# Patient Record
Sex: FEMALE | Race: WHITE | Employment: PART TIME | URBAN - METROPOLITAN AREA
[De-identification: names, ages, dates, MRNs, and addresses within clinical notes are randomized per-mention and may not be internally consistent; named-entity substitution may affect disease eponyms.]

---

## 2017-08-21 DIAGNOSIS — O36.80X0 ENCOUNTER TO DETERMINE FETAL VIABILITY OF PREGNANCY, NOT APPLICABLE OR UNSPECIFIED FETUS: Primary | ICD-10-CM

## 2017-08-25 ENCOUNTER — PRENATAL OFFICE VISIT (OUTPATIENT)
Dept: OBGYN | Facility: CLINIC | Age: 27
End: 2017-08-25
Attending: OBSTETRICS & GYNECOLOGY
Payer: COMMERCIAL

## 2017-08-25 ENCOUNTER — RADIANT APPOINTMENT (OUTPATIENT)
Dept: ULTRASOUND IMAGING | Facility: CLINIC | Age: 27
End: 2017-08-25
Attending: OBSTETRICS & GYNECOLOGY
Payer: COMMERCIAL

## 2017-08-25 DIAGNOSIS — O36.80X0 PREGNANCY WITH UNCERTAIN FETAL VIABILITY, NOT APPLICABLE OR UNSPECIFIED FETUS: Primary | ICD-10-CM

## 2017-08-25 DIAGNOSIS — O36.80X0 ENCOUNTER TO DETERMINE FETAL VIABILITY OF PREGNANCY, NOT APPLICABLE OR UNSPECIFIED FETUS: ICD-10-CM

## 2017-08-25 LAB
ABO + RH BLD: NORMAL
ABO + RH BLD: NORMAL
BLD GP AB SCN SERPL QL: NORMAL
BLOOD BANK CMNT PATIENT-IMP: NORMAL
SPECIMEN EXP DATE BLD: NORMAL

## 2017-08-25 PROCEDURE — 99202 OFFICE O/P NEW SF 15 MIN: CPT | Performed by: OBSTETRICS & GYNECOLOGY

## 2017-08-25 PROCEDURE — 86901 BLOOD TYPING SEROLOGIC RH(D): CPT | Performed by: OBSTETRICS & GYNECOLOGY

## 2017-08-25 PROCEDURE — 84702 CHORIONIC GONADOTROPIN TEST: CPT | Performed by: OBSTETRICS & GYNECOLOGY

## 2017-08-25 PROCEDURE — 36415 COLL VENOUS BLD VENIPUNCTURE: CPT | Performed by: OBSTETRICS & GYNECOLOGY

## 2017-08-25 PROCEDURE — 86900 BLOOD TYPING SEROLOGIC ABO: CPT | Performed by: OBSTETRICS & GYNECOLOGY

## 2017-08-25 PROCEDURE — 76817 TRANSVAGINAL US OBSTETRIC: CPT | Performed by: OBSTETRICS & GYNECOLOGY

## 2017-08-25 PROCEDURE — 86850 RBC ANTIBODY SCREEN: CPT | Performed by: OBSTETRICS & GYNECOLOGY

## 2017-08-25 ASSESSMENT — ANXIETY QUESTIONNAIRES
6. BECOMING EASILY ANNOYED OR IRRITABLE: NEARLY EVERY DAY
GAD7 TOTAL SCORE: 12
3. WORRYING TOO MUCH ABOUT DIFFERENT THINGS: SEVERAL DAYS
5. BEING SO RESTLESS THAT IT IS HARD TO SIT STILL: MORE THAN HALF THE DAYS
2. NOT BEING ABLE TO STOP OR CONTROL WORRYING: SEVERAL DAYS
7. FEELING AFRAID AS IF SOMETHING AWFUL MIGHT HAPPEN: MORE THAN HALF THE DAYS
IF YOU CHECKED OFF ANY PROBLEMS ON THIS QUESTIONNAIRE, HOW DIFFICULT HAVE THESE PROBLEMS MADE IT FOR YOU TO DO YOUR WORK, TAKE CARE OF THINGS AT HOME, OR GET ALONG WITH OTHER PEOPLE: SOMEWHAT DIFFICULT
1. FEELING NERVOUS, ANXIOUS, OR ON EDGE: MORE THAN HALF THE DAYS

## 2017-08-25 ASSESSMENT — PATIENT HEALTH QUESTIONNAIRE - PHQ9
5. POOR APPETITE OR OVEREATING: SEVERAL DAYS
SUM OF ALL RESPONSES TO PHQ QUESTIONS 1-9: 15

## 2017-08-25 NOTE — PATIENT INSTRUCTIONS
Ultrasound images reviewed and concerns with dating discrepancy discussed at length.  Will check serial quantitative pregnancy hormone level (today and Monday) as well as blood type today.    Will return in one week for repeat viability ultrasound if numbers rise appropriately.  Miscarriage precautions reviewed in detail

## 2017-08-25 NOTE — PROGRESS NOTES
SUBJECTIVE:                                                   Dayana Moss is a 27 year old female who presents to clinic today for the following health issue(s):  Patient presents with:  Prenatal Care      HPI:  New patient --here today for new OB visit but found to have possible early missed AB.  Were not TTC but were not preventing.  Sure LMP and had been having regular, monthly menses q28d.  No vb/spotting/cramping.  Overall feeling pretty well.  First pregnancy.    Hx ADHD --stopped her adderall once she found out she was pregnant  Hx smoking --stopped once she found out she was pregnant  Hx ovarian cyst --right side 2yrs ago; some concern for torsion but never had surgery    US today shows possible IUP with enlarged yolk sac and ?CRL of 5+6wks, no FHT; should be 8+6wks by LMP  Discussed options of repeat US vs serial quantitative HCG --will check quant today and again Monday; if rising, will repeat US on Friday with me    Patient's last menstrual period was 2017..   Patient is sexually active, .  Using nothing for contraception.    reports that she has been smoking Cigarettes.  She started smoking about 14 years ago. She has been smoking about 0.50 packs per day. She does not have any smokeless tobacco history on file.    Today's PHQ-9 Score:   PHQ-9 SCORE 2017   Total Score 15     Today's JACINTO-7 Score:   JACINTO-7 SCORE 2017   Total Score 12       Problem list and histories reviewed & adjusted, as indicated.  Additional history: as documented.    Patient Active Problem List   Diagnosis     Ovarian mass     No past surgical history on file.   Social History   Substance Use Topics     Smoking status: Current Every Day Smoker     Packs/day: 0.50     Types: Cigarettes     Start date: 2003     Smokeless tobacco: Not on file     Alcohol use 8.4 oz/week     14 Cans of beer per week           Current Outpatient Prescriptions   Medication Sig     oxyCODONE-acetaminophen (PERCOCET)  5-325 MG per tablet Take 1-2 tablets by mouth every 4 hours as needed for moderate to severe pain     ibuprofen (ADVIL,MOTRIN) 600 MG tablet Take 1 tablet (600 mg) by mouth every 6 hours as needed for moderate pain     senna-docusate (SENOKOT-S;PERICOLACE) 8.6-50 MG per tablet Take 1 tablet by mouth 2 times daily     senna-docusate (SENOKOT-S;PERICOLACE) 8.6-50 MG per tablet Take 1 tablet by mouth 2 times daily as needed for constipation     ondansetron (ZOFRAN) 4 MG tablet Take 1 tablet (4 mg) by mouth every 8 hours as needed for nausea     Amphetamine-Dextroamphetamine (ADDERALL PO) Take 25 mg by mouth     No current facility-administered medications for this visit.      No Known Allergies    ROS:  12 point review of systems negative other than symptoms noted below.  Constitutional: Fatigue and Weight Gain  Breast: Tenderness  Gastrointestinal: Bloating, Constipation and Diarrhea  Genitourinary: Cramps, No Periods and Vaginal Discharge  Skin: Acne  Neurologic: Headaches  Psychiatric: Anxiety, Depression, Difficulty Sleeping and Quinn    OBJECTIVE:     LMP 06/24/2017      Exam:  Constitutional:  Appearance: Well nourished, well developed alert, in no acute distress  Neurologic/Psychiatric:  Mental Status:  Oriented X3      In-Clinic Test Results:  Results for orders placed or performed in visit on 08/25/17 (from the past 24 hour(s))   ABO/Rh type and screen   Result Value Ref Range    ABO A     RH(D) Pos     Antibody Screen Neg     Test Valid Only At [facility]        Specimen Expires 08/28/2017        ASSESSMENT/PLAN:                                                        ICD-10-CM    1. Pregnancy with uncertain fetal viability, not applicable or unspecified fetus O36.80X0 ABO/Rh type and screen     HCG quantitative pregnancy     HCG quantitative pregnancy       Patient Instructions   Ultrasound images reviewed and concerns with dating discrepancy discussed at length.  Will check serial  quantitative pregnancy hormone level (today and Monday) as well as blood type today.    Will return in one week for repeat viability ultrasound if numbers rise appropriately.  Miscarriage precautions reviewed in detail      Nia Casanova MD  [facility] [place]

## 2017-08-25 NOTE — MR AVS SNAPSHOT
After Visit Summary   8/25/2017    Dayana Moss    MRN: [id number]           Patient Information     Date Of Birth          1990        Visit Information        Provider Department      8/25/2017 2:00 PM Nia Casanova MD; WE TRIAGE [facility]        Today's Diagnoses     Pregnancy with uncertain fetal viability, not applicable or unspecified fetus    -  1      Care Instructions    Ultrasound images reviewed and concerns with dating discrepancy discussed at length.  Will check serial quantitative pregnancy hormone level (today and Monday) as well as blood type today.    Will return in one week for repeat viability ultrasound if numbers rise appropriately.  Miscarriage precautions reviewed in detail          Follow-ups after your visit        Follow-up notes from your care team     Return in about 1 week (around 9/1/2017) for viability ultrsaound.      Your next 10 appointments already scheduled     Aug 28, 2017  8:00 AM CDT   LAB with WE LAB   [facility] [place] ([facility] [place])    [address] [id number] [phone]           Patient must bring picture ID. Patient should be prepared to give a urine specimen  Please do not eat 10-12 hours before your appointment if you are coming in fasting for labs on lipids, cholesterol, or glucose (sugar). Pregnant women should follow their Care Team instructions. Water with medications is okay. Do not drink coffee or other fluids. If you have concerns about taking  your medications, please ask at office or if scheduling via [employer], send a message by clicking on Secure Messaging, Message Your Care Team.            Sep 01, 2017  3:30 PM CDT   US PELVIC COMPLETE W TRANSVAGINAL with WEUS1   [facility] [place] ([facility] [place])    [address]  [place] [address] [phone]           Please bring a list of your  medicines (including vitamins, minerals and over-the-counter drugs). Also, tell your doctor about any allergies you may have. Wear comfortable clothes and leave your valuables at home.  Adults: Drink six 8-ounce glasses of fluid one hour before your exam. Do NOT empty your bladder.  If you need to empty your bladder before your exam, try to release only a little bit of urine. Then, drink another 8oz glass of fluid.  Children: Children who are potty trained should drink at least 4 cups (32 oz) of liquid 45 minutes to one hour prior to the exam. The child s bladder must be full in order to achieve a diagnostic exam. If your child is very uncomfortable or has an urgent need to pee, please notify a technologist; they will try to find out how much longer the wait may be and provide instructions to help relieve the pressure. Occasionally it is medically necessary to insert a urinary catheter to fill the bladder.  Please call the Imaging Department at your exam site with any questions.            Sep 01, 2017  4:00 PM CDT   SHORT with Nia Casanova MD   [facility] [place] ([facility])    [address] [place] [address]   [phone]              Future tests that were ordered for you today     Open Future Orders        Priority Expected Expires Ordered    HCG quantitative pregnancy Routine 8/28/2017 9/25/2017 8/25/2017            Who to contact     If you have questions or need follow up information about today's clinic visit or your schedule please contact [facility] [place] directly at [phone].  Normal or non-critical lab and imaging results will be communicated to you by MyChart, letter or phone within 4 business days after the clinic has received the results. If you do not hear from us within 7 days, please contact the clinic through MyChart or phone. If you have a critical or abnormal lab result, we will notify you by phone as soon as  "possible.  Submit refill requests through [email or web address] or call your pharmacy and they will forward the refill request to us. Please allow 3 business days for your refill to be completed.          Additional Information About Your Visit        [employer] Information     [email or web address] lets you send messages to your doctor, view your test results, renew your prescriptions, schedule appointments and more. To sign up, go to [email or web address] . Click on \"Log in\" on the left side of the screen, which will take you to the Welcome page. Then click on \"Sign up Now\" on the right side of the page.     You will be asked to enter the access code listed below, as well as some personal information. Please follow the directions to create your username and password.     Your access code is: [email or web address]  Expires: 2017  1:23 PM     Your access code will  in 90 days. If you need help or a new code, please call your [place] clinic or [phone].        Care EveryWhere ID     This is your Care EveryWhere ID. This could be used by other organizations to access your [place] medical records  [id number]        Your Vitals Were     Last Period                   2017            Blood Pressure from Last 3 Encounters:   08/16/15 97/57    Weight from Last 3 Encounters:   08/15/15 135 lb (61.2 kg)              We Performed the Following     ABO/Rh type and screen     HCG quantitative pregnancy        Primary Care Provider    Physician No Ref-Primary       No address on file        Equal Access to Services     COLLIN PORTILLO : Hadii brandon ku hadasho Sojbali, waaxda luqadaha, qaybta kaalmada adeegyada, jyothi vargas . So [facility] [phone].    ATENCIÓN: Si habla español, tiene a dumont disposición servicios gratuitos de asistencia lingüística. Llame al [phone].    We comply with applicable federal civil rights laws and [place] laws. We do not discriminate on the basis of race, color, national origin, age, " disability sex, sexual orientation or gender identity.            Thank you!     Thank you for choosing [facility] [place]  for your care. Our goal is always to provide you with excellent care. Hearing back from our patients is one way we can continue to improve our services. Please take a few minutes to complete the written survey that you may receive in the mail after your visit with us. Thank you!             Your Updated Medication List - Protect others around you: Learn how to safely use, store and throw away your medicines at www.disposemymeds.org.          This list is accurate as of: 8/25/17  5:26 PM.  Always use your most recent med list.                   Brand Name Dispense Instructions for use Diagnosis    ADDERALL PO      Take 25 mg by mouth        ibuprofen 600 MG tablet    ADVIL/MOTRIN    20 tablet    Take 1 tablet (600 mg) by mouth every 6 hours as needed for moderate pain    Acute abdominal pain       ondansetron 4 MG tablet    ZOFRAN    18 tablet    Take 1 tablet (4 mg) by mouth every 8 hours as needed for nausea    Nausea       oxyCODONE-acetaminophen 5-325 MG per tablet    PERCOCET    40 tablet    Take 1-2 tablets by mouth every 4 hours as needed for moderate to severe pain    Acute abdominal pain       * senna-docusate 8.6-50 MG per tablet    SENOKOT-S;PERICOLACE    60 tablet    Take 1 tablet by mouth 2 times daily    Acute abdominal pain       * senna-docusate 8.6-50 MG per tablet    SENOKOT-S;PERICOLACE    60 tablet    Take 1 tablet by mouth 2 times daily as needed for constipation    Acute abdominal pain       * Notice:  This list has 2 medication(s) that are the same as other medications prescribed for you. Read the directions carefully, and ask your doctor or other care provider to review them with you.

## 2017-08-26 LAB — B-HCG SERPL-ACNC: ABNORMAL IU/L (ref 0–5)

## 2017-08-26 ASSESSMENT — ANXIETY QUESTIONNAIRES: GAD7 TOTAL SCORE: 12

## 2017-08-28 ENCOUNTER — TELEPHONE (OUTPATIENT)
Dept: OBGYN | Facility: CLINIC | Age: 27
End: 2017-08-28

## 2017-08-28 DIAGNOSIS — O36.80X0 PREGNANCY WITH UNCERTAIN FETAL VIABILITY, NOT APPLICABLE OR UNSPECIFIED FETUS: ICD-10-CM

## 2017-08-28 LAB — B-HCG SERPL-ACNC: ABNORMAL IU/L (ref 0–5)

## 2017-08-28 PROCEDURE — 84702 CHORIONIC GONADOTROPIN TEST: CPT | Performed by: OBSTETRICS & GYNECOLOGY

## 2017-08-28 PROCEDURE — 36415 COLL VENOUS BLD VENIPUNCTURE: CPT | Performed by: OBSTETRICS & GYNECOLOGY

## 2017-08-28 NOTE — TELEPHONE ENCOUNTER
Called Dayana with HCG level results, pt notified    Has viability ultrasound scheduled for this Friday (9/1). Will double check with Dr. Casanova to see if that is still the plan. Will call pt if plan changes.

## 2017-08-28 NOTE — MR AVS SNAPSHOT
After Visit Summary   8/28/2017    Dayana Moss    MRN: [id number]           Patient Information     Date Of Birth          1990        Visit Information        Provider Department      8/28/2017 8:00 AM WE LAB [facility] [place]        Today's Diagnoses     Pregnancy with uncertain fetal viability, not applicable or unspecified fetus           Follow-ups after your visit        Your next 10 appointments already scheduled     Sep 01, 2017  3:30 PM CDT   US PELVIC COMPLETE W TRANSVAGINAL with WEUS1   [facility] [place] ([facility] [place])    [address]  [place] [address]   [phone]           Please bring a list of your medicines (including vitamins, minerals and over-the-counter drugs). Also, tell your doctor about any allergies you may have. Wear comfortable clothes and leave your valuables at home.  Adults: Drink six 8-ounce glasses of fluid one hour before your exam. Do NOT empty your bladder.  If you need to empty your bladder before your exam, try to release only a little bit of urine. Then, drink another 8oz glass of fluid.  Children: Children who are potty trained should drink at least 4 cups (32 oz) of liquid 45 minutes to one hour prior to the exam. The child s bladder must be full in order to achieve a diagnostic exam. If your child is very uncomfortable or has an urgent need to pee, please notify a technologist; they will try to find out how much longer the wait may be and provide instructions to help relieve the pressure. Occasionally it is medically necessary to insert a urinary catheter to fill the bladder.  Please call the Imaging Department at your exam site with any questions.            Sep 01, 2017  4:00 PM CDT   SHORT with Nia Casanova MD   [facility] [place] ([facility] [place])    [address]  [place] [id number] [phone]              Who to  "contact     If you have questions or need follow up information about today's clinic visit or your schedule please contact [facility] [place] directly at [phone].  Normal or non-critical lab and imaging results will be communicated to you by MyChart, letter or phone within 4 business days after the clinic has received the results. If you do not hear from us within 7 days, please contact the clinic through MyChart or phone. If you have a critical or abnormal lab result, we will notify you by phone as soon as possible.  Submit refill requests through [employer] or call your pharmacy and they will forward the refill request to us. Please allow 3 business days for your refill to be completed.          Additional Information About Your Visit        [employer] Information     [employer] lets you send messages to your doctor, view your test results, renew your prescriptions, schedule appointments and more. To sign up, go to [email or web address] . Click on \"Log in\" on the left side of the screen, which will take you to the Welcome page. Then click on \"Sign up Now\" on the right side of the page.     You will be asked to enter the access code listed below, as well as some personal information. Please follow the directions to create your username and password.     Your access code is: [email or web address]  Expires: 2017  1:23 PM     Your access code will  in 90 days. If you need help or a new code, please call your [place] clinic or [phone].        Care EveryWhere ID     This is your Care EveryWhere ID. This could be used by other organizations to access your [place] medical records  [id number]        Your Vitals Were     Last Period                   2017            Blood Pressure from Last 3 Encounters:   08/16/15 97/57    Weight from Last 3 Encounters:   08/15/15 135 lb (61.2 kg)              We Performed the Following     HCG quantitative pregnancy        Primary Care Provider    Physician No " Ref-Primary       No address on file        Equal Access to Services     LEONARDA LINDSEY : Hadii brandon duncan lupillo Mustafa, waverada luqadaha, qaybta kaalterrence jluisjena, jyothi maryin hayaadavian bazzilamberto neal sam spring. So [facility] [phone].    ATENCIÓN: Si habla español, tiene a dumont disposición servicios gratuitos de asistencia lingüística. [facility] [phone].    We comply with applicable federal civil rights laws and [place] laws. We do not discriminate on the basis of race, color, national origin, age, disability sex, sexual orientation or gender identity.            Thank you!     Thank you for choosing [facility]  for your care. Our goal is always to provide you with excellent care. Hearing back from our patients is one way we can continue to improve our services. Please take a few minutes to complete the written survey that you may receive in the mail after your visit with us. Thank you!             Your Updated Medication List - Protect others around you: Learn how to safely use, store and throw away your medicines at www.disposemymeds.org.          This list is accurate as of: 8/28/17  8:12 AM.  Always use your most recent med list.                   Brand Name Dispense Instructions for use Diagnosis    ADDERALL PO      Take 25 mg by mouth        ibuprofen 600 MG tablet    ADVIL/MOTRIN    20 tablet    Take 1 tablet (600 mg) by mouth every 6 hours as needed for moderate pain    Acute abdominal pain       ondansetron 4 MG tablet    ZOFRAN    18 tablet    Take 1 tablet (4 mg) by mouth every 8 hours as needed for nausea    Nausea       oxyCODONE-acetaminophen 5-325 MG per tablet    PERCOCET    40 tablet    Take 1-2 tablets by mouth every 4 hours as needed for moderate to severe pain    Acute abdominal pain       * senna-docusate 8.6-50 MG per tablet    SENOKOT-S;PERICOLACE    60 tablet    Take 1 tablet by mouth 2 times daily    Acute abdominal pain       * senna-docusate 8.6-50 MG per tablet     SENOKOT-S;PERICOLACE    60 tablet    Take 1 tablet by mouth 2 times daily as needed for constipation    Acute abdominal pain       * Notice:  This list has 2 medication(s) that are the same as other medications prescribed for you. Read the directions carefully, and ask your doctor or other care provider to review them with you.

## 2017-08-29 NOTE — TELEPHONE ENCOUNTER
We will not need to repeat the viability ultrasound given the drop in her pregnancy hormone consistent with miscarriage.  We will rather discuss their options moving forward --she can try to move this appointment up to tomorrow with me if she would like.  Can otherwise keep it on Friday

## 2017-08-29 NOTE — PROGRESS NOTES
MA Mirlande Dulon called patient with results and unfortunate diagnosis of miscarriage.  Will still follow up with me later this week to discuss management options.  Offered earlier appointment but patient unable to change due to work schedule.

## 2017-08-29 NOTE — TELEPHONE ENCOUNTER
Pt agrees with plan, is not able to come in tomorrow so will keep appt Friday to go over options. I will cancel ultrasound appt Friday.

## 2017-09-01 ENCOUNTER — OFFICE VISIT (OUTPATIENT)
Dept: OBGYN | Facility: CLINIC | Age: 27
End: 2017-09-01
Payer: COMMERCIAL

## 2017-09-01 VITALS — WEIGHT: 135 LBS | SYSTOLIC BLOOD PRESSURE: 102 MMHG | DIASTOLIC BLOOD PRESSURE: 64 MMHG

## 2017-09-01 DIAGNOSIS — O02.1 MISSED ABORTION: Primary | ICD-10-CM

## 2017-09-01 DIAGNOSIS — F90.2 ATTENTION DEFICIT HYPERACTIVITY DISORDER (ADHD), COMBINED TYPE: ICD-10-CM

## 2017-09-01 PROCEDURE — 99215 OFFICE O/P EST HI 40 MIN: CPT | Performed by: OBSTETRICS & GYNECOLOGY

## 2017-09-01 RX ORDER — ONDANSETRON 4 MG/1
4 TABLET, FILM COATED ORAL EVERY 8 HOURS PRN
Qty: 10 TABLET | Refills: 0 | Status: SHIPPED | OUTPATIENT
Start: 2017-09-01 | End: 2018-02-07

## 2017-09-01 RX ORDER — MISOPROSTOL 200 UG/1
800 TABLET ORAL ONCE
Qty: 4 TABLET | Refills: 1 | Status: SHIPPED | OUTPATIENT
Start: 2017-09-01 | End: 2017-09-01

## 2017-09-01 RX ORDER — HYDROCODONE BITARTRATE AND ACETAMINOPHEN 5; 325 MG/1; MG/1
1 TABLET ORAL EVERY 6 HOURS PRN
Qty: 10 TABLET | Refills: 0 | Status: SHIPPED | OUTPATIENT
Start: 2017-09-01 | End: 2018-02-07

## 2017-09-01 NOTE — MR AVS SNAPSHOT
After Visit Summary   2017    Dayana Moss    MRN: [id number]           Patient Information     Date Of Birth          1990        Visit Information        Provider Department      2017 3:30 PM Nia Casanova MD [facility]        Today's Diagnoses     Missed     -  1    Attention deficit hyperactivity disorder (ADHD), combined type          Care Instructions    Long discussion today regarding management options of miscarriage.  Discussed r/b/a to each including expectant management vs medical management with vaginal cytotec vs surgical management with suction D&C.  Yumiko would like to try medication over the weekend.  Explained expected results with heavier bleeding and cramping.  Nausea and pain medications also given.  Bleeding/pain precautions discussed.  I will follow up with Yumiko by phone on Monday.  If successful, will check home UPT in 2 weeks and call if still positive to arrange lab visit for quant.  If negative, will await one menses before resuming TTC.  Several questions answered including management of other medical issues/concerns/medications with future pregnancy, health habits before conception, sleep issues, mood issues, etc          Follow-ups after your visit        Additional Services     MENTAL HEALTH REFERRAL       Your provider has referred you to: [facility]: [facility] - ADHD & Bariatric Assessments - Adult ADHD Evaluations - [facility] [phone]   [email or web address]   *Please call to schedule an appointment.    All scheduling is subject to the client's specific insurance plan & benefits, provider/location availability, and provider clinical specialities.  Please arrive 15 minutes early for your first appointment and bring your completed paperwork.    Please be aware that coverage of these services is subject to the terms and limitations  "of your health insurance plan.  Call member services at your health plan with any benefit or coverage questions.                  Follow-up notes from your care team     Return if symptoms worsen or fail to improve.      Who to contact     If you have questions or need follow up information about today's clinic visit or your schedule please contact [facility] directly at [phone].  Normal or non-critical lab and imaging results will be communicated to you by MyChart, letter or phone within 4 business days after the clinic has received the results. If you do not hear from us within 7 days, please contact the clinic through [place] [employer] or phone. If you have a critical or abnormal lab result, we will notify you by phone as soon as possible.  Submit refill requests through Wheelz or call your pharmacy and they will forward the refill request to us. Please allow 3 business days for your refill to be completed.          Additional Information About Your Visit        Wheelz Information     Wheelz lets you send messages to your doctor, view your test results, renew your prescriptions, schedule appointments and more. To sign up, go to [email or web address] . Click on \"Log in\" on the left side of the screen, which will take you to the Welcome page. Then click on \"Sign up Now\" on the right side of the page.     You will be asked to enter the access code listed below, as well as some personal information. Please follow the directions to create your username and password.     Your access code is: [email or web address]  Expires: 2017  1:23 PM     Your access code will  in 90 days. If you need help or a new code, please call your [facility] clinic or [phone].        Care EveryWhere ID     This is your Care EveryWhere ID. This could be used by other organizations to access your [facility] medical records  [id number]        Your Vitals Were     Last Period Breastfeeding?                2017 No   "         Blood Pressure from Last 3 Encounters:   17 102/64   08/16/15 97/57    Weight from Last 3 Encounters:   17 135 lb (61.2 kg)   08/15/15 135 lb (61.2 kg)              We Performed the Following     MENTAL HEALTH REFERRAL          Today's Medication Changes          These changes are accurate as of: 17  7:16 PM.  If you have any questions, ask your nurse or doctor.               Start taking these medicines.        Dose/Directions    HYDROcodone-acetaminophen 5-325 MG per tablet   Commonly known as:  NORCO   Used for:  Missed    Started by:  Nia Casanova MD        Dose:  1 tablet   Take 1 tablet by mouth every 6 hours as needed for moderate to severe pain   Quantity:  10 tablet   Refills:  0       misoprostol 200 MCG tablet   Commonly known as:  CYTOTEC   Used for:  Missed    Started by:  Nia Casanova MD        Dose:  800 mcg   Place 4 tablets (800 mcg) vaginally once for 1 dose   Quantity:  4 tablet   Refills:  1         These medicines have changed or have updated prescriptions.        Dose/Directions    * ondansetron 4 MG tablet   Commonly known as:  ZOFRAN   This may have changed:  Another medication with the same name was added. Make sure you understand how and when to take each.   Used for:  Nausea        Dose:  4 mg   Take 1 tablet (4 mg) by mouth every 8 hours as needed for nausea   Quantity:  18 tablet   Refills:  0       * ondansetron 4 MG tablet   Commonly known as:  ZOFRAN   This may have changed:  You were already taking a medication with the same name, and this prescription was added. Make sure you understand how and when to take each.   Used for:  Missed    Changed by:  Nia Casanova MD        Dose:  4 mg   Take 1 tablet (4 mg) by mouth every 8 hours as needed for nausea   Quantity:  10 tablet   Refills:  0       * Notice:  This list has 2 medication(s) that are the same as other medications prescribed for you. Read the directions  carefully, and ask your doctor or other care provider to review them with you.         Where to get your medicines      Some of these will need a paper prescription and others can be bought over the counter.  Ask your nurse if you have questions.     Bring a paper prescription for each of these medications     HYDROcodone-acetaminophen 5-325 MG per tablet    misoprostol 200 MCG tablet    ondansetron 4 MG tablet                Primary Care Provider    Physician No Ref-Primary       No address on file        Equal Access to Services     LEONARDA PORTILLO : Hadii brandon ku cassyo Sobjali, waaxda luqadaha, qaybta kaalmada adeeglocda, waxay mariya bryantdavian thomson jackyrobert spring. So [facility] [phone].    ATENCIÓN: Si rasheeda sheridan, tiene a dumont disposición servicios gratuitos de asistencia lingüística. Llame al [phone].    We comply with applicable federal civil rights laws and [place] laws. We do not discriminate on the basis of race, color, national origin, age, disability sex, sexual orientation or gender identity.            Thank you!     Thank you for choosing [facility]  for your care. Our goal is always to provide you with excellent care. Hearing back from our patients is one way we can continue to improve our services. Please take a few minutes to complete the written survey that you may receive in the mail after your visit with us. Thank you!             Your Updated Medication List - Protect others around you: Learn how to safely use, store and throw away your medicines at www.disposemymeds.org.          This list is accurate as of: 17  7:16 PM.  Always use your most recent med list.                   Brand Name Dispense Instructions for use Diagnosis    ADDERALL PO      Take 25 mg by mouth        HYDROcodone-acetaminophen 5-325 MG per tablet    NORCO    10 tablet    Take 1 tablet by mouth every 6 hours as needed for moderate to severe pain    Missed        ibuprofen 600 MG tablet     ADVIL/MOTRIN    20 tablet    Take 1 tablet (600 mg) by mouth every 6 hours as needed for moderate pain    Acute abdominal pain       misoprostol 200 MCG tablet    CYTOTEC    4 tablet    Place 4 tablets (800 mcg) vaginally once for 1 dose    Missed        * ondansetron 4 MG tablet    ZOFRAN    18 tablet    Take 1 tablet (4 mg) by mouth every 8 hours as needed for nausea    Nausea       * ondansetron 4 MG tablet    ZOFRAN    10 tablet    Take 1 tablet (4 mg) by mouth every 8 hours as needed for nausea    Missed        oxyCODONE-acetaminophen 5-325 MG per tablet    PERCOCET    40 tablet    Take 1-2 tablets by mouth every 4 hours as needed for moderate to severe pain    Acute abdominal pain       * senna-docusate 8.6-50 MG per tablet    SENOKOT-S;PERICOLACE    60 tablet    Take 1 tablet by mouth 2 times daily    Acute abdominal pain       * senna-docusate 8.6-50 MG per tablet    SENOKOT-S;PERICOLACE    60 tablet    Take 1 tablet by mouth 2 times daily as needed for constipation    Acute abdominal pain       * Notice:  This list has 4 medication(s) that are the same as other medications prescribed for you. Read the directions carefully, and ask your doctor or other care provider to review them with you.

## 2017-09-01 NOTE — PROGRESS NOTES
SUBJECTIVE:                                                   Dayana Moss is a 27 year old female who presents to clinic today for the following health issue(s):  Patient presents with:  Consult: f/u miscarriage      HPI:  Here today for discussion regarding abnormal pregnancy.  Was seen last week for new OB visit and found to have only yolk sac and GS c/w 5+6wks when she should have been >8wks by LMP.  Very questionable fetal pole and no FHT.  Serial quants last week showed dropping levels ([id number]).  Here today to discuss options.  No vb/spotting/cramping  Still quite upset and saddened by loss   very supportive  Would like to wait but have their wedding reception on  and a week vacation/honeymoon the following week  -also several questions about medications, ADHD, sleep issues, preconception modifications, early pregnancy recommendations, etc.  Hx ADHD --stopped her adderall when she found out she was pregnant and not doing well without meds --especially at work.  No longer has psychiatrist due to insurance change and asking for referral/recommendations    Patient's last menstrual period was 2017..   Patient is sexually active, .  Using none for contraception.    reports that she has been smoking Cigarettes.  She started smoking about 14 years ago. She has been smoking about 0.50 packs per day. She has never used smokeless tobacco.      STD testing offered?  Declined    Health maintenance updated:  yes    Today's PHQ-2 Score: No flowsheet data found.  Today's PHQ-9 Score:   PHQ-9 SCORE 2017   Total Score 15     Today's JACINTO-7 Score:   JACINTO-7 SCORE 2017   Total Score 12       Problem list and histories reviewed & adjusted, as indicated.  Additional history: as documented.    Patient Active Problem List   Diagnosis     Ovarian mass     ADHD     Past Surgical History:   Procedure Laterality Date     NO HISTORY OF SURGERY        Social History   Substance Use Topics      Smoking status: Current Every Day Smoker     Packs/day: 0.50     Types: Cigarettes     Start date: 5/7/2003     Smokeless tobacco: Never Used     Alcohol use 8.4 oz/week     14 Cans of beer per week           Current Outpatient Prescriptions   Medication Sig     misoprostol (CYTOTEC) 200 MCG tablet Place 4 tablets (800 mcg) vaginally once for 1 dose     ondansetron (ZOFRAN) 4 MG tablet Take 1 tablet (4 mg) by mouth every 8 hours as needed for nausea     HYDROcodone-acetaminophen (NORCO) 5-325 MG per tablet Take 1 tablet by mouth every 6 hours as needed for moderate to severe pain     ibuprofen (ADVIL,MOTRIN) 600 MG tablet Take 1 tablet (600 mg) by mouth every 6 hours as needed for moderate pain     senna-docusate (SENOKOT-S;PERICOLACE) 8.6-50 MG per tablet Take 1 tablet by mouth 2 times daily     senna-docusate (SENOKOT-S;PERICOLACE) 8.6-50 MG per tablet Take 1 tablet by mouth 2 times daily as needed for constipation     ondansetron (ZOFRAN) 4 MG tablet Take 1 tablet (4 mg) by mouth every 8 hours as needed for nausea     Amphetamine-Dextroamphetamine (ADDERALL PO) Take 25 mg by mouth     oxyCODONE-acetaminophen (PERCOCET) 5-325 MG per tablet Take 1-2 tablets by mouth every 4 hours as needed for moderate to severe pain (Patient not taking: Reported on 9/1/2017)     No current facility-administered medications for this visit.      No Known Allergies    ROS:  12 point review of systems negative other than symptoms noted below.    OBJECTIVE:     /64  Wt 135 lb (61.2 kg)  LMP 06/24/2017  Breastfeeding? No  There is no height or weight on file to calculate BMI.    Exam:  Constitutional:  Appearance: Well nourished, well developed alert, in no acute distress  Neurologic/Psychiatric:  Mental Status:  Oriented X3      In-Clinic Test Results:  No results found for this or any previous visit (from the past 24 hour(s)).    ASSESSMENT/PLAN:                                                        ICD-10-CM    1.  Missed  O02.1 misoprostol (CYTOTEC) 200 MCG tablet     ondansetron (ZOFRAN) 4 MG tablet     HYDROcodone-acetaminophen (NORCO) 5-325 MG per tablet   2. Attention deficit hyperactivity disorder (ADHD), combined type F90.2 MENTAL HEALTH REFERRAL    already carries dx but would like psychiatrist to help with meds       Patient Instructions   Long discussion today regarding management options of miscarriage.  Discussed r/b/a to each including expectant management vs medical management with vaginal cytotec vs surgical management with suction D&C.  Yumiko would like to try medication over the weekend.  Explained expected results with heavier bleeding and cramping.  Nausea and pain medications also given.  Bleeding/pain precautions discussed.  I will follow up with Yumiko by phone on Monday.  If successful, will check home UPT in 2 weeks and call if still positive to arrange lab visit for quant.  If negative, will await one menses before resuming TTC.  Several questions answered including management of other medical issues/concerns/medications with future pregnancy, health habits before conception, sleep issues, mood issues, etc      40 min spent in discussion as above; all spent in counseling    Nia Casanova MD  [facility]

## 2017-09-02 ENCOUNTER — NURSE TRIAGE (OUTPATIENT)
Dept: NURSING | Facility: CLINIC | Age: 27
End: 2017-09-02

## 2017-09-02 NOTE — PATIENT INSTRUCTIONS
Long discussion today regarding management options of miscarriage.  Discussed r/b/a to each including expectant management vs medical management with vaginal cytotec vs surgical management with suction D&C.  Yumiko would like to try medication over the weekend.  Explained expected results with heavier bleeding and cramping.  Nausea and pain medications also given.  Bleeding/pain precautions discussed.  I will follow up with Yumiko by phone on Monday.  If successful, will check home UPT in 2 weeks and call if still positive to arrange lab visit for quant.  If negative, will await one menses before resuming TTC.  Several questions answered including management of other medical issues/concerns/medications with future pregnancy, health habits before conception, sleep issues, mood issues, etc

## 2017-09-02 NOTE — TELEPHONE ENCOUNTER
Pt states she has more vaginal bleeding today, and questions if she should take the medication that Dr. Casanova prescribed, and insisted on paging provider.    Reason for Disposition    Caller has URGENT question and triager unable to answer question    Additional Information    Negative: Shock suspected (e.g., cold/pale/clammy skin, too weak to stand, low BP, rapid pulse)    Negative: Difficult to awaken or acting confused  (e.g., disoriented, slurred speech)    Negative: Passed out (i.e., lost consciousness, collapsed and was not responding)    Negative: Sounds like a life-threatening emergency to the triager    Negative: [1] Threatened miscarriage (threatened ) suspected AND [2] has not been examined by a HCP    Negative: [1] Abdominal pain is main symptom and [2] NO vaginal bleeding in past 24 hours    Negative: [1] Vaginal bleeding is main symptom and [2] more than 4 weeks since medical visit    Negative: Not pregnant or pregnancy status unknown    Negative: SEVERE abdominal pain    Negative: [1] SEVERE vaginal bleeding(i.e., soaking 2 pads / hour, large blood clots) AND [2] present 2 or more hours    Negative: [1] MODERATE vaginal bleeding (i.e., soaking 1 pad / hour; clots) AND [2] present > 6 hours    Negative: Passed tissue (e.g., gray-white)    Negative: Pale skin (pallor) of new onset or worsening    Negative: Patient sounds very sick or weak to the triager    Negative: [1] Constant abdominal pain AND [2] present > 2 hours    Protocols used:  - THREATENED MISCARRIAGE FOLLOW-UP CALL-[facility]    Paged Dr. Casanova via Smart web at 5:13 pm Call pt Dayana Greenbergadanjenslarisa,  88, MRN [id number], increased vaginal bleeding today from miscarriage and requested page to confirm if she should take medications as directed or not. Thanks!    Jeanie Kennedy, RN, BSN  [facility]

## 2017-09-03 ENCOUNTER — APPOINTMENT (OUTPATIENT)
Dept: ULTRASOUND IMAGING | Facility: CLINIC | Age: 27
End: 2017-09-03
Attending: EMERGENCY MEDICINE
Payer: COMMERCIAL

## 2017-09-03 ENCOUNTER — HOSPITAL ENCOUNTER (EMERGENCY)
Facility: CLINIC | Age: 27
Discharge: HOME | End: 2017-09-03
Attending: EMERGENCY MEDICINE | Admitting: EMERGENCY MEDICINE
Payer: COMMERCIAL

## 2017-09-03 VITALS
RESPIRATION RATE: 16 BRPM | SYSTOLIC BLOOD PRESSURE: 106 MMHG | DIASTOLIC BLOOD PRESSURE: 58 MMHG | HEART RATE: 65 BPM | TEMPERATURE: 98.4 F | OXYGEN SATURATION: 99 %

## 2017-09-03 DIAGNOSIS — O03.9 ABORTION, COMPLETE: ICD-10-CM

## 2017-09-03 LAB
ANION GAP SERPL CALCULATED.3IONS-SCNC: 12 MMOL/L (ref 3–14)
BASOPHILS # BLD AUTO: 0 10E9/L (ref 0–0.2)
BASOPHILS NFR BLD AUTO: 0.1 %
BUN SERPL-MCNC: 12 MG/DL (ref 7–30)
CALCIUM SERPL-MCNC: 8.7 MG/DL (ref 8.5–10.1)
CHLORIDE SERPL-SCNC: 105 MMOL/L (ref 94–109)
CO2 SERPL-SCNC: 22 MMOL/L (ref 20–32)
CREAT SERPL-MCNC: 0.57 MG/DL (ref 0.52–1.04)
DIFFERENTIAL METHOD BLD: ABNORMAL
EOSINOPHIL # BLD AUTO: 0.2 10E9/L (ref 0–0.7)
EOSINOPHIL NFR BLD AUTO: 1.3 %
ERYTHROCYTE [DISTWIDTH] IN BLOOD BY AUTOMATED COUNT: 11.3 % (ref 10–15)
GFR SERPL CREATININE-BSD FRML MDRD: >90 ML/MIN/1.7M2
GLUCOSE SERPL-MCNC: 130 MG/DL (ref 70–99)
HCT VFR BLD AUTO: 40 % (ref 35–47)
HGB BLD-MCNC: 13.8 G/DL (ref 11.7–15.7)
IMM GRANULOCYTES # BLD: 0 10E9/L (ref 0–0.4)
IMM GRANULOCYTES NFR BLD: 0.3 %
LYMPHOCYTES # BLD AUTO: 1.5 10E9/L (ref 0.8–5.3)
LYMPHOCYTES NFR BLD AUTO: 11.1 %
MCH RBC QN AUTO: 32.3 PG (ref 26.5–33)
MCHC RBC AUTO-ENTMCNC: 34.5 G/DL (ref 31.5–36.5)
MCV RBC AUTO: 94 FL (ref 78–100)
MONOCYTES # BLD AUTO: 0.6 10E9/L (ref 0–1.3)
MONOCYTES NFR BLD AUTO: 4.3 %
NEUTROPHILS # BLD AUTO: 11.3 10E9/L (ref 1.6–8.3)
NEUTROPHILS NFR BLD AUTO: 82.9 %
NRBC # BLD AUTO: 0 10*3/UL
NRBC BLD AUTO-RTO: 0 /100
PLATELET # BLD AUTO: 201 10E9/L (ref 150–450)
POTASSIUM SERPL-SCNC: 3.9 MMOL/L (ref 3.4–5.3)
RBC # BLD AUTO: 4.27 10E12/L (ref 3.8–5.2)
SODIUM SERPL-SCNC: 139 MMOL/L (ref 133–144)
WBC # BLD AUTO: 13.6 10E9/L (ref 4–11)

## 2017-09-03 PROCEDURE — 99285 EMERGENCY DEPT VISIT HI MDM: CPT | Mod: 25

## 2017-09-03 PROCEDURE — 96375 TX/PRO/DX INJ NEW DRUG ADDON: CPT

## 2017-09-03 PROCEDURE — 96374 THER/PROPH/DIAG INJ IV PUSH: CPT

## 2017-09-03 PROCEDURE — 25000128 H RX IP 250 OP 636: Performed by: EMERGENCY MEDICINE

## 2017-09-03 PROCEDURE — 85025 COMPLETE CBC W/AUTO DIFF WBC: CPT | Performed by: EMERGENCY MEDICINE

## 2017-09-03 PROCEDURE — 80048 BASIC METABOLIC PNL TOTAL CA: CPT | Performed by: EMERGENCY MEDICINE

## 2017-09-03 PROCEDURE — 96361 HYDRATE IV INFUSION ADD-ON: CPT | Mod: 59

## 2017-09-03 PROCEDURE — 99285 EMERGENCY DEPT VISIT HI MDM: CPT | Mod: Z6 | Performed by: EMERGENCY MEDICINE

## 2017-09-03 PROCEDURE — 76801 OB US < 14 WKS SINGLE FETUS: CPT

## 2017-09-03 RX ORDER — OXYCODONE AND ACETAMINOPHEN 5; 325 MG/1; MG/1
1-2 TABLET ORAL EVERY 4 HOURS PRN
Qty: 15 TABLET | Refills: 0 | Status: SHIPPED | OUTPATIENT
Start: 2017-09-03 | End: 2018-02-07

## 2017-09-03 RX ORDER — SODIUM CHLORIDE 9 MG/ML
1000 INJECTION, SOLUTION INTRAVENOUS CONTINUOUS
Status: DISCONTINUED | OUTPATIENT
Start: 2017-09-03 | End: 2017-09-03 | Stop reason: HOSPADM

## 2017-09-03 RX ORDER — IBUPROFEN 800 MG/1
800 TABLET, FILM COATED ORAL EVERY 8 HOURS PRN
Qty: 60 TABLET | Refills: 0 | Status: SHIPPED | OUTPATIENT
Start: 2017-09-03 | End: 2017-09-11

## 2017-09-03 RX ORDER — KETOROLAC TROMETHAMINE 30 MG/ML
30 INJECTION, SOLUTION INTRAMUSCULAR; INTRAVENOUS ONCE
Status: COMPLETED | OUTPATIENT
Start: 2017-09-03 | End: 2017-09-03

## 2017-09-03 RX ORDER — LORAZEPAM 2 MG/ML
1 INJECTION INTRAMUSCULAR ONCE
Status: COMPLETED | OUTPATIENT
Start: 2017-09-03 | End: 2017-09-03

## 2017-09-03 RX ORDER — HYDROMORPHONE HYDROCHLORIDE 1 MG/ML
0.5 INJECTION, SOLUTION INTRAMUSCULAR; INTRAVENOUS; SUBCUTANEOUS
Status: DISCONTINUED | OUTPATIENT
Start: 2017-09-03 | End: 2017-09-03 | Stop reason: HOSPADM

## 2017-09-03 RX ADMIN — SODIUM CHLORIDE 1000 ML: 9 INJECTION, SOLUTION INTRAVENOUS at 15:55

## 2017-09-03 RX ADMIN — KETOROLAC TROMETHAMINE 30 MG: 30 INJECTION, SOLUTION INTRAMUSCULAR at 16:10

## 2017-09-03 RX ADMIN — LORAZEPAM 1 MG: 2 INJECTION INTRAMUSCULAR; INTRAVENOUS at 16:00

## 2017-09-03 RX ADMIN — HYDROMORPHONE HYDROCHLORIDE 0.5 MG: 1 INJECTION, SOLUTION INTRAMUSCULAR; INTRAVENOUS; SUBCUTANEOUS at 16:05

## 2017-09-03 ASSESSMENT — ENCOUNTER SYMPTOMS
VOMITING: 1
NAUSEA: 1
ABDOMINAL PAIN: 1

## 2017-09-03 NOTE — ED PROVIDER NOTES
History     Chief Complaint   Patient presents with     Miscarriage     abdominal pain and vomiting     HPI  Dayana Moss is a 27 year old,  female who presents with abdominal pain and vaginal bleeding. The patient recently underwent an ultrasound on 17 that revealed likely missed AB, measuring at 5w6d pregnant when patient was 8w6d pregnant by last menstrual period. She was evaluated by her OB/Gyn physician on 17 (2 days ago) and was given a prescription for Cytotec. The patient took the Cytotec this morning around 9:30 AM along with Zofran and ibuprofen. She then developed vaginal bleeding and severe abdominal cramping. She did take a Norco around 1:00 PM for the pain, however, the cramping has continued to worsen, prompting arrival to the ED. She has saturated a few pads since this morning. She denies taking any other medications since 1:00 PM. She is not on any other medications currently. She complains of nausea and vomiting as well.     Past Medical History:   Diagnosis Date     ADHD      Ovarian cyst      Smoking     quit with +UPT       Past Surgical History:   Procedure Laterality Date     NO HISTORY OF SURGERY         No family history on file.    Social History   Substance Use Topics     Smoking status: Former Smoker     Packs/day: 0.50     Types: Cigarettes     Start date: 2003     Smokeless tobacco: Never Used     Alcohol use 8.4 oz/week     14 Cans of beer per week     No current facility-administered medications for this encounter.      Current Outpatient Prescriptions   Medication     oxyCODONE-acetaminophen (PERCOCET) 5-325 MG per tablet     ondansetron (ZOFRAN) 4 MG tablet     HYDROcodone-acetaminophen (NORCO) 5-325 MG per tablet     senna-docusate (SENOKOT-S;PERICOLACE) 8.6-50 MG per tablet     senna-docusate (SENOKOT-S;PERICOLACE) 8.6-50 MG per tablet     ondansetron (ZOFRAN) 4 MG tablet     Amphetamine-Dextroamphetamine (ADDERALL PO)      No Known Allergies     I have  reviewed the Medications, Allergies, Past Medical and Surgical History, and Social History in the Epic system.    Review of Systems   Gastrointestinal: Positive for abdominal pain (cramping), nausea and vomiting.   Genitourinary: Positive for vaginal bleeding.   All other systems reviewed and are negative.      Physical Exam   BP: 127/59  Pulse: 71  Temp: 96.3  F (35.7  C)  Resp: 16  Weight:  (EKTA)  SpO2: 100 %  Physical Exam Exam:  Constitutional: healthy, alert and in pain  Head: Normocephalic. No masses, lesions, tenderness or abnormalities  Neck: Neck supple. No adenopathy. Thyroid symmetric, normal size,, Carotids without bruits.  ENT: ENT exam normal, no neck nodes or sinus tenderness  Cardiovascular: negative, PMI normal. No lifts, heaves, or thrills. RRR. No murmurs, clicks gallops or rub  Respiratory: negative, Percussion normal. Good diaphragmatic excursion. Lungs clear  Gastrointestinal: Abdomen diffuse tender  : Deferred  Musculoskeletal: extremities normal- no gross deformities noted, gait normal and normal muscle tone  Skin: no suspicious lesions or rashes  Neurologic: Gait normal. Reflexes normal and symmetric. Sensation grossly WNL.  Psychiatric: mentation appears normal and in pain  Hematologic/Lymphatic/Immunologic: Normal cervical lymph nodes        ED Course     ED Course     Procedures     3:42 PM  The patient was seen and examined by Dr. Rose in Room 4.               Labs Ordered and Resulted from Time of ED Arrival Up to the Time of Departure from the ED   CBC WITH PLATELETS DIFFERENTIAL - Abnormal; Notable for the following:        Result Value    WBC 13.6 (*)     Absolute Neutrophil 11.3 (*)     All other components within normal limits   BASIC METABOLIC PANEL - Abnormal; Notable for the following:     Glucose 130 (*)     All other components within normal limits                US OB <14 Weeks W Transvaginal (Final result) Result time: 09/03/17 18:39:52     Final result by Jai Street  MD Terrell (17 18:39:52)     Impression:     IMPRESSION: Empty uterus. No gestational sac identified. No adnexal  mass is seen. The findings would be consistent with a completed  .    ISHMAEL PURCELL MD     Narrative:     US OB <14 WEEKS WITH TRANSVAGINAL SINGLE  9/3/2017 5:03 PM     HISTORY:  IUP noted and took cytotec today and now severe pain    COMPARISON: Ultrasound exam dated 2017    TECHNIQUE: Transabdominal and transvaginal imaging was performed.  Transvaginal exam performed to better evaluate the uterus, ovaries and  adnexa.    FINDINGS: The previous ultrasound showed an intrauterine gestation. No  intrauterine gestation is identified on the current study.. No yolk  sac is seen. The endometrium stripe measures 1 cm in thickness. No  embryo is seen. The right ovary appears normal there appears to be a 1  cm corpus luteum cyst in the right ovary.. Left ovary appears normal.  No hemorrhage identified. No adnexal mass. No free fluid.         Assessments & Plan (with Medical Decision Making)   MDM  27-year-old female who is here after taking Cytotec this morning.  Patient is approximately 8 weeks pregnant and had an iup noted via ultrasound approximately 9 days ago.  Patient states that it was deemed to be a nonviable pregnancy and was given an option for Cytotec which she took this morning.  Patient had also taken this morning some ibuprofen as well as some narcotics this afternoon.  Patient states that since then increasing bleeding vaginally as well as increasing in pain that is similar to her usual menstrual pain except much severe.  Patient denies any urinary symptoms.  Physical exam is difficult currently secondary to being in pain but she is diffusely tender.  I am concerned for vaginal bleeding secondary to threatened AB versus induced AB versus torsion.    Labwork does not show any evidence of anemia and U/S shows empty uterus and no gestational sac and no adnexal mass. Will d/c home and  f/u with PCP.    I have reviewed the nursing notes.    I have reviewed the findings, diagnosis, plan and need for follow up with the patient.    Discharge Medication List as of 9/3/2017  6:13 PM          Final diagnoses:   , complete     I, Alyssa Beasley, am serving as a trained medical scribe to document services personally performed by Agusto Rose MD, based on the provider's statements to me.   IAgusto MD, was physically present and have reviewed and verified the accuracy of this note documented by Alyssa Beasley.     9/3/2017   [facility], [place], EMERGENCY DEPARTMENT     Agusto Rose MD  [phone]

## 2017-09-03 NOTE — ED AVS SNAPSHOT
[facility], Emergency Department    [address]    [facility] [address]    Phone:  [phone]    Fax:  [phone]                                       Dayana Moss   MRN: [id number]    Department:  [facility], Emergency Department   Date of Visit:  9/3/2017           After Visit Summary Signature Page     I have received my discharge instructions, and my questions have been answered. I have discussed any challenges I see with this plan with the nurse or doctor.    ..........................................................................................................................................  Patient/Patient Representative Signature      ..........................................................................................................................................  Patient Representative Print Name and Relationship to Patient    ..................................................               ................................................  Date                                            Time    ..........................................................................................................................................  Reviewed by Signature/Title    ...................................................              ..............................................  Date                                                            Time

## 2017-09-03 NOTE — DISCHARGE INSTRUCTIONS
Please make an appointment to follow up with your OB provider within 5-7 days    Completed Spontaneous Miscarriage  Today's exams show your pregnancy has ended suddenly. This can be emotionally difficult. There is little that can be done to change the way you feel. But understand that miscarriages are common.  About 1 or 2 out of every 10 pregnancies end this way. Some end even before you know you are pregnant. This happens for a number of reasons, and usually the cause is never known. It s important you know that it is not your fault. It didn t happen because you did anything wrong.  Having sex or exercising does not cause a miscarriage. These activities are usually safe unless you have pain or bleeding or your doctor tells you to stop. Even minor falls won t cause a miscarriage. Miscarriages happen because things were not developing as they were supposed to.  It appears that your miscarriage is complete. All tissue from the pregnancy should have passed out of your uterus. If some of the pregnancy tissue remains in the uterus, you will probably have more cramping and bleeding. The bleeding can be light spotting or like a period, but it is usually not heavy. You may also pass some tissue.  After you have recovered, you should still be able to get pregnant again. But before trying, talk with your healthcare provider.  Home care  Follow these tips to take of yourself at home:    You can go back to your normal activities if you don t have heavy bleeding or pain.    You may have some cramping and bleeding, but it shouldn t be severe.  Until the bleeding stops completely and to prevent infection:    Don t have sex until your healthcare provider says it s OK    Use sanitary napkins instead of tampons.    Don t douche.  Having a miscarriage can be very difficult emotionally. It is natural to feel sadness or grief. It may help to talk about your feelings with family and friends, or with a counselor.  Follow-up  care  Make an appointment to see your healthcare provider in 1 to 2 weeks for a checkup.  If cramping and bleeding return and continue for more than a few days, call your healthcare provider or return here for an exam. To prevent infection in the uterus, your provider might need to take out any tissue that remains. Or you may be given medicine to take at home to help your body expel the rest of the tissue.  If you had an ultrasound, a radiologist will review it. You will be told of any new findings that may affect your care.  Call 911  Call 911 if you have:    Severe pain and very heavy bleeding    Severe lightheadedness, passing out, or fainting    Rapid heart rate    Difficulty breathing    Confusion or difficulty waking up  When to seek medical advice  Call your healthcare provider right away if any of these occur:    Heavy bleeding. This means soaking 1 new pad an hour over 3 hours.    Bleeding that doesn t stop after 10 days    Foul-smelling vaginal discharge    Fever of 100.4 F (38 C) or higher, or as directed by your healthcare provider    Pain in your lower belly (abdomen) that gets worse    Weakness or dizziness  Date Last Reviewed: 9/1/2016 2000-2017 [employer]. [address] [place] [address], [place] [address]. All rights reserved. This information is not intended as a substitute for professional medical care. Always follow your healthcare professional's instructions.

## 2017-09-03 NOTE — ED AVS SNAPSHOT
[facility], Emergency Department    [address]    [place] [address]    Phone:  [phone]    Fax:  [phone]                                       Dayana Moss   MRN: [id number]    Department:  [facility], Emergency Department   Date of Visit:  9/3/2017           Patient Information     Date Of Birth          1990        Your diagnoses for this visit were:     , complete        You were seen by Agusto Rose MD.        Discharge Instructions           Please make an appointment to follow up with your OB provider within 5-7 days    Completed Spontaneous Miscarriage  Today's exams show your pregnancy has ended suddenly. This can be emotionally difficult. There is little that can be done to change the way you feel. But understand that miscarriages are common.  About 1 or 2 out of every 10 pregnancies end this way. Some end even before you know you are pregnant. This happens for a number of reasons, and usually the cause is never known. It s important you know that it is not your fault. It didn t happen because you did anything wrong.  Having sex or exercising does not cause a miscarriage. These activities are usually safe unless you have pain or bleeding or your doctor tells you to stop. Even minor falls won t cause a miscarriage. Miscarriages happen because things were not developing as they were supposed to.  It appears that your miscarriage is complete. All tissue from the pregnancy should have passed out of your uterus. If some of the pregnancy tissue remains in the uterus, you will probably have more cramping and bleeding. The bleeding can be light spotting or like a period, but it is usually not heavy. You may also pass some tissue.  After you have recovered, you should still be able to get pregnant again. But before trying, talk with your healthcare provider.  Home care  Follow these tips to take of yourself at home:    You can go back to your normal activities if  you don t have heavy bleeding or pain.    You may have some cramping and bleeding, but it shouldn t be severe.  Until the bleeding stops completely and to prevent infection:    Don t have sex until your healthcare provider says it s OK    Use sanitary napkins instead of tampons.    Don t douche.  Having a miscarriage can be very difficult emotionally. It is natural to feel sadness or grief. It may help to talk about your feelings with family and friends, or with a counselor.  Follow-up care  Make an appointment to see your healthcare provider in 1 to 2 weeks for a checkup.  If cramping and bleeding return and continue for more than a few days, call your healthcare provider or return here for an exam. To prevent infection in the uterus, your provider might need to take out any tissue that remains. Or you may be given medicine to take at home to help your body expel the rest of the tissue.  If you had an ultrasound, a radiologist will review it. You will be told of any new findings that may affect your care.  Call 911  Call 911 if you have:    Severe pain and very heavy bleeding    Severe lightheadedness, passing out, or fainting    Rapid heart rate    Difficulty breathing    Confusion or difficulty waking up  When to seek medical advice  Call your healthcare provider right away if any of these occur:    Heavy bleeding. This means soaking 1 new pad an hour over 3 hours.    Bleeding that doesn t stop after 10 days    Foul-smelling vaginal discharge    Fever of 100.4 F (38 C) or higher, or as directed by your healthcare provider    Pain in your lower belly (abdomen) that gets worse    Weakness or dizziness  Date Last Reviewed: 9/1/2016 2000-2017 [employer]. [address] [place] [address], [place] [address]. All rights reserved. This information is not intended as a substitute for professional medical care. Always follow your healthcare professional's instructions.          24 Hour Appointment Hotline       To  make an appointment at any [facility] clinic, call [phone] ([phone]). If you don't have a family doctor or clinic, we will help you find one. [facility] clinics are conveniently located to serve the needs of you and your family.             Review of your medicines      CONTINUE these medicines which may have CHANGED, or have new prescriptions. If we are uncertain of the size of tablets/capsules you have at home, strength may be listed as something that might have changed.        Dose / Directions Last dose taken    ibuprofen 800 MG tablet   Commonly known as:  ADVIL/MOTRIN   Dose:  800 mg   What changed:    - medication strength  - how much to take  - when to take this   Quantity:  60 tablet        Take 1 tablet (800 mg) by mouth every 8 hours as needed for moderate pain   Refills:  0        oxyCODONE-acetaminophen 5-325 MG per tablet   Commonly known as:  PERCOCET   Dose:  1-2 tablet   What changed:  reasons to take this   Quantity:  15 tablet        Take 1-2 tablets by mouth every 4 hours as needed for pain   Refills:  0          Our records show that you are taking the medicines listed below. If these are incorrect, please call your family doctor or clinic.        Dose / Directions Last dose taken    ADDERALL PO   Dose:  25 mg        Take 25 mg by mouth   Refills:  0        HYDROcodone-acetaminophen 5-325 MG per tablet   Commonly known as:  NORCO   Dose:  1 tablet   Quantity:  10 tablet        Take 1 tablet by mouth every 6 hours as needed for moderate to severe pain   Refills:  0        * ondansetron 4 MG tablet   Commonly known as:  ZOFRAN   Dose:  4 mg   Quantity:  18 tablet        Take 1 tablet (4 mg) by mouth every 8 hours as needed for nausea   Refills:  0        * ondansetron 4 MG tablet   Commonly known as:  ZOFRAN   Dose:  4 mg   Quantity:  10 tablet        Take 1 tablet (4 mg) by mouth every 8 hours as needed for nausea   Refills:  0        * senna-docusate 8.6-50 MG per tablet   Commonly  known as:  SENOKOT-S;PERICOLACE   Dose:  1 tablet   Quantity:  60 tablet        Take 1 tablet by mouth 2 times daily   Refills:  1        * senna-docusate 8.6-50 MG per tablet   Commonly known as:  SENOKOT-S;PERICOLACE   Dose:  1 tablet   Quantity:  60 tablet        Take 1 tablet by mouth 2 times daily as needed for constipation   Refills:  1        * Notice:  This list has 4 medication(s) that are the same as other medications prescribed for you. Read the directions carefully, and ask your doctor or other care provider to review them with you.            Prescriptions were sent or printed at these locations (2 Prescriptions)                   Other Prescriptions                Printed at Department/Unit printer (2 of 2)         oxyCODONE-acetaminophen (PERCOCET) 5-325 MG per tablet               ibuprofen (ADVIL/MOTRIN) 800 MG tablet                Procedures and tests performed during your visit     Basic metabolic panel    CBC with platelets differential    US OB <14 Weeks W Transvaginal      Orders Needing Specimen Collection     Ordered          09/03/17 1541  UA reflex to Microscopic and Culture - STAT, [facility]: STAT, Needs to be Collected     Scheduled Task Status   09/03/17 1541 Collect UA reflex to Microscopic and Culture Open   Order Class:  PCU Collect                  Pending Results     Date and Time Order Name Status Description    9/3/2017 1547 US OB <14 Weeks W Transvaginal Preliminary             Pending Culture Results     No orders found from 9/1/2017 to 9/4/2017.            Pending Results Instructions     If you had any lab results that were not finalized at the time of your Discharge, you can call the ED Lab Result RN at [phone]. You will be contacted by this team for any positive Lab results or changes in treatment. The nurses are available 7 days a week from 10A to 6:30P.  You can leave a message 24 hours per day and they will return your call.        Thank you for choosing [facility]      "  Thank you for choosing [facility] for your care. Our goal is always to provide you with excellent care. Hearing back from our patients is one way we can continue to improve our services. Please take a few minutes to complete the written survey that you may receive in the mail after you visit with us. Thank you!        [facility] Information     [employer] lets you send messages to your doctor, view your test results, renew your prescriptions, schedule appointments and more. To sign up, go to [email or web address] . Click on \"Log in\" on the left side of the screen, which will take you to the Welcome page. Then click on \"Sign up Now\" on the right side of the page.     You will be asked to enter the access code listed below, as well as some personal information. Please follow the directions to create your username and password.     Your access code is: [email or web address]  Expires: 2017  1:23 PM     Your access code will  in 90 days. If you need help or a new code, please call your [facility] clinic or [phone].        Care EveryWhere ID     This is your Care EveryWhere ID. This could be used by other organizations to access your [facility] medical records  [id number]        Equal Access to Services     LEONARDA PORTILLO : Igor Mustafa, waaxda luz mariaadaha, qaybta kaalmada adelambertoyada, jyothi spring. So [facility] [phone].    ATENCIÓN: Si habla español, tiene a dumont disposición servicios gratuitos de asistencia lingüística. Llame al [phone].    We comply with applicable federal civil rights laws and [place] laws. We do not discriminate on the basis of race, color, national origin, age, disability sex, sexual orientation or gender identity.            After Visit Summary       This is your record. Keep this with you and show to your community pharmacist(s) and doctor(s) at your next visit.                  "

## 2017-10-19 ENCOUNTER — OFFICE VISIT (OUTPATIENT)
Dept: PSYCHOLOGY | Facility: CLINIC | Age: 27
End: 2017-10-19
Payer: COMMERCIAL

## 2017-10-19 DIAGNOSIS — F41.1 GENERALIZED ANXIETY DISORDER: Primary | ICD-10-CM

## 2017-10-19 PROCEDURE — 90791 PSYCH DIAGNOSTIC EVALUATION: CPT | Performed by: PSYCHOLOGIST

## 2017-10-19 NOTE — PROGRESS NOTES
Adult Intake Structured Interview  Standard Diagnostic Assessment      CLIENT'S NAME: Dayana Moss  MRN:   [id number]  :   1990  ACCT. NUMBER: [id number]  DATE OF SERVICE: 10/19/17      Identifying Information:  Dayana Moss is a 27 year old, ,  female. Client was referred for counseling by self and family. Client is currently employed full time.  in . Client attended the session alone.       Client's Statement of Presenting Concern:  Client reports the reason for seeking therapy at this time as : she went off her medication in July due to being pregnant but then miscarried in September. She decided not to restart her ADHD meds to see how it would go without them. She has been on the meds since age 18 or 19. She was originally diagnosed by her PCP after being referred for an ADHD evaluation by the therapist she was seeing at the time.  She was on Adderral 40 mg EX, with an additional  5 mg PRN immediate release for PM use. Client stated that her symptoms have resulted in the following functional impairments: home life with her , management of the household and or completion of tasks, operation of a motor vehicle, organization, relationship(s), social interactions and work / vocational responsibilities  She reports trouble focusing, particularly with reading comprehension and retention. She feels mnroe disorganized, with difficulty completing tasks, losing, forgetting things, late for appointments. Etc.       History of Presenting Concern:  Client reports that these problem(s) began as a child, with petrona a 'social butterfly, difficulty sitting still, and difficulty paying attention'. She was orginally referred for ADHD testing in college by her therapist at the time. Client has  attempted to resolve these concerns in the past through counseling and medications. Client reports that other professional(s) are not involved in providing support / services, but she is open to counseling and medications after clarification of her diagnosis has been established.       Social History:  Client reported she grew up in [place], [place]. They were the second born of two children with three step-sisters on her step-father' sside. Her biological mother lives in [place], [place] with her .  Her father and step-mother are  and living in [place]. She was age 2 when her biological parents divorces Client reported that her childhood was good. She was moved around a lot, but parents were supportive. Client described her current relationships with family of origin as supportive.    Client reported a history of one committed relationships or marriages. Client has been  for 10 months. Client reported having no children. Client identified some stable and meaningful social connections. Her sister lives closes and is supportive. Client reported that she has not been involved with the legal system.  Client's highest education level was college graduate with bachelors in social work and psychology. Client did not identify any diagnosed learning disorders, but did have difficulty with learning and retention throughout her educational career, including attention and concentration. She has had particular difficulty with reading and math throughout her life. There are no ethnic, cultural or Yazdanism factors that may be relevant for therapy. Client identified her preferred language to be English. Client reported she does not need the assistance of an  or other support involved in therapy. Modifications will not be used to assist communication in therapy. Client did not serve in the .     Mental Health History:  Client reported the following biological family members or relatives with mental  health issues: Father experienced Anxiety, Mother experienced Anxiety, Maternal Grandfather experienced Depression, Sister experienced Anxiety and Depression and step-mother experienced Bipolar Disorder, Obsessive compulsive disorder and a Personality Disorder .  Client previously received the following mental health diagnosis: ADHD.  Client has received the following mental health services in the past: counseling and medication(s) from physician / PCP.  Hospitalizations: None.  Client is not currently receiving any mental health services.      Chemical Health History:  Client reported the following biological family members or relatives with chemical health issues: Father reportedly uses alcohol , Maternal Grandmother reportedly uses alcohol , Maternal Grandfather reportedly uses alcohol , Mother reportedly uses alcohol , Paternal Grandmother reportedly uses alcohol , Paternal Grandfather reportedly uses alcohol , Sister reportedly uses alcohol , Stepfather reportedly uses alcohol . Client has not received chemical dependency treatment in the past. Client is not currently receiving any chemical dependency treatment. Client reports no problems as a result of their drinking / drug use, though she does think she drinks more that she should at times.      Client Reports:  Client reports using alcohol 2 times per day and has 1 beers at a time. Client first started drinking at age 8.  Client reports using tobacco 2 times per day. Client started using tobacco at age 13..  Client reports using marijuana 2 times per month and smokes 1 at a time. Client started using marijuana at age 16.  Client reports using caffeine 1 times per day and drinks 1 at a time. Client started using caffeine at age 13.  Client denies using street drugs.  Last use of Xstacy and cocaine 6 months ago  Client denies the non-medical use of prescription or over the counter drugs.    CAGE: C     Patient felt they ought to CUT down on your drinking (or  drug use).   Based on the negative Cage-Aid score and clinical interview there  are not indications of drug or alcohol abuse.    Discussed the general effects of drugs and alcohol on health and well-being and the impact of drugs and alcohol when used during pregnancy.       Significant Losses / Trauma / Abuse / Neglect Issues:  There are indications or report of significant loss, trauma, abuse or neglect issues related to: death of unborn child through miscarriage in September 2017.    Issues of possible neglect are not present.      Medical Issues:  Client has not had a physical exam to rule out medical causes for current symptoms. Date of last physical exam was greater than a year ago and client was encouraged to schedule an exam with PCP. The client does not have a Primary Care Provider and was encouraged to establish care with a PCP.. The client reports not having a psychiatrist. Client reports no current medical concerns. The client denies the presence of chronic or episodic pain. There are not significant nutritional concerns, though she reports craving sweets and carbs much of the time. She reports a chronic problem with sleeping, with difficulty falling asleep and staying a sleep, as well as being a light sleeper. She does experience frequent nightmares.    Client reports current meds as:   Current Outpatient Prescriptions   Medication Sig     oxyCODONE-acetaminophen (PERCOCET) 5-325 MG per tablet Take 1-2 tablets by mouth every 4 hours as needed for pain     ondansetron (ZOFRAN) 4 MG tablet Take 1 tablet (4 mg) by mouth every 8 hours as needed for nausea     HYDROcodone-acetaminophen (NORCO) 5-325 MG per tablet Take 1 tablet by mouth every 6 hours as needed for moderate to severe pain     senna-docusate (SENOKOT-S;PERICOLACE) 8.6-50 MG per tablet Take 1 tablet by mouth 2 times daily     senna-docusate (SENOKOT-S;PERICOLACE) 8.6-50 MG per tablet Take 1 tablet by mouth 2 times daily as needed for constipation      ondansetron (ZOFRAN) 4 MG tablet Take 1 tablet (4 mg) by mouth every 8 hours as needed for nausea     Amphetamine-Dextroamphetamine (ADDERALL PO) Take 25 mg by mouth     No current facility-administered medications for this visit.        Client Allergies:  the following allergies to medications: One mdeication she was given in [employer] for pain, but  doesn't know what it's called    Medical History:  Past Medical History:   Diagnosis Date     ADHD      Ovarian cyst      Smoking     quit with +UPT         Medication Adherence:  N/A - Client does not have prescribed psychiatric medications.    Client was provided recommendation to follow-up with prescribing physician.    Mental Status Assessment:  Appearance:   Appropriate   Eye Contact:   Good   Psychomotor Behavior: Normal   Attitude:   Cooperative   Orientation:   All  Speech   Rate / Production: Normal    Volume:  Normal   Mood:    Anxious  Normal Sad   Affect:    Appropriate  Constricted   Thought Content:  Clear   Thought Form:  Coherent  Logical   Insight:    Good       Review of Symptoms:  Depression: Sleep Interest Guilt Energy Concentration Psychomotor slowing or agitation Suicide Worthless Irritability  Geri:  No symptoms  Psychosis: No symptoms  Anxiety: Worries Nervousness  Panic:  Palpitations Shortness of Breath Tingling Numbness Sense of Impending Doom Triggers: unknown. The attacks tend to happen at night, two in the 6 weeks   Post Traumatic Stress Disorder: No symptoms  Obsessive Compulsive Disorder: No symptoms  Eating Disorder: No symptoms  Oppositional Defiant Disorder: No symptoms  ADD / ADHD: Attention Listening Task Completion Organization Distractiblity Forgetful Interrupts  Conduct Disorder: No symptoms      Safety Assessment:    History of Safety Concerns:   Client denied a history of suicidal ideation.    Client denied a history of suicide attempts.    Client denied a history of homicidal ideation.    Client reported a history of  self-injurious ideation.  Onset: in high school and frequency: no SIB since high school.  Client reported a history of self-injurious behaivors: superficial cutting in high school.  .  Client denied a history of personal safety concerns.    Client denied a history of assaultive behaviors.        Current Safety Concerns:  Client denies current suicidal ideation.    Client denies current homicidal ideation and behaviors.  Client denies current self-injurious ideation and behaviors.    Client denies current concerns for personal safety.      Client reports there are no firearms in the house.     Plan for Safety and Risk Management:  A safety and risk management plan has not been developed at this time, however client was given the after-hours number / 911 should there be a change in any of these risk factors.    Client's Strengths and Limitations:  Client identified the following strengths or resources that will help her succeed in counseling: commitment to health and well being, friends / good social support, family support, intelligence, positive work environment and sense of humor. Client identified the following supports: family and friends. Things that may interfere with the client's success in counseling include: none identified.      Diagnostic Criteria:  B. The person finds it difficult to control the worry.   - Restlessness or feeling keyed up or on edge.    - Being easily fatigued.    - Difficulty concentrating or mind going blank.    - Irritability.    - Muscle tension.    - Sleep disturbance (difficulty falling or staying asleep, or restless unsatisfying sleep).   D. The focus of the anxiety and worry is not confined to features of an Axis I disorder.  E. The anxiety, worry, or physical symptoms cause clinically significant distress or impairment in social, occupational, or other important areas of functioning.   F. The disturbance is not due to the direct physiological effects of a substance (e.g., a drug  of abuse, a medication) or a general medical condition (e.g., hyperthyroidism) and does not occur exclusively during a Mood Disorder, a Psychotic Disorder, or a Pervasive Developmental Disorder.  2. At least one of the attacks has been followed by 1 month (or more) of one (or more) of the following:     (a) persistent concern about having additional attacks  3. Absence of agoraphobia  4. The panic attacks are not to the the direct physiological effects of a substance or general medical condition  5. The panic attacks are not better accounted for by another mental disorder, such as social phobia, specific phobia, OCD, PTSD, or separation anxiety disorder  A) A persistent pattern of inattention and/or hyperactivity-impulsivity that interferes with functioning or development, as characterized by (1) Inattention and/or (2) Hyperactivity and Impulsivity  - Often fails to give close attention to details or makes careless mistakes in schoolwork, at work, or during other activities  - Often has difficulty sustaining attention in tasks or play activities  - Often does not follow through on instructions and fails to finish schoolwork, chores, or duties in the workplace  - Often has difficulty organizing tasks and activities  - Often avoids, dislikes, or is reluctant to engage in tasks that require sustained mental effort  - Often loses things necessary for tasks or activities  - Is often easily distractedby extraneous stimuli  - Is often forgetful in daily activities  - Often fidgets with or taps hands or feet or squirms in seat  - Often interrupts or intrudes on others  B) Several inattentive or hyperactive-impulsive symptoms were present prior to age 11 years  C) Several inattentive or hyperactive-impulsive symptoms are present in two or more settings  D) There is clear evidence that the symptoms interfere with, or reduce the quality of, social academic, or occupational functioning  E) The Symptoms do not occur exclusively  during the course of schizophrenia or another psychotic disorder and are not better explained by another mental disorder  A. The development of emotional or behavioral symptoms in response to an identifiable stressor(s) occurring within 3 months of the onset of the stressor(s)  B. These symptoms or behaviors are clinically significant, as evidenced by one or both of the following:       - Significant impairment in social, occupational, or other important areas of functioning  C. The stress-related disturbance does not meet criteria for another disorder & is not not an exacerbation of another mental disorder      Functional Status:  Client's symptoms have caused and are causing reduced functional status in the following areas: Activities of Daily Living, Occupational / Vocational. Social / Relational      DSM5 Diagnoses: (Sustained by DSM5 Criteria Listed Above)  Diagnoses: Attention-Deficit/Hyperactivity Disorder  314.01 (F90.2) Combined presentation, by history  Rule-out 300.01 (F41.0) Panic Disorder  Rule -out 300.02 (F41.1) Generalized Anxiety Disorder  Rule-out 308.3(F43.0) Acute Stress Disorder  Psychosocial & Contextual Factors: Issues pertaining to grief and loss, primary relationships, career/occupation, activities of daily living  WHODAS 2.0 (12 item)            This questionnaire asks about difficulties due to health conditions. Health conditions  include  disease or illnesses, other health problems that may be short or long lasting,  injuries, mental health or emotional problems, and problems with alcohol or drugs.                     Think back over the past 30 days and answer these questions, thinking about how much  difficulty you had doing the following activities. For each question, please [place] only  one response.    S1 Standing for long periods such as 30 minutes? None =         1   S2 Taking care of household responsibilities? Moderate =   3   S3 Learning a new task, for example, learning how to  get to a new place? Severe =       4   S4 How much of a problem do you have joining community activities (for example, festivals, Hinduism or other activities) in the same way as anyone else can? Moderate =   3   S5 How much have you been emotionally affected by your health problems? Severe =       4     In the past 30 days, how much difficulty did you have in:   S6 Concentrating on doing something for ten minutes? Severe =       4   S7 Walking a long distance such as a kilometer (or equivalent)? None =         1   S8 Washing your whole body? None =         1   S9 Getting dressed? None =         1   S10 Dealing with people you do not know? None =         1   S11 Maintaining a friendship? Moderate =   3   S12 Your day to day work? Severe =       4     H1 Overall, in the past 30 days, how many days were these difficulties present? Record number of days 30   H2 In the past 30 days, for how many days were you totally unable to carry out your usual activities or work because of any health condition? Record number of days  3   H3 In the past 30 days, not counting the days that you were totally unable, for how many days did you cut back or reduce your usual activities or work because of any health condition? Record number of days 27         Collaboration:  Collaboration with other professionals is not indicated at this time.      Preliminary Treatment Plan:  The client reports no currently identified Hinduism, ethnic or cultural issues relevant to therapy.     services are not indicated.    Modifications to assist communication are not indicated.    The concerns identified by the client will be addressed through neuropsychological evaluation process    Evaluation and rule-out of differential diagnosis to focus on: Anxiety, Grief / Loss, Attentional Problems.    Referral to another professional/service is not indicated at this time..    A Release of Information is not needed at this time.    Report to child /  adult protection services was NA.    Client will have access to their [facility]' medical record.    Suzy HOOD PsyD, LP October 19, 2017

## 2017-10-19 NOTE — MR AVS SNAPSHOT
"                  MRN:[id number]                      After Visit Summary   10/19/2017    Dayana Moss    MRN: [id number]           Visit Information        Provider Department      10/19/2017 1:00 PM Suzy Castaneda PsyD [facility] NEUROPSYCH      Your next 10 appointments already scheduled     Oct 24, 2017  1:00 PM CDT   Return Visit with Suzy Castaneda KotaNikhil   [facility])    [facility]  [address] [facility] [address]  [facility] [address]   [phone]              MyChart Information     [employer] lets you send messages to your doctor, view your test results, renew your prescriptions, schedule appointments and more. To sign up, go to [email or web address] . Click on \"Log in\" on the left side of the screen, which will take you to the Welcome page. Then click on \"Sign up Now\" on the right side of the page.     You will be asked to enter the access code listed below, as well as some personal information. Please follow the directions to create your username and password.     Your access code is: [email or web address]  Expires: 2017  1:23 PM     Your access code will  in 90 days. If you need help or a new code, please call your [facility] clinic or [phone].        Care EveryWhere ID     This is your Care EveryWhere ID. This could be used by other organizations to access your [facility] medical records  [id number]        Equal Access to Services     COLLIN PORTILLO : Hadii brandon duncan hadasho Sojbali, waaxda luqadaha, qaybta kaalmada adeegyada, waxay mariya vargas . So [facility] [phone].    ATENCIÓN: Si habla español, tiene a dumont disposición servicios gratuitos de asistencia lingüística. Llame al [phone].    We comply with applicable federal civil rights laws and [place] laws. We do not discriminate on the basis of race, color, national origin, age, disability, sex, sexual " orientation, or gender identity.

## 2017-10-24 ENCOUNTER — OFFICE VISIT (OUTPATIENT)
Dept: PSYCHOLOGY | Facility: CLINIC | Age: 27
End: 2017-10-24
Payer: COMMERCIAL

## 2017-10-24 DIAGNOSIS — F32.1 MODERATE SINGLE CURRENT EPISODE OF MAJOR DEPRESSIVE DISORDER (H): Primary | ICD-10-CM

## 2017-10-24 DIAGNOSIS — F41.1 GAD (GENERALIZED ANXIETY DISORDER): ICD-10-CM

## 2017-10-24 DIAGNOSIS — F43.29 ADJUSTMENT DISORDER WITH DISTURBANCE OF EMOTION: ICD-10-CM

## 2017-10-24 PROCEDURE — 96118 C NEUROPSYCH TESTING, PER HR/PSYCHOLOGIST: CPT | Performed by: PSYCHOLOGIST

## 2017-10-24 NOTE — MR AVS SNAPSHOT
"                  MRN:[id number]                      After Visit Summary   10/24/2017    Dayana Moss    MRN: [id number]           Visit Information        Provider Department      10/24/2017 1:00 PM Suzy Castaneda PsyD [facility]      [place] Information     [employer] lets you send messages to your doctor, view your test results, renew your prescriptions, schedule appointments and more. To sign up, go to [email or web address] . Click on \"Log in\" on the left side of the screen, which will take you to the Welcome page. Then click on \"Sign up Now\" on the right side of the page.     You will be asked to enter the access code listed below, as well as some personal information. Please follow the directions to create your username and password.     Your access code is: [email or web address]  Expires: 2018  3:56 PM     Your access code will  in 90 days. If you need help or a new code, please call your [facility] clinic or [phone].        Care EveryWhere ID     This is your Care EveryWhere ID. This could be used by other organizations to access your [facility] medical records  [id number]        Equal Access to Services     LEONARDA PORTILLO : Igor Mustafa, waverada ewelina, qashivamta kaalmada salas, jyothi spring. So [facility] [phone].    ATENCIÓN: Si habla español, tiene a dumont disposición servicios gratuitos de asistencia lingüística. Llame al [phone].    We comply with applicable federal civil rights laws and [place] laws. We do not discriminate on the basis of race, color, national origin, age, disability, sex, sexual orientation, or gender identity.            "

## 2017-10-24 NOTE — PROGRESS NOTES
"                                             NEUROPSYCHOLOGICAL EVALUATION                                                                           Intake Structured Interview      CLIENT'S NAME: Dayana Moss  MRN:   [id number]  :   1990  ACCT. NUMBER: [id number]  DATE OF SERVICE: 10/24/17    Adult Intake Structured Interview  Standard Diagnostic Assessment        CLIENT'S NAME:                 Dayana Moss  MRN:                                                         [id number]  :                                                         1990  ACCT. NUMBER:                 [id number]  DATE OF SERVICE:            10/19/17        Identifying Information:  Dayana \"Cadence Moss is a 27 year old, ,  female. She was referred for counseling by self and family. She is currently employed full time as a  in . She attended the session alone. Information was obtained through a diagnostic interview and evaluation with patient, information regarding ADHD symptoms from collateral sources and review of available records.        Client's Statement of Presenting Concern:  Antonio reports the reason for seeking therapy at this time as : she went off her medication in July due to being pregnant but then miscarried in September. She decided not to restart her ADHD meds to see how it would go without them. She has been on the meds since age 18 or 19. She was originally diagnosed by her PCP after being referred for an ADHD evaluation by the therapist she was seeing at the time.  She was on Adderral 40 mg EX, with an additional  5 mg PRN immediate release for PM use. Client stated that her symptoms have resulted in the following functional impairments: home life with her , management of the household and or completion of tasks, operation of a motor vehicle, organization, relationship(s), social interactions and work / vocational responsibilities  She reports " trouble focusing, particularly with reading comprehension and retention. She feels mnroe disorganized, with difficulty completing tasks, losing, forgetting things, late for appointments. Etc.         History of Presenting Concern:  Antonio reports that these problem(s) began as a child, with petrona a 'social butterfly, difficulty sitting still, and difficulty paying attention'. She was orginally referred for ADHD testing in college by her therapist at the time. She reports she recently received a promotion at work and worries about her performance and keeping up with the demands of the position. Client has attempted to resolve these concerns in the past through counseling and medications. She reports that other professional(s) are not involved in providing support / services, but she is open to counseling and medications after clarification of her diagnosis has been established.         Social History:  Antonio reported she grew up in [place], [place]. They were the second born of two children with three step-sisters on her step-father's side. Her biological mother lives in [place], [place] with her .  She reports that she moved back and forth between her parent's homes numerous times as a child, but never had to switch schools and she always felt supported by her family. Her father and step-mother are  and living in [place]. She was age 2 when her biological parents . Client reported that her childhood was good. She was moved around a lot, but parents were supportive. She described her current relationships with family of origin as supportive.     Antonio reported a history of one committed relationships or marriages. She has been  for 10 months. She reported having no children. She identified some stable and meaningful social connections. Her sister lives closes and is supportive. Client reported that she has not been involved with the legal system.  Antonio's highest education level was college graduate  with bachelors in social work and psychology. She did not identify any diagnosed learning disorders, but did have difficulty with learning and retention throughout her educational career, including attention and concentration. She has had particular difficulty with reading and math throughout her life. She identified receiving academic support from her step-mother, who was a teacher,  And never had a formal IEP or special education.  There are no ethnic, cultural or Church factors that may be relevant for therapy. She identified her preferred language to be English. She reported she does not need the assistance of an  or other support involved in therapy. Modifications will not be used to assist communication in therapy. She did not serve in the .      Mental Health History:  Antonio reported the following biological family members or relatives with mental health issues: Father experienced Anxiety, Mother experienced Anxiety, Maternal Grandfather experienced Depression, Sister experienced Anxiety and Depression and step-mother experienced Bipolar Disorder, Obsessive compulsive disorder and a Personality Disorder .  She previously received the following mental health diagnosis: ADHD.  She has received the following mental health services in the past: counseling and medication(s) from physician / PCP.  Hospitalizations: None.  She is not currently receiving any mental health services.        Chemical Health History:  Antonio reported the following biological family members or relatives with chemical health issues: Father reportedly uses alcohol , Maternal Grandmother reportedly uses alcohol , Maternal Grandfather reportedly uses alcohol , Mother reportedly uses alcohol , Paternal Grandmother reportedly uses alcohol , Paternal Grandfather reportedly uses alcohol , Sister reportedly uses alcohol , Stepfather reportedly uses alcohol . She has not received chemical dependency treatment in the past. Client  is not currently receiving any chemical dependency treatment. She reports no problems as a result of their drinking / drug use, though she does think she drinks more that she should at times.        Client Reports:  Client reports using alcohol 2 times per day and has 1 beers at a time. Client first started drinking at age 8.  Client reports using tobacco 2 times per day. Client started using tobacco at age 13..  Client reports using marijuana 2 times per month and smokes 1 at a time. Client started using marijuana at age 16.  Client reports using caffeine 1 times per day and drinks 1 at a time. Client started using caffeine at age 13.  Client denies using street drugs.  Last use of Xstacy and cocaine 6 months ago  Client denies the non-medical use of prescription or over the counter drugs.     CAGE: C     Patient felt they ought to CUT down on your drinking (or drug use).   Based on the negative Cage-Aid score and clinical interview there  are not indications of drug or alcohol abuse.     Discussed the general effects of drugs and alcohol on health and well-being and the impact of drugs and alcohol when used during pregnancy.         Significant Losses / Trauma / Abuse / Neglect Issues:  There are indications or report of significant loss, trauma, abuse or neglect issues related to: death of unborn child through miscarriage in September 2017.     Issues of possible neglect are not present.        Medical Issues:  Antonio has not had a physical exam to rule out medical causes for current symptoms. Date of last physical exam was greater than a year ago and client was encouraged to schedule an exam with PCP. The client does not have a Primary Care Provider and was encouraged to establish care with a PCP. She reports not having a psychiatrist. She reports no current medical concerns. She denies the presence of chronic or episodic pain. There are not significant nutritional concerns, though she reports craving sweets and  carbs much of the time. She reports a chronic problem with sleeping, with difficulty falling asleep and staying a sleep, as well as being a light sleeper. She does experience frequent nightmares.    Developmental History:  Antonio reports no issues during pregnancy, birth or early development. Birth weight 9 lbs. There were no major childhood illnesses reported.  She reported that the she met her developmental milestones within normal time limits. She reports a period of Functional fecal retention at age 2 in association with her parents divorce. Medical intervention was sought and issues resolved with no complications. There is not a significant history of separation from primary caregiver(s). There is  a history of trauma, loss or abuse reported, including recent miscarriage.  There are reported problems with sleep. Sleep problems include: difficulties falling asleep at night and difficulties staying asleep at night.      Client reports not having any current medications.    Patient Allergies:  the following allergies to medications: One mdeication she was given in college for pain, but  doesn't know what it's called    Medical History:  Past Medical History:   Diagnosis Date     ADHD      Ovarian cyst      Smoking     quit with +UPT       Mental Status Assessment:  Appearance:                                                          Appropriate   Eye Contact:                                                         Good   Psychomotor Behavior:                    Normal   Attitude:                                                                 Cooperative   Orientation:                                                           All  Speech                        Rate / Production:                 Normal                         Volume:                                           Normal   Mood:                                                                              Anxious  Normal Sad   Affect:                                                                               Appropriate  Constricted   Thought Content:                                        Clear   Thought Form:                                            Coherent  Logical   Insight:                                                                             Good         Review of Symptoms:  Depression:               Sleep Interest Guilt Energy Concentration Psychomotor slowing or agitation Suicide Worthless Irritability  Geri:                                 No symptoms  Psychosis:                 No symptoms  Anxiety:                     Worries Nervousness  Panic:                                  Palpitations Shortness of Breath Tingling Numbness Sense of Impending Doom Triggers: unknown. The attacks tend to happen at night, two in the 6 weeks   Post Traumatic Stress Disorder:                  No symptoms  Obsessive Compulsive Disorder:                 No symptoms  Eating Disorder:                    No symptoms  Oppositional Defiant Disorder:                     No symptoms  ADD / ADHD:            Attention Listening Task Completion Organization Distractiblity Forgetful Interrupts  Conduct Disorder:                 No symptoms        Safety Assessment:     History of Safety Concerns:   Client denied a history of suicidal ideation.    Client denied a history of suicide attempts.    Client denied a history of homicidal ideation.    Client reported a history of self-injurious ideation.  Onset: in high school and frequency: no SIB since high school.  Client reported a history of self-injurious behaivors: superficial cutting in high school.  .  Client denied a history of personal safety concerns.    Client denied a history of assaultive behaviors.          Current Safety Concerns:  Client denies current suicidal ideation.    Client denies current homicidal ideation and behaviors.  Client denies current self-injurious ideation and behaviors.    Client denies current  concerns for personal safety.       Client reports there are no firearms in the house.      Plan for Safety and Risk Management:  A safety and risk management plan has not been developed at this time, however client was given the after-hours number / 911 should there be a change in any of these risk factors.     Client's Strengths and Limitations:  Client identified the following strengths or resources that will help her succeed in counseling: commitment to health and well being, friends / good social support, family support, intelligence, positive work environment and sense of humor. Client identified the following supports: family and friends. Things that may interfere with the client's success in counseling include: none identified.      MENTAL STATUS AND BEHAVIOR:  Antonio is a 27 -year-old right-handed  female with long brown hair.  She has corrective lenses and an average build.  She appeared her stated age.  She was well groomed and professionally dressed in a wrap-dress and slacks.      Antonio's attention was generally consistent in the one-to-one context of the assessment. She needed repetition of directions for clarity associated with anxiety regarding the evaluation.  She exhibited a good tolerance for frustration and persisted well at tasks, but expressed worry and concerns about her performance. Shewas tested on her usual doses of medications.  Overall, she put forth adequate effort and the test results appear to be an accurate reflection of her current cognition.     TESTS ADMINISTERED:  Review of records, [place] Verbal Learning Test (CVLT) Domen[place]-Gray Executive Function System (D-KEFS; selected subtests), Sentence Repetition Test, [place], [place] Complex Figure Test (RCFT), Stroop Test, Gray Oral Reading Test, Wechsler Adult Intelligence scale 4th ed. (WAIS-IV), [place] Multiphasic Personality Inventory (MMPI-2), Espinal Depression Inventory (BDI), Espinal Anxiety Inventory (ASHLEY), Flavio's Adult  ADHD Rating Scale (Self and Observer reports).    TEST RESULTS:  COGNITIVE FUNCTIONING:  The FSIQ composite score is derived from ten subtests and summarizes ability across a diverse set of cognitive functions.  Index scores are reported using standard scores which have a mean of 100 and a standard deviation of 15. Subtest scores are reported using scaled scores that have a mean of 10 and a standard deviation of 2. This score is considered the most representative indicator of general intellectual functioning. Subtests are drawn from five areas of cognitive ability: verbal comprehension, perceptual reasoning ability, working memory, and processing speed. Although the WAIS-IV measures various aspects of ability, a person's scores on this test can also be influenced by many factors that are not captured in this report. When interpreting this report, consider additional sources of information that may not be reflected in the scores on this assessment.     TEST RESULTS:   Summary of WAIS-IV Index Scores   Index  Score Percentile Rank Confidence  Interval* Qualitative Description   Verbal Comprehension Index (VCI) N/A N/A N/A N/A   Perceptual Reasoning Index (GREYSON) 113 81 108-118 High Average   Working Memory Index (WMI) 100 50  Average   Processing Speed Index (PSI) 100 50  Average   Full Scale IQ (FSIQ) 108 70 103-113 Average   General Ability Index 112 79 107-118 High Average   Cognitive Proficiency Index 100 50  Average     Summary of WAIS-IV Scaled Subtest Scores  Verbal Comprehension Perceptual Reasoning   Similarities 14 Block Design 11   Vocabulary 12 Matrix Reasoning 13   Information 9 Visual Puzzles 13   Working Memory Processing Speed   Digit Span 11 Coding 10   Arithmetic 9 Symbol Search 10   *Confidence intervals at 95th percentile      Susans full scale IQ was estimated at 108, which is in the average range of functioning and at the 70th percentile, indicating that she did as well or better  than 70 percent of peers in the standard sample.  There is a 95 percent confidence that Antonio's true FSIQ falls between 103 and 113.    Antonio was administered the five subtests comprising the General Ability Index (GAI), an ancillary index that provides an estimate of general intelligence that is less impacted by working memory and processing speed, relative to the FSIQ. The GAI consists of subtests from the verbal comprehension, visual spatial, and fluid reasoning domains.  She earned a GAI score of 112, which falls in the high average range and at the 79th percentile.  This indicates that she performed as well as or better than 79 percent of individuals her age in the WAIS-V standardization sample.  There is a 95 percent probability that Antonio s true GAI score falls between 107 and 118.      Antonio was also administered the Cognitive Proficiency Index (CPI), which consists of four subtests drawn from the working memory and processing speed domains. The CPI represents a set of functions whose common element is the proficiency with which a person processes certain types of cognitive information. Through quick visual speed and good cognitive control, proficient processing facilitates fluid reasoning and the acquisition of new material by reducing the cognitive demands of novel or higher order tasks. This efficiency in cognitive processing facilitates learning and problem solving by  freeing up  cognitive resources for acquiring more advanced skills. She earned a CPI score of 100, which falls in the average range and at the 50th percentile.  This indicates that she performed as well as or better than 50 percent of individuals her age in the WAIS-V standardization sample.  There is a 95 percent probability that Antonio s true CPI score falls between 94 and 106.      The Verbal Comprehension Index (VCI), a measure of Crystallized Intelligence, represents Antonio s ability to reason with previously learned information acquired from  formal and informal educational opportunities and exposure to mainstream culture.  The VCI is comprised of tasks that required her to define words (Vocabulary), draw conceptual similarities between words (Similarities), and answer questions involving knowledge of general principles and social situations (Comprehension).  The variability of Antonio s performance on these three tasks was unusually large, suggesting that TAURUS cannot be adequately described by a single score.  Her performance on two of these tasks (Vocabulary and Information) fell within normal limits for her chronological age.  Her performance on Similarities represented a normative stregth and was in the high average range for her chronological age.       The Verbal Comprehension Index (VCI), a measure of Crystallized Intelligence, represents Antonio s ability to reason with previously learned information acquired from formal and informal educational opportunities and exposure to mainstream culture.  These results in particular may not be a reflection of her true intelligence as her scores may have been negatively affected by both a history of  disruptions in her academic opportunities, which interfered with her learning and retention. Studies have show that traumatic stress can interfere with children s ability to concentrate and learn effectively. Antonio describes her educational experience in it self to be stressful and traumatic due to learning difficulties.     Antonio obtained a Perceptual Reasoning Index score of 113, which is in the 81st percentile and in the high average range.  The Visual Spatial Index (VSI) measured her ability to evaluate visual details and understand visual spatial relationships in order to construct geometric designs from a model. This skill requires visual spatial reasoning, integration and synthesis of part-whole relationships, attentiveness to visual detail, and visual-motor integration.    Antonio obtained a Working Memory Index  score of 100, which is in the 50th percentile and in the average range.  The Working Memory Index (WMI) measured her ability to register, maintain, and manipulate visual and auditory information in conscious awareness, which requires attention and concentration, as well as visual and auditory discrimination.      Antonio received a Processing Speed Index score of 100, which is in the 50th percentile and in the average range.  The Processing Speed Index (PSI) measured her speed and accuracy of visual identification, decision making, and decision implementation. Performance on the PSI is related to visual scanning, visual discrimination, short-term visual memory, visuomotor coordination, and concentration. The PSI assessed her ability to rapidly identify, register, and implement decisions about visual stimuli.       Overall, the results suggested that Antonio had average skills across all areas of intellect, and she showed a relative strength in Perceptual Reasoning and aspects of Verbal Comprehension. Her lowest scores, though well within normal limits,  were in areas in which she had to actively use Working Memory, suggesting that at times she may require more effort towards completion of tasks that require her to sustain attention, concentrate, and exert mental control  Furthermore,  a weakness in the speed of processing routine information may make the task of comprehending novel information more time-consuming and difficult for her. Thus, this weakness in simple visual scanning and tracking may leave Antonio less time and mental energy for the complex task of understanding new material.    ATTENTION: In the one-to-one context of the assessment, Dayana freire attention was generally consistent. This included her ability to comprehending task instructions and taken to information.    Noelle showed on the average to high average performance on simple and more complex visual attention tasks. She performed in the high  average range on a task requiring of visuomotor scanning (D-KEFS, [place] Making 1, 84th percentile). With a more familiar tasks such as sequencing numbers, she performed in the high average range (Trail Making 2, 91st percentile) and when sequencing letters she performed in the high average range (Trail Making 3, 91st percentile). Her ability on a task requiring her to quickly focus and execute at appropriate motor response when searching for matching symbols was average (Symbol Search, 50th percentile) and her ability on a coding activity that measured visual sequential processing was also in the average range (Coding, 50th percentile).     On auditory tasks, Noelle's scores were in the average range on a digit span task (63rd percentile) and on a mental arithmetic task (37th percentile). She received an average score on a sentence repetition task (34th percentile) for which she was asked to listen to and immediately repeat a sentence. While the scores obtained in this current assessment suggests that her attentional skills were generally average to high average, it is important to keep in mind that these scores were obtained under well-controlled testing conditions with few opportunities for distraction. In everyday situations, such as in a busy work environment, Noelle may experience some difficulty attending to and processing particularly auditory information or situations in which she has to write and track information quickly, with adequacy and accuracy in comparison to her same age peers, particularly as information increases in complexity.    EXECUTIVE FUNCTIONING: Noelle showed an average to high average performance on tasks of executive functioning. On a fluency task, her abilities were in the average range on an activity that places emphasis upon organizing language processes by retrieving words that begin with a specific starting letter (D-KEFS, Verbal Fluency, 50th percentile). These abilities  are important because they are skills that help support good reading. Ability to retrieve words from conceptual or semantic memory was average. This included her ability to classify objects and ideas within semantic categories (50th percentile) and her ability for switching between categories with high average (91st percentile).    On a visual motor task that required her to alter and redirect the focus of her attention, Noelle demonstrated an average performance and shifting her attention between number and letter sequencing (Trail Making 4, 63rd percentile). She made one error. On an untimed task measuring her capacity for visuospatial processing, skills were within normal limits (RCFT Copy). She showed good visual organizational skills and she did take time for accuracy.    On the [place], Dayana was asked to reproduce different configurations in as few of moves as possible while being timed. She was able to saw that 7 of 10 problems in the fewest moves which is equivalent to a high average performance (86th percentile). Her total move performance was high average (79th percentile), indicating that she is significantly fewer number of moves to complete the puzzles as compared to her same age peers. Her initiation time was average (70th percentile) he did take time for planning her moves. Her overall execution and total problem-solving times were average (45th to 55th percentile), indicating that she was an average amount of time to complete the puzzles as compared to her same age peers. She made no rule violations, suggesting that she was capable of inhibiting responses when given specific instructions to follow. She made one time violation, suggesting that her performance was generally efficient (30th percentile average).    Flavio Adult ADHD Rating Scale-IV: Self and Other Reports (BAARS-IV)  The BAARS-IV assesses for symptoms of ADHD that are experienced in one's daily life. This assessment  "measure includes self and collateral rating scales designed to provide information regarding current and childhood symptoms of ADHD including inattention, hyperactivity, and impulsivity. Self-report scores are reported as percentiles. Scores at the 76th-83rd percentile are considered marginal, scores at the 84th-92nd percentile are considered borderline, scores at the 93rd-95th percentile are considered mild, scores at the 96th-98th percentile are considered moderate, and those at the 99th percentile are considered severe. Collateral or \"other\" rating scales are reported as number of symptoms observed in comparison to those reported by the client. Norms and percentile scores are not available for collateral reports.     Current Symptoms Scale--Self Report:   Dayana completed the self-report inventory of current symptoms. The results indicate that the her Total ADHD Score was 61 which places her in the 99+ percentile for overall ADHD symptoms. In addition, the she endorsed 7/9 (98th percentile) Inattention symptoms, 3/5 (98rd percentile) Hyperactivity symptoms, 4/4 (99+ percentile) Impulsivity symptoms, and 7/9 (97th percentile) Sluggish Cognitive Tempo symptoms. Client indicated that the reported symptoms have resulted in impaired functioning in school, home, work and social relationships. Overall, the results suggest the client is experiencing Severe ADHD symptoms.     Current Symptoms Scale--Other Report:  Dayana's sister completed the collateral report inventory of current symptoms. Based on the collateral contact's observation of symptoms,she demonstrates 1/9 Inattention symptoms, 0/5 Hyperactivity symptoms, 1/4 Impulsivity symptoms, and 1/9 Sluggish Cognitive Tempo symptoms. Dayana's Total ADHD Score was 28. The collateral contact indicated she demonstrates impaired functioning in home and social relationships} The collateral- and self-report scores are significantly different with scores within normal " limits.    Dayana's friend completed the collateral report inventory of current symptoms. Based on the collateral contact's observation of symptoms, Dayana demonstrates 4/9 Inattention symptoms, 2/5 Hyperactivity symptoms, 4/4 Impulsivity symptoms, and 1/9 Sluggish Cognitive Tempo symptoms. Dayana's Total ADHD Score was 44. The collateral contact indicated the client demonstrates impaired functioning in home, work and social relationships} The collateral- and self-report scores are significantly different with the exception of Impulsivity, which she scored her in the 99+ percentile. Otherwise, scores were within normal limits.    Childhood Symptoms Scale--Self-Report:  Dayana completed the self-report inventory of childhood symptoms. The results indicate that her Total ADHD Score was 62 which places her in the 99+ percentile for overall ADHD symptoms in childhood. In addition, the client endorsed 8/9 (99+ percentile) Inattention symptoms and  8/9 (98th percentile) Hyperactivity-Impulsivity symptoms. Client indicated that the reported symptoms resulted in impaired functioning in school, home and social relationships Overall, the results suggest the client experienced  Severe symptoms of ADHD as a child.     Childhood Symptoms Scale--Other Report:  Dayana's sister completed the collateral report inventory of childhood symptoms. Based on the collateral contact's recollection of client's childhood symptoms, the client demonstrated 2/9 Inattention symptoms and 0/9 Hyperactivity-Impulsivity symptoms. Dayana's Total ADHD Score was 26. The collateral contact indicated the she demonstrates impaired functioning in schoolThe collateral- and self-report scores are significantly different with scores within normal limits.                          Flavio Functional Impairment Scale: Self and Other Reports (BFIS)  The BFIS is used to assess an individuals' psychosocial impairment in major life/daily activities that  "may be due to a mental health disorder. This assessment measure includes self and collateral rating scales. Self-report scores are reported as percentiles. Scores at the 76th-83rd percentile are considered marginal, scores at the 84th-92nd percentile are considered borderline, scores at the 93rd-95th percentile are considered mild, scores at the 96th-98th percentile are considered moderate, and those at the 99th percentile are considered severe.Collateral or \"other\" rating scales are reported as number of symptoms observed in comparison to those reported by the client. Norms and percentile scores are not available for collateral reports.     Results indicate Dayana identified impairment (scores at or greater than 93rd percentile) in the following areas: home-family, home-chores, work, daily responsibilities, self-care routines and health maintenance Her Mean Impairment Score was 5 (89th percentile) indicating the client is reporting Borderline impairment in functioning across domains.     Dayana's sister completed the collateral rating scale, which indicated discrepant results. The collateral contact's scores were generally lower than the client's report.    Dayana's friend completed the collateral rating scale, which indicated discrepant results. The collateral contact's scores were generally lower than the client's report.     Flavio Deficits in Executive Functioning Scale (BDEFS)  The BDEFS is a measure used for evaluating dimensions of adult executive functioning in daily life.This assessment measure includes self and collateral rating scales. Self-report scores are reported as percentiles. Scores at the 76th-83rd percentile are considered marginal, scores at the 84th-92nd percentile are considered borderline, scores at the 93rd-95th percentile are considered mild, scores at the 96th-98th percentile are considered moderate, and those at the 99th percentile are considered severe.Collateral or \"other\" " rating scales are reported as number of symptoms observed in comparison to those reported by the client. Norms and percentile scores are not available for collateral reports.     Results indicate the Dayana Total Executive Functioning Score was 280  (99+ percentile). The ADHD-Executive Functioning Index score was 33 (99+percentile). These scores suggest the client has Severe deficits in executive functioning. These deficits are likely to be due to a combination of ADHD and anxiety. Results indicate the client identified significant deficits in the following areas: self-management to time such as planning ahead and motivation to complete tasks and self-organization/problem-solving including organizing thoughts and words.    Dayana's sister completed the collateral rating scale, which indicated discrepant results. The collateral contact's scores were generally lower than the client's report.    Dayana's friend completed the collateral rating scale, which indicated discrepant results. The collateral contact's scores were generally lower than the client's report.    Noelle's performance on tasks of executive functioning suggested that she has adequate abilities in regard to executive planning and problem solving as well as maintaining complex action in remote memory. Her mental flexibility was consistent.    LEARNING AND MEMORY: During one task, Noelle was asked to specifically recall a list of 16 words over 5 trials. Her ability to repeat a list of words the first time she heard them within the average range (50th percentile) suggests that she had an adequate initial attention span for auditory verbal information. On her second, third, fourth, and fifth learning attempt, her performance improved though given the degree of repetition offered in the task or score is actually declined slightly in comparison to her same age peers.    By her fifth trial, Noelle's recall was in the average range (32nd  percentile). Her total recall of the word list across 5 trials was in the average range (63rd percentile). She did use semantic clustering spontaneously, such as by grouping similar items together, suggesting that she did recognize and utilize identifiable aides to help herself with learning and recall.    Over time, Noelle showed an average ability to freely recall the information she had learned over the short-term (68th percentile), suggesting that she retained much of the information she had originally learned. Over a longer term delay, she recalled 13 of 16 words (50th percentile), which is in the average range. Her ability for q.d. recall was average for both long and short-term delays (68th percentile). Her ability to recognize which he learned when provided choices was average (50th percentile), showing that she was capable of differentiating between verbal items when given cues.    To assess visual memory, Noelle was asked to remember a complex drawing immediately and after a 30 minute delay. We will ability for immediate recall was average (58th percentile) and her ability to recall the information 30 minutes later was average (73rd percentile). The fact that her performance improved over time shows that she may benefit from a difficult time for the consolidation of memory. Her ability to recognize what she had learned when provided choices with average (38th percentile), indicating that she was capable of differentiating between visual items when taken out of context.    Narrative helps provide meeting and structure to her communication, and successful communication with others benefits from our ability to coherently and accurately formulate new, and retell narratives. Disability depends on a wide range of cognitive functioning, including language production and comprehension, as well as long-term and working memory. On a narrative memory task Noelle scored in the very superior range (98th  percentile) for immediate memory, very superior range (99th percentile) for delayed recall and in the above average range (greater than 75th percentile) when provided clues.    Memory is the ability to encode, store, retain, and then recall information. These findings suggest that Noelle showed a generally adequate performance and learning new information. She did better with verbal information when it was presented in an organized fashion. Such as through a story form as opposed to a by rote memory, though her list learning was well within normal limits. She also showed adequate visual memory improved recall when given additional time for the consolidation of visual memory. She did utilize strategies for assisting herself with learning and recall such as by organizing information in a logical way. Noelle may benefit from learning information through a variety of contexts at the same time. This may include learning for auditory, visual, and tactile means, as well as to practical application.    LANGUAGE AND VERBAL ABILITIES: Noelle is overall verbal WAIS-IV scores suggest variable expressive language skills that include average vocabulary knowledge (75th percentile), high average verbal concept formation (91st percentile) and average information (37th percentile).    Noelle showed an average performance on a phenomic fluency task (THAT D-KEFS, 50th percentile). On the doorway oral reading test she scored 4 out of 4 suggesting adequate reading and comprehension and retention. And on a handwriting sample she showed adequate spelling punctuation and grammar. Taken together and his performance on these tests suggest adequate reading abilities.    With regard to language skills, Noelle's abilities for expressive language was a strength. She did well when permitted free expression, this is through general conversation or by using words to explain concepts. The same skills were observed and her ability to  organize verbal information in a novel way, including skills necessary for reading and understanding oral directions.    VISUAL SPATIAL AND SENSORY MOTOR FUNCTIONING: With respect to visual perceptual spatial processing, Millies abilities on the WAIS-IV were high average. She performed in the average range on a visual spatial problem-solving tasks that required her to analyze and synthesize an abstract design (63rd percentile) and high average on a task requiring her to analyze picture concepts (84th percentile). She also scored in the high average range on a nonverbal abstract reasoning task (84th percentile).    With regard to a visual motor planning necessary for handwriting, Dayana skills were in the high average range on a simple motor speed task ([place] 5, 91st percentile) that she did show a decrease in accuracy with increased speed. She also scored within normal limits on a more complex design task (RCFT Copy). She showed good organization of her design and she did take time for accuracy.     EMOTIONAL/BEHAVIORAL FUNCTIONING:  Dayana was administered the  [place] Multiphasic Personality Inventroy (MMPI-2) which is a self-report instrument designed to aid in the assessment of a wide range of clinical conditions, and the Espinal Depression Inventory (BDI) and the Espinal Anxiety Inventory (AHSLEY). The MMPI-2 appears to be a valid and interpretable profile. Dayana responded in an open and honest manner.    Antonio scored a 25 on the ASHLEY, indicating a moderate level of anxiety. Significant responses include:    Wobbliness in legs    Fear of losing control    Unable to relax    Fear that the worst will happen    Antonio scored a 27 on the BDI, indicating a moderate level of depression. Significant responses include:    I dislike myself    I have trouble making any decisions    It's hard to keep my mind on anything for very long    I criticize myself for all of my faults    Dayana's profile indicates  "long-standing depressive features in conjunction with extensive feelings of guilt and inadequacy. Suicidal ideation is possible and further evaluation into her safety is warranted. She has difficulty recognizing her own strengths and may tend to lean on others to compensate for her perceived weaknesses. She reports numerous stressors, including relationships, finances and has worries about the amount and/ or frequency of her use of alcohol or chemicals to self-soothe.     She is reluctant to expose herself to anxiety and thus may have difficulty making significant changes at this time. She can be ruminatively introspective, which can manifest as indecisiveness, doubts, and worry. She reports difficulty concentrating and thinking clearly.  She complains of difficulties in concentration and thinking. She acknowledges emotional distress, depression, anxiety, sleep disturbances, a loss of cognitive control when emotionally dysregulated, which may contribute to procrastination and ruminations.     Antonio reports that she is experiencing a moderate to severe level of emotional distress characterized by dysphoria, guilt, and anxiety. She views herself as being irritable and grouchy, although others are more aware of her dysphoria. She is unlikely to express her anger overtly toward others or assert herself. She endorses anhedonia. She is a chronic worrier who tends to brood and ruminate. She may react to stress with agitation, guilt, and self-punishment. She lacks self-confidence. She may feel shy, lonely, and introverted and is easily embarrassed and will tend to choice isolation over social gatherings. She is uncomfortable sharing personal information, with a belief that others do not understand nor do they truly care, making it difficult to ask for help.    CONCLUSION AND RECOMMENDATIONS:  Dayana \"Antonio\" Ajay Patel) is a 27-year-old, right-handed,  female who was seen at [facility], " [facility].  She has a history of anxiety and ADHD.  In addition to mood incongruency, she is having difficulty performing up to expectations in her day to day functioning.  She was self referred for a neuropsychological evaluation to provide diagnostic clarification and treatment recommendations pertaining to her specific needs.      SUMMARY:  Cognitive:    On the neuropsychological testing, Overall, the results suggested that Antonio had average skills across all areas of intellect, and she showed a relative strength in Perceptual Reasoning and aspects of Verbal Comprehension. Her lowest scores, though well within normal limits,  were in areas in which she had to actively use Working Memory, suggesting that at times she may require more effort towards completion of tasks that require her to sustain attention, concentrate, and exert mental control Furthermore,  a weakness in the speed of processing routine information may make the task of comprehending novel information more time-consuming and difficult for her. Thus, this weakness in simple visual scanning and tracking may leave Antonio less time and mental energy for the complex task of understanding new material.      While the scores obtained in this current assessment suggests that her attentional skills were generally average to high average, it is important to keep in mind that these scores were obtained under well-controlled testing conditions with few opportunities for distraction. In everyday situations, such as in a busy work environment, Noelle may experience some difficulty attending to and processing particularly auditory information or situations in which she has to write and track information quickly, with adequacy and accuracy in comparison to her same age peers, particularly as information increases in complexity.      Noelle's performance on tasks of executive functioning suggested that she has adequate abilities in regard to executive  planning and problem solving as well as maintaining complex action in remote memory. Her mental flexibility was consistent. The severity of Antonio's negative internal experience and judgment of her attention and executive functioning abilities are not reflected in the observations of her outside observers, namely her sister and her friend who report to functioning to be generally within normal limits. She may be prone to a loss of cognitive control when emotionally dysregulated.  Planning and organization are important components of problem solving and involve setting a goal and determining the best way to reach that goal through a series of steps.  Those with planning difficulties may feel overwhelmed and may approach a problem in a haphazard fashion and get caught up in details while missing the big picture.  This can happen in work related tasks or in solving everyday life problems.  Anxiety and low mood can affect the ability for emotional control and the ability for expressing and regulating emotions appropriately.  These may impact Antonio 's mood instability and she may act impulsively. Therefore, she is much more likely to do well in a clearly structured situation than in situations that are ambiguous, changeable or complex.       Memory is the ability to encode, store, retain, and then recall information. These findings suggest that Noelle showed a generally adequate performance and learning new information. She did better with verbal information when it was presented in an organized fashion. Such as through a story form as opposed to a by rote memory, though her list learning was well within normal limits. She also showed adequate visual memory improved recall when given additional time for the consolidation of visual memory. She did utilize strategies for assisting herself with learning and recall such as by organizing information in a logical way. Noelle may benefit from learning information through a  variety of contexts at the same time. This may include learning for auditory, visual, and tactile means, as well as to practical application.      With regard to language skills, Noelle's abilities for expressive language was a strength. She did well when permitted free expression, this is through general conversation or by using words to explain concepts. The same skills were observed and her ability to organize verbal information in a novel way, including skills necessary for reading and understanding oral directions.      With respect to visual perceptual spatial processing, Millies abilities on the WAIS-IV were high average. She also scored in the high average range on a nonverbal abstract reasoning task. With regard to a visual motor planning necessary for handwriting, Dayana skills were in the high average range on a simple motor speed task, though she did show a decrease in accuracy with increased speed. She also scored within normal limits on a more complex design task.She showed good organization of her design and she did take time for accuracy.     Emotional:    Dayana's profile indicates long-standing depressive features in conjunction with extensive feelings of guilt and inadequacy. Suicidal ideation is possible and further evaluation into her safety is warranted. She has difficulty recognizing her own strengths and may tend to lean on others to compensate for her perceived weaknesses. She reports numerous stressors, including relationships, finances and has worries about the amount and/ or frequency of her use of alcohol or chemicals to self-soothe.       She is reluctant to expose herself to anxiety and thus may have difficulty making significant changes at this time. She can be ruminatively introspective, which can manifest as indecisiveness, doubts, and worry. She reports difficulty concentrating and thinking clearly.  She complains of difficulties in concentration and thinking. She  acknowledges emotional distress, depression, anxiety, sleep disturbances, a loss of cognitive control when emotionally dysregulated, which may contribute to procrastination and ruminations.       Antonio reports that she is experiencing a moderate to severe level of emotional distress characterized by dysphoria, guilt, and anxiety. She views herself as being irritable and grouchy, although others are more aware of her dysphoria. She is unlikely to express her anger overtly toward others or assert herself. She endorses anhedonia. She is a chronic worrier who tends to brood and ruminate. She may react to stress with agitation, guilt, and self-punishment. She lacks self-confidence. She may feel shy, lonely, and introverted and is easily embarrassed and will tend to choice isolation over social gatherings. She is uncomfortable sharing personal information, with a belief that others do not understand nor do they truly care, making it difficult to ask for help.    Diagnostic Criteria:  B. The person finds it difficult to control the worry.   - Restlessness or feeling keyed up or on edge.    - Being easily fatigued.    - Difficulty concentrating or mind going blank.    - Irritability.    - Muscle tension.    - Sleep disturbance (difficulty falling or staying asleep, or restless unsatisfying sleep).   D. The focus of the anxiety and worry is not confined to features of an Axis I disorder.  E. The anxiety, worry, or physical symptoms cause clinically significant distress or impairment in social, occupational, or other important areas of functioning.    - Depressed mood. Note: In children and adolescents, can be irritable mood.     - Diminished interest or pleasure in all, or almost all, activities.    - Decreased sleep.    - Fatigue or loss of energy.    - Feelings of worthlessness or inappropriate and excessive guilt.    - Diminished ability to think or concentrate, or indecisiveness.    - Recurrent thoughts of death (not just  fear of dying), recurrent suicidal ideation without a specific plan, or a suicide attempt or a specific plan for committing suicide.   B) The symptoms cause clinically significant distress or impairment in social, occupational, or other important areas of functioning  C) The episode is not attributable to the physiological effects of a substance or to another medical condition  D) The occurence of major depressive episode is not better explained by other thought / psychotic disorders  E) There has never been a manic episode or hypomanic episode  - Often has difficulty sustaining attention in tasks or play activities  - Often has difficulty organizing tasks and activities  - Often avoids, dislikes, or is reluctant to engage in tasks that require sustained mental effort  - Is often easily distractedby extraneous stimuli  - Is often forgetful in daily activities      Functional Status:  Antonio's symptoms have caused and are causing reduced functional status in the following areas: Activities of Daily Living, Occupational / Vocational, Social / Relational    DSM5 Diagnoses: (Sustained by DSM5 Criteria Listed Above)  Diagnoses:   300.02 (F41.1) Generalized Anxiety Disorder  296.22 (F32.1)  Major Depressive Disorder, Single Episode, Moderate _ and With mixed features  Rule-out -- 300.01 (F41.0) Panic Disorder    Psychosocial & Contextual Factors: Issues pertaining to primary relationships, career, grief and loss, environmental stressors, limited effective coping skills    RECOMMENDATIONS:   1. Individual, family and group psychotherapy to assist Antonio in identifying successful strategies towards positive social behavior and good emotional control, as well as explore and identify stressful events or factors that contribute to an increase in poor inhibition, and/or other identified behavioral issues. Skills based therapy such as dialectical behavioral therapy (DBT), Cognitive Behavioral Therapy (CBT) and trauma based therapy  such as Eye Movement Desensitization and Reprocessing (EMDR) may be particularly useful to her and her family in developing healthy skills for emotional, behavioral, and cognitive regulation.    2. Antonio and her partner may consider additional support specific to her recent loss and miscarriage through [place] support [place] (www[email or web address])     3. As anxiety and mood disorders are known to have a negative effect on working memory and attention, therefore, a diagnosis of ADHD is uncertain at this time.  Antonio may benefit from a reevaluation of her attentional concerns after a period of mood stability and sobriety has been established (6-12 months).There are a variety of medications that can be used to treat depression and anxiety.  She may benefit from medication and behavioral interventions to assist in alleviating symptoms and improving cognitive functioning and is encouraged to discuss these recommendations with her physician.    4. Time Management  Create checklists and to do lists, adding time estimates for each task. Program your cell phone, computer or watch alarm as appointment reminders or to keep track of time. Explore the best use of calendars, organizers, computers or personal digital assistants, or PDAs.     5. Workspace Management  Minimize both visual distractions and noise in your workspace. If possible, work in a private or work space. Pare down clutter in a home or study space.    6. Memory Building  In school, write down all instructions, or ask for written instructions along with verbal directions. Stick to a consistent routine, so that you move swiftly and smoothly from one chore to another. Challenge your memory with mind-building games, word puzzles, math puzzles and reading.    7. Physical Exercise  Individuals can maintain strong cognitive abilities with regular physical exercise. Physical exercise improves executive function. Active individuals display better executive function than  "inactive individuals. Activities may include but are not limited to; biking, walking every day, or yoga (Yuko Anaya, 2011).    8. There are several books helpful to those with attention and executive functioning difficulties. A few of these include:        Driven to Distraction: Recognizing and Coping with Attention Deficit Disorder  (2011) by Micheal Guzman M.D. & Erick Rios M.D.     Smart but Scattered . (2013). By ARIAS Aguilera, & ERMIAS Almeida.    \"Change Your Brain, Change Your Life\" (2015) CARMELINA Garzon     9. Antonio should be encouraged to seek structured pro-social activities, such as a club or an artistic, musical, or athletic organization.  This may help her develop positive social relationships as well as increase her self-confidence by developing new skills or hobbies.  Activities that promote physical activity would be beneficial in reducing anxiety and in enhancing her mood.      Thank you for the opportunity to assist in Antonio 's care and treatment planning.  It was a pleasure to work with her.  I would be happy to answer any questions or concerns and remain available for further consultation. I can be reached at [phone].      Attestation:    Total Time = 240 minutes of face to face time, in addition to in chart/collateral review, scoring, interpretation, and report writing.      Suzy HOOD PsyD, LP October 24, 2017  "

## 2017-11-29 ENCOUNTER — OFFICE VISIT (OUTPATIENT)
Dept: PSYCHOLOGY | Facility: CLINIC | Age: 27
End: 2017-11-29
Payer: COMMERCIAL

## 2017-11-29 DIAGNOSIS — F32.9 MAJOR DEPRESSIVE DISORDER, SINGLE EPISODE WITH MIXED FEATURES: Primary | ICD-10-CM

## 2017-11-29 PROCEDURE — 90834 PSYTX W PT 45 MINUTES: CPT | Performed by: PSYCHOLOGIST

## 2017-11-29 NOTE — MR AVS SNAPSHOT
"                  MRN:[id number]                      After Visit Summary   2017    Dayana Moss    MRN: [id number]           Visit Information        Provider Department      2017 3:00 PM Suzy Castaneda PsyD [facility]      Manpreet Information     [facility] lets you send messages to your doctor, view your test results, renew your prescriptions, schedule appointments and more. To sign up, go to [email or web address] . Click on \"Log in\" on the left side of the screen, which will take you to the Welcome page. Then click on \"Sign up Now\" on the right side of the page.     You will be asked to enter the access code listed below, as well as some personal information. Please follow the directions to create your username and password.     Your access code is: [email or web address]  Expires: 2018  3:56 PM     Your access code will  in 90 days. If you need help or a new code, please call your [facility] clinic or [phone].        Care EveryWhere ID     This is your Care EveryWhere ID. This could be used by other organizations to access your [facility] medical records  [id number]        Equal Access to Services     LEONARDA PORTILLO : Igor Mustafa, waayden theodore, qashivamta kaalmada salas, jyothi spring. So [facility] [phone].    ATENCIÓN: Si habla español, tiene a dumont disposición servicios gratuitos de asistencia lingüística. Llame al [phone].    We comply with applicable federal civil rights laws and [place] laws. We do not discriminate on the basis of race, color, national origin, age, disability, sex, sexual orientation, or gender identity.            "

## 2017-11-29 NOTE — PROGRESS NOTES
Progress Note    Client Name: Dayana Moss  Date: November 29,2017         Service Type: Neuropsychological Evaluation Feedback Session      Session Start Time: 3:00  Session End Time: 3:50      Session Length: 50 minutes     Session #: 3     Attendees: Client attended alone       DATA      Progress Since Last Session (Related to Symptoms / Goals / Homework):   Symptoms: Stable       Current / Ongoing Stressors and Concerns:   Antonio continues to experience anxiety and low mood with difficulty concentrating and focusing.       Intervention:  Provide feedback on neuropsychological evaluation. Reviewed test results in depth and answered patient's questions. Patient diagnosed with 300.02 (F41.1) Generalized Anxiety Disorder; 296.22 (F32.1)  Major Depressive Disorder, Single Episode, Moderate _ and With mixed features; Rule-out -- 300.01 (F41.0) Panic Disorder.  Reviewed symptom management and other recommendations. She was able to repeat three new behavioral strategies to try. Encouraged to to setup an appointment with PCP to discuss medication options. This provider also completed full written report of evaluation, including integration of testing data, summary, and recommendations. Please see consultation note dated 10/24/2017 for results.        ASSESSMENT: Current Emotional / Mental Status (status of significant symptoms):   Risk status (Self / Other harm or suicidal ideation)   Client denies current fears or concerns for personal safety.   Client reports the following current or recent suicidal ideation or behaviors: passive fleeting thoughts with no plan or intent.   Client denies current or recent homicidal ideation or behaviors.   Client denies current or recent self injurious behavior or ideation.   Client denies other safety concerns.   A safety and risk management plan has not been developed at this time, however she agreed to shared with her  and  supports any increase in intensity of her thoughts and was encouraged to call 911 should there be a change in any of these risk factors.     Appearance:   Appropriate    Eye Contact:   Good    Psychomotor Behavior: Normal    Attitude:   Cooperative    Orientation:   All   Speech    Rate / Production: Normal     Volume:  Normal    Mood:    Anxious  Normal Sad    Affect:    Appropriate    Thought Content:  Clear    Thought Form:  Coherent  Goal Directed  Logical    Insight:    Fair      Medication Review:   No current psychiatric medications prescribed     Medication Compliance:   NA     Changes in Health Issues:   None reported     Chemical Use Review:   Substance Use: Use continues with no change since last session, Provided encouragement towards sobriety        Tobacco Use: No current tobacco use.       Collateral Reports Completed:   Routed note to PCP    PLAN: (Client Tasks / Therapist Tasks / Other)  RECOMMENDATIONS:   1. Individual, family and group psychotherapy to assist Antonio in identifying successful strategies towards positive social behavior and good emotional control, as well as explore and identify stressful events or factors that contribute to an increase in poor inhibition, and/or other identified behavioral issues. Skills based therapy such as dialectical behavioral therapy (DBT), Cognitive Behavioral Therapy (CBT) and trauma based therapy such as Eye Movement Desensitization and Reprocessing (EMDR) may be particularly useful to her and her family in developing healthy skills for emotional, behavioral, and cognitive regulation.     2. Antonio and her partner may consider additional support specific to her recent loss and [place]riage through [place] (www[place][email or web address])      3. As anxiety and mood disorders are known to have a negative effect on working memory and attention, therefore, a diagnosis of ADHD is uncertain at this time.  Antonio may benefit from a reevaluation of her attentional  "concerns after a period of mood stability and sobriety has been established (6-12 months).There are a variety of medications that can be used to treat depression and anxiety.  She may benefit from medication and behavioral interventions to assist in alleviating symptoms and improving cognitive functioning and is encouraged to discuss these recommendations with her physician.     4. Time Management  Create checklists and to do lists, adding time estimates for each task. Program your cell phone, computer or watch alarm as appointment reminders or to keep track of time. Explore the best use of calendars, organizers, computers or personal digital assistants, or PDAs.      5. Workspace Management  Minimize both visual distractions and noise in your workspace. If possible, work in a private or work space. Pare down clutter in a home or study space.     6. Memory Building  In school, write down all instructions, or ask for written instructions along with verbal directions. Stick to a consistent routine, so that you move swiftly and smoothly from one chore to another. Challenge your memory with mind-building games, word puzzles, math puzzles and reading.     7. Physical Exercise  Individuals can maintain strong cognitive abilities with regular physical exercise. Physical exercise improves executive function. Active individuals display better executive function than inactive individuals. Activities may include but are not limited to; biking, walking every day, or yoga (Yuko Anaya, 2011).     8. There are several books helpful to those with attention and executive functioning difficulties. A few of these include:         Driven to Distraction: Recognizing and Coping with Attention Deficit Disorder  (2011) by Micheal Guzman M.D. & Erick Rios M.D.     Smart but Scattered . (2013). By ARIAS Aguilera, & ERMIAS Almeida.    \"Change Your Brain, Change Your Life\" (2015) CARMELINA Garzon      9. Antonio should be encouraged to seek " structured pro-social activities, such as a club or an artistic, musical, or athletic organization.  This may help her develop positive social relationships as well as increase her self-confidence by developing new skills or hobbies.  Activities that promote physical activity would be beneficial in reducing anxiety and in enhancing her mood.         Suzy HOOD, Rick, LP November 29, 2017

## 2017-12-19 ENCOUNTER — OFFICE VISIT (OUTPATIENT)
Dept: FAMILY MEDICINE | Facility: CLINIC | Age: 27
End: 2017-12-19
Payer: COMMERCIAL

## 2017-12-19 VITALS
HEIGHT: 68 IN | OXYGEN SATURATION: 100 % | DIASTOLIC BLOOD PRESSURE: 60 MMHG | BODY MASS INDEX: 25.91 KG/M2 | HEART RATE: 79 BPM | WEIGHT: 171 LBS | SYSTOLIC BLOOD PRESSURE: 112 MMHG | TEMPERATURE: 98.4 F

## 2017-12-19 DIAGNOSIS — F41.1 GAD (GENERALIZED ANXIETY DISORDER): ICD-10-CM

## 2017-12-19 DIAGNOSIS — F33.1 MODERATE EPISODE OF RECURRENT MAJOR DEPRESSIVE DISORDER (H): Primary | ICD-10-CM

## 2017-12-19 PROCEDURE — 99214 OFFICE O/P EST MOD 30 MIN: CPT | Performed by: NURSE PRACTITIONER

## 2017-12-19 RX ORDER — CITALOPRAM HYDROBROMIDE 20 MG/1
TABLET ORAL
Qty: 30 TABLET | Refills: 0 | Status: SHIPPED | OUTPATIENT
Start: 2017-12-19 | End: 2018-01-02

## 2017-12-19 RX ORDER — CLONAZEPAM 0.5 MG/1
0.25-0.5 TABLET ORAL 2 TIMES DAILY PRN
Qty: 20 TABLET | Refills: 0 | Status: SHIPPED | OUTPATIENT
Start: 2017-12-19 | End: 2018-01-02

## 2017-12-19 NOTE — PROGRESS NOTES
SUBJECTIVE:   Dayana Moss is a 27 year old female who presents to clinic today for the following health issues:    Abnormal Mood Symptoms  Onset: Since 18-20 years old     Description:   Depression: YES  Anxiety: YES    Accompanying Signs & Symptoms:  Still participating in activities that you used to enjoy: no  Fatigue: YES  Irritability: YES  Difficulty concentrating: YES  Changes in appetite: YES  Problems with sleep: YES  Heart racing/beating fast : YES  Thoughts of hurting yourself or others: no    History:   Recent stress: YES  Prior depression hospitalization: None  Family history of depression: YES  Family history of anxiety: YES    Precipitating factors:   Alcohol/drug use: YES    Alleviating factors:  none    Therapies Tried and outcome: None   Had a miscarriage a few months ago, and since then has been having very bad anxiety attacks. Has had a lot of trouble sleeping, loss of interest in activties and seeing friends, weight gain and eating more. Has affected her performance at work. Her boss is aware that she is having mental health issues  And has been accommodating so far.     -------------------------------------    Problem list and histories reviewed & adjusted, as indicated.  Additional history: as documented    Patient Active Problem List   Diagnosis     Ovarian mass     ADHD     JACINTO (generalized anxiety disorder)     Moderate episode of recurrent major depressive disorder (H)     Past Surgical History:   Procedure Laterality Date     NO HISTORY OF SURGERY         Social History   Substance Use Topics     Smoking status: Former Smoker     Packs/day: 0.50     Types: Cigarettes     Start date: 5/7/2003     Smokeless tobacco: Never Used     Alcohol use 8.4 oz/week     14 Cans of beer per week     No family history on file.          Reviewed and updated as needed this visit by clinical staff     Reviewed and updated as needed this visit by Provider         ROS:  Constitutional, HEENT,  "cardiovascular, pulmonary, gi and gu systems are negative, except as otherwise noted.      OBJECTIVE:   /60  Pulse 79  Temp 98.4  F (36.9  C) (Oral)  Ht 5' 8.11\" (1.73 m)  Wt 171 lb (77.6 kg)  LMP 11/28/2017  SpO2 100%  BMI 25.92 kg/m2  Body mass index is 25.92 kg/(m^2).   GENERAL: alert, no distress and fatigued  NECK: no adenopathy, no asymmetry, masses, or scars and thyroid normal to palpation  RESP: lungs clear to auscultation - no rales, rhonchi or wheezes  CV: regular rate and rhythm, normal S1 S2, no S3 or S4, no murmur, click or rub, no peripheral edema and peripheral pulses strong  MS: no gross musculoskeletal defects noted, no edema  PSYCH: mentation appears normal, affect normal/bright, tearful and anxious    Diagnostic Test Results:  No results found for this or any previous visit (from the past 24 hour(s)).    ASSESSMENT/PLAN:     Problem List Items Addressed This Visit     Moderate episode of recurrent major depressive disorder (H) - Primary    Relevant Medications    citalopram (CELEXA) 20 MG tablet    clonazePAM (KLONOPIN) 0.5 MG tablet    Other Relevant Orders    MENTAL HEALTH REFERRAL  - Adult; Outpatient Treatment; Individual/Couples/Family/Group Therapy/Health Psychology; [facility]: Merged with [facility] [phone]; We will contact you to schedule the appointment or please call with any questions    JACINTO (generalized anxiety disorder)    Relevant Medications    clonazePAM (KLONOPIN) 0.5 MG tablet    Other Relevant Orders    MENTAL HEALTH REFERRAL  - Adult; Outpatient Treatment; Individual/Couples/Family/Group Therapy/Health Psychology; [facility]: Merged with [facility] [phone]; We will contact you to schedule the appointment or please call with any questions         Follow up in 2 weeks; Start Klonopin twice daily for two weeks and follow up in clinic to re-assess; given information for crisis  [facility]  Please note greater " than 50% of this 30 minute appointment were spent in counseling with the patient of the issues described above in the history of present illness and in the plan, including starting medications

## 2017-12-19 NOTE — MR AVS SNAPSHOT
"              After Visit Summary   12/19/2017    Dayana Moss    MRN: [id number]           Patient Information     Date Of Birth          1990        Visit Information        Provider Department      12/19/2017 9:30 AM Cheri Georges APRN CNP [facility] [place]        Today's Diagnoses     Screening for malignant neoplasm of cervix    -  1    Attention deficit hyperactivity disorder (ADHD), combined type        Moderate episode of recurrent major depressive disorder (H)        JACINTO (generalized anxiety disorder)           Follow-ups after your visit        Who to contact     If you have questions or need follow up information about today's clinic visit or your schedule please contact [facility] directly at [phone].  Normal or non-critical lab and imaging results will be communicated to you by MyChart, letter or phone within 4 business days after the clinic has received the results. If you do not hear from us within 7 days, please contact the clinic through MyChart or phone. If you have a critical or abnormal lab result, we will notify you by phone as soon as possible.  Submit refill requests through [employer] or call your pharmacy and they will forward the refill request to us. Please allow 3 business days for your refill to be completed.          Additional Information About Your Visit        MyChart Information     [employer] lets you send messages to your doctor, view your test results, renew your prescriptions, schedule appointments and more. To sign up, go to [email or web address] . Click on \"Log in\" on the left side of the screen, which will take you to the Welcome page. Then click on \"Sign up Now\" on the right side of the page.     You will be asked to enter the access code listed below, as well as some personal information. Please follow the directions to create your username and password.     Your access code is: [email or web address]  Expires: 2/27/2018  3:56 PM     Your " "access code will  in 90 days. If you need help or a new code, please call your [facility] clinic or [phone].        Care EveryWhere ID     This is your Care EveryWhere ID. This could be used by other organizations to access your [facility] medical records  [id number]        Your Vitals Were     Pulse Temperature Height Last Period Pulse Oximetry BMI (Body Mass Index)    79 98.4  F (36.9  C) (Oral) 5' 8.11\" (1.73 m) 2017 100% 25.92 kg/m2       Blood Pressure from Last 3 Encounters:   17 112/60   17 106/58   17 102/64    Weight from Last 3 Encounters:   17 171 lb (77.6 kg)   17 135 lb (61.2 kg)   08/15/15 135 lb (61.2 kg)              We Performed the Following     DEPRESSION ACTION PLAN (DAP)          Today's Medication Changes          These changes are accurate as of: 17 10:07 AM.  If you have any questions, ask your nurse or doctor.               Start taking these medicines.        Dose/Directions    citalopram 20 MG tablet   Commonly known as:  celeXA   Used for:  Moderate episode of recurrent major depressive disorder (H)   Started by:  Cheri Georges APRN CNP        Take 1/2 tablet (10 mg) for 1 week, then increase to 1 tablet orally daily   Quantity:  30 tablet   Refills:  0       clonazePAM 0.5 MG tablet   Commonly known as:  klonoPIN   Used for:  Moderate episode of recurrent major depressive disorder (H), JACINTO (generalized anxiety disorder)   Started by:  Cheri Georges APRN CNP        Dose:  0.25-0.5 mg   Take 0.5-1 tablets (0.25-0.5 mg) by mouth 2 times daily as needed for anxiety   Quantity:  20 tablet   Refills:  0            Where to get your medicines      These medications were sent to [facility] [place], [place] - [address]  [address] [address]     Phone:  [phone]     citalopram 20 MG tablet         Some of these will need a paper prescription and others can be bought over the counter. "  Ask your nurse if you have questions.     Bring a paper prescription for each of these medications     clonazePAM 0.5 MG tablet                Primary Care Provider Fax #    Physician No Ref-Primary [phone]       No address on file        Equal Access to Services     LEONARDA PORTILLO : Igor brandon duncan lupillo Mustafa, waverada luqadaha, qaybta kaalmada salas, jyothi hookerwali spring. So [facility] [phone].    ATENCIÓN: Si habla español, tiene a dumont disposición servicios gratuitos de asistencia lingüística. Llame al [phone].    We comply with applicable federal civil rights laws and [place] laws. We do not discriminate on the basis of race, color, national origin, age, disability, sex, sexual orientation, or gender identity.            Thank you!     Thank you for choosing [facility]  [place] your [place]. Our goal is always to provide you with excellent care. Hearing back from our patients is one way we can continue to improve our services. Please take a few minutes to complete the written survey that you may receive in the mail after your visit with us. Thank you!             Your Updated Medication List - Protect others around you: Learn how to safely use, store and throw away your medicines at www.disposemymeds.org.          This list is accurate as of: 12/19/17 10:07 AM.  Always use your most recent med list.                   Brand Name Dispense Instructions for use Diagnosis    ADDERALL PO      Take 25 mg by mouth        citalopram 20 MG tablet    celeXA    30 tablet    Take 1/2 tablet (10 mg) for 1 week, then increase to 1 tablet orally daily    Moderate episode of recurrent major depressive disorder (H)       clonazePAM 0.5 MG tablet    klonoPIN    20 tablet    Take 0.5-1 tablets (0.25-0.5 mg) by mouth 2 times daily as needed for anxiety    Moderate episode of recurrent major depressive disorder (H), JACINTO (generalized anxiety disorder)       HYDROcodone-acetaminophen 5-325 MG  per tablet    NORCO    10 tablet    Take 1 tablet by mouth every 6 hours as needed for moderate to severe pain    Missed        * ondansetron 4 MG tablet    ZOFRAN    18 tablet    Take 1 tablet (4 mg) by mouth every 8 hours as needed for nausea    Nausea       * ondansetron 4 MG tablet    ZOFRAN    10 tablet    Take 1 tablet (4 mg) by mouth every 8 hours as needed for nausea    Missed        oxyCODONE-acetaminophen 5-325 MG per tablet    PERCOCET    15 tablet    Take 1-2 tablets by mouth every 4 hours as needed for pain        * senna-docusate 8.6-50 MG per tablet    SENOKOT-S;PERICOLACE    60 tablet    Take 1 tablet by mouth 2 times daily    Acute abdominal pain       * senna-docusate 8.6-50 MG per tablet    SENOKOT-S;PERICOLACE    60 tablet    Take 1 tablet by mouth 2 times daily as needed for constipation    Acute abdominal pain       * Notice:  This list has 4 medication(s) that are the same as other medications prescribed for you. Read the directions carefully, and ask your doctor or other care provider to review them with you.

## 2017-12-19 NOTE — LETTER
My Depression Action Plan  Name: Dayana Moss   Date of Birth 1990  Date: 12/19/2017    My doctor: No Ref-Primary, Physician   My clinic: [address] [address] [phone]          GREEN    ZONE   Good Control    What it looks like:     Things are going generally well. You have normal up s and down s. You may even feel depressed from time to time, but bad moods usually last less than a day.   What you need to do:  1. Continue to care for yourself (see self care plan)  2. Check your depression survival kit and update it as needed  3. Follow your physician s recommendations including any medication.  4. Do not stop taking medication unless you consult with your physician first.           YELLOW         ZONE Getting Worse    What it looks like:     Depression is starting to interfere with your life.     It may be hard to get out of bed; you may be starting to isolate yourself from others.    Symptoms of depression are starting to last most all day and this has happened for several days.     You may have suicidal thoughts but they are not constant.   What you need to do:     1. Call your care team, your response to treatment will improve if you keep your care team informed of your progress. Yellow periods are signs an adjustment may need to be made.     2. Continue your self-care, even if you have to fake it!    3. Talk to someone in your support network    4. Open up your depression survival kit           RED    ZONE Medical Alert - Get Help    What it looks like:     Depression is seriously interfering with your life.     You may experience these or other symptoms: You can t get out of bed most days, can t work or engage in other necessary activities, you have trouble taking care of basic hygiene, or basic responsibilities, thoughts of suicide or death that will not go away, self-injurious behavior.     What you need to  do:  1. Call your care team and request a same-day appointment. If they are not available (weekends or after hours) call your local crisis line, emergency room or 911.      Electronically signed by: Santo Wren, December 19, 2017    Depression Self Care Plan / Survival Kit    Self-Care for Depression  Here s the deal. Your body and mind are really not as separate as most people think.  What you do and think affects how you feel and how you feel influences what you do and think. This means if you do things that people who feel good do, it will help you feel better.  Sometimes this is all it takes.  There is also a place for medication and therapy depending on how severe your depression is, so be sure to consult with your medical provider and/ or Behavioral Health Consultant if your symptoms are worsening or not improving.     In order to better manage my stress, I will:    Exercise  Get some form of exercise, every day. This will help reduce pain and release endorphins, the  feel good  chemicals in your brain. This is almost as good as taking antidepressants!  This is not the same as joining a gym and then never going! (they count on that by the way ) It can be as simple as just going for a walk or doing some gardening, anything that will get you moving.      Hygiene   Maintain good hygiene (Get out of bed in the morning, Make your bed, Brush your teeth, Take a shower, and Get dressed like you were going to work, even if you are unemployed).  If your clothes don't fit try to get ones that do.    Diet  I will strive to eat foods that are good for me, drink plenty of water, and avoid excessive sugar, caffeine, alcohol, and other mood-altering substances.  Some foods that are helpful in depression are: complex carbohydrates, B vitamins, flaxseed, fish or fish oil, fresh fruits and vegetables.    Psychotherapy  I agree to participate in Individual Therapy (if recommended).    Medication  If prescribed medications, I  agree to take them.  Missing doses can result in serious side effects.  I understand that drinking alcohol, or other illicit drug use, may cause potential side effects.  I will not stop my medication abruptly without first discussing it with my provider.    Staying Connected With Others  I will stay in touch with my friends, family members, and my primary care provider/team.    Use your imagination  Be creative.  We all have a creative side; it doesn t matter if it s oil painting, sand castles, or mud pies! This will also kick up the endorphins.    Witness Beauty  (AKA stop and smell the roses) Take a look outside, even in mid-winter. Notice colors, textures. Watch the squirrels and birds.     Service to others  Be of service to others.  There is always someone else in need.  By helping others we can  get out of ourselves  and remember the really important things.  This also provides opportunities for practicing all the other parts of the program.    Humor  Laugh and be silly!  Adjust your TV habits for less news and crime-drama and more comedy.    Control your stress  Try breathing deep, massage therapy, biofeedback, and meditation. Find time to relax each day.     My support system    Clinic Contact:  Phone number:    Contact 1:  Phone number:    Contact 2:  Phone number:    Amish/:  Phone number:    Therapist:  Phone number:    Local crisis center:    Phone number:    Other community support:  Phone number:

## 2018-01-02 ENCOUNTER — OFFICE VISIT (OUTPATIENT)
Dept: FAMILY MEDICINE | Facility: CLINIC | Age: 28
End: 2018-01-02
Payer: COMMERCIAL

## 2018-01-02 VITALS
SYSTOLIC BLOOD PRESSURE: 116 MMHG | BODY MASS INDEX: 26.14 KG/M2 | TEMPERATURE: 97.1 F | HEART RATE: 74 BPM | OXYGEN SATURATION: 99 % | WEIGHT: 172.5 LBS | DIASTOLIC BLOOD PRESSURE: 60 MMHG

## 2018-01-02 DIAGNOSIS — O21.9 NAUSEA/VOMITING IN PREGNANCY: ICD-10-CM

## 2018-01-02 DIAGNOSIS — F33.1 MODERATE EPISODE OF RECURRENT MAJOR DEPRESSIVE DISORDER (H): Primary | ICD-10-CM

## 2018-01-02 PROCEDURE — 99213 OFFICE O/P EST LOW 20 MIN: CPT | Performed by: NURSE PRACTITIONER

## 2018-01-02 RX ORDER — CITALOPRAM HYDROBROMIDE 20 MG/1
TABLET ORAL
Qty: 30 TABLET | Refills: 3 | Status: SHIPPED | OUTPATIENT
Start: 2018-01-02 | End: 2018-01-24

## 2018-01-02 ASSESSMENT — PATIENT HEALTH QUESTIONNAIRE - PHQ9: SUM OF ALL RESPONSES TO PHQ QUESTIONS 1-9: 16

## 2018-01-02 NOTE — PROGRESS NOTES
SUBJECTIVE:   Dayana Moss is a 27 year old female who presents to clinic today for the following health issues:    Depression Followup    Status since last visit: Stable     See PHQ-9 for current symptoms.  Other associated symptoms: None    Complicating factors:   Significant life event:  Yes-  Pregnancy, unexpected   Current substance abuse:  None  Anxiety or Panic symptoms:  Yes-  Anxiety    PHQ-9 Score and MyChart F/U Questions 8/25/2017 1/2/2018 1/11/2018   Total Score 15 16 18   Q9: Suicide Ideation Not at all Not at all Not at all       PHQ-9  English  PHQ-9   Any Language  Suicide Assessment Five-step Evaluation and Treatment (SAFE-T)      Amount of exercise or physical activity: None    Problems taking medications regularly: No    Medication side effects: none    Diet: regular (no restrictions)        -------------------------------------    Problem list and histories reviewed & adjusted, as indicated.  Additional history: as documented    Patient Active Problem List   Diagnosis     Ovarian mass     ADHD     JACINTO (generalized anxiety disorder)     Moderate episode of recurrent major depressive disorder (H)     Major depressive disorder, single episode, moderate (H)     Past Surgical History:   Procedure Laterality Date     NO HISTORY OF SURGERY         Social History   Substance Use Topics     Smoking status: Former Smoker     Packs/day: 0.50     Types: Cigarettes     Start date: 5/7/2003     Smokeless tobacco: Never Used     Alcohol use 8.4 oz/week     14 Cans of beer per week     No family history on file.          Reviewed and updated as needed this visit by clinical staffTobacco  Meds       Reviewed and updated as needed this visit by Provider         ROS:  Constitutional, HEENT, cardiovascular, pulmonary, gi and gu systems are negative, except as otherwise noted.      OBJECTIVE:   /60  Pulse 74  Temp 97.1  F (36.2  C) (Oral)  Wt 172 lb 8 oz (78.2 kg)  LMP 11/28/2017  SpO2 99%   BMI 26.14 kg/m2  Body mass index is 26.14 kg/(m^2).   GENERAL: healthy, alert and no distress  NECK: no adenopathy, no asymmetry, masses, or scars and thyroid normal to palpation  RESP: lungs clear to auscultation - no rales, rhonchi or wheezes  CV: regular rate and rhythm, normal S1 S2, no S3 or S4, no murmur, click or rub, no peripheral edema and peripheral pulses strong  ABDOMEN: soft, nontender, no hepatosplenomegaly, no masses and bowel sounds normal  MS: no gross musculoskeletal defects noted, no edema    Diagnostic Test Results:  none     ASSESSMENT/PLAN:     Problem List Items Addressed This Visit     Moderate episode of recurrent major depressive disorder (H) - Primary    Relevant Medications    citalopram (CELEXA) 20 MG tablet      Other Visit Diagnoses     Nausea/vomiting in pregnancy           Follow up 2-3 weeks    CHEY Hernandez CNP  [facility]

## 2018-01-02 NOTE — MR AVS SNAPSHOT
After Visit Summary   1/2/2018    Dayana Moss    MRN: [id number]           Patient Information     Date Of Birth          1990        Visit Information        Provider Department      1/2/2018 3:30 PM Cheri Georges APRN CNP [facility]        Today's Diagnoses     Screening for malignant neoplasm of cervix    -  1    Moderate episode of recurrent major depressive disorder (H)        Nausea/vomiting in pregnancy           Follow-ups after your visit        Your next 10 appointments already scheduled     Jan 11, 2018  1:00 PM CST   New Visit with Karel Wren [facility]  [address] [place], [place] [id number]  [facility] [id number] [phone]            Jan 18, 2018  5:30 PM CST   Return Visit with Karel Wren [place] [address] [place] [id number] [phone]              Who to contact     If you have questions or need follow up information about today's clinic visit or your schedule please contact [facility] directly at [phone].  Normal or non-critical lab and imaging results will be communicated to you by MyChart, letter or phone within 4 business days after the clinic has received the results. If you do not hear from us within 7 days, please contact the clinic through [employer] or phone. If you have a critical or abnormal lab result, we will notify you by phone as soon as possible.  Submit refill requests through [email or web address] or call your pharmacy and they will forward the refill request to us. Please allow 3 business days for your refill to be completed.          Additional Information About Your Visit        MyChart Information     [email or web address] lets you send messages to your doctor, view your test results, renew your prescriptions, schedule appointments and more. To sign up, go to  [email or web address] . Click on \"Log in\" on the left side of the screen, which will take you to the Welcome page. Then click on \"Sign up Now\" on the right side of the page.     You will be asked to enter the access code listed below, as well as some personal information. Please follow the directions to create your username and password.     Your access code is: [email or web address]  Expires: 2018  3:56 PM     Your access code will  in 90 days. If you need help or a new code, please call your [facility] clinic or [phone].        Care EveryWhere ID     This is your Care EveryWhere ID. This could be used by other organizations to access your [facility] medical records  [id number]        Your Vitals Were     Pulse Temperature Last Period Pulse Oximetry BMI (Body Mass Index)       74 97.1  F (36.2  C) (Oral) 2017 99% 26.14 kg/m2        Blood Pressure from Last 3 Encounters:   18 116/60   17 112/60   17 106/58    Weight from Last 3 Encounters:   18 172 lb 8 oz (78.2 kg)   17 171 lb (77.6 kg)   17 135 lb (61.2 kg)              Today, you had the following     No orders found for display         Today's Medication Changes          These changes are accurate as of: 18  3:50 PM.  If you have any questions, ask your nurse or doctor.               Start taking these medicines.        Dose/Directions    Pyridoxine HCl 25 MG Lozg   Used for:  Nausea/vomiting in pregnancy   Started by:  Cheri Georges APRN CNP        Dose:  1 lozenge   Take 1 lozenge by mouth 3 times daily as needed   Quantity:  60 lozenge   Refills:  3         These medicines have changed or have updated prescriptions.        Dose/Directions    citalopram 20 MG tablet   Commonly known as:  celeXA   This may have changed:  additional instructions   Used for:  Moderate episode of recurrent major depressive disorder (H)   Changed by:  Cheri Georges APRN CNP        1 tablet orally daily   Quantity:  " 30 tablet   Refills:  3       ondansetron 4 MG tablet   Commonly known as:  ZOFRAN   This may have changed:  Another medication with the same name was removed. Continue taking this medication, and follow the directions you see here.   Used for:  Missed    Changed by:  Nia Casanova MD        Dose:  4 mg   Take 1 tablet (4 mg) by mouth every 8 hours as needed for nausea   Quantity:  10 tablet   Refills:  0         Stop taking these medicines if you haven't already. Please contact your care team if you have questions.     ADDERALL PO   Stopped by:  Cheri Georges APRN CNP           clonazePAM 0.5 MG tablet   Commonly known as:  klonoPIN   Stopped by:  Cheri Georges APRN CNP                Where to get your medicines      These medications were sent to [facility] [place], [place] - [address]  [address] [address]     Phone:  [phone]     citalopram 20 MG tablet    Pyridoxine HCl 25 MG Lozg                Primary Care Provider Fax #    Physician No Ref-Primary [phone]       No address on file        Equal Access to Services     [facility]: Hadii aad ku hadasho Soomaali, waaxda luqadaha, qaybta kaalmada adeegyada, waxmarsha vargas . So [facility] [phone].    ATENCIÓN: Si habla español, tiene a dumont disposición servicios gratuitos de asistencia lingüística. Llame al [phone].    We comply with applicable federal civil rights laws and [place] laws. We do not discriminate on the basis of race, color, national origin, age, disability, sex, sexual orientation, or gender identity.            Thank you!     Thank you for choosing [facility]  for your care. Our goal is always to provide you with excellent care. Hearing back from our patients is one way we can continue to improve our services. Please take a few minutes to complete the written survey that you may receive in the mail after your visit with  us. Thank you!             Your Updated Medication List - Protect others around you: Learn how to safely use, store and throw away your medicines at www.disposemymeds.org.          This list is accurate as of: 18  3:50 PM.  Always use your most recent med list.                   Brand Name Dispense Instructions for use Diagnosis    citalopram 20 MG tablet    celeXA    30 tablet    1 tablet orally daily    Moderate episode of recurrent major depressive disorder (H)       HYDROcodone-acetaminophen 5-325 MG per tablet    NORCO    10 tablet    Take 1 tablet by mouth every 6 hours as needed for moderate to severe pain    Missed        ondansetron 4 MG tablet    ZOFRAN    10 tablet    Take 1 tablet (4 mg) by mouth every 8 hours as needed for nausea    Missed        oxyCODONE-acetaminophen 5-325 MG per tablet    PERCOCET    15 tablet    Take 1-2 tablets by mouth every 4 hours as needed for pain        Pyridoxine HCl 25 MG Lozg     60 lozenge    Take 1 lozenge by mouth 3 times daily as needed    Nausea/vomiting in pregnancy       * senna-docusate 8.6-50 MG per tablet    SENOKOT-S;PERICOLACE    60 tablet    Take 1 tablet by mouth 2 times daily    Acute abdominal pain       * senna-docusate 8.6-50 MG per tablet    SENOKOT-S;PERICOLACE    60 tablet    Take 1 tablet by mouth 2 times daily as needed for constipation    Acute abdominal pain       * Notice:  This list has 2 medication(s) that are the same as other medications prescribed for you. Read the directions carefully, and ask your doctor or other care provider to review them with you.

## 2018-01-03 ENCOUNTER — TELEPHONE (OUTPATIENT)
Dept: FAMILY MEDICINE | Facility: CLINIC | Age: 28
End: 2018-01-03

## 2018-01-03 DIAGNOSIS — O21.9 NAUSEA/VOMITING IN PREGNANCY: ICD-10-CM

## 2018-01-03 RX ORDER — PYRIDOXINE HCL (VITAMIN B6) 25 MG
25 TABLET ORAL 3 TIMES DAILY PRN
Qty: 90 TABLET | Refills: 3 | Status: SHIPPED | OUTPATIENT
Start: 2018-01-03 | End: 2018-02-07

## 2018-01-11 ENCOUNTER — OFFICE VISIT (OUTPATIENT)
Dept: PSYCHOLOGY | Facility: CLINIC | Age: 28
End: 2018-01-11
Attending: NURSE PRACTITIONER
Payer: COMMERCIAL

## 2018-01-11 DIAGNOSIS — F32.1 MAJOR DEPRESSIVE DISORDER, SINGLE EPISODE, MODERATE (H): Primary | ICD-10-CM

## 2018-01-11 DIAGNOSIS — F41.1 GAD (GENERALIZED ANXIETY DISORDER): ICD-10-CM

## 2018-01-11 PROCEDURE — 90834 PSYTX W PT 45 MINUTES: CPT | Performed by: COUNSELOR

## 2018-01-11 ASSESSMENT — ANXIETY QUESTIONNAIRES
5. BEING SO RESTLESS THAT IT IS HARD TO SIT STILL: SEVERAL DAYS
7. FEELING AFRAID AS IF SOMETHING AWFUL MIGHT HAPPEN: NOT AT ALL
1. FEELING NERVOUS, ANXIOUS, OR ON EDGE: MORE THAN HALF THE DAYS
3. WORRYING TOO MUCH ABOUT DIFFERENT THINGS: SEVERAL DAYS
GAD7 TOTAL SCORE: 9
IF YOU CHECKED OFF ANY PROBLEMS ON THIS QUESTIONNAIRE, HOW DIFFICULT HAVE THESE PROBLEMS MADE IT FOR YOU TO DO YOUR WORK, TAKE CARE OF THINGS AT HOME, OR GET ALONG WITH OTHER PEOPLE: VERY DIFFICULT
6. BECOMING EASILY ANNOYED OR IRRITABLE: MORE THAN HALF THE DAYS
2. NOT BEING ABLE TO STOP OR CONTROL WORRYING: SEVERAL DAYS

## 2018-01-11 ASSESSMENT — PATIENT HEALTH QUESTIONNAIRE - PHQ9
5. POOR APPETITE OR OVEREATING: MORE THAN HALF THE DAYS
SUM OF ALL RESPONSES TO PHQ QUESTIONS 1-9: 18

## 2018-01-11 NOTE — PROGRESS NOTES
"               Progress Note - Initial Session    Client Name:  Dayana Moss Date: 1/11/2018         Service Type: Individual      Session Start Time: 1:00p  Session End Time: 1:45p      Session Length: 38 - 52      Session #: 1     Attendees: Client attended alone         Diagnostic Assessment in progress.  Unable to complete documentation at the conclusion of the first session due to addressing depression and anxiety symptoms and recent life stressors.       Mental Status Assessment:  Appearance:   Appropriate   Eye Contact:   Fair   Psychomotor Behavior: Normal   Attitude:   Cooperative   Orientation:   All  Speech   Rate / Production: Normal    Volume:  Normal   Mood:    Anxious  Depressed  Sad  Tearful \"Tired\"   Affect:    Appropriate   Thought Content:  Clear   Thought Form:  Coherent  Logical  Goal oriented  Insight:    Fair       Safety Issues and Plan for Safety and Risk Management:  Client denies current fears or concerns for personal safety.  Client denies current or recent suicidal ideation or behaviors. Within last month, passive thoughts of SI: don't want to be here, too much stuff going on. Triggers: when she does not get things done, struggle at work, lack of motivation. Denied plans. No hx of hospitalization.   Client denies current or recent homicidal ideation or behaviors.  Client denies current or recent self injurious behavior or ideation. Hx of cutting in high school.   Client denies other safety concerns.  A safety and risk management plan has not been developed at this time, however client was given the after-hours number / 911 should there be a change in any of these risk factors.  Client reports there are no firearms in the house.      Diagnostic Criteria:  A. Excessive anxiety and worry about a number of events or activities (such as work or school performance).   B. The person finds it difficult to control the worry.  C. Select 3 or more symptoms (required for diagnosis). Only one " item is required in children.   - Restlessness or feeling keyed up or on edge.    - Being easily fatigued.    - Difficulty concentrating or mind going blank.    - Irritability.    - Muscle tension.    - Sleep disturbance (difficulty falling or staying asleep, or restless unsatisfying sleep).   D. The focus of the anxiety and worry is not confined to features of an Axis I disorder.  E. The anxiety, worry, or physical symptoms cause clinically significant distress or impairment in social, occupational, or other important areas of functioning.   F. The disturbance is not due to the direct physiological effects of a substance (e.g., a drug of abuse, a medication) or a general medical condition (e.g., hyperthyroidism) and does not occur exclusively during a Mood Disorder, a Psychotic Disorder, or a Pervasive Developmental Disorder.   - Depressed mood. Note: In children and adolescents, can be irritable mood.     - Diminished interest or pleasure in all, or almost all, activities.    - Overeating.    - Increased sleep.    - Fatigue or loss of energy.    - Feelings of worthlessness or inappropriate and excessive guilt.    - Diminished ability to think or concentrate, or indecisiveness.   B) The symptoms cause clinically significant distress or impairment in social, occupational, or other important areas of functioning  C) The episode is not attributable to the physiological effects of a substance or to another medical condition  D) The occurence of major depressive episode is not better explained by other thought / psychotic disorders  E) There has never been a manic episode or hypomanic episode      DSM5 Diagnoses: (Sustained by DSM5 Criteria Listed Above)  Diagnoses: 296.22 (F32.1)  Major Depressive Disorder, Single Episode, Moderate & 300.02 (F41.1) Generalized Anxiety Disorder; RULE OUT Major Depressive Disorder, Recurrent, Moderate & Adjustment Disorder  Psychosocial & Contextual Factors: Miscarriage Sept 2017,  currently 6 weeks pregnant, some strained in marriage and family relationships  WHODAS 2.0 (12 item)            This questionnaire asks about difficulties due to health conditions. Health conditions  include  disease or illnesses, other health problems that may be short or long lasting,  injuries, mental health or emotional problems, and problems with alcohol or drugs.                     Think back over the past 30 days and answer these questions, thinking about how much  difficulty you had doing the following activities. For each question, please [place] only  one response.    S1 Standing for long periods such as 30 minutes? None =         1   S2 Taking care of household responsibilities? Severe =       4   S3 Learning a new task, for example, learning how to get to a new place? Mild =           2   S4 How much of a problem do you have joining community activities (for example, festivals, Spiritism or other activities) in the same way as anyone else can? Severe =       4   S5 How much have you been emotionally affected by your health problems? Severe =       4     In the past 30 days, how much difficulty did you have in:   S6 Concentrating on doing something for ten minutes? Severe =       4   S7 Walking a long distance such as a kilometer (or equivalent)? None =         1   S8 Washing your whole body? None =         1   S9 Getting dressed? None =         1   S10 Dealing with people you do not know? None =         1   S11 Maintaining a friendship? Moderate =   3   S12 Your day to day work? Severe =       4     H1 Overall, in the past 30 days, how many days were these difficulties present? Record number of days 28   H2 In the past 30 days, for how many days were you totally unable to carry out your usual activities or work because of any health condition? Record number of days 3-4   H3 In the past 30 days, not counting the days that you were totally unable, for how many days did you cut back or reduce your usual  activities or work because of any health condition? Record number of days 20       Collateral Reports Completed:  Routed note to PCP      PLAN: (Homework, other):  Client stated that she may follow up for ongoing services with [facility].  Continue to assess depression and anxiety symptoms and life stressors.       Karel Wren, [facility]

## 2018-01-11 NOTE — MR AVS SNAPSHOT
MRN:[id number]                      After Visit Summary   1/11/2018    Dayana Moss    MRN: [id number]           Visit Information        Provider Department      1/11/2018 1:00 PM Karel Wren [facility] Generic      Your next 10 appointments already scheduled     Jan 18, 2018  5:30 PM CST   Return Visit with Karel Wren [facility]  [address] [facility]  [facility] [address]   [phone]            Jan 24, 2018  4:30 PM CST   Office Visit with CHEY Hernandez [facility] ([facility])    [address] [place] [address] [address]  [facility] [id number] [phone]           Bring a current list of meds and any records pertaining to this visit. For Physicals, please bring immunization records and any forms needing to be filled out. Please arrive 10 minutes early to complete paperwork.            Jan 31, 2018  4:30 PM CST   Return Visit with Karel Wren [facility] ([facility]  [address]  [facility] [address]   [phone]            Feb 07, 2018  2:15 PM CST   US PELVIC COMPLETE W TRANSVAGINAL with WEUS1   [facility] [place] ([facility] [place])    [address] [facility] [address]  [facility] [id number] [phone]           Please bring a list of your medicines (including vitamins, minerals and over-the-counter drugs). Also, tell your doctor about any allergies you may have. Wear comfortable clothes and leave your valuables at home.  Adults: Drink six 8-ounce glasses of fluid one hour before your exam. Do NOT empty your bladder.  If you need to empty your bladder before your exam, try to release only a little bit of urine. Then, drink another 8oz glass of fluid.  Children: Children who are potty trained should  "drink at least 4 cups (32 oz) of liquid 45 minutes to one hour prior to the exam. The child s bladder must be full in order to achieve a diagnostic exam. If your child is very uncomfortable or has an urgent need to pee, please notify a technologist; they will try to find out how much longer the wait may be and provide instructions to help relieve the pressure. Occasionally it is medically necessary to insert a urinary catheter to fill the bladder.  Please call the Imaging Department at your exam site with any questions.            2018  3:10 PM CST   New Prenatal with Nia Casanova MD, WE TRIAGE   [facility] [place] ([facility])    [address] [place] [address] [phone]              MyChart Information     [employer] lets you send messages to your doctor, view your test results, renew your prescriptions, schedule appointments and more. To sign up, go to [email or web address] . Click on \"Log in\" on the left side of the screen, which will take you to the Welcome page. Then click on \"Sign up Now\" on the right side of the page.     You will be asked to enter the access code listed below, as well as some personal information. Please follow the directions to create your username and password.     Your access code is: [email or web address]  Expires: 2018  3:56 PM     Your access code will  in 90 days. If you need help or a new code, please call your [place] clinic or [phone].        Care EveryWhere ID     This is your Care EveryWhere ID. This could be used by other organizations to access your [place] medical records  [id number]        Equal Access to Services     LEONARDA PORTILLO : Igor Mustafa, darlene theodore, jyothi whitmore. So [facility] [phone].    ATENCIÓN: Si habla español, tiene a dumont disposición servicios gratuitos de asistencia lingüística. Llame al " [phone].    We comply with applicable federal civil rights laws and [place] laws. We do not discriminate on the basis of race, color, national origin, age, disability, sex, sexual orientation, or gender identity.

## 2018-01-11 NOTE — Clinical Note
Dayana Davey completed first intake appt for therapy. She currently meets criteria for major depressive disorder, single episode, moderate, and generalized anxiety disorder. Please contact me with any questions or concerns.   Thank you, Karel Wren MA, [facility]

## 2018-01-12 ASSESSMENT — ANXIETY QUESTIONNAIRES: GAD7 TOTAL SCORE: 9

## 2018-01-18 ENCOUNTER — OFFICE VISIT (OUTPATIENT)
Dept: PSYCHOLOGY | Facility: CLINIC | Age: 28
End: 2018-01-18
Attending: NURSE PRACTITIONER
Payer: COMMERCIAL

## 2018-01-18 DIAGNOSIS — F32.1 MAJOR DEPRESSIVE DISORDER, SINGLE EPISODE, MODERATE (H): Primary | ICD-10-CM

## 2018-01-18 DIAGNOSIS — F41.1 GAD (GENERALIZED ANXIETY DISORDER): ICD-10-CM

## 2018-01-18 PROCEDURE — 90791 PSYCH DIAGNOSTIC EVALUATION: CPT | Performed by: COUNSELOR

## 2018-01-18 NOTE — MR AVS SNAPSHOT
MRN:[id number]                      After Visit Summary   1/18/2018    Dayana Moss    MRN: [id number]           Visit Information        Provider Department      1/18/2018 5:30 PM Karel Wren [facility] Generic      Your next 10 appointments already scheduled     Jan 24, 2018  4:30 PM CST   Office Visit with CHEY Hernandez CNP   [facility] ([facility])    [address] [place] [address]  [facility] [id number] [phone]           Bring a current list of meds and any records pertaining to this visit. For Physicals, please bring immunization records and any forms needing to be filled out. Please arrive 10 minutes early to complete paperwork.            Jan 31, 2018  4:30 PM CST   Return Visit with Karel Wren [facility] ([facility]  [address]  [facility] [address]   [phone]            Feb 07, 2018  2:15 PM CST   US PELVIC COMPLETE W TRANSVAGINAL with WEUS1   [facility] [place] ([facility] [place])    [address] [facility] [address]  [facility] [address] [phone]           Please bring a list of your medicines (including vitamins, minerals and over-the-counter drugs). Also, tell your doctor about any allergies you may have. Wear comfortable clothes and leave your valuables at home.  Adults: Drink six 8-ounce glasses of fluid one hour before your exam. Do NOT empty your bladder.  If you need to empty your bladder before your exam, try to release only a little bit of urine. Then, drink another 8oz glass of fluid.  Children: Children who are potty trained should drink at least 4 cups (32 oz) of liquid 45 minutes to one hour prior to the exam. The child s bladder must be full in order to achieve a diagnostic exam. If your child is very uncomfortable or has an urgent need to pee, please  "notify a technologist; they will try to find out how much longer the wait may be and provide instructions to help relieve the pressure. Occasionally it is medically necessary to insert a urinary catheter to fill the bladder.  Please call the Imaging Department at your exam site with any questions.            2018  3:10 PM CST   New Prenatal with Nia Casanova MD, WE TRIAGE   [facility] [place] ([facility] [place])    [address] [facility]  [address]  [place] [address]   [phone]            2018  4:30 PM CST   Return Visit with Karel Wren [facility] ([facility])    [place] [address] [place], [place] [address]  [facility] [address] [phone]            Mar 15, 2018  4:30 PM CDT   Return Visit with Karel Wren [facility] ([place] [address] [place], [place] [address]  [facility] [address]   [phone]              MyChart Information     [employer] lets you send messages to your doctor, view your test results, renew your prescriptions, schedule appointments and more. To sign up, go to [email or web address] . Click on \"Log in\" on the left side of the screen, which will take you to the Welcome page. Then click on \"Sign up Now\" on the right side of the page.     You will be asked to enter the access code listed below, as well as some personal information. Please follow the directions to create your username and password.     Your access code is: [email or web address]  Expires: 2018  3:56 PM     Your access code will  in 90 days. If you need help or a new code, please call your [place] clinic or [phone].        Care EveryWhere ID     This is your Care EveryWhere ID. This could be used by other organizations to access your [place] medical records  [id number]        Equal Access to Services     LEONARDA PORTILLO AH: darlene Lagunas " priscilla theodore waxay idiin hayaan adeeg kharash la'aan ah. So [facility] [phone].    ATENCIÓN: Si habla español, tiene a dumont disposición servicios gratuitos de asistencia lingüística. Llame al [phone].    We comply with applicable federal civil rights laws and [place] laws. We do not discriminate on the basis of race, color, national origin, age, disability, sex, sexual orientation, or gender identity.

## 2018-01-18 NOTE — Clinical Note
Antonio Davey completed intake appt for therapy. We will be working on depression, anxiety, and grief and loss. Please contact me with any questions or concerns.   Thank you, Karel Wren MA, [facility]

## 2018-01-24 ENCOUNTER — OFFICE VISIT (OUTPATIENT)
Dept: FAMILY MEDICINE | Facility: CLINIC | Age: 28
End: 2018-01-24
Payer: COMMERCIAL

## 2018-01-24 VITALS
WEIGHT: 173.1 LBS | DIASTOLIC BLOOD PRESSURE: 66 MMHG | TEMPERATURE: 97.6 F | BODY MASS INDEX: 26.23 KG/M2 | SYSTOLIC BLOOD PRESSURE: 110 MMHG | HEART RATE: 69 BPM | OXYGEN SATURATION: 100 %

## 2018-01-24 DIAGNOSIS — Z12.4 SCREENING FOR MALIGNANT NEOPLASM OF CERVIX: Primary | ICD-10-CM

## 2018-01-24 DIAGNOSIS — F33.1 MODERATE EPISODE OF RECURRENT MAJOR DEPRESSIVE DISORDER (H): ICD-10-CM

## 2018-01-24 PROCEDURE — 99213 OFFICE O/P EST LOW 20 MIN: CPT | Performed by: NURSE PRACTITIONER

## 2018-01-24 RX ORDER — CITALOPRAM HYDROBROMIDE 40 MG/1
TABLET ORAL
Qty: 30 TABLET | Refills: 3 | Status: SHIPPED | OUTPATIENT
Start: 2018-01-24 | End: 2018-04-25

## 2018-01-24 NOTE — MR AVS SNAPSHOT
After Visit Summary   1/24/2018    Dayana Moss    MRN: [id number]           Patient Information     Date Of Birth          1990        Visit Information        Provider Department      1/24/2018 4:30 PM Cheri Georges APRN CNP [facility]        Today's Diagnoses     Screening for malignant neoplasm of cervix    -  1    Moderate episode of recurrent major depressive disorder (H)           Follow-ups after your visit        Your next 10 appointments already scheduled     Jan 31, 2018  4:30 PM CST   [facility]   [facility] ([facility]  [address]  [facility] [address]   [phone]            Feb 07, 2018  2:15 PM CST   US PELVIC COMPLETE W TRANSVAGINAL with WEUS1   [facility] [place] ([facility] [place])    [address] [place] [address] [phone]           Please bring a list of your medicines (including vitamins, minerals and over-the-counter drugs). Also, tell your doctor about any allergies you may have. Wear comfortable clothes and leave your valuables at home.  Adults: Drink six 8-ounce glasses of fluid one hour before your exam. Do NOT empty your bladder.  If you need to empty your bladder before your exam, try to release only a little bit of urine. Then, drink another 8oz glass of fluid.  Children: Children who are potty trained should drink at least 4 cups (32 oz) of liquid 45 minutes to one hour prior to the exam. The child s bladder must be full in order to achieve a diagnostic exam. If your child is very uncomfortable or has an urgent need to pee, please notify a technologist; they will try to find out how much longer the wait may be and provide instructions to help relieve the pressure. Occasionally it is medically necessary to insert a urinary catheter to fill the bladder.  Please call the Imaging  "Department at your exam site with any questions.            Feb 07, 2018  3:10 PM CST   New Prenatal with Nia Casanova MD, WE TRIAGE   [facility] [place] ([facility] [place])    [address] [place] [address]  [facility] [address]   [phone]            Feb 28, 2018  4:30 PM CST   Return Visit with Karel Wren [facility] ([facility])    [facility]  [address]  [facility] [address] [phone]            Mar 15, 2018  4:30 PM CDT   Return Visit with Karel Wren [facility] ([facility]  [address]  [facility] [address] [phone]              Who to contact     If you have questions or need follow up information about today's clinic visit or your schedule please contact [facility] directly at [phone].  Normal or non-critical lab and imaging results will be communicated to you by Vanilla Breezehart, letter or phone within 4 business days after the clinic has received the results. If you do not hear from us within 7 days, please contact the clinic through mVisumt or phone. If you have a critical or abnormal lab result, we will notify you by phone as soon as possible.  Submit refill requests through [employer] or call your pharmacy and they will forward the refill request to us. Please allow 3 business days for your refill to be completed.          Additional Information About Your Visit        [employer] Information     [employer] lets you send messages to your doctor, view your test results, renew your prescriptions, schedule appointments and more. To sign up, go to [email or web address] . Click on \"Log in\" on the left side of the screen, which will take you to the Welcome page. Then click on \"Sign up Now\" on the right side of the page.     You will be asked to enter the access code listed below, as well as some personal " information. Please follow the directions to create your username and password.     Your access code is: [email or web address]  Expires: 2018  3:56 PM     Your access code will  in 90 days. If you need help or a new code, please call your [place] clinic or [phone].        Care EveryWhere ID     This is your Care EveryWhere ID. This could be used by other organizations to access your [place] medical records  [id number]        Your Vitals Were     Pulse Temperature Last Period Pulse Oximetry BMI (Body Mass Index)       69 97.6  F (36.4  C) (Oral) 2017 100% 26.23 kg/m2        Blood Pressure from Last 3 Encounters:   18 110/66   18 116/60   17 112/60    Weight from Last 3 Encounters:   18 173 lb 1.6 oz (78.5 kg)   18 172 lb 8 oz (78.2 kg)   17 171 lb (77.6 kg)              Today, you had the following     No orders found for display         Today's Medication Changes          These changes are accurate as of 18  4:55 PM.  If you have any questions, ask your nurse or doctor.               These medicines have changed or have updated prescriptions.        Dose/Directions    citalopram 40 MG tablet   Commonly known as:  celeXA   This may have changed:  medication strength   Used for:  Moderate episode of recurrent major depressive disorder (H)   Changed by:  Cheri Georges APRN CNP        1 tablet orally daily   Quantity:  30 tablet   Refills:  3            Where to get your medicines      These medications were sent to [facility] [address]  [address] [address]     Phone:  [phone]     citalopram 40 MG tablet                Primary Care Provider Fax #    Physician No Ref-Primary [phone]       No address on file        Equal Access to Services     LEONARDA PORTILLO : Igor Mustafa, waaxda luqadaha, qaybta kaalmada salas, jyothi spring. So [facility]  [phone].    ATENCIÓN: Si rasheeda sheridan, tiene a dumont disposición servicios gratuitos de asistencia lingüística. Tania son [phone].    We comply with applicable federal civil rights laws and [place] laws. We do not discriminate on the basis of race, color, national origin, age, disability, sex, sexual orientation, or gender identity.            Thank you!     Thank you for choosing [facility]  for your care. Our goal is always to provide you with excellent care. Hearing back from our patients is one way we can continue to improve our services. Please take a few minutes to complete the written survey that you may receive in the mail after your visit with us. Thank you!             Your Updated Medication List - Protect others around you: Learn how to safely use, store and throw away your medicines at www.disposemymeds.org.          This list is accurate as of 18  4:55 PM.  Always use your most recent med list.                   Brand Name Dispense Instructions for use Diagnosis    citalopram 40 MG tablet    celeXA    30 tablet    1 tablet orally daily    Moderate episode of recurrent major depressive disorder (H)       HYDROcodone-acetaminophen 5-325 MG per tablet    NORCO    10 tablet    Take 1 tablet by mouth every 6 hours as needed for moderate to severe pain    Missed        ondansetron 4 MG tablet    ZOFRAN    10 tablet    Take 1 tablet (4 mg) by mouth every 8 hours as needed for nausea    Missed        oxyCODONE-acetaminophen 5-325 MG per tablet    PERCOCET    15 tablet    Take 1-2 tablets by mouth every 4 hours as needed for pain        pyridOXINE 25 MG tablet    vitamin B-6    90 tablet    Take 1 tablet (25 mg) by mouth 3 times daily as needed    Nausea/vomiting in pregnancy       * senna-docusate 8.6-50 MG per tablet    SENOKOT-S;PERICOLACE    60 tablet    Take 1 tablet by mouth 2 times daily    Acute abdominal pain       * senna-docusate 8.6-50 MG per tablet     SENOKOT-S;PERICOLACE    60 tablet    Take 1 tablet by mouth 2 times daily as needed for constipation    Acute abdominal pain       * Notice:  This list has 2 medication(s) that are the same as other medications prescribed for you. Read the directions carefully, and ask your doctor or other care provider to review them with you.

## 2018-01-24 NOTE — PROGRESS NOTES
SUBJECTIVE:   Dayana Moss is a 27 year old female who presents to clinic today for the following health issues:    Depression and Anxiety Follow-Up    Status since last visit: No change, continue to feel very sad and tired    Other associated symptoms:None    Complicating factors:     Significant life event: No     Current substance abuse: None    PHQ-9 8/25/2017 1/2/2018 1/11/2018   Total Score 15 16 18   Q9: Suicide Ideation Not at all Not at all Not at all     JACINTO-7 SCORE 8/25/2017 1/11/2018   Total Score 12 9       PHQ-9  English  PHQ-9   Any Language  JACINTO-7  Suicide Assessment Five-step Evaluation and Treatment (SAFE-T)    Amount of exercise or physical activity: None    Problems taking medications regularly: No    Medication side effects: none    Diet: regular (no restrictions)    Has appointment coming up in two weeks for pregnancy check; and she is feeling very sad because this is around the time she miscarried during her last pregnancy. Feels like she wants to stay to herself at home. Feels sad to acknowledge the pregnancy because she doesn't know if it will last.    -------------------------------------    Problem list and histories reviewed & adjusted, as indicated.  Additional history: as documented    Labs reviewed in EPIC    Reviewed and updated as needed this visit by clinical staff       Reviewed and updated as needed this visit by Provider         ROS:  Constitutional, HEENT, cardiovascular, pulmonary, gi and gu systems are negative, except as otherwise noted.    OBJECTIVE:     /66  Pulse 69  Temp 97.6  F (36.4  C) (Oral)  Wt 173 lb 1.6 oz (78.5 kg)  LMP 11/28/2017  SpO2 100%  BMI 26.23 kg/m2  Body mass index is 26.23 kg/(m^2).   GENERAL: healthy, alert and no distress  NECK: no adenopathy, no asymmetry, masses, or scars and thyroid normal to palpation  RESP: lungs clear to auscultation - no rales, rhonchi or wheezes  CV: regular rate and rhythm, normal S1 S2, no S3 or S4, no  murmur, click or rub, no peripheral edema and peripheral pulses strong  MS: no gross musculoskeletal defects noted, no edema  PSYCH: mentation appears normal, affect normal/bright, appearance well groomed and sad    Diagnostic Test Results:  none     ASSESSMENT/PLAN:     Problem List Items Addressed This Visit     Moderate episode of recurrent major depressive disorder (H)    Relevant Medications    citalopram (CELEXA) 40 MG tablet      Other Visit Diagnoses     Screening for malignant neoplasm of cervix    -  Primary           Follow up in 1 month  CHEY Hernandez CNP  [facility]

## 2018-01-31 ENCOUNTER — OFFICE VISIT (OUTPATIENT)
Dept: PSYCHOLOGY | Facility: CLINIC | Age: 28
End: 2018-01-31
Payer: COMMERCIAL

## 2018-01-31 DIAGNOSIS — F32.1 MAJOR DEPRESSIVE DISORDER, SINGLE EPISODE, MODERATE (H): ICD-10-CM

## 2018-01-31 DIAGNOSIS — F41.1 GAD (GENERALIZED ANXIETY DISORDER): ICD-10-CM

## 2018-01-31 PROCEDURE — 90834 PSYTX W PT 45 MINUTES: CPT | Performed by: COUNSELOR

## 2018-01-31 ASSESSMENT — ANXIETY QUESTIONNAIRES
6. BECOMING EASILY ANNOYED OR IRRITABLE: MORE THAN HALF THE DAYS
2. NOT BEING ABLE TO STOP OR CONTROL WORRYING: SEVERAL DAYS
5. BEING SO RESTLESS THAT IT IS HARD TO SIT STILL: MORE THAN HALF THE DAYS
IF YOU CHECKED OFF ANY PROBLEMS ON THIS QUESTIONNAIRE, HOW DIFFICULT HAVE THESE PROBLEMS MADE IT FOR YOU TO DO YOUR WORK, TAKE CARE OF THINGS AT HOME, OR GET ALONG WITH OTHER PEOPLE: VERY DIFFICULT
7. FEELING AFRAID AS IF SOMETHING AWFUL MIGHT HAPPEN: SEVERAL DAYS
3. WORRYING TOO MUCH ABOUT DIFFERENT THINGS: MORE THAN HALF THE DAYS
GAD7 TOTAL SCORE: 12
1. FEELING NERVOUS, ANXIOUS, OR ON EDGE: MORE THAN HALF THE DAYS

## 2018-01-31 ASSESSMENT — PATIENT HEALTH QUESTIONNAIRE - PHQ9: 5. POOR APPETITE OR OVEREATING: MORE THAN HALF THE DAYS

## 2018-01-31 NOTE — MR AVS SNAPSHOT
MRN:[id number]                      After Visit Summary   1/31/2018    Dayana Moss    MRN: [id number]           Visit Information        Provider Department      1/31/2018 4:30 PM Karel Wren LPCC [facility] Generic      Your next 10 appointments already scheduled     Feb 07, 2018  2:15 PM CST   US PELVIC COMPLETE W TRANSVAGINAL with WEUS1   [facility] ([facility])    [address]  [place] [address]   [phone]           Please bring a list of your medicines (including vitamins, minerals and over-the-counter drugs). Also, tell your doctor about any allergies you may have. Wear comfortable clothes and leave your valuables at home.  Adults: Drink six 8-ounce glasses of fluid one hour before your exam. Do NOT empty your bladder.  If you need to empty your bladder before your exam, try to release only a little bit of urine. Then, drink another 8oz glass of fluid.  Children: Children who are potty trained should drink at least 4 cups (32 oz) of liquid 45 minutes to one hour prior to the exam. The child s bladder must be full in order to achieve a diagnostic exam. If your child is very uncomfortable or has an urgent need to pee, please notify a technologist; they will try to find out how much longer the wait may be and provide instructions to help relieve the pressure. Occasionally it is medically necessary to insert a urinary catheter to fill the bladder.  Please call the Imaging Department at your exam site with any questions.            Feb 07, 2018  3:10 PM CST   New Prenatal with Nia Casanova MD, WE TRIAGE   [facility] ([facility] [place] [facility])    [address]  [place] [address]   [phone]            Feb 19, 2018  5:30 PM CST   Return Visit with HIMANSHU Staples   [facility] ([facility])     "[facility]  [address]  [facility] [address]   [phone]            2018  4:30 PM CST   Office Visit with CHEY Hernandez CNP   [facility]    [address] [place] [address]  [facility] [id number] [phone]           Bring a current list of meds and any records pertaining to this visit. For Physicals, please bring immunization records and any forms needing to be filled out. Please arrive 10 minutes early to complete paperwork.            2018  4:30 PM CST   Return Visit with Karel Wren [facility] ([facility]  [address] [place], [place] [address]  [facility] [address]   [phone]            Mar 15, 2018  4:30 PM CDT   Return Visit with Karel Wren [facility] ([place] [address] [place], [place] [address]  [facility] [address]   [phone]              [facility] [employer] Information     [facility] lets you send messages to your doctor, view your test results, renew your prescriptions, schedule appointments and more. To sign up, go to [email or web address] . Click on \"Log in\" on the left side of the screen, which will take you to the Welcome page. Then click on \"Sign up Now\" on the right side of the page.     You will be asked to enter the access code listed below, as well as some personal information. Please follow the directions to create your username and password.     Your access code is: [email or web address]  Expires: 2018  3:56 PM     Your access code will  in 90 days. If you need help or a new code, please call your [place] clinic or [phone].        Care EveryWhere ID     This is your Care EveryWhere ID. This could be used by other organizations to access your [place] medical records  [id number]        Equal Access to Services     LEONARDA PORTILLO AH: darlene Lagunas " priscilla theodore waxay idiin hayaan adeeg kharash la'aan ah. So [facility] [phone].    ATENCIÓN: Si habla español, tiene a dumont disposición servicios gratuitos de asistencia lingüística. Llame al [phone].    We comply with applicable federal civil rights laws and [place] laws. We do not discriminate on the basis of race, color, national origin, age, disability, sex, sexual orientation, or gender identity.

## 2018-01-31 NOTE — PROGRESS NOTES
"                                           Progress Note    Client Name: Dayana Moss  Date: 1/31/2018         Service Type: Individual      Session Start Time: 4:35p  Session End Time: 5:15p      Session Length: 40 minutes     Session #: 3     Attendees: Client attended alone    Treatment Plan Last Reviewed: In progress  PHQ-9 / JACINTO-7 : 12 & 12     DATA      Progress Since Last Session (Related to Symptoms / Goals / Homework):   Symptoms: Stable, see Epic for PHQ 9 & JACINTO 7 updates     Homework: Client will practice giving herself permission to ask for help.      Episode of Care Goals: Minimal progress - PREPARATION (Decided to change - considering how); Intervened by negotiating a change plan and determining options / strategies for behavior change, identifying triggers, exploring social supports, and working towards setting a date to begin behavior change     Current / Ongoing Stressors and Concerns:   Ongoing sadness and tiredness - feeling like her mind is like a bouncing tennis ball in a tin can, feeling anxious about pregnancy confirmation appt next week (\"that's all I can think about\"), struggling to ask for help (\"I don't deserve help, I don't know what I need, what I don't need\"); was able to identify some plans to cope and distract; ongoing interpersonal work issues, 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      Treatment Objective(s) Addressed in This Session:   In progress     Intervention:   Motivational Interviewing: open-ended questions, affirmations, reflections and emphasizing personal control and choices, challenge sustain talk, evoke change talk  DBT: introduce interpersonal effectiveness/communication skills  CBT: identify and replace anxious thinking and teach the function of anxiety, reinforce proactive problem solving skills - what is gaining/missing out of from avoiding        ASSESSMENT: Current Emotional / Mental Status (status of significant symptoms):   Risk status (Self " / Other harm or suicidal ideation)   Client denies current fears or concerns for personal safety.  Client denies current or recent suicidal ideation or behaviors. Within last month, passive thoughts of SI: don't want to be here, too much stuff going on. Triggers: when she does not get things done, struggle at work, lack of motivation. Denied plans. No hx of hospitalization.   Client denies current or recent homicidal ideation or behaviors.  Client denies current or recent self injurious behavior or ideation. Hx of cutting in high school.   Client denies other safety concerns.  A safety and risk management plan has not been developed at this time, however client was given the after-hours number / 911 should there be a change in any of these risk factors.  Client reports there are no firearms in the house.     Appearance:   Appropriate    Eye Contact:   Fair   Psychomotor Behavior: Normal    Attitude:   Cooperative    Orientation:   All   Speech    Rate / Production: Normal     Volume:  Normal    Mood:    Depressed  Sad  Tired  Tearful   Affect:    Appropriate  Worrisome    Thought Content:  Clear  Rumination    Thought Form:  Coherent  Logical  Circumstantial   Insight:    Good      Medication Review:   See Epic for changes     Medication Compliance:   Yes     Changes in Health Issues:   None reported     Chemical Use Review:   Substance Use: Chemical use reviewed, no active concerns identified      Tobacco Use: No current tobacco use     Collateral Reports Completed:   Not Applicable    PLAN: (Client Tasks / Therapist Tasks / Other)  Start addressing issues identified on treatment plan.         HIMANSHU Staples                                                         ________________________________________________________________________    Treatment Plan    Client's Name: Dayana Moss  YOB: 1990    Date: 1/31/2018    Diagnoses: 296.22 (F32.1)  Major Depressive Disorder, Single Episode, Moderate  & 300.02 (F41.1) Generalized Anxiety Disorder; RULE OUT Major Depressive Disorder, Recurrent, Moderate & Adjustment Disorder  Psychosocial & Contextual Factors: Miscarriage Sept 2017, currently 6 weeks pregnant, some strained in marriage and family relationships  WHODAS: 30    Referral / Collaboration:  Referral to another professional/service is not indicated at this time.    Anticipated number of session or this episode of care: 12      MeasurableTreatment Goal(s) related to diagnosis / functional impairment(s)  Goal 1: Client will improve mood as evidenced by decreased score on PHQ 9 from 18 (moderately severe) to 10 or less (moderate).    I will know I've met my goal when I don't see so sad and tired.      Objective #A (Client Action)    Client will increase motivation, interest, engagement, and pleasure in doing things.  Status: New - Date: 1/31/2018     Intervention(s)  Therapist will assign homework of social leisure planning; teach emotional regulation skills and challenging self-defeating thoughts.    Objective #B  Client will increase understanding of steps in the grief process.  Status: New - Date: 1/31/2018     Intervention(s)  Therapist will provide educational materials on grief and loss; role-play conflict management; teach distraction skills.    Objective #C  Client will practice communicating/identifying her needs 1x week.  Status: New - Date: 1/31/2018     Intervention(s)  Therapist will assign homework of communication practice; role-play effective communication skills; teach emotional recognition/identification.      Goal 2: Client will better manage anxiety as evidenced by decreased score on JACINTO 7 from 9 (moderate) to 5 or less (mild).    I will know I've met my goal when I can focus my thoughts and not worrying too much.      Objective #A (Client Action)    Client will learn 3 anxiety management skills to focus on the here and now.  Status: New - Date: 1/31/2018     Intervention(s)  Therapist  will assign homework of skill practice; provide educational materials on anxiety management; teach the client how to perform a behavioral chain analysis.      Client has reviewed and agreed to the above plan.      HIMANSHU Staples  January 31, 2018

## 2018-02-01 ASSESSMENT — ANXIETY QUESTIONNAIRES: GAD7 TOTAL SCORE: 12

## 2018-02-01 ASSESSMENT — PATIENT HEALTH QUESTIONNAIRE - PHQ9: SUM OF ALL RESPONSES TO PHQ QUESTIONS 1-9: 12

## 2018-02-07 ENCOUNTER — PRENATAL OFFICE VISIT (OUTPATIENT)
Dept: OBGYN | Facility: CLINIC | Age: 28
End: 2018-02-07
Payer: COMMERCIAL

## 2018-02-07 ENCOUNTER — RADIANT APPOINTMENT (OUTPATIENT)
Dept: ULTRASOUND IMAGING | Facility: CLINIC | Age: 28
End: 2018-02-07
Payer: COMMERCIAL

## 2018-02-07 VITALS
DIASTOLIC BLOOD PRESSURE: 59 MMHG | HEART RATE: 61 BPM | HEIGHT: 69 IN | WEIGHT: 167.2 LBS | BODY MASS INDEX: 24.76 KG/M2 | SYSTOLIC BLOOD PRESSURE: 107 MMHG

## 2018-02-07 DIAGNOSIS — O09.91 SUPERVISION OF HIGH RISK PREGNANCY IN FIRST TRIMESTER: Primary | ICD-10-CM

## 2018-02-07 DIAGNOSIS — Z23 NEED FOR PROPHYLACTIC VACCINATION AND INOCULATION AGAINST INFLUENZA: ICD-10-CM

## 2018-02-07 DIAGNOSIS — O36.80X0 ENCOUNTER TO DETERMINE FETAL VIABILITY OF PREGNANCY, SINGLE OR UNSPECIFIED FETUS: ICD-10-CM

## 2018-02-07 DIAGNOSIS — O36.80X0 ENCOUNTER TO DETERMINE FETAL VIABILITY OF PREGNANCY, SINGLE OR UNSPECIFIED FETUS: Primary | ICD-10-CM

## 2018-02-07 DIAGNOSIS — F33.1 MODERATE EPISODE OF RECURRENT MAJOR DEPRESSIVE DISORDER (H): ICD-10-CM

## 2018-02-07 DIAGNOSIS — F41.1 GAD (GENERALIZED ANXIETY DISORDER): ICD-10-CM

## 2018-02-07 PROBLEM — O09.90 SUPERVISION OF HIGH-RISK PREGNANCY: Status: ACTIVE | Noted: 2018-02-07

## 2018-02-07 LAB
ALBUMIN UR-MCNC: NEGATIVE MG/DL
APPEARANCE UR: CLEAR
BACTERIA #/AREA URNS HPF: ABNORMAL /HPF
BASOPHILS # BLD AUTO: 0 10E9/L (ref 0–0.2)
BASOPHILS NFR BLD AUTO: 0.2 %
BILIRUB UR QL STRIP: NEGATIVE
COLOR UR AUTO: YELLOW
DIFFERENTIAL METHOD BLD: ABNORMAL
EOSINOPHIL # BLD AUTO: 0.1 10E9/L (ref 0–0.7)
EOSINOPHIL NFR BLD AUTO: 1 %
ERYTHROCYTE [DISTWIDTH] IN BLOOD BY AUTOMATED COUNT: 11.4 % (ref 10–15)
GLUCOSE UR STRIP-MCNC: NEGATIVE MG/DL
HCT VFR BLD AUTO: 39.5 % (ref 35–47)
HGB BLD-MCNC: 13.4 G/DL (ref 11.7–15.7)
HGB UR QL STRIP: NEGATIVE
KETONES UR STRIP-MCNC: ABNORMAL MG/DL
LEUKOCYTE ESTERASE UR QL STRIP: ABNORMAL
LYMPHOCYTES # BLD AUTO: 2 10E9/L (ref 0.8–5.3)
LYMPHOCYTES NFR BLD AUTO: 17.2 %
MCH RBC QN AUTO: 31.5 PG (ref 26.5–33)
MCHC RBC AUTO-ENTMCNC: 33.9 G/DL (ref 31.5–36.5)
MCV RBC AUTO: 93 FL (ref 78–100)
MONOCYTES # BLD AUTO: 0.5 10E9/L (ref 0–1.3)
MONOCYTES NFR BLD AUTO: 4.6 %
NEUTROPHILS # BLD AUTO: 8.8 10E9/L (ref 1.6–8.3)
NEUTROPHILS NFR BLD AUTO: 77 %
NITRATE UR QL: NEGATIVE
NON-SQ EPI CELLS #/AREA URNS LPF: ABNORMAL /LPF
PH UR STRIP: 6 PH (ref 5–7)
PLATELET # BLD AUTO: 173 10E9/L (ref 150–450)
RBC # BLD AUTO: 4.25 10E12/L (ref 3.8–5.2)
RBC #/AREA URNS AUTO: ABNORMAL /HPF
SOURCE: ABNORMAL
SP GR UR STRIP: 1.02 (ref 1–1.03)
UROBILINOGEN UR STRIP-ACNC: 0.2 EU/DL (ref 0.2–1)
WBC # BLD AUTO: 11.5 10E9/L (ref 4–11)
WBC #/AREA URNS AUTO: ABNORMAL /HPF

## 2018-02-07 PROCEDURE — 86900 BLOOD TYPING SEROLOGIC ABO: CPT | Performed by: OBSTETRICS & GYNECOLOGY

## 2018-02-07 PROCEDURE — 99207 ZZC FIRST OB VISIT: CPT | Performed by: OBSTETRICS & GYNECOLOGY

## 2018-02-07 PROCEDURE — 87389 HIV-1 AG W/HIV-1&-2 AB AG IA: CPT | Performed by: OBSTETRICS & GYNECOLOGY

## 2018-02-07 PROCEDURE — 90686 IIV4 VACC NO PRSV 0.5 ML IM: CPT | Performed by: OBSTETRICS & GYNECOLOGY

## 2018-02-07 PROCEDURE — 86850 RBC ANTIBODY SCREEN: CPT | Performed by: OBSTETRICS & GYNECOLOGY

## 2018-02-07 PROCEDURE — 76817 TRANSVAGINAL US OBSTETRIC: CPT | Performed by: OBSTETRICS & GYNECOLOGY

## 2018-02-07 PROCEDURE — 85025 COMPLETE CBC W/AUTO DIFF WBC: CPT | Performed by: OBSTETRICS & GYNECOLOGY

## 2018-02-07 PROCEDURE — 86901 BLOOD TYPING SEROLOGIC RH(D): CPT | Performed by: OBSTETRICS & GYNECOLOGY

## 2018-02-07 PROCEDURE — 86762 RUBELLA ANTIBODY: CPT | Performed by: OBSTETRICS & GYNECOLOGY

## 2018-02-07 PROCEDURE — 36415 COLL VENOUS BLD VENIPUNCTURE: CPT | Performed by: OBSTETRICS & GYNECOLOGY

## 2018-02-07 PROCEDURE — 90471 IMMUNIZATION ADMIN: CPT | Performed by: OBSTETRICS & GYNECOLOGY

## 2018-02-07 PROCEDURE — 81001 URINALYSIS AUTO W/SCOPE: CPT | Performed by: OBSTETRICS & GYNECOLOGY

## 2018-02-07 PROCEDURE — 87340 HEPATITIS B SURFACE AG IA: CPT | Performed by: OBSTETRICS & GYNECOLOGY

## 2018-02-07 PROCEDURE — 87086 URINE CULTURE/COLONY COUNT: CPT | Performed by: OBSTETRICS & GYNECOLOGY

## 2018-02-07 PROCEDURE — 86780 TREPONEMA PALLIDUM: CPT | Performed by: OBSTETRICS & GYNECOLOGY

## 2018-02-07 RX ORDER — PRENATAL VIT/IRON FUM/FOLIC AC 27MG-0.8MG
2 TABLET ORAL DAILY
COMMUNITY
End: 2019-01-31

## 2018-02-07 ASSESSMENT — ANXIETY QUESTIONNAIRES
7. FEELING AFRAID AS IF SOMETHING AWFUL MIGHT HAPPEN: SEVERAL DAYS
IF YOU CHECKED OFF ANY PROBLEMS ON THIS QUESTIONNAIRE, HOW DIFFICULT HAVE THESE PROBLEMS MADE IT FOR YOU TO DO YOUR WORK, TAKE CARE OF THINGS AT HOME, OR GET ALONG WITH OTHER PEOPLE: SOMEWHAT DIFFICULT
1. FEELING NERVOUS, ANXIOUS, OR ON EDGE: SEVERAL DAYS
3. WORRYING TOO MUCH ABOUT DIFFERENT THINGS: SEVERAL DAYS
6. BECOMING EASILY ANNOYED OR IRRITABLE: SEVERAL DAYS
GAD7 TOTAL SCORE: 8
5. BEING SO RESTLESS THAT IT IS HARD TO SIT STILL: SEVERAL DAYS
2. NOT BEING ABLE TO STOP OR CONTROL WORRYING: SEVERAL DAYS

## 2018-02-07 ASSESSMENT — PATIENT HEALTH QUESTIONNAIRE - PHQ9: 5. POOR APPETITE OR OVEREATING: MORE THAN HALF THE DAYS

## 2018-02-07 NOTE — PROGRESS NOTES
This is a 27 year old female patient,   who presents for her first obstetrical visit.    Patient's last menstrual period was 2017. This gives her an EDC of Sep 4, 2018 .  She is 10w1d weeks.  Her cycles are regular.  Her last menstrual period was normal.  She has had an ultrasound on 2018 which showed viable IUP measuring 9w6d. .  Since her LMP, she has experienced  fatigue, weight gain, some breast tenderness, bloating, skin dryness, headaches, decreased libido, dull back pain, cramping, anxiety and depression).  She denies nausea, emesis, abdominal pain, loss of appetite, vaginal discharge, dysuria, pelvic pain, urinary urgency, lightheadedness, urinary frequency, vaginal bleeding, hemorrhoids and constipation.    No results found for: No PAP done within the last three years, per pt but a history of all normal PAP's.    Here today for new OB visit!!  2nd pregnancy --had early loss last September.  Feeling well --sure LMP.  US today confirms dating.  No vb/spotting/cramping.  +fatigue but no other concerns/issues.  Fairly good appetite.  No nausea/vomiting.  No bowel/bladder issues.    Hx anxiety/depression --is now seeing psychiatrist through [facility] --started on citalopram and recently increased to 40mg daily; also seeing therapist (Karel Wren) every 2 weeks  -agrees to flu shot today  -quit smoking last     Past History:  Her past medical history   Past Medical History:   Diagnosis Date     ADHD     States that it's not an ongoing diagnosis, the person who did her psych eval for it did not think she had it. Was on Adderall in the past and stopped that in 2017.     Anxiety     Started the Citalopram early 2017 by her PCP.      Depressive disorder     Started the Citalopram early 2017 by her PCP.      Ovarian cyst      Smoking     Stopped 2017.   .      She has a history of  one missed AB that she passed vaginally with use of Cytotec.    Since her last LMP she  admits to the use of alcohol but none since found out is pregnant..    HISTORY:  Obstetric History       T0      L0     SAB1   TAB0   Ectopic0   Multiple0   Live Births0       # Outcome Date GA Lbr Tristin/2nd Weight Sex Delivery Anes PTL Lv   2 Current            1 SAB 2017     SAB           Past Medical History:   Diagnosis Date     ADHD     States that it's not an ongoing diagnosis, the person who did her psych eval for it did not think she had it. Was on Adderall in the past and stopped that in 2017.     Anxiety     Started the Citalopram early 2017 by her PCP.      Depressive disorder     Started the Citalopram early 2017 by her PCP.      Ovarian cyst      Smoking     Stopped 2017.     Past Surgical History:   Procedure Laterality Date     NO HISTORY OF SURGERY       History reviewed. No pertinent family history.  Social History     Social History     Marital status:      Spouse name: N/A     Number of children: 0     Years of education: N/A     Social History Main Topics     Smoking status: Former Smoker     Packs/day: 0.50     Types: Cigarettes     Start date: 2003     Quit date: 10/2017     Smokeless tobacco: Former User     Quit date: 10/2017      Comment: Used a vapor for the month of October.     Alcohol use No      Comment: None since found out is pregnant.     Drug use: 1.00 per week     Special: Marijuana      Comment: Socially smoked marijuana, cannot remember last date that smoked. None since her last period.     Sexual activity: Yes     Partners: Male     Birth control/ protection: None     Other Topics Concern     None     Social History Narrative     Current Outpatient Prescriptions   Medication Sig     Prenatal Vit-Fe Fumarate-FA (PRENATAL MULTIVITAMIN PLUS IRON) 27-0.8 MG TABS per tablet Take 2 tablets by mouth daily     Acetaminophen (TYLENOL PO)      citalopram (CELEXA) 40 MG tablet 1 tablet orally daily     No current  "facility-administered medications for this visit.      Allergies   Allergen Reactions     Seasonal Allergies        Past medical, surgical, social and family history were reviewed and updated in EPIC.    ROS:   12 point review of systems negative other than symptoms noted below.  Constitutional: Fatigue and Weight Gain  Breast: Tenderness  Gastrointestinal: Bloating  Genitourinary: Cramps  Skin: Skin Dryness  Neurologic: Headaches  Musculoskeletal: Dull back pain  Endocrine: Decreased Libido  Psychiatric: Anxiety and Depression    EXAM:  /59 (BP Location: Right arm, Patient Position: Chair, Cuff Size: Adult Regular)  Pulse 61  Ht 5' 9\" (1.753 m)  Wt 167 lb 3.2 oz (75.8 kg)  LMP 11/28/2017  Breastfeeding? No  BMI 24.69 kg/m2   BMI: Body mass index is 24.69 kg/(m^2).    EXAM:  Constitutional:  Appearance: Well nourished, well developed alert, in no acute distress.  Neck:   Lymph Nodes:  No lymphadenopathy present.    Thyroid:  Gland size normal, nontender, no nodules or masses present  on palpation.  Chest:  Respiratory Effort:  Breathing unlabored.  Cardiovascular:  Heart Auscultation:  Regular rate, normal rhythm, no murmurs    present.  Breasts: Deferred.    Axillary Lymph Nodes:  No lymphadenopathy present.  Gastrointestinal:  Abdominal Examination:  Abdomen nontender to palpation, tone  normal without rigidity or guarding, no masses present, umbilicus without  Lesions.    Liver and speen:  No hepatomegaly present, liver nontender to  palpation.    Hernias:  No hernias present.  Lymphatic: Lymph Nodes:  No other lymphadenopathy present.  Skin:  General Inspection:  No rashes present, no lesions present, no areas of  discoloration.    Genitalia and Groin:  No rashes present, no lesions present, no areas of  discoloration, no masses present.  Neurologic/Psychiatric:    Mental Status:  Oriented X3.    No Pelvic Exam performed    ASSESSMENT:      ICD-10-CM    1. Supervision of high risk pregnancy in first " trimester O09.91 ABO/Rh type and screen     Anti Treponema     CBC with platelets differential     Hepatitis B surface antigen     HIV Antigen Antibody Combo     Urine Culture Aerobic Bacterial     UA with Microscopic     Rubella Antibody IgG Quantitative   2. Need for prophylactic vaccination and inoculation against influenza Z23 FLU VAC, SPLIT VIRUS IM > 3 YO (QUADRIVALENT) [59497]     Vaccine Administration, Initial [70182]   3. JACINTO (generalized anxiety disorder) F41.1    4. Moderate episode of recurrent major depressive disorder (H) F33.1        PLAN/PATIENT INSTRUCTIONS:    Patient Instructions   Avoid alcoholic beverages.  Discussed all genetic testing options.  Patient declines.  Discussed depression/anxiety and medication risks and benefits.  Explained increased risk for post-partum depression.  Encouraged Antonio to continue with close follow up with her psychiatrist and therapist throughout her pregnancy.  Reviewed usual course of pregnancy including visits, labwork, imaging, vaccinations, etc.  Due to history of miscarriage and high anxiety, Antoino will return to see me in 2 weeks for FHT check.  Flu shot today      Nia Casanova MD

## 2018-02-07 NOTE — PROGRESS NOTES

## 2018-02-07 NOTE — PROGRESS NOTES
Pt has NOB U/S and appt today (2/7/2018) with Dr. Casanova. U/S order placed/futured out and linked to appt.

## 2018-02-07 NOTE — NURSING NOTE
[facility] Obstetrical Risk History    Patient presents for new OB labs and teaching.      1. Please indicate any condition you have or have had in the past: Depression, Anxiety  2. Do you smoke?  No  If yes, how many packs/day?   3. Do you drink alcoholic beverages? None since found out is pregnant  If yes, how often?  What type?   4. List any medications taken since your last period: Prenatal Vitamin, Citalopram, Tylenol  5. List any recreational drugs (cocaine, marijuana, etc. used since your last period: None  6. List any chemical or radiation exposure that you've encountered: None  7. Are you on a restricted diet? No  If yes, please describe:  8.   Do you have any Faith objections to any form of treatment? No    GENETIC SCREENING    These questions apply to you, the baby's father, or anyone in either family with:    1. Patient's age 35 or greater at delivery? No  2. Frisian, Ivorian, Mediterranean ancestry? No  3. Neural Tube Defect (Meningomyelocele, Spina Bifida, or Anencephaly)? No  4. Anabaptist, Sammarinese Namibian or history of Carl-Sachs disease? No  5. Down's Syndrome?   No  6. Hemophilia or clotting disorder? No  7. Muscular Dystrophy? No  8. Cystic Fibrosis? No  9. Edwin's Chorea? No  10. Mental Retardation? No  11. 3 or more miscarriages or a stillborn? No  12. Other inherited disease or chromosomal disorder? No  13. Have you or the baby's father had a child born with a birth defect? No  14. Did you or the baby's father have a birth defect yourselves? No    Do you have any other concerns about birth defects? No        -Pt states the last time she had a PAP smear was over 3 yrs ago at [facility].   -Pt has not received a flu shot this season.  -Informed pt and  about genetic testing/genetic screening. Gave Innatal brochure and informed what it is. Pt is aware minimum to get Innatal done is at 10 wks.

## 2018-02-07 NOTE — MR AVS SNAPSHOT
After Visit Summary   2/7/2018    Dayana Moss    MRN: [id number]           Patient Information     Date Of Birth          1990        Visit Information        Provider Department      2/7/2018 3:10 PM Nia Casanova MD; [facility] [place]        Today's Diagnoses     Supervision of high risk pregnancy in first trimester    -  1       Follow-ups after your visit        Your next 10 appointments already scheduled     Feb 08, 2018  5:30 PM CST   Return Visit with Karel Wren [facility]  [address] [place] [address]   [phone]            Feb 19, 2018  5:30 PM CST   Return Visit with Karel Wren [facility]  [address] [place] [address] [phone]            Feb 21, 2018  4:30 PM CST   Office Visit with CHEY Hernandez [facility] ([facility]) ([address] [phone] [phone]           Bring a current list of meds and any records pertaining to this visit. For Physicals, please bring immunization records and any forms needing to be filled out. Please arrive 10 minutes early to complete paperwork.            Feb 28, 2018  4:30 PM CST   Return Visit with Karel Wren [facility]  [address] [place] [id number] [phone]            Mar 15, 2018  4:30 PM CDT   Return Visit with Karel Wren [facility]  [address] [place] [id number] [phone]              Who to contact     If you have questions or need follow up information about today's clinic visit or your schedule please contact  "[facility] [place] directly at [phone].  Normal or non-critical lab and imaging results will be communicated to you by MyChart, letter or phone within 4 business days after the clinic has received the results. If you do not hear from us within 7 days, please contact the clinic through MyChart or phone. If you have a critical or abnormal lab result, we will notify you by phone as soon as possible.  Submit refill requests through Longaccess or call your pharmacy and they will forward the refill request to us. Please allow 3 business days for your refill to be completed.          Additional Information About Your Visit        KIDOZ[employer]ora [employer] Access[employer] Information     Longaccess lets you send messages to your doctor, view your test results, renew your prescriptions, schedule appointments and more. To sign up, go to [email or web address] . Click on \"Log in\" on the left side of the screen, which will take you to the Welcome page. Then click on \"Sign up Now\" on the right side of the page.     You will be asked to enter the access code listed below, as well as some personal information. Please follow the directions to create your username and password.     Your access code is: [email or web address]  Expires: 2018  3:56 PM     Your access code will  in 90 days. If you need help or a new code, please call your [place] clinic or [phone].        Care EveryWhere ID     This is your Care EveryWhere ID. This could be used by other organizations to access your [place] medical records  [id number]        Your Vitals Were     Pulse Height Last Period Breastfeeding? BMI (Body Mass Index)       61 5' 9\" (1.753 m) 2017 No 24.69 kg/m2        Blood Pressure from Last 3 Encounters:   18 107/59   18 110/66   18 116/60    Weight from Last 3 Encounters:   18 167 lb 3.2 oz (75.8 kg)   18 173 lb 1.6 oz (78.5 kg)   18 172 lb 8 oz (78.2 kg)              We Performed the Following     UA with " Microscopic     Urine Culture Aerobic Bacterial        Primary Care Provider Fax #    Physician No Ref-Primary [phone]       No address on file        Equal Access to Services     LEONARDA PORTILLO : Hadii aad ku hadnancyrustam Montserratchandan, darlene mehranchilo, priscilla marina, jyothi archer So [facility] [phone].    ATENCIÓN: Si habla español, tiene a dumont disposición servicios gratuitos de asistencia lingüística. Llame al [phone].    We comply with applicable federal civil rights laws and [place] laws. We do not discriminate on the basis of race, color, national origin, age, disability, sex, sexual orientation, or gender identity.            Thank you!     Thank you for choosing [facility]  for your care. Our goal is always to provide you with excellent care. Hearing back from our patients is one way we can continue to improve our services. Please take a few minutes to complete the written survey that you may receive in the mail after your visit with us. Thank you!             Your Updated Medication List - Protect others around you: Learn how to safely use, store and throw away your medicines at www.disposemymeds.org.          This list is accurate as of 2/7/18  3:49 PM.  Always use your most recent med list.                   Brand Name Dispense Instructions for use Diagnosis    citalopram 40 MG tablet    celeXA    30 tablet    1 tablet orally daily    Moderate episode of recurrent major depressive disorder (H)       prenatal multivitamin plus iron 27-0.8 MG Tabs per tablet      Take 2 tablets by mouth daily        TYLENOL PO

## 2018-02-07 NOTE — PATIENT INSTRUCTIONS
Avoid alcoholic beverages.  Discussed all genetic testing options.  Patient declines.  Discussed depression/anxiety and medication risks and benefits.  Explained increased risk for post-partum depression.  Encouraged Antonio to continue with close follow up with her psychiatrist and therapist throughout her pregnancy.  Reviewed usual course of pregnancy including visits, labwork, imaging, vaccinations, etc.  Due to history of miscarriage and high anxiety, Antonio will return to see me in 2 weeks for FHT check.  Flu shot today

## 2018-02-08 LAB
ABO + RH BLD: NORMAL
ABO + RH BLD: NORMAL
BACTERIA SPEC CULT: NO GROWTH
BLD GP AB SCN SERPL QL: NORMAL
BLOOD BANK CMNT PATIENT-IMP: NORMAL
HBV SURFACE AG SERPL QL IA: NONREACTIVE
HIV 1+2 AB+HIV1 P24 AG SERPL QL IA: NONREACTIVE
Lab: NORMAL
SPECIMEN EXP DATE BLD: NORMAL
SPECIMEN SOURCE: NORMAL

## 2018-02-08 ASSESSMENT — PATIENT HEALTH QUESTIONNAIRE - PHQ9: SUM OF ALL RESPONSES TO PHQ QUESTIONS 1-9: 15

## 2018-02-08 ASSESSMENT — ANXIETY QUESTIONNAIRES: GAD7 TOTAL SCORE: 8

## 2018-02-09 LAB
RUBV IGG SERPL IA-ACNC: 12 IU/ML
T PALLIDUM IGG+IGM SER QL: NEGATIVE

## 2018-02-18 ENCOUNTER — OFFICE VISIT (OUTPATIENT)
Dept: URGENT CARE | Facility: URGENT CARE | Age: 28
End: 2018-02-18
Payer: COMMERCIAL

## 2018-02-18 ENCOUNTER — NURSE TRIAGE (OUTPATIENT)
Dept: NURSING | Facility: CLINIC | Age: 28
End: 2018-02-18

## 2018-02-18 VITALS
HEART RATE: 67 BPM | SYSTOLIC BLOOD PRESSURE: 112 MMHG | OXYGEN SATURATION: 98 % | WEIGHT: 168 LBS | BODY MASS INDEX: 24.81 KG/M2 | TEMPERATURE: 97.9 F | DIASTOLIC BLOOD PRESSURE: 73 MMHG

## 2018-02-18 DIAGNOSIS — B02.9 HERPES ZOSTER WITHOUT COMPLICATION: Primary | ICD-10-CM

## 2018-02-18 DIAGNOSIS — R07.0 THROAT PAIN: ICD-10-CM

## 2018-02-18 LAB
DEPRECATED S PYO AG THROAT QL EIA: NORMAL
SPECIMEN SOURCE: NORMAL

## 2018-02-18 PROCEDURE — 87880 STREP A ASSAY W/OPTIC: CPT | Performed by: FAMILY MEDICINE

## 2018-02-18 PROCEDURE — 87081 CULTURE SCREEN ONLY: CPT | Performed by: FAMILY MEDICINE

## 2018-02-18 PROCEDURE — 99213 OFFICE O/P EST LOW 20 MIN: CPT | Performed by: FAMILY MEDICINE

## 2018-02-18 RX ORDER — FAMCICLOVIR 500 MG/1
500 TABLET ORAL 3 TIMES DAILY
Qty: 21 TABLET | Refills: 0 | Status: SHIPPED | OUTPATIENT
Start: 2018-02-18 | End: 2018-06-12

## 2018-02-18 NOTE — PROGRESS NOTES
Subjective: 2 or 3 days of left ear and posterior ear pain that comes into the throat on the left side as well.  She is 12 weeks pregnant.  No cold symptoms.  She did have chickenpox as a child.  Her  had chickenpox as well.    Objective: ENT is fairly normal but she has groups of red bumps that are very painful behind her ear and on the left side of the neck not crossing the midline.  There is no adenopathy.    Assessment and plan: Almost undoubtedly shingles.  Strep test is negative as expected.  Famvir should be safe in pregnancy and worth it to try to prevent it from coming into the eye area

## 2018-02-18 NOTE — NURSING NOTE
"Chief Complaint   Patient presents with     Urgent Care     Pt in clinic to have eval for sore throat, neck pain, and ear pain for 3 days.     Pharyngitis     Neck Pain     Otalgia       Initial /73  Pulse 67  Temp 97.9  F (36.6  C) (Tympanic)  Wt 168 lb (76.2 kg)  LMP 11/28/2017  SpO2 98%  BMI 24.81 kg/m2 Estimated body mass index is 24.81 kg/(m^2) as calculated from the following:    Height as of 2/7/18: 5' 9\" (1.753 m).    Weight as of this encounter: 168 lb (76.2 kg).  Medication Reconciliation: complete   Darya Carballo/ MA    "

## 2018-02-18 NOTE — MR AVS SNAPSHOT
After Visit Summary   2/18/2018    Dayana AGUILA    MRN: [id number]           Patient Information     Date Of Birth          1990        Visit Information        Provider Department      2/18/2018 11:45 AM José Miguel Eller MD [facility]        Today's Diagnoses     Herpes zoster without complication    -  1    Throat pain           Follow-ups after your visit        Your next 10 appointments already scheduled     Feb 19, 2018  5:30 PM CST   Return Visit with Karel Wren [facility]  [address] [facility]  [facility] [address]   [phone]            Feb 20, 2018  3:10 PM CST   ESTABLISHED PRENATAL with Nia Casanova MD   [facility] [place] ([facility])    [address]  [facility] [address]   [phone]            Feb 21, 2018  4:30 PM CST   Office Visit with CHEY Hernandez [facility]    [address] [place] [address] [address]  [facility] [phone] [phone]           Bring a current list of meds and any records pertaining to this visit. For Physicals, please bring immunization records and any forms needing to be filled out. Please arrive 10 minutes early to complete paperwork.            Feb 28, 2018  4:30 PM CST   Return Visit with Karel Wren [facility]  [address] [facility]  [facility] [address]   [phone]            Mar 15, 2018  4:30 PM CDT   Return Visit with Karel Wren [facility]  [address] [facility]  [facility] [address]   [phone]              Who to contact     If you have questions or need follow up information about today's clinic visit or your schedule please contact [facility] [facility] directly at [phone].  Normal or non-critical lab and imaging results will be communicated to you by MyChart, letter or phone within 4 business days after the clinic has received the results. If you do not hear from us within 7 days, please contact the clinic through Douguohart or phone. If you have a critical or abnormal lab result, we will notify you by phone as soon as possible.  Submit refill requests through Milk Mantra or call your pharmacy and they will forward the refill request to us. Please allow 3 business days for your refill to be completed.          Additional Information About Your Visit        DouguoharWholeWorldBand Information     Milk Mantra lets you send messages to your doctor, view your test results, renew your prescriptions, schedule appointments and more. To sign up, go to [email or web address] . Click on \"Log in\" on the left side of the screen, which will take you to the Welcome page. Then click on \"Sign up Now\" on the right side of the page.     You will be asked to enter the access code listed below, as well as some personal information. Please follow the directions to create your username and password.     Your access code is: [email or web address]  Expires: 2018  3:56 PM     Your access code will  in 90 days. If you need help or a new code, please call your [place] clinic or [phone].        Care EveryWhere ID     This is your Care EveryWhere ID. This could be used by other organizations to access your [place] medical records  [id number]        Your Vitals Were     Pulse Temperature Last Period Pulse Oximetry BMI (Body Mass Index)       67 97.9  F (36.6  C) (Tympanic) 2017 98% 24.81 kg/m2        Blood Pressure from Last 3 Encounters:   18 112/73   18 107/59   18 110/66    Weight from Last 3 Encounters:   18 168 lb (76.2 kg)   18 167 lb 3.2 oz (75.8 kg)   18 173 lb 1.6 oz (78.5 kg)              We Performed the Following     Beta " strep group A culture     Strep, Rapid Screen          Today's Medication Changes          These changes are accurate as of 2/18/18 12:37 PM.  If you have any questions, ask your nurse or doctor.               Start taking these medicines.        Dose/Directions    famciclovir 500 MG tablet   Commonly known as:  FAMVIR   Used for:  Herpes zoster without complication   Started by:  José Miguel Eller MD        Dose:  500 mg   Take 1 tablet (500 mg) by mouth 3 times daily   Quantity:  21 tablet   Refills:  0            Where to get your medicines      These medications were sent to [id number] - [place], [place] - [address] [facility] [address]  [address], [place] [address]     Phone:  [phone]     famciclovir 500 MG tablet                Primary Care Provider Fax #    Physician No Ref-Primary [phone]       No address on file        Equal Access to Services     LEONARDA PORTILLO : Igor wesley Sochandan, waaxnick theodore, qaybta kaalmanick marina, jyothi vargas . So [facility] [phone].    ATENCIÓN: Si habla español, tiene a dumont disposición servicios gratuitos de asistencia lingüística. Llame al [phone].    We comply with applicable federal civil rights laws and [place] laws. We do not discriminate on the basis of race, color, national origin, age, disability, sex, sexual orientation, or gender identity.            Thank you!     Thank you for choosing [facility]  for your care. Our goal is always to provide you with excellent care. Hearing back from our patients is one way we can continue to improve our services. Please take a few minutes to complete the written survey that you may receive in the mail after your visit with us. Thank you!             Your Updated Medication List - Protect others around you: Learn how to safely use, store and throw away your medicines at www.disposemymeds.org.          This list is  accurate as of 2/18/18 12:37 PM.  Always use your most recent med list.                   Brand Name Dispense Instructions for use Diagnosis    citalopram 40 MG tablet    celeXA    30 tablet    1 tablet orally daily    Moderate episode of recurrent major depressive disorder (H)       famciclovir 500 MG tablet    FAMVIR    21 tablet    Take 1 tablet (500 mg) by mouth 3 times daily    Herpes zoster without complication       prenatal multivitamin plus iron 27-0.8 MG Tabs per tablet      Take 2 tablets by mouth daily        TYLENOL PO

## 2018-02-18 NOTE — TELEPHONE ENCOUNTER
" [facility] ; Is  12 weeks pregnant,  and 1st pregnancy , CODY 9/4/18 .   2/16/18 Stiff / pain on  Left side neck , hurt to swallow  and left earache , and under jaw left tender to touch and back left side of ear  .  Currently : no fever.   Having left earache 5/10 on painscale  and pain behind jaw started 2/17/18 . Constant   left Neck pain / stiffness and currently 7/10 on painscale .  Triage guideline used was neck pain /stiffness adult and disposition is see provider within 4 hours and Pt is looking at location , FNA recommended [facility] open at 10am -8 today.  .Fiona Roberts RN [place] nurse advisors.    Reason for Disposition    [1] SEVERE neck pain (e.g., excruciating, unable to do any normal activities) AND [2] not improved after 2 hours of pain medicine    Additional Information    Negative: Shock suspected (e.g., cold/pale/clammy skin, too weak to stand, low BP, rapid pulse)    Negative: Difficult to awaken or acting confused  (e.g., disoriented, slurred speech)    Negative: [1] Similar pain previously AND [2] it was from \"heart attack\"    Negative: [1] Similar pain previously AND [2] it was from \"angina\" AND [3] not relieved by nitroglycerin    Negative: Sounds like a life-threatening emergency to the triager    Negative: Followed a neck injury (e.g., MVA, sports, impact or collision)    Negative: Chest pain    Negative: Lymph node in the neck is swollen or painful to the touch    Negative: Sore throat is main symptom    Negative: Difficulty breathing or unusual sweating (e.g., sweating without exertion)    Negative: [1] Stiff neck (can't put chin to chest) AND [2] headache    Negative: [1] Stiff neck (can't put chin to chest) AND [2] fever    Negative: Weakness of an arm or hand    Negative: Problems with bowel or bladder control    Negative: Head is twisting to one side (or ask \"is it turning against your will?\")    Negative: Patient sounds very sick or weak " to the triager    Protocols used: NECK PAIN OR STIFFNESS-ADULT-AH

## 2018-02-19 LAB
BACTERIA SPEC CULT: NORMAL
SPECIMEN SOURCE: NORMAL

## 2018-02-20 ENCOUNTER — PRENATAL OFFICE VISIT (OUTPATIENT)
Dept: OBGYN | Facility: CLINIC | Age: 28
End: 2018-02-20
Payer: COMMERCIAL

## 2018-02-20 VITALS — SYSTOLIC BLOOD PRESSURE: 116 MMHG | BODY MASS INDEX: 25.1 KG/M2 | DIASTOLIC BLOOD PRESSURE: 66 MMHG | WEIGHT: 170 LBS

## 2018-02-20 DIAGNOSIS — O09.91 SUPERVISION OF HIGH RISK PREGNANCY IN FIRST TRIMESTER: ICD-10-CM

## 2018-02-20 DIAGNOSIS — Z3A.12 12 WEEKS GESTATION OF PREGNANCY: Primary | ICD-10-CM

## 2018-02-20 PROCEDURE — 99207 ZZC PRENATAL VISIT: CPT | Performed by: OBSTETRICS & GYNECOLOGY

## 2018-02-20 NOTE — PROGRESS NOTES
Doing fairly well today.   Here for heart tones  No vb/spotting/cramping  Was dx'd with shingles of left neck/behind her ear this weekend in urgent care; on TID famcyclovir --still having a lot of pain; seeing PCP tomorrow  Declines genetic screening

## 2018-02-20 NOTE — MR AVS SNAPSHOT
After Visit Summary   2/20/2018    Dayana AGUILA    MRN: [id number]           Patient Information     Date Of Birth          1990        Visit Information        Provider Department      2/20/2018 3:10 PM Nia Casanova MD [facility] [place]        Today's Diagnoses     12 weeks gestation of pregnancy    -  1    Supervision of high risk pregnancy in first trimester           Follow-ups after your visit        Follow-up notes from your care team     Return in about 4 weeks (around 3/20/2018) for Routine Prenatal Visit.      Your next 10 appointments already scheduled     Feb 21, 2018  4:30 PM CST   Office Visit with CHEY Hernandez [facility] ([facility]    [address] [place] [address] [phone]           Bring a current list of meds and any records pertaining to this visit. For Physicals, please bring immunization records and any forms needing to be filled out. Please arrive 10 minutes early to complete paperwork.            Feb 28, 2018  4:30 PM CST   Return Visit with Karel Wren [place] [address] [place] [address] [phone]            Mar 15, 2018  4:30 PM CDT   Return Visit with Karel Wren [place] [address] [place] [address] [phone]              Who to contact     If you have questions or need follow up information about today's clinic visit or your schedule please contact [facility] [place] directly at [phone].  Normal or non-critical lab and imaging results will be communicated to you by MyChart, letter or phone within 4 business days after the clinic has received the results. If you do not hear from us within 7 days, please contact the clinic through Voyatt  "or phone. If you have a critical or abnormal lab result, we will notify you by phone as soon as possible.  Submit refill requests through inSelly or call your pharmacy and they will forward the refill request to us. Please allow 3 business days for your refill to be completed.          Additional Information About Your Visit        SportsBlog.comhart Information     inSelly lets you send messages to your doctor, view your test results, renew your prescriptions, schedule appointments and more. To sign up, go to [email or web address] . Click on \"Log in\" on the left side of the screen, which will take you to the Welcome page. Then click on \"Sign up Now\" on the right side of the page.     You will be asked to enter the access code listed below, as well as some personal information. Please follow the directions to create your username and password.     Your access code is: [email or web address]  Expires: 2018  3:56 PM     Your access code will  in 90 days. If you need help or a new code, please call your [place] clinic or [phone].        Care EveryWhere ID     This is your Care EveryWhere ID. This could be used by other organizations to access your [place] medical records  [id number]        Your Vitals Were     Last Period BMI (Body Mass Index)                2017 25.1 kg/m2           Blood Pressure from Last 3 Encounters:   18 116/66   18 112/73   18 107/59    Weight from Last 3 Encounters:   18 170 lb (77.1 kg)   18 168 lb (76.2 kg)   18 167 lb 3.2 oz (75.8 kg)              Today, you had the following     No orders found for display       Primary Care Provider Fax #    Physician No Ref-Primary [phone]       No address on file        Equal Access to Services     COLLIN PORTILLO : Igor Mustafa, waayden theodore, qaybta kaalmada salas, jyothi spring. So [facility] [phone].    ATENCIÓN: Si habla español, tiene a dumont disposición " servicios gratuitos de asistencia lingüística. Tania son [phone].    We comply with applicable federal civil rights laws and [place] laws. We do not discriminate on the basis of race, color, national origin, age, disability, sex, sexual orientation, or gender identity.            Thank you!     Thank you for choosing [facility] [place]  for your care. Our goal is always to provide you with excellent care. Hearing back from our patients is one way we can continue to improve our services. Please take a few minutes to complete the written survey that you may receive in the mail after your visit with us. Thank you!             Your Updated Medication List - Protect others around you: Learn how to safely use, store and throw away your medicines at www.disposemymeds.org.          This list is accurate as of 2/20/18  3:53 PM.  Always use your most recent med list.                   Brand Name Dispense Instructions for use Diagnosis    citalopram 40 MG tablet    celeXA    30 tablet    1 tablet orally daily    Moderate episode of recurrent major depressive disorder (H)       famciclovir 500 MG tablet    FAMVIR    21 tablet    Take 1 tablet (500 mg) by mouth 3 times daily    Herpes zoster without complication       prenatal multivitamin plus iron 27-0.8 MG Tabs per tablet      Take 2 tablets by mouth daily        TYLENOL PO

## 2018-02-21 ENCOUNTER — OFFICE VISIT (OUTPATIENT)
Dept: FAMILY MEDICINE | Facility: CLINIC | Age: 28
End: 2018-02-21
Payer: COMMERCIAL

## 2018-02-21 VITALS
WEIGHT: 169.5 LBS | OXYGEN SATURATION: 99 % | HEART RATE: 68 BPM | BODY MASS INDEX: 25.03 KG/M2 | SYSTOLIC BLOOD PRESSURE: 100 MMHG | DIASTOLIC BLOOD PRESSURE: 58 MMHG | TEMPERATURE: 97.9 F

## 2018-02-21 DIAGNOSIS — B02.9 HERPES ZOSTER WITHOUT COMPLICATION: ICD-10-CM

## 2018-02-21 DIAGNOSIS — F33.1 MODERATE EPISODE OF RECURRENT MAJOR DEPRESSIVE DISORDER (H): Primary | ICD-10-CM

## 2018-02-21 PROCEDURE — 99214 OFFICE O/P EST MOD 30 MIN: CPT | Performed by: NURSE PRACTITIONER

## 2018-02-21 RX ORDER — LIDOCAINE 50 MG/G
OINTMENT TOPICAL PRN
Qty: 50 G | Refills: 0 | Status: SHIPPED | OUTPATIENT
Start: 2018-02-21 | End: 2018-06-12

## 2018-02-21 NOTE — MR AVS SNAPSHOT
After Visit Summary   2/21/2018    Dayana AGUILA    MRN: [id number]           Patient Information     Date Of Birth          1990        Visit Information        Provider Department      2/21/2018 4:30 PM Cheri Georges APRN [facility]        Today's Diagnoses     Herpes zoster without complication    -  1       Follow-ups after your visit        Your next 10 appointments already scheduled     Feb 28, 2018  4:30 PM CST   Return Visit with Karel Wren [place] [id number] [place], [place] [address]  [facility] [address]   [phone]            Mar 15, 2018  4:30 PM CDT   Return Visit with Karel Wren [facility] ([place] [address] [place], [place] [address]  [facility] [address] [phone]            Mar 20, 2018  8:50 AM CDT   ESTABLISHED PRENATAL with Nia Casanova MD   [facility] ([facility])    [address] [place] [address] [phone]              Who to contact     If you have questions or need follow up information about today's clinic visit or your schedule please contact [facility] directly at [phone].  Normal or non-critical lab and imaging results will be communicated to you by G2 [employer]systemshart, letter or phone within 4 business days after the clinic has received the results. If you do not hear from us within 7 days, please contact the clinic through MyChart or phone. If you have a critical or abnormal lab result, we will notify you by phone as soon as possible.  Submit refill requests through Postini or call your pharmacy and they will forward the refill request to us. Please allow 3 business days for your refill to be completed.          Additional Information About Your Visit        Postini Information     Postini lets you send messages to  "your doctor, view your test results, renew your prescriptions, schedule appointments and more. To sign up, go to [email or web address] . Click on \"Log in\" on the left side of the screen, which will take you to the Welcome page. Then click on \"Sign up Now\" on the right side of the page.     You will be asked to enter the access code listed below, as well as some personal information. Please follow the directions to create your username and password.     Your access code is: [email or web address]  Expires: 2018  3:56 PM     Your access code will  in 90 days. If you need help or a new code, please call your [place] clinic or [phone].        Care EveryWhere ID     This is your Care EveryWhere ID. This could be used by other organizations to access your [place] medical records  [id number]        Your Vitals Were     Pulse Temperature Last Period Pulse Oximetry BMI (Body Mass Index)       68 97.9  F (36.6  C) (Oral) 2017 99% 25.03 kg/m2        Blood Pressure from Last 3 Encounters:   18 100/58   18 116/66   18 112/73    Weight from Last 3 Encounters:   18 169 lb 8 oz (76.9 kg)   18 170 lb (77.1 kg)   18 168 lb (76.2 kg)              Today, you had the following     No orders found for display         Today's Medication Changes          These changes are accurate as of 18  4:46 PM.  If you have any questions, ask your nurse or doctor.               Start taking these medicines.        Dose/Directions    lidocaine 5 % ointment   Commonly known as:  XYLOCAINE   Used for:  Herpes zoster without complication   Started by:  Cheri Georges APRN CNP        Apply topically as needed for moderate pain   Quantity:  50 g   Refills:  0            Where to get your medicines      These medications were sent to [facility] [address]  [address] [address]     Phone:  [phone]     lidocaine 5 % ointment    "             Primary Care Provider Fax #    Physician No Ref-Primary [phone]       No address on file        Equal Access to Services     LEONARDA PORTILLO : Hadii aad ku hadnancyrustam Kianali, boboda luz mariavaldoha, priscilla nation jluisjena, jyothi maryin hayaadavian bazzilamberto neal sam spring. So [facility] [phone].    ATENCIÓN: Si habla español, tiene a dumont disposición servicios gratuitos de asistencia lingüística. Llame al [phone].    We comply with applicable federal civil rights laws and [place] laws. We do not discriminate on the basis of race, color, national origin, age, disability, sex, sexual orientation, or gender identity.            Thank you!     Thank you for choosing [facility]  for your care. Our goal is always to provide you with excellent care. Hearing back from our patients is one way we can continue to improve our services. Please take a few minutes to complete the written survey that you may receive in the mail after your visit with us. Thank you!             Your Updated Medication List - Protect others around you: Learn how to safely use, store and throw away your medicines at www.disposemymeds.org.          This list is accurate as of 2/21/18  4:46 PM.  Always use your most recent med list.                   Brand Name Dispense Instructions for use Diagnosis    citalopram 40 MG tablet    celeXA    30 tablet    1 tablet orally daily    Moderate episode of recurrent major depressive disorder (H)       famciclovir 500 MG tablet    FAMVIR    21 tablet    Take 1 tablet (500 mg) by mouth 3 times daily    Herpes zoster without complication       lidocaine 5 % ointment    XYLOCAINE    50 g    Apply topically as needed for moderate pain    Herpes zoster without complication       prenatal multivitamin plus iron 27-0.8 MG Tabs per tablet      Take 2 tablets by mouth daily        TYLENOL PO

## 2018-02-21 NOTE — PROGRESS NOTES
SUBJECTIVE:   Dayana AGUILA is a 27 year old female who presents to clinic today for the following health issues:    Depression and Anxiety Follow-Up    Status since last visit: Improved but has shingles     Other associated symptoms:None    Complicating factors:     Significant life event: Yes-  Confirmed pregnancy     Current substance abuse: None    PHQ-9 1/11/2018 1/31/2018 2/7/2018   Total Score 18 12 15   Q9: Suicide Ideation Not at all Not at all Not at all     JACINTO-7 SCORE 1/11/2018 1/31/2018 2/7/2018   Total Score 9 12 8       PHQ-9  English  PHQ-9   Any Language  JACINTO-7  Suicide Assessment Five-step Evaluation and Treatment (SAFE-T)    Amount of exercise or physical activity: None    Problems taking medications regularly: No    Medication side effects: none    Diet: regular (no restrictions)    Still having a lot of pain from Shingles. The lesions have not opened; they are located along the left side of her neck into her hairline. She has missed work Monday- Wednesday due to pain, and is planning not to go tomorrow as well.     Problem list and histories reviewed & adjusted, as indicated.  Additional history: as documented    Patient Active Problem List   Diagnosis     Ovarian mass     ADHD     JACINTO (generalized anxiety disorder)     Moderate episode of recurrent major depressive disorder (H)     Major depressive disorder, single episode, moderate (H)     Supervision of high-risk pregnancy     Past Surgical History:   Procedure Laterality Date     NO HISTORY OF SURGERY         Social History   Substance Use Topics     Smoking status: Former Smoker     Packs/day: 0.50     Types: Cigarettes     Start date: 5/7/2003     Quit date: 10/2017     Smokeless tobacco: Former User     Quit date: 10/2017      Comment: Used a vapor for the month of October.     Alcohol use No      Comment: None since found out is pregnant.     No family history on file.        Reviewed and updated as needed this visit by clinical  staff  Tobacco  Allergies  Meds       Reviewed and updated as needed this visit by Provider         ROS:  Constitutional, HEENT, cardiovascular, pulmonary, gi and gu systems are negative, except as otherwise noted.    OBJECTIVE:     /58  Pulse 68  Temp 97.9  F (36.6  C) (Oral)  Wt 169 lb 8 oz (76.9 kg)  LMP 11/28/2017  SpO2 99%  BMI 25.03 kg/m2  Body mass index is 25.03 kg/(m^2).   GENERAL: healthy, alert and no distress  NECK: no adenopathy, no asymmetry, masses, or scars and thyroid normal to palpation  RESP: lungs clear to auscultation - no rales, rhonchi or wheezes  CV: regular rate and rhythm, normal S1 S2, no S3 or S4, no murmur, click or rub, no peripheral edema and peripheral pulses strong  ABDOMEN: soft, nontender, no hepatosplenomegaly, no masses and bowel sounds normal  MS: no gross musculoskeletal defects noted, no edema  SKIN: red papules on left side of posterior neck into hairline    Diagnostic Test Results:  none     ASSESSMENT/PLAN:     Problem List Items Addressed This Visit     Moderate episode of recurrent major depressive disorder (H) - Primary      Other Visit Diagnoses     Herpes zoster without complication        Relevant Medications    lidocaine (XYLOCAINE) 5 % ointment         Depression symptoms currently stable- recommended follow up in 2-3 months; she is currently seeing a counselor as well.  Consider prednisone for shingles if not improving over next several days  CHEY Hernandez [facility]  Please note greater than 50% of this 30 minute appointment were spent in face to face counseling with the patient of the issues described above in the history of present illness and in the plan, including monitoring medications

## 2018-02-27 ENCOUNTER — TELEPHONE (OUTPATIENT)
Dept: PSYCHOLOGY | Facility: CLINIC | Age: 28
End: 2018-02-27

## 2018-03-15 ENCOUNTER — OFFICE VISIT (OUTPATIENT)
Dept: PSYCHOLOGY | Facility: CLINIC | Age: 28
End: 2018-03-15
Payer: COMMERCIAL

## 2018-03-15 DIAGNOSIS — F32.1 MAJOR DEPRESSIVE DISORDER, SINGLE EPISODE, MODERATE (H): Primary | ICD-10-CM

## 2018-03-15 DIAGNOSIS — F41.1 GAD (GENERALIZED ANXIETY DISORDER): ICD-10-CM

## 2018-03-15 PROCEDURE — 90834 PSYTX W PT 45 MINUTES: CPT | Performed by: COUNSELOR

## 2018-03-15 ASSESSMENT — ANXIETY QUESTIONNAIRES
IF YOU CHECKED OFF ANY PROBLEMS ON THIS QUESTIONNAIRE, HOW DIFFICULT HAVE THESE PROBLEMS MADE IT FOR YOU TO DO YOUR WORK, TAKE CARE OF THINGS AT HOME, OR GET ALONG WITH OTHER PEOPLE: VERY DIFFICULT
2. NOT BEING ABLE TO STOP OR CONTROL WORRYING: NEARLY EVERY DAY
1. FEELING NERVOUS, ANXIOUS, OR ON EDGE: NEARLY EVERY DAY
5. BEING SO RESTLESS THAT IT IS HARD TO SIT STILL: MORE THAN HALF THE DAYS
GAD7 TOTAL SCORE: 18
6. BECOMING EASILY ANNOYED OR IRRITABLE: SEVERAL DAYS
7. FEELING AFRAID AS IF SOMETHING AWFUL MIGHT HAPPEN: NEARLY EVERY DAY
3. WORRYING TOO MUCH ABOUT DIFFERENT THINGS: NEARLY EVERY DAY

## 2018-03-15 ASSESSMENT — PATIENT HEALTH QUESTIONNAIRE - PHQ9: 5. POOR APPETITE OR OVEREATING: NEARLY EVERY DAY

## 2018-03-15 NOTE — PROGRESS NOTES
"                                           Progress Note    Client Name: Dayana Moss  Date: 3/15/2018         Service Type: Individual      Session Start Time: 4:30p  Session End Time: 5:15p      Session Length: 45 minutes     Session #: 4     Attendees: Client attended alone    Treatment Plan Last Reviewed: 3/15/2018  PHQ-9 / JACINTO-7 : 13 & 18       DATA      Progress Since Last Session (Related to Symptoms / Goals / Homework):   Symptoms: Worsened, see Epic for PHQ 9 & JACINTO 7 updates     Homework: Client will practice giving herself permission to ask for help - partially completed. By next appt, client will for for walks and practice attending to her environment.       Episode of Care Goals: Minimal progress - PREPARATION (Decided to change - considering how); Intervened by negotiating a change plan and determining options / strategies for behavior change, identifying triggers, exploring social supports, and working towards setting a date to begin behavior change     Current / Ongoing Stressors and Concerns:   Ongoing anxiety, tiredness, passivity - now experiencing panic like symptoms when feeling overwhelmed, especially re: work and pregnancy; was very tearful and anxious during session, was responsive to coaching and modeling on deep breathing and grounding exercise - \"I feel embarrassed that this happened\"; was ambivalent about incorporating mindfulness/relaxation (deep breathing, positive affirmations, progressive muscle relaxation, splashing cold water in face...) in daily routine - \"that sounds like too much, but I can go for walks\"; described worst case scenario/what if thinking and struggled to seek out support to challenge irrational/anxious thoughts (e.g., calling nurse line to see if she can drink tea).      Treatment Objective(s) Addressed in This Session:    Client will practice communicating/identifying her needs 1x week. Client will learn 3 anxiety management skills to focus on the here and " now.     Intervention:   Motivational Interviewing: open-ended questions, affirmations, reflections and emphasizing personal control and choices, challenge sustain talk, evoke change talk  DBT: reinforce interpersonal effectiveness/communication skills, teach and practice emotion regulation skills (TIPP), introduce mindfulness  CBT: identify and replace anxious thinking, reinforce proactive problem solving skills, introduce internal locus of control and proactive help seeking behaviors         ASSESSMENT: Current Emotional / Mental Status (status of significant symptoms):   Risk status (Self / Other harm or suicidal ideation)   Client denies current fears or concerns for personal safety.  Client denies current or recent suicidal ideation or behaviors. Within last month, passive thoughts of SI: don't want to be here, too much stuff going on. Triggers: when she does not get things done, struggle at work, lack of motivation. Denied plans. No hx of hospitalization.   Client denies current or recent homicidal ideation or behaviors.  Client denies current or recent self injurious behavior or ideation. Hx of cutting in high school.   Client denies other safety concerns.  A safety and risk management plan has not been developed at this time, however client was given the after-hours number / 911 should there be a change in any of these risk factors.  Client reports there are no firearms in the house.     Appearance:   Appropriate    Eye Contact:   Fair   Psychomotor Behavior: Normal    Attitude:   Cooperative    Orientation:   All   Speech    Rate / Production: Normal     Volume:  Normal    Mood:    Depressed  Anxious  Tired  Tearful   Affect:    Appropriate  Worrisome    Thought Content:  Clear  Rumination    Thought Form:  Coherent  Logical  Circumstantial   Insight:    Fair-Limited     Medication Review:   See Epic for changes     Medication Compliance:   Yes     Changes in Health Issues:   None reported     Chemical Use  Review:   Substance Use: Chemical use reviewed, no active concerns identified      Tobacco Use: No current tobacco use     Collateral Reports Completed:   Not Applicable      PLAN: (Client Tasks / Therapist Tasks / Other)  Therapist will assign homework of social leisure planning; teach emotional regulation skills and challenging self-defeating thoughts. Reinforce mindfulness/grounding exercises and internal locus of control.         Karel Wren, [facility]                                                         ________________________________________________________________________    Treatment Plan    Client's Name: Dayana Moss  YOB: 1990    Date: 1/31/2018    Diagnoses: 296.22 (F32.1)  Major Depressive Disorder, Single Episode, Moderate & 300.02 (F41.1) Generalized Anxiety Disorder; RULE OUT Major Depressive Disorder, Recurrent, Moderate & Adjustment Disorder  Psychosocial & Contextual Factors: Miscarriage Sept 2017, currently 6 weeks pregnant, some strained in marriage and family relationships  WHODAS: 30    Referral / Collaboration:  Referral to another professional/service is not indicated at this time.    Anticipated number of session or this episode of care: 12      MeasurableTreatment Goal(s) related to diagnosis / functional impairment(s)  Goal 1: Client will improve mood as evidenced by decreased score on PHQ 9 from 18 (moderately severe) to 10 or less (moderate).    I will know I've met my goal when I don't see so sad and tired.      Objective #A (Client Action)    Client will increase motivation, interest, engagement, and pleasure in doing things.  Status: New - Date: 1/31/2018     Intervention(s)  Therapist will assign homework of social leisure planning; teach emotional regulation skills and challenging self-defeating thoughts.    Objective #B  Client will increase understanding of steps in the grief process.  Status: New - Date: 1/31/2018     Intervention(s)  Therapist will provide  educational materials on grief and loss; role-play conflict management; teach distraction skills.    Objective #C  Client will practice communicating/identifying her needs 1x week.  Status: New - Date: 1/31/2018     Intervention(s)  Therapist will assign homework of communication practice; role-play effective communication skills; teach emotional recognition/identification.      Goal 2: Client will better manage anxiety as evidenced by decreased score on JACINTO 7 from 9 (moderate) to 5 or less (mild).    I will know I've met my goal when I can focus my thoughts and not worrying too much.      Objective #A (Client Action)    Client will learn 3 anxiety management skills to focus on the here and now.  Status: New - Date: 1/31/2018     Intervention(s)  Therapist will assign homework of skill practice; provide educational materials on anxiety management; teach the client how to perform a behavioral chain analysis.      Client has reviewed and agreed to the above plan.      HIMANSHU Staples  January 31, 2018

## 2018-03-15 NOTE — MR AVS SNAPSHOT
"                  MRN:[id number]                      After Visit Summary   3/15/2018    Dayana AGUILA    MRN: [id number]           Visit Information        Provider Department      3/15/2018 4:30 PM Karel Wren [facility]      Your next 10 appointments already scheduled     Mar 20, 2018  8:50 AM CDT   ESTABLISHED PRENATAL with Nia Casanova MD   [facility] ([facility] [place])    [address]  [facility] [address]   [phone]            Apr 05, 2018  4:30 PM CDT   Return Visit with Karel Wren [facility] ([facility])    [facility]  [address]  [facility] [address]   [phone]            Apr 16, 2018  4:30 PM CDT   Return Visit with Karel Wren [facility] ([facility]  [address]  [facility] [address]   [phone]            Apr 25, 2018  4:30 PM CDT   Office Visit with CHEY Hernandez CNP   [facility] ([facility])    [address]  [facility] [address] [phone]           Bring a current list of meds and any records pertaining to this visit. For Physicals, please bring immunization records and any forms needing to be filled out. Please arrive 10 minutes early to complete paperwork.              nvitehart Information     Markado lets you send messages to your doctor, view your test results, renew your prescriptions, schedule appointments and more. To sign up, go to [email or web address] . Click on \"Log in\" on the left side of the screen, which will take you to the Welcome page. Then click on \"Sign up Now\" on the right side of the page.     You will be asked to enter the access code listed below, as well as some personal information. Please follow the directions to create your username " and password.     Your access code is: [email or web address]  Expires: 2018  6:35 PM     Your access code will  in 90 days. If you need help or a new code, please call your [place] clinic or [phone].        Care EveryWhere ID     This is your Care EveryWhere ID. This could be used by other organizations to access your [place] medical records  [id number]        Equal Access to Services     LEONARDA PORTILLO : Hadii brandon maderao Sochandan, waaxda luqadaha, qaybta kaalmada adeegyada, jyothi vargas . So [facility] [phone].    ATENCIÓN: Si habla español, tiene a dumont disposición servicios gratuitos de asistencia lingüística. Llame al [phone].    We comply with applicable federal civil rights laws and [place] laws. We do not discriminate on the basis of race, color, national origin, age, disability, sex, sexual orientation, or gender identity.

## 2018-03-15 NOTE — Clinical Note
Ken Alonzo, Client presents with worsened anxiety and now has panic like symptoms (difficulty breathing, SOB, crying, clouded mind) that is interfering with her ability to function at work. She states symptoms were better managed in the past when providers thought she had ADHD and prescribed her medication for that. Please advise and assess medications at next appt.  Thank you, Karel Wren MA, [facility]

## 2018-03-16 ASSESSMENT — ANXIETY QUESTIONNAIRES: GAD7 TOTAL SCORE: 18

## 2018-03-16 ASSESSMENT — PATIENT HEALTH QUESTIONNAIRE - PHQ9: SUM OF ALL RESPONSES TO PHQ QUESTIONS 1-9: 13

## 2018-03-20 ENCOUNTER — PRENATAL OFFICE VISIT (OUTPATIENT)
Dept: OBGYN | Facility: CLINIC | Age: 28
End: 2018-03-20
Payer: COMMERCIAL

## 2018-03-20 VITALS — BODY MASS INDEX: 25.25 KG/M2 | DIASTOLIC BLOOD PRESSURE: 56 MMHG | WEIGHT: 171 LBS | SYSTOLIC BLOOD PRESSURE: 102 MMHG

## 2018-03-20 DIAGNOSIS — Z3A.16 16 WEEKS GESTATION OF PREGNANCY: Primary | ICD-10-CM

## 2018-03-20 DIAGNOSIS — O09.92 SUPERVISION OF HIGH RISK PREGNANCY IN SECOND TRIMESTER: ICD-10-CM

## 2018-03-20 PROCEDURE — 99207 ZZC PRENATAL VISIT: CPT | Performed by: OBSTETRICS & GYNECOLOGY

## 2018-03-20 NOTE — PROGRESS NOTES
Doing well today.    A mild headache over the past couple of days; has tried tylenol; also discussed small amounts caffeine, relaxation techniques, massage, etc  No vb/spotting/cramping  No bowel/bladder issues  RTC 4wk --anatomy us at that time

## 2018-03-20 NOTE — MR AVS SNAPSHOT
After Visit Summary   3/20/2018    Dayana FINK    MRN: [id number]           Patient Information     Date Of Birth          1990        Visit Information        Provider Department      3/20/2018 8:50 AM Nia Casanova MD [facility] [place]        Today's Diagnoses     16 weeks gestation of pregnancy    -  1    Supervision of high risk pregnancy in second trimester           Follow-ups after your visit        Follow-up notes from your care team     Return in about 4 weeks (around 4/17/2018) for Routine Prenatal Visit.      Your next 10 appointments already scheduled     Apr 05, 2018  4:30 PM CDT   Return Visit with Karel Wren [facility] ([facility])    [facility]  [address]  [facility] [address]   [phone]            Apr 16, 2018  4:30 PM CDT   Return Visit with Karel Wren [facility] ([place] [address] [place], [place] [address]  [facility] [address]   [phone]            Apr 17, 2018  8:15 AM CDT   (Arrive by 8:00 AM)   US OB > 14 WEEKS COMPLETE SINGLE with WEUS1   [facility] [place] ([facility] [place])    [address] [place] [address] [address]  [facility] [address] [phone]           Please bring a list of your medicines (including vitamins, minerals and over-the-counter drugs). Also, tell your doctor about any allergies you may have. Wear comfortable clothes and leave your valuables at home.  If you re less than 20 weeks drink four 8-ounce glasses of fluid an hour before your exam. If you need to empty your bladder before your exam, try to release only a little urine. Then, drink another glass of fluid.  You may have up to two family members in the exam room. If you bring a small child, an adult must be there to care for him or her.  Please call the Imaging Department at your exam site with any questions.  "           Apr 17, 2018  9:10 AM CDT   ESTABLISHED PRENATAL with Nia Casanova MD   [facility] [place] ([facility]    [address] [facility] [address]  [facility] [address] [phone]            Apr 25, 2018  4:30 PM CDT   Office Visit with CHEY Hernandez CNP   [facility] ([facility]    [address] [address]  [facility] [address] [phone]           Bring a current list of meds and any records pertaining to this visit. For Physicals, please bring immunization records and any forms needing to be filled out. Please arrive 10 minutes early to complete paperwork.              Future tests that were ordered for you today     Open Future Orders        Priority Expected Expires Ordered    US OB > 14 Weeks Complete Single Routine 4/17/2018 3/21/2019 3/20/2018            Who to contact     If you have questions or need follow up information about today's clinic visit or your schedule please contact [facility] [place] directly at [phone].  Normal or non-critical lab and imaging results will be communicated to you by Datalogixhart, letter or phone within 4 business days after the clinic has received the results. If you do not hear from us within 7 days, please contact the clinic through Sock [employer] Mediat or phone. If you have a critical or abnormal lab result, we will notify you by phone as soon as possible.  Submit refill requests through Innotas or call your pharmacy and they will forward the refill request to us. Please allow 3 business days for your refill to be completed.          Additional Information About Your Visit        Innotas Information     Innotas lets you send messages to your doctor, view your test results, renew your prescriptions, schedule appointments and more. To sign up, go to [email or web address] . Click on \"Log in\" on the left side of the screen, which will take you to the Welcome page. " "Then click on \"Sign up Now\" on the right side of the page.     You will be asked to enter the access code listed below, as well as some personal information. Please follow the directions to create your username and password.     Your access code is: [email or web address]  Expires: 2018  6:35 PM     Your access code will  in 90 days. If you need help or a new code, please call your [place] clinic or [phone].        Care EveryWhere ID     This is your Care EveryWhere ID. This could be used by other organizations to access your [place] medical records  [id number]        Your Vitals Were     Last Period BMI (Body Mass Index)                2017 25.25 kg/m2           Blood Pressure from Last 3 Encounters:   18 102/56   18 100/58   18 116/66    Weight from Last 3 Encounters:   18 171 lb (77.6 kg)   18 169 lb 8 oz (76.9 kg)   18 170 lb (77.1 kg)               Primary Care Provider Fax #    Physician No Ref-Primary [phone]       No address on file        Equal Access to Services     LEONARDA PORTILLO : Hadii brandon maderao Sochandan, waaxda luqadaha, qaybta kaalmada adelambertoyada, jyothi spring. So [facility] [phone].    ATENCIÓN: Si habla español, tiene a dumont disposición servicios gratuitos de asistencia lingüística. Tania al [phone].    We comply with applicable federal civil rights laws and [place] laws. We do not discriminate on the basis of race, color, national origin, age, disability, sex, sexual orientation, or gender identity.            Thank you!     Thank you for choosing [facility] [place]  for your care. Our goal is always to provide you with excellent care. Hearing back from our patients is one way we can continue to improve our services. Please take a few minutes to complete the written survey that you may receive in the mail after your visit with us. Thank you!             Your Updated Medication List - Protect " others around you: Learn how to safely use, store and throw away your medicines at www.disposemymeds.org.          This list is accurate as of 3/20/18  9:17 AM.  Always use your most recent med list.                   Brand Name Dispense Instructions for use Diagnosis    citalopram 40 MG tablet    celeXA    30 tablet    1 tablet orally daily    Moderate episode of recurrent major depressive disorder (H)       famciclovir 500 MG tablet    FAMVIR    21 tablet    Take 1 tablet (500 mg) by mouth 3 times daily    Herpes zoster without complication       lidocaine 5 % ointment    XYLOCAINE    50 g    Apply topically as needed for moderate pain    Herpes zoster without complication       prenatal multivitamin plus iron 27-0.8 MG Tabs per tablet      Take 2 tablets by mouth daily        TYLENOL PO

## 2018-04-16 ENCOUNTER — OFFICE VISIT (OUTPATIENT)
Dept: PSYCHOLOGY | Facility: CLINIC | Age: 28
End: 2018-04-16
Payer: COMMERCIAL

## 2018-04-16 DIAGNOSIS — F41.1 GAD (GENERALIZED ANXIETY DISORDER): ICD-10-CM

## 2018-04-16 DIAGNOSIS — F32.1 MAJOR DEPRESSIVE DISORDER, SINGLE EPISODE, MODERATE (H): Primary | ICD-10-CM

## 2018-04-16 PROCEDURE — 90834 PSYTX W PT 45 MINUTES: CPT | Performed by: COUNSELOR

## 2018-04-16 ASSESSMENT — ANXIETY QUESTIONNAIRES
5. BEING SO RESTLESS THAT IT IS HARD TO SIT STILL: NOT AT ALL
3. WORRYING TOO MUCH ABOUT DIFFERENT THINGS: SEVERAL DAYS
1. FEELING NERVOUS, ANXIOUS, OR ON EDGE: NEARLY EVERY DAY
7. FEELING AFRAID AS IF SOMETHING AWFUL MIGHT HAPPEN: MORE THAN HALF THE DAYS
IF YOU CHECKED OFF ANY PROBLEMS ON THIS QUESTIONNAIRE, HOW DIFFICULT HAVE THESE PROBLEMS MADE IT FOR YOU TO DO YOUR WORK, TAKE CARE OF THINGS AT HOME, OR GET ALONG WITH OTHER PEOPLE: VERY DIFFICULT
GAD7 TOTAL SCORE: 11
6. BECOMING EASILY ANNOYED OR IRRITABLE: SEVERAL DAYS
2. NOT BEING ABLE TO STOP OR CONTROL WORRYING: MORE THAN HALF THE DAYS

## 2018-04-16 ASSESSMENT — PATIENT HEALTH QUESTIONNAIRE - PHQ9: 5. POOR APPETITE OR OVEREATING: MORE THAN HALF THE DAYS

## 2018-04-16 NOTE — MR AVS SNAPSHOT
MRN:[id number]                      After Visit Summary   4/16/2018    Dayana Moss    MRN: [id number]           Visit Information        Provider Department      4/16/2018 4:30 PM Karel Wren [facility]      Your next 10 appointments already scheduled     Apr 25, 2018  4:30 PM CDT   Office Visit with CHEY Hernandez [facility] ([facility])    [address] [facility] [address]  [facility] [address] [phone]           Bring a current list of meds and any records pertaining to this visit. For Physicals, please bring immunization records and any forms needing to be filled out. Please arrive 10 minutes early to complete paperwork.            May 02, 2018  4:30 PM CDT   Return Visit with Karel Wren [facility] ([facility]  [address] [facility] [address]  [facility] [address]   [phone]            May 15, 2018  8:50 AM CDT   ESTABLISHED PRENATAL with Nia Casanova MD   [facility] [place] ([facility])    [address] [place] [address] [address]  [facility] [address]   [phone]            Jun 12, 2018  8:00 AM CDT   Glucose Tolerance Test with WE LAB   [facility] ([facility])    [address] [place] [address] [address]  [facility] [address]   [phone]            Jun 12, 2018  8:15 AM CDT   US PELVIC COMPLETE W TRANSVAGINAL with WEUS1   [facility] ([facility])    [address] [place] [address] [address]  [facility] [address]   [phone]           Please bring a list of your medicines (including vitamins, minerals and over-the-counter drugs). Also, tell your doctor about any allergies you may have. Wear comfortable clothes and leave your valuables at home.  Adults: Drink six 8-ounce glasses of fluid one hour before your  exam. Do NOT empty your bladder.  If you need to empty your bladder before your exam, try to release only a little bit of urine. Then, drink another 8oz glass of fluid.  Children: Children who are potty trained should drink at least 4 cups (32 oz) of liquid 45 minutes to one hour prior to the exam. The child s bladder must be full in order to achieve a diagnostic exam. If your child is very uncomfortable or has an urgent need to pee, please notify a technologist; they will try to find out how much longer the wait may be and provide instructions to help relieve the pressure. Occasionally it is medically necessary to insert a urinary catheter to fill the bladder.  Please call the Imaging Department at your exam site with any questions.            Jun 12, 2018  9:00 AM CDT   ESTABLISHED PRENATAL with Nia Casanova MD   [facility] [place] ([facility] [place])    [address] [place] [address] [phone]              MyCharMyRealTrip Information     [employer] gives you secure access to your electronic health record. If you see a primary care provider, you can also send messages to your care team and make appointments. If you have questions, please call your primary care clinic.  If you do not have a primary care provider, please call [phone] and they will assist you.        Care EveryWhere ID     This is your Care EveryWhere ID. This could be used by other organizations to access your [place] medical records  [id number]        Equal Access to Services     LEONARDA PORTILLO : Hadii brandon wesley Sochandan, waaxda luqadaha, qaybta kaalmada adejena, jyothi vargas . So [facility] [phone].    ATENCIÓN: Si habla español, tiene a dumont disposición servicios gratuitos de asistencia lingüística. Llame al [phone].    We comply with applicable federal civil rights laws and [place] laws. We do not discriminate on the basis of race, color, national  origin, age, disability, sex, sexual orientation, or gender identity.

## 2018-04-16 NOTE — PROGRESS NOTES
Progress Note    Client Name: Dayana Moss  Date: 4/16/2018         Service Type: Individual      Session Start Time: 4:30p  Session End Time: 5:15p      Session Length: 45 minutes     Session #: 5     Attendees: Client attended alone    Treatment Plan Last Reviewed: 4/16/2018  PHQ-9 / JACINTO-7 : 8 & 11       DATA      Progress Since Last Session (Related to Symptoms / Goals / Homework):   Symptoms: Stable, see Epic for PHQ 9 & JACINTO 7 updates     Homework: Not completed - client will for for walks and practice attending to her environment. By next appt, client will focus on facts given by providers and set limits with anxious thinking.       Episode of Care Goals: Minimal progress - PREPARATION (Decided to change - considering how); Intervened by negotiating a change plan and determining options / strategies for behavior change, identifying triggers, exploring social supports, and working towards setting a date to begin behavior change     Current / Ongoing Stressors and Concerns:   Ongoing anxiety, passivity, and ambivalence re: work and pregnancy - acknowledged feeling stressed, needing more support, but unsure what she wants or needs to more forward with difficulties (feeling incompetent at work, worst case scenario thinking about health of baby...); reported that support system can be unreliable at times as friend and sister are struggling with their own fertility problems, supervisor at work is too busy, and  seems distant; was able to identify one example of when she was able to challenge anxious thinking and to trust her own sense of baby's health (e.g., after falling).      Treatment Objective(s) Addressed in This Session:    Client will practice communicating/identifying her needs 1x week. Client will learn 3 anxiety management skills to focus on the here and now.     Intervention:   Motivational Interviewing: open-ended questions, affirmations,  reflections and emphasizing personal control and choices, challenge sustain talk, evoke change talk  DBT: reinforce interpersonal effectiveness/communication skills, teach and practice emotion regulation skills (TIPP), introduce mindfulness  CBT: identify and replace anxious thinking, reinforce proactive problem solving skills, reinforce internal locus of control and proactive help seeking behaviors, teach identifying wants and needs         ASSESSMENT: Current Emotional / Mental Status (status of significant symptoms):   Risk status (Self / Other harm or suicidal ideation)   Client denies current fears or concerns for personal safety.  Client denies current or recent suicidal ideation or behaviors. Within last month, passive thoughts of SI: don't want to be here, too much stuff going on. Triggers: when she does not get things done, struggle at work, lack of motivation. Denied plans. No hx of hospitalization.   Client denies current or recent homicidal ideation or behaviors.  Client denies current or recent self injurious behavior or ideation. Hx of cutting in high school.   Client denies other safety concerns.  A safety and risk management plan has not been developed at this time, however client was given the after-hours number / 911 should there be a change in any of these risk factors.  Client reports there are no firearms in the house.     Appearance:   Appropriate    Eye Contact:   Fair   Psychomotor Behavior: Normal    Attitude:   Cooperative    Orientation:   All   Speech    Rate / Production: Normal     Volume:  Normal    Mood:    Depressed  Anxious  Tired  Tearful   Affect:    Appropriate  Worrisome    Thought Content:  Clear  Rumination    Thought Form:  Coherent  Logical  Circumstantial   Insight:    Fair     Medication Review:   See Epic for changes     Medication Compliance:   Yes     Changes in Health Issues:   None reported     Chemical Use Review:   Substance Use: Chemical use reviewed, no active  concerns identified      Tobacco Use: No current tobacco use     Collateral Reports Completed:   Not Applicable      PLAN: (Client Tasks / Therapist Tasks / Other)  Therapist will assign homework of social leisure planning; teach emotional regulation skills and challenging self-defeating thoughts. Reinforce mindfulness/grounding exercises and internal locus of control. Continue to reinforce help seeking and identification of wants and needs.         Karel Wren, [facility]                                                         ________________________________________________________________________    Treatment Plan    Client's Name: Dayana Moss  YOB: 1990    Date: 1/31/2018    Diagnoses: 296.22 (F32.1)  Major Depressive Disorder, Single Episode, Moderate & 300.02 (F41.1) Generalized Anxiety Disorder; RULE OUT Major Depressive Disorder, Recurrent, Moderate & Adjustment Disorder  Psychosocial & Contextual Factors: Miscarriage Sept 2017, currently 6 weeks pregnant, some strained in marriage and family relationships  WHODAS: 30    Referral / Collaboration:  Referral to another professional/service is not indicated at this time.    Anticipated number of session or this episode of care: 12      MeasurableTreatment Goal(s) related to diagnosis / functional impairment(s)  Goal 1: Client will improve mood as evidenced by decreased score on PHQ 9 from 18 (moderately severe) to 10 or less (moderate).    I will know I've met my goal when I don't see so sad and tired.      Objective #A (Client Action)    Client will increase motivation, interest, engagement, and pleasure in doing things.  Status: New - Date: 1/31/2018     Intervention(s)  Therapist will assign homework of social leisure planning; teach emotional regulation skills and challenging self-defeating thoughts.    Objective #B  Client will increase understanding of steps in the grief process.  Status: New - Date: 1/31/2018     Intervention(s)  Therapist  will provide educational materials on grief and loss; role-play conflict management; teach distraction skills.    Objective #C  Client will practice communicating/identifying her needs 1x week.  Status: New - Date: 1/31/2018     Intervention(s)  Therapist will assign homework of communication practice; role-play effective communication skills; teach emotional recognition/identification.    Objective #D (Client Action)                                    Monitor risk factors associated with hx of SI at every session.   Status: New - Date: 4/16/2018      Intervention(s)  Therapist will assign homework of effective help seeking behaviors; role-play communication skills to secure support; teach about identifying warning signs for risks.      Goal 2: Client will better manage anxiety as evidenced by decreased score on JACINTO 7 from 9 (moderate) to 5 or less (mild).    I will know I've met my goal when I can focus my thoughts and not worrying too much.      Objective #A (Client Action)    Client will learn 3 anxiety management skills to focus on the here and now.  Status: New - Date: 1/31/2018     Intervention(s)  Therapist will assign homework of skill practice; provide educational materials on anxiety management; teach the client how to perform a behavioral chain analysis.      Client has reviewed and agreed to the above plan.      Karel Wren [facility]  January 31, 2018

## 2018-04-17 ENCOUNTER — RADIANT APPOINTMENT (OUTPATIENT)
Dept: ULTRASOUND IMAGING | Facility: CLINIC | Age: 28
End: 2018-04-17
Attending: OBSTETRICS & GYNECOLOGY
Payer: COMMERCIAL

## 2018-04-17 ENCOUNTER — PRENATAL OFFICE VISIT (OUTPATIENT)
Dept: OBGYN | Facility: CLINIC | Age: 28
End: 2018-04-17
Attending: OBSTETRICS & GYNECOLOGY
Payer: COMMERCIAL

## 2018-04-17 VITALS — WEIGHT: 178 LBS | SYSTOLIC BLOOD PRESSURE: 112 MMHG | BODY MASS INDEX: 26.29 KG/M2 | DIASTOLIC BLOOD PRESSURE: 60 MMHG

## 2018-04-17 DIAGNOSIS — O09.92 SUPERVISION OF HIGH RISK PREGNANCY IN SECOND TRIMESTER: ICD-10-CM

## 2018-04-17 DIAGNOSIS — Z3A.20 20 WEEKS GESTATION OF PREGNANCY: Primary | ICD-10-CM

## 2018-04-17 PROCEDURE — 76805 OB US >/= 14 WKS SNGL FETUS: CPT | Performed by: OBSTETRICS & GYNECOLOGY

## 2018-04-17 PROCEDURE — 99207 ZZC PRENATAL VISIT: CPT | Performed by: OBSTETRICS & GYNECOLOGY

## 2018-04-17 ASSESSMENT — PATIENT HEALTH QUESTIONNAIRE - PHQ9: SUM OF ALL RESPONSES TO PHQ QUESTIONS 1-9: 8

## 2018-04-17 ASSESSMENT — ANXIETY QUESTIONNAIRES: GAD7 TOTAL SCORE: 11

## 2018-04-17 NOTE — PROGRESS NOTES
Doing well today.  No concerns/issues  +FM  No cramping/vb/lof  Anatomy us today; BR, EFW 360g, post marginal previa; supriya wnl except for mild bilateral renal pylectasis (4mm B) --will repeat imaging at 28wks  RTC 4wks

## 2018-04-17 NOTE — MR AVS SNAPSHOT
After Visit Summary   4/17/2018    Dayana Moss    MRN: [id number]           Patient Information     Date Of Birth          1990        Visit Information        Provider Department      4/17/2018 9:10 AM Nia Casanova MD [facility] [place]        Today's Diagnoses     20 weeks gestation of pregnancy    -  1    Supervision of high risk pregnancy in second trimester           Follow-ups after your visit        Follow-up notes from your care team     Return in about 4 weeks (around 5/15/2018) for Routine Prenatal Visit.      Your next 10 appointments already scheduled     Apr 17, 2018  9:10 AM CDT   ESTABLISHED PRENATAL with Nia Casanova MD   [facility] [place] ([facility] [place])    [address] [address]  [facility] [address] [phone]            Apr 25, 2018  4:30 PM CDT   Office Visit with CHEY Hernandez CNP   [facility] ([facility])    [address]   [facility] [address] [phone]           Bring a current list of meds and any records pertaining to this visit. For Physicals, please bring immunization records and any forms needing to be filled out. Please arrive 10 minutes early to complete paperwork.            May 02, 2018  4:30 PM CDT   Return Visit with HIMANSHU Staples   [facility] ([facility]  [address]  [facility] [address] [phone]              Who to contact     If you have questions or need follow up information about today's clinic visit or your schedule please contact [facility] [place] directly at [phone].  Normal or non-critical lab and imaging results will be communicated to you by MyChart, letter or phone within 4 business days after the clinic has received the results. If you do not hear from us within 7 days, please contact the clinic  through [employer] or phone. If you have a critical or abnormal lab result, we will notify you by phone as soon as possible.  Submit refill requests through [employer]alla or call your pharmacy and they will forward the refill request to us. Please allow 3 business days for your refill to be completed.          Additional Information About Your Visit        [email or web address] Information     Gowalla gives you secure access to your electronic health record. If you see a primary care provider, you can also send messages to your care team and make appointments. If you have questions, please call your primary care clinic.  If you do not have a primary care provider, please call [phone] and they will assist you.        Care EveryWhere ID     This is your Care EveryWhere ID. This could be used by other organizations to access your [place] medical records  [id number]        Your Vitals Were     Last Period Breastfeeding? BMI (Body Mass Index)             11/28/2017 No 26.29 kg/m2          Blood Pressure from Last 3 Encounters:   04/17/18 112/60   03/20/18 102/56   02/21/18 100/58    Weight from Last 3 Encounters:   04/17/18 178 lb (80.7 kg)   03/20/18 171 lb (77.6 kg)   02/21/18 169 lb 8 oz (76.9 kg)              Today, you had the following     No orders found for display       Primary Care Provider Fax #    Physician No Ref-Primary [phone]       No address on file        Equal Access to Services     LEONARDA PORTILLO : Hadii brandon maderao Sochandan, waaxda luqadaha, qaybta kaalmada salas, jyothi vargas . So [facility] [phone].    ATENCIÓN: Si habla español, tiene a dumont disposición servicios gratuitos de asistencia lingüística. Llame al [phone].    We comply with applicable federal civil rights laws and [place] laws. We do not discriminate on the basis of race, color, national origin, age, disability, sex, sexual orientation, or gender identity.            Thank you!     Thank you for choosing  [facility]  for your care. Our goal is always to provide you with excellent care. Hearing back from our patients is one way we can continue to improve our services. Please take a few minutes to complete the written survey that you may receive in the mail after your visit with us. Thank you!             Your Updated Medication List - Protect others around you: Learn how to safely use, store and throw away your medicines at www.disposemymeds.org.          This list is accurate as of 4/17/18  9:05 AM.  Always use your most recent med list.                   Brand Name Dispense Instructions for use Diagnosis    citalopram 40 MG tablet    celeXA    30 tablet    1 tablet orally daily    Moderate episode of recurrent major depressive disorder (H)       famciclovir 500 MG tablet    FAMVIR    21 tablet    Take 1 tablet (500 mg) by mouth 3 times daily    Herpes zoster without complication       lidocaine 5 % ointment    XYLOCAINE    50 g    Apply topically as needed for moderate pain    Herpes zoster without complication       prenatal multivitamin plus iron 27-0.8 MG Tabs per tablet      Take 2 tablets by mouth daily        TYLENOL PO

## 2018-04-24 ENCOUNTER — HEALTH MAINTENANCE LETTER (OUTPATIENT)
Age: 28
End: 2018-04-24

## 2018-04-25 ENCOUNTER — OFFICE VISIT (OUTPATIENT)
Dept: FAMILY MEDICINE | Facility: CLINIC | Age: 28
End: 2018-04-25
Payer: COMMERCIAL

## 2018-04-25 VITALS
BODY MASS INDEX: 26.73 KG/M2 | WEIGHT: 181 LBS | DIASTOLIC BLOOD PRESSURE: 67 MMHG | OXYGEN SATURATION: 97 % | SYSTOLIC BLOOD PRESSURE: 114 MMHG | TEMPERATURE: 98.7 F | HEART RATE: 80 BPM

## 2018-04-25 DIAGNOSIS — F33.1 MODERATE EPISODE OF RECURRENT MAJOR DEPRESSIVE DISORDER (H): ICD-10-CM

## 2018-04-25 PROCEDURE — 99213 OFFICE O/P EST LOW 20 MIN: CPT | Performed by: NURSE PRACTITIONER

## 2018-04-25 RX ORDER — CITALOPRAM HYDROBROMIDE 40 MG/1
TABLET ORAL
Qty: 90 TABLET | Refills: 1 | Status: SHIPPED | OUTPATIENT
Start: 2018-04-25 | End: 2018-12-06

## 2018-04-25 NOTE — PROGRESS NOTES
SUBJECTIVE:   Dayana Moss is a 27 year old female who presents to clinic today for the following health issues:    Depression and Anxiety Follow-Up    Status since last visit: No change, stable, feels like she is doing overall well    Other associated symptoms:None    Complicating factors:     Significant life event: No     Current substance abuse: None    PHQ-9 2/7/2018 3/15/2018 4/16/2018   Total Score 15 13 8   Q9: Suicide Ideation Not at all Not at all Not at all     JACINTO-7 SCORE 2/7/2018 3/15/2018 4/16/2018   Total Score 8 18 11       PHQ-9  English  PHQ-9   Any Language  JACINTO-7  Suicide Assessment Five-step Evaluation and Treatment (SAFE-T)    Amount of exercise or physical activity: None    Problems taking medications regularly: No    Medication side effects: none    Diet: regular (no restrictions)      21 weeks pregnant; going to counseling; mood has been good; Has marginal placenta previa which has caused some uncertainty and will lead to more frequent monitoring. She feels like she has been somewhat isolated during pregnancy and doesn't have many people to chat about pregnancy with, as her good friend and sister are struggling with infertility. ;   -------------------------------------    Problem list and histories reviewed & adjusted, as indicated.  Additional history: as documented    Patient Active Problem List   Diagnosis     Ovarian mass     ADHD     JACINTO (generalized anxiety disorder)     Moderate episode of recurrent major depressive disorder (H)     Major depressive disorder, single episode, moderate (H)     Supervision of high-risk pregnancy     Past Surgical History:   Procedure Laterality Date     NO HISTORY OF SURGERY         Social History   Substance Use Topics     Smoking status: Former Smoker     Packs/day: 0.50     Types: Cigarettes     Start date: 5/7/2003     Quit date: 10/2017     Smokeless tobacco: Former User     Quit date: 10/2017      Comment: Used a vapor for the month of  October.     Alcohol use No      Comment: None since found out is pregnant.     History reviewed. No pertinent family history.        Reviewed and updated as needed this visit by clinical staff  Tobacco  Allergies  Meds  Med Hx  Surg Hx  Fam Hx  Soc Hx      Reviewed and updated as needed this visit by Provider         ROS:  Constitutional, HEENT, cardiovascular, pulmonary, gi and gu systems are negative, except as otherwise noted.    OBJECTIVE:     /67  Pulse 80  Temp 98.7  F (37.1  C) (Oral)  Wt 181 lb (82.1 kg)  LMP 11/28/2017  SpO2 97%  BMI 26.73 kg/m2  Body mass index is 26.73 kg/(m^2).   GENERAL: healthy, alert and no distress  NECK: no adenopathy, no asymmetry, masses, or scars and thyroid normal to palpation  RESP: lungs clear to auscultation - no rales, rhonchi or wheezes  CV: regular rate and rhythm, normal S1 S2, no S3 or S4, no murmur, click or rub, no peripheral edema and peripheral pulses strong  MS: no gross musculoskeletal defects noted, no edema    Diagnostic Test Results:  none     ASSESSMENT/PLAN:     Problem List Items Addressed This Visit     Moderate episode of recurrent major depressive disorder (H)    Relevant Medications    citalopram (CELEXA) 40 MG tablet         Stay with current dose of medication; discussed options such as [facility] [place] prenatal class, or prenatal yoga to get in touch with other pregnant people, but she states this will be hard for her due to social anxiety. She will think about these options.  CHEY Hernandez [facility]

## 2018-04-25 NOTE — NURSING NOTE
"Chief Complaint   Patient presents with     Psyche Consult     Pt here for Psych f/u        Initial /67  Pulse 80  Temp 98.7  F (37.1  C) (Oral)  Wt 181 lb (82.1 kg)  LMP 11/28/2017  SpO2 97%  BMI 26.73 kg/m2 Estimated body mass index is 26.73 kg/(m^2) as calculated from the following:    Height as of 2/7/18: 5' 9\" (1.753 m).    Weight as of this encounter: 181 lb (82.1 kg).  Medication Reconciliation: complete     Robert Cooley MA        "

## 2018-04-25 NOTE — MR AVS SNAPSHOT
After Visit Summary   4/25/2018    Dayana Moss    MRN: [id number]           Patient Information     Date Of Birth          1990        Visit Information        Provider Department      4/25/2018 4:30 PM Cheri Georges APRN [facility]        Today's Diagnoses     Moderate episode of recurrent major depressive disorder (H)           Follow-ups after your visit        Your next 10 appointments already scheduled     May 02, 2018  4:30 PM CDT   Return Visit with Karel Wren, [facility] ([facility]  [address]  [facility] [address]   [phone]            May 15, 2018  8:50 AM CDT   ESTABLISHED PRENATAL with Nia Casanova MD   [facility] [place] ([facility] [place])    [address] [place] [address] [address]  [place] [address]   [phone]            Jun 12, 2018  8:00 AM CDT   Glucose Tolerance Test with WE LAB   [facility] ([facility] [place])    [address] [place] [address] [address]  [facility] [address]   [phone]            Jun 12, 2018  8:15 AM CDT   US PELVIC COMPLETE W TRANSVAGINAL with WEUS1   [facility] [place] ([facility] [place])    [address] [place] [address] [address]  [facility] [address]   [phone]           Please bring a list of your medicines (including vitamins, minerals and over-the-counter drugs). Also, tell your doctor about any allergies you may have. Wear comfortable clothes and leave your valuables at home.  Adults: Drink six 8-ounce glasses of fluid one hour before your exam. Do NOT empty your bladder.  If you need to empty your bladder before your exam, try to release only a little bit of urine. Then, drink another 8oz glass of fluid.  Children: Children who are potty trained should drink at least 4 cups (32 oz) of liquid 45 minutes to one hour prior to the exam. The  child s bladder must be full in order to achieve a diagnostic exam. If your child is very uncomfortable or has an urgent need to pee, please notify a technologist; they will try to find out how much longer the wait may be and provide instructions to help relieve the pressure. Occasionally it is medically necessary to insert a urinary catheter to fill the bladder.  Please call the Imaging Department at your exam site with any questions.            Jun 12, 2018  9:00 AM CDT   ESTABLISHED PRENATAL with Nia Casanova MD   [facility] [place] ([facility] [place])    [address] [place] [address] [phone]              Who to contact     If you have questions or need follow up information about today's clinic visit or your schedule please contact [facility] directly at [phone].  Normal or non-critical lab and imaging results will be communicated to you by MyChart, letter or phone within 4 business days after the clinic has received the results. If you do not hear from us within 7 days, please contact the clinic through [employer] or phone. If you have a critical or abnormal lab result, we will notify you by phone as soon as possible.  Submit refill requests through [employer] or call your pharmacy and they will forward the refill request to us. Please allow 3 business days for your refill to be completed.          Additional Information About Your Visit        MyChart Information     [employer] gives you secure access to your electronic health record. If you see a primary care provider, you can also send messages to your care team and make appointments. If you have questions, please call your primary care clinic.  If you do not have a primary care provider, please call [phone] and they will assist you.        Care EveryWhere ID     This is your Care EveryWhere ID. This could be used by other organizations to access your [facility]  records  [id number]        Your Vitals Were     Pulse Temperature Last Period Pulse Oximetry BMI (Body Mass Index)       80 98.7  F (37.1  C) (Oral) 11/28/2017 97% 26.73 kg/m2        Blood Pressure from Last 3 Encounters:   04/25/18 114/67   04/17/18 112/60   03/20/18 102/56    Weight from Last 3 Encounters:   04/25/18 181 lb (82.1 kg)   04/17/18 178 lb (80.7 kg)   03/20/18 171 lb (77.6 kg)              Today, you had the following     No orders found for display         Where to get your medicines      These medications were sent to [facility] [place], [place] - [address]  [address] [place] [address], [facility] [address]     Phone:  [phone]     citalopram 40 MG tablet          Primary Care Provider Fax #    Physician No Ref-Primary [phone]       No address on file        Equal Access to Services     LEONARDA PORTILLO : Hadii brandon duncan hadasho Soomaali, waaxda luqadaha, qaybta kaalmada adeegyada, jyothi vargas . So [facility] [phone].    ATENCIÓN: Si habla español, tiene a dumont disposición servicios gratuitos de asistencia lingüística. Llame al [phone].    We comply with applicable federal civil rights laws and [place] laws. We do not discriminate on the basis of race, color, national origin, age, disability, sex, sexual orientation, or gender identity.            Thank you!     Thank you for choosing [facility]  for your care. Our goal is always to provide you with excellent care. Hearing back from our patients is one way we can continue to improve our services. Please take a few minutes to complete the written survey that you may receive in the mail after your visit with us. Thank you!             Your Updated Medication List - Protect others around you: Learn how to safely use, store and throw away your medicines at www.disposemymeds.org.          This list is accurate as of 4/25/18  4:52 PM.  Always use your most recent med list.                    Brand Name Dispense Instructions for use Diagnosis    citalopram 40 MG tablet    celeXA    90 tablet    1 tablet orally daily    Moderate episode of recurrent major depressive disorder (H)       famciclovir 500 MG tablet    FAMVIR    21 tablet    Take 1 tablet (500 mg) by mouth 3 times daily    Herpes zoster without complication       lidocaine 5 % ointment    XYLOCAINE    50 g    Apply topically as needed for moderate pain    Herpes zoster without complication       prenatal multivitamin plus iron 27-0.8 MG Tabs per tablet      Take 2 tablets by mouth daily        TYLENOL PO

## 2018-05-02 ENCOUNTER — OFFICE VISIT (OUTPATIENT)
Dept: PSYCHOLOGY | Facility: CLINIC | Age: 28
End: 2018-05-02
Payer: COMMERCIAL

## 2018-05-02 DIAGNOSIS — F41.1 GAD (GENERALIZED ANXIETY DISORDER): ICD-10-CM

## 2018-05-02 DIAGNOSIS — F32.1 MAJOR DEPRESSIVE DISORDER, SINGLE EPISODE, MODERATE (H): Primary | ICD-10-CM

## 2018-05-02 PROCEDURE — 90834 PSYTX W PT 45 MINUTES: CPT | Performed by: COUNSELOR

## 2018-05-02 ASSESSMENT — ANXIETY QUESTIONNAIRES
6. BECOMING EASILY ANNOYED OR IRRITABLE: MORE THAN HALF THE DAYS
3. WORRYING TOO MUCH ABOUT DIFFERENT THINGS: SEVERAL DAYS
IF YOU CHECKED OFF ANY PROBLEMS ON THIS QUESTIONNAIRE, HOW DIFFICULT HAVE THESE PROBLEMS MADE IT FOR YOU TO DO YOUR WORK, TAKE CARE OF THINGS AT HOME, OR GET ALONG WITH OTHER PEOPLE: SOMEWHAT DIFFICULT
5. BEING SO RESTLESS THAT IT IS HARD TO SIT STILL: SEVERAL DAYS
7. FEELING AFRAID AS IF SOMETHING AWFUL MIGHT HAPPEN: MORE THAN HALF THE DAYS
GAD7 TOTAL SCORE: 10
1. FEELING NERVOUS, ANXIOUS, OR ON EDGE: MORE THAN HALF THE DAYS
2. NOT BEING ABLE TO STOP OR CONTROL WORRYING: SEVERAL DAYS

## 2018-05-02 ASSESSMENT — PATIENT HEALTH QUESTIONNAIRE - PHQ9: 5. POOR APPETITE OR OVEREATING: SEVERAL DAYS

## 2018-05-02 NOTE — PROGRESS NOTES
Progress Note    Client Name: Dayana Moss  Date: 5/2/2018         Service Type: Individual      Session Start Time: 4:35p  Session End Time: 5:15p      Session Length: 40 minutes     Session #: 6     Attendees: Client attended alone    Treatment Plan Last Reviewed: 5/2/2018  PHQ-9 / JACINTO-7 : 6 & 10       DATA      Progress Since Last Session (Related to Symptoms / Goals / Homework):   Symptoms: Improved, see Epic for PHQ 9 & JACINTO 7 updates     Homework: Completed and continued - client will focus on facts given by providers and set limits with anxious thinking. Client will also work to set boundaries with friends and family re: negative attitudes or put downs.        Episode of Care Goals: Some progress - ACTION (Actively working towards change); Intervened by reinforcing change plan / affirming steps taken     Current / Ongoing Stressors and Concerns:   Reported improved mood and anxiety in the past 2 weeks with more energy due to coming to the realization that if this is her last pregnancy, she wants to enjoy it; reported continued difficulties with support system as some are jealous of her pregnancy, self-destructive, unsure how to support her, feel over utilized; acknowledged feelings of loneliness, but better able to manage anxiety and to problem solve vs feeling stuck and helpless.      Treatment Objective(s) Addressed in This Session:    Client will practice communicating/identifying her needs 1x week. Client will learn 3 anxiety management skills to focus on the here and now. Client will increase motivation, interest, engagement, and pleasure in doing things.     Intervention:   Motivational Interviewing: open-ended questions, affirmations, reflections and emphasizing personal control and choices, challenge sustain talk, evoke change talk  DBT: reinforce interpersonal effectiveness/communication skills, teach and practice emotion regulation skills (TIPP),  reinforce mindfulness, teach boundary setting  CBT: identify and replace anxious thinking, reinforce proactive problem solving skills, reinforce internal locus of control and proactive help seeking behaviors, teach identifying wants and needs         ASSESSMENT: Current Emotional / Mental Status (status of significant symptoms):   Risk status (Self / Other harm or suicidal ideation)   Client denies current fears or concerns for personal safety.  Client denies current or recent suicidal ideation or behaviors. Within last month, passive thoughts of SI: don't want to be here, too much stuff going on. Triggers: when she does not get things done, struggle at work, lack of motivation. Denied plans. No hx of hospitalization.   Client denies current or recent homicidal ideation or behaviors.  Client denies current or recent self injurious behavior or ideation. Hx of cutting in high school.   Client denies other safety concerns.  A safety and risk management plan has not been developed at this time, however client was given the after-hours number / 911 should there be a change in any of these risk factors.  Client reports there are no firearms in the house.     Appearance:   Appropriate    Eye Contact:   Fair   Psychomotor Behavior: Normal    Attitude:   Cooperative    Orientation:   All   Speech    Rate / Production: Normal     Volume:  Normal    Mood:    More energetic Some anxiousness    Affect:    Appropriate  Bright   Thought Content:  Clear  Less rumination    Thought Form:  Coherent  Logical  Circumstantial   Insight:    Good     Medication Review:   See Epic for changes     Medication Compliance:   Yes     Changes in Health Issues:   None reported     Chemical Use Review:   Substance Use: Chemical use reviewed, no active concerns identified      Tobacco Use: No current tobacco use     Collateral Reports Completed:   Not Applicable      PLAN: (Client Tasks / Therapist Tasks / Other)  Therapist will assign homework of  social leisure planning; teach emotional regulation skills and challenging self-defeating thoughts. Reinforce mindfulness/grounding exercises and internal locus of control. Continue to reinforce help seeking and identification of wants and needs. Continue to reinforce anxiety management and interpersonal effectiveness.         Karel Holger [facility]                                                         ________________________________________________________________________    Treatment Plan    Client's Name: Dayana Moss  YOB: 1990    Date: 1/31/2018    Diagnoses: 296.22 (F32.1)  Major Depressive Disorder, Single Episode, Moderate & 300.02 (F41.1) Generalized Anxiety Disorder; RULE OUT Major Depressive Disorder, Recurrent, Moderate & Adjustment Disorder  Psychosocial & Contextual Factors: Miscarriage Sept 2017, currently 6 weeks pregnant, some strained in marriage and family relationships  WHODAS: 30    Referral / Collaboration:  Referral to another professional/service is not indicated at this time.    Anticipated number of session or this episode of care: 12      MeasurableTreatment Goal(s) related to diagnosis / functional impairment(s)  Goal 1: Client will improve mood as evidenced by decreased score on PHQ 9 from 18 (moderately severe) to 10 or less (moderate).    I will know I've met my goal when I don't see so sad and tired.      Objective #A (Client Action)    Client will increase motivation, interest, engagement, and pleasure in doing things.  Status: New - Date: 1/31/2018     Intervention(s)  Therapist will assign homework of social leisure planning; teach emotional regulation skills and challenging self-defeating thoughts.    Objective #B  Client will increase understanding of steps in the grief process.  Status: New - Date: 1/31/2018     Intervention(s)  Therapist will provide educational materials on grief and loss; role-play conflict management; teach distraction skills.    Objective  #C  Client will practice communicating/identifying her needs 1x week.  Status: New - Date: 1/31/2018     Intervention(s)  Therapist will assign homework of communication practice; role-play effective communication skills; teach emotional recognition/identification.    Objective #D (Client Action)                                    Monitor risk factors associated with hx of SI at every session.   Status: New - Date: 4/16/2018      Intervention(s)  Therapist will assign homework of effective help seeking behaviors; role-play communication skills to secure support; teach about identifying warning signs for risks.      Goal 2: Client will better manage anxiety as evidenced by decreased score on JACINTO 7 from 9 (moderate) to 5 or less (mild).    I will know I've met my goal when I can focus my thoughts and not worrying too much.      Objective #A (Client Action)    Client will learn 3 anxiety management skills to focus on the here and now.  Status: New - Date: 1/31/2018     Intervention(s)  Therapist will assign homework of skill practice; provide educational materials on anxiety management; teach the client how to perform a behavioral chain analysis.      Client has reviewed and agreed to the above plan.      HIMANSHU Staples  January 31, 2018

## 2018-05-02 NOTE — MR AVS SNAPSHOT
MRN:[id number]                      After Visit Summary   5/2/2018    Dayana Moss    MRN: [id number]           Visit Information        Provider Department      5/2/2018 4:30 PM Karel Wren [facility]      Your next 10 appointments already scheduled     May 15, 2018  8:50 AM CDT   ESTABLISHED PRENATAL with Nia Casanova MD   [facility] [place] ([facility] [place])    [address] [place] [address] [address]  [place] [address]   [phone]            May 21, 2018  4:30 PM CDT   Return Visit with Karel Wren [facility] ([facility])    [facility]  [address]  [facility] [address]   [phone]            Jun 07, 2018  4:30 PM CDT   Return Visit with Karel Wren [facility] ([facility])    [facility]  [address]  [facility] [address]   [phone]            Jun 12, 2018  8:00 AM CDT   Glucose Tolerance Test with WE LAB   [facility] [place] ([facility] [place])    [address] [facility] [address]  [place] [address]   [phone]            Jun 12, 2018  8:15 AM CDT   US PELVIC COMPLETE W TRANSVAGINAL with WEUS1   [facility] [place] ([facility] [place])    [address] [facility] [address]  [place] [address]   [phone]           Please bring a list of your medicines (including vitamins, minerals and over-the-counter drugs). Also, tell your doctor about any allergies you may have. Wear comfortable clothes and leave your valuables at home.  Adults: Drink six 8-ounce glasses of fluid one hour before your exam. Do NOT empty your bladder.  If you need to empty your bladder before your exam, try to release only a little bit of urine. Then, drink another 8oz glass of fluid.  Children: Children who are potty trained should drink  at least 4 cups (32 oz) of liquid 45 minutes to one hour prior to the exam. The child s bladder must be full in order to achieve a diagnostic exam. If your child is very uncomfortable or has an urgent need to pee, please notify a technologist; they will try to find out how much longer the wait may be and provide instructions to help relieve the pressure. Occasionally it is medically necessary to insert a urinary catheter to fill the bladder.  Please call the Imaging Department at your exam site with any questions.            Jun 12, 2018  9:00 AM CDT   ESTABLISHED PRENATAL with Nia Casanova MD   [facility] [place] ([facility] [place])    [address] [facility]  [address]  [facility] [address] [phone]            Jul 25, 2018  4:30 PM CDT   Office Visit with CHEY Hernandez CNP   [facility]    [address] [place] [address] [address]  [facility] [address] [phone]           Bring a current list of meds and any records pertaining to this visit. For Physicals, please bring immunization records and any forms needing to be filled out. Please arrive 10 minutes early to complete paperwork.              [email or web address] Information     [employer] gives you secure access to your electronic health record. If you see a primary care provider, you can also send messages to your care team and make appointments. If you have questions, please call your primary care clinic.  If you do not have a primary care provider, please call [phone] and they will assist you.        Care EveryWhere ID     This is your Care EveryWhere ID. This could be used by other organizations to access your [place] medical records  [id number]        Equal Access to Services     [facility] : Igor Mustafa, darlene theodore, qajyothi webb. So [facility] [phone].    ATENCIÓN: milton Fuentes  dumont disposición servicios gratuitos de asistencia lingüística. Llame al [phone].    We comply with applicable federal civil rights laws and [place] laws. We do not discriminate on the basis of race, color, national origin, age, disability, sex, sexual orientation, or gender identity.

## 2018-05-03 ASSESSMENT — ANXIETY QUESTIONNAIRES: GAD7 TOTAL SCORE: 10

## 2018-05-03 ASSESSMENT — PATIENT HEALTH QUESTIONNAIRE - PHQ9: SUM OF ALL RESPONSES TO PHQ QUESTIONS 1-9: 6

## 2018-05-15 ENCOUNTER — PRENATAL OFFICE VISIT (OUTPATIENT)
Dept: OBGYN | Facility: CLINIC | Age: 28
End: 2018-05-15
Payer: COMMERCIAL

## 2018-05-15 VITALS — SYSTOLIC BLOOD PRESSURE: 120 MMHG | DIASTOLIC BLOOD PRESSURE: 74 MMHG | WEIGHT: 174 LBS | BODY MASS INDEX: 25.7 KG/M2

## 2018-05-15 DIAGNOSIS — Z3A.24 24 WEEKS GESTATION OF PREGNANCY: ICD-10-CM

## 2018-05-15 DIAGNOSIS — O09.92 SUPERVISION OF HIGH RISK PREGNANCY IN SECOND TRIMESTER: ICD-10-CM

## 2018-05-15 DIAGNOSIS — O44.20 PLACENTA PREVIA MARGINALIS: Primary | ICD-10-CM

## 2018-05-15 PROCEDURE — 99207 ZZC PRENATAL VISIT: CPT | Performed by: OBSTETRICS & GYNECOLOGY

## 2018-05-15 NOTE — PROGRESS NOTES
Doing well today. No issues/concerns  +FM  No cramping/vb/lof  Discussed PTL precautions  RTC 4wks --glucola, hgb and Tdap at that time  Repeat us at 28wks for marginal previa and bilateral pyelectasis

## 2018-05-15 NOTE — MR AVS SNAPSHOT
After Visit Summary   5/15/2018    Dayana Moss    MRN: [id number]           Patient Information     Date Of Birth          1990        Visit Information        Provider Department      5/15/2018 8:50 AM Nia Casanova MD [facility] [place]        Today's Diagnoses     Placenta previa marginalis    -  1    Supervision of high risk pregnancy in second trimester        24 weeks gestation of pregnancy           Follow-ups after your visit        Follow-up notes from your care team     Return in about 4 weeks (around 6/12/2018) for Routine Prenatal Visit.      Your next 10 appointments already scheduled     May 21, 2018  4:30 PM CDT   Return Visit with Karel Wren [facility] ([facility]  [address]  [facility] [address]   [phone]            Jun 07, 2018  4:30 PM CDT   Return Visit with Karel Wren [facility] ([facility]  [address]  [facility] [address]   [phone]            Jun 12, 2018  8:00 AM CDT   Glucose Tolerance Test with WE LAB   [facility] [place] ([facility] [place])    [address] [place] [address] [address]  [facility] [address]   [phone]            Jun 12, 2018  8:15 AM CDT   US PELVIC COMPLETE W TRANSVAGINAL with WEUS1   [facility] [place] ([facility] [place])    [address] [place] [address]  [facility] [address]   [phone]           Please bring a list of your medicines (including vitamins, minerals and over-the-counter drugs). Also, tell your doctor about any allergies you may have. Wear comfortable clothes and leave your valuables at home.  Adults: Drink six 8-ounce glasses of fluid one hour before your exam. Do NOT empty your bladder.  If you need to empty your bladder before your exam, try to release only a little bit of  urine. Then, drink another 8oz glass of fluid.  Children: Children who are potty trained should drink at least 4 cups (32 oz) of liquid 45 minutes to one hour prior to the exam. The child s bladder must be full in order to achieve a diagnostic exam. If your child is very uncomfortable or has an urgent need to pee, please notify a technologist; they will try to find out how much longer the wait may be and provide instructions to help relieve the pressure. Occasionally it is medically necessary to insert a urinary catheter to fill the bladder.  Please call the Imaging Department at your exam site with any questions.            Jun 12, 2018  9:00 AM CDT   ESTABLISHED PRENATAL with Nia Casanova MD   [facility] ([facility])    [address] [place] [address] 100  [facility] [id number] [phone]            Jul 25, 2018  4:30 PM CDT   Office Visit with CHEY Hernandez CNP   [facility]    [address] [place] [address] 700  [facility] [address] [phone]           Bring a current list of meds and any records pertaining to this visit. For Physicals, please bring immunization records and any forms needing to be filled out. Please arrive 10 minutes early to complete paperwork.              Future tests that were ordered for you today     Open Future Orders        Priority Expected Expires Ordered    US OB Single Follow Up Repeat Routine 6/12/2018 5/16/2019 5/15/2018            Who to contact     If you have questions or need follow up information about today's clinic visit or your schedule please contact [facility] [place] directly at [phone].  Normal or non-critical lab and imaging results will be communicated to you by MyChart, letter or phone within 4 business days after the clinic has received the results. If you do not hear from us within 7 days, please contact the clinic through iMERhart or  phone. If you have a critical or abnormal lab result, we will notify you by phone as soon as possible.  Submit refill requests through Screen Tonic or call your pharmacy and they will forward the refill request to us. Please allow 3 business days for your refill to be completed.          Additional Information About Your Visit        [facility] Information     Screen Tonic gives you secure access to your electronic health record. If you see a primary care provider, you can also send messages to your care team and make appointments. If you have questions, please call your primary care clinic.  If you do not have a primary care provider, please call [phone] and they will assist you.        Care EveryWhere ID     This is your Care EveryWhere ID. This could be used by other organizations to access your [place] medical records  [id number]        Your Vitals Were     Last Period Breastfeeding? BMI (Body Mass Index)             11/28/2017 No 25.7 kg/m2          Blood Pressure from Last 3 Encounters:   05/15/18 120/74   04/25/18 114/67   04/17/18 112/60    Weight from Last 3 Encounters:   05/15/18 174 lb (78.9 kg)   04/25/18 181 lb (82.1 kg)   04/17/18 178 lb (80.7 kg)               Primary Care Provider Fax #    Physician No Ref-Primary [phone]       No address on file        Equal Access to Services     LEONARDA PORTILLO : Hadii brandon maderao Sojbali, waaxda luqadaha, qaybta kaalmada adeegyada, jyothi spring. So [facility] [phone].    ATENCIÓN: Si habla español, tiene a dumont disposición servicios gratuitos de asistencia lingüística. Llame al [phone].    We comply with applicable federal civil rights laws and [place] laws. We do not discriminate on the basis of race, color, national origin, age, disability, sex, sexual orientation, or gender identity.            Thank you!     Thank you for choosing [facility]  for your care. Our goal is always to provide you with excellent  care. Hearing back from our patients is one way we can continue to improve our services. Please take a few minutes to complete the written survey that you may receive in the mail after your visit with us. Thank you!             Your Updated Medication List - Protect others around you: Learn how to safely use, store and throw away your medicines at www.disposemymeds.org.          This list is accurate as of 5/15/18  9:08 AM.  Always use your most recent med list.                   Brand Name Dispense Instructions for use Diagnosis    citalopram 40 MG tablet    celeXA    90 tablet    1 tablet orally daily    Moderate episode of recurrent major depressive disorder (H)       famciclovir 500 MG tablet    FAMVIR    21 tablet    Take 1 tablet (500 mg) by mouth 3 times daily    Herpes zoster without complication       lidocaine 5 % ointment    XYLOCAINE    50 g    Apply topically as needed for moderate pain    Herpes zoster without complication       prenatal multivitamin plus iron 27-0.8 MG Tabs per tablet      Take 2 tablets by mouth daily        TYLENOL PO

## 2018-05-21 ENCOUNTER — OFFICE VISIT (OUTPATIENT)
Dept: PSYCHOLOGY | Facility: CLINIC | Age: 28
End: 2018-05-21
Payer: COMMERCIAL

## 2018-05-21 DIAGNOSIS — F41.1 GAD (GENERALIZED ANXIETY DISORDER): Primary | ICD-10-CM

## 2018-05-21 DIAGNOSIS — F32.1 MAJOR DEPRESSIVE DISORDER, SINGLE EPISODE, MODERATE (H): ICD-10-CM

## 2018-05-21 PROCEDURE — 90834 PSYTX W PT 45 MINUTES: CPT | Performed by: COUNSELOR

## 2018-05-21 ASSESSMENT — ANXIETY QUESTIONNAIRES
3. WORRYING TOO MUCH ABOUT DIFFERENT THINGS: SEVERAL DAYS
6. BECOMING EASILY ANNOYED OR IRRITABLE: SEVERAL DAYS
2. NOT BEING ABLE TO STOP OR CONTROL WORRYING: SEVERAL DAYS
5. BEING SO RESTLESS THAT IT IS HARD TO SIT STILL: NOT AT ALL
IF YOU CHECKED OFF ANY PROBLEMS ON THIS QUESTIONNAIRE, HOW DIFFICULT HAVE THESE PROBLEMS MADE IT FOR YOU TO DO YOUR WORK, TAKE CARE OF THINGS AT HOME, OR GET ALONG WITH OTHER PEOPLE: SOMEWHAT DIFFICULT
1. FEELING NERVOUS, ANXIOUS, OR ON EDGE: NEARLY EVERY DAY
7. FEELING AFRAID AS IF SOMETHING AWFUL MIGHT HAPPEN: SEVERAL DAYS
GAD7 TOTAL SCORE: 8

## 2018-05-21 ASSESSMENT — PATIENT HEALTH QUESTIONNAIRE - PHQ9: 5. POOR APPETITE OR OVEREATING: SEVERAL DAYS

## 2018-05-21 NOTE — PROGRESS NOTES
"                                           Progress Note    Client Name: Dayana Moss  Date: 5/21/2018         Service Type: Individual      Session Start Time: 4:35p  Session End Time: 5:15p      Session Length: 40 minutes     Session #: 7     Attendees: Client attended alone    Treatment Plan Last Reviewed: 5/21/2018  PHQ-9 / JACINTO-7 : 8 & 8       DATA      Progress Since Last Session (Related to Symptoms / Goals / Homework):   Symptoms: Stable, see Epic for PHQ 9 & JACINTO 7 updates     Homework: Completed and continued - client will work to set boundaries with friends and family re: negative attitudes or put downs. Client will also practice one mindfully skill as it relates to home and work priorities.       Episode of Care Goals: Satisfactory progress - ACTION (Actively working towards change); Intervened by reinforcing change plan / affirming steps taken     Current / Ongoing Stressors and Concerns:   Reported stable mood and anxiety in the past 2 weeks despite feeling sick and nauseous; reported feeling more secured with pregnancy as she can feel baby move although she does notice continued increased anxiety before appts; reported sexual nightmares and \"vibrating\" energy and contributed symptoms to not being able to be intimacy per doctor's orders; described trying her best to approach stressors \"one foot at a time\" and reported feeling supported by  and sister at this time.      Treatment Objective(s) Addressed in This Session:    Client will practice communicating/identifying her needs 1x week. Client will learn 3 anxiety management skills to focus on the here and now. Client will increase motivation, interest, engagement, and pleasure in doing things.     Intervention:   Motivational Interviewing: open-ended questions, affirmations, reflections and emphasizing personal control and choices, challenge sustain talk, evoke change talk  DBT: reinforce interpersonal effectiveness/communication skills, " teach and practice emotion regulation skills (TIPP), reinforce mindfulness - one mindfully, teach boundary setting  CBT: identify and replace anxious thinking, reinforce proactive problem solving skills, reinforce internal locus of control and proactive help seeking behaviors, teach identifying wants and needs         ASSESSMENT: Current Emotional / Mental Status (status of significant symptoms):   Risk status (Self / Other harm or suicidal ideation)   Client denies current fears or concerns for personal safety.  Client denies current or recent suicidal ideation or behaviors. Within last month, passive thoughts of SI: don't want to be here, too much stuff going on. Triggers: when she does not get things done, struggle at work, lack of motivation. Denied plans. No hx of hospitalization.   Client denies current or recent homicidal ideation or behaviors.  Client denies current or recent self injurious behavior or ideation. Hx of cutting in high school.   Client denies other safety concerns.  A safety and risk management plan has not been developed at this time, however client was given the after-hours number / 911 should there be a change in any of these risk factors.  Client reports there are no firearms in the house.     Appearance:   Appropriate    Eye Contact:   Fair   Psychomotor Behavior: Normal    Attitude:   Cooperative    Orientation:   All   Speech    Rate / Production: Normal     Volume:  Normal    Mood:    Tired Some anxiousness    Affect:    Appropriate     Thought Content:  Clear  Less rumination    Thought Form:  Coherent  Logical  Circumstantial   Insight:    Good     Medication Review:   See Epic for changes     Medication Compliance:   Yes     Changes in Health Issues:   None reported     Chemical Use Review:   Substance Use: Chemical use reviewed, no active concerns identified      Tobacco Use: No current tobacco use     Collateral Reports Completed:   Not Applicable      PLAN: (Client Tasks /  Therapist Tasks / Other)  Therapist will assign homework of social leisure planning; teach emotional regulation skills and challenging self-defeating thoughts. Reinforce mindfulness/grounding exercises and internal locus of control. Continue to reinforce help seeking and identification of wants and needs. Continue to reinforce anxiety management and interpersonal effectiveness.         [place] Wren [facility]                                                         ________________________________________________________________________    Treatment Plan    Client's Name: Dayana Moss  YOB: 1990    Date: 1/31/2018    Diagnoses: 296.22 (F32.1)  Major Depressive Disorder, Single Episode, Moderate & 300.02 (F41.1) Generalized Anxiety Disorder; RULE OUT Major Depressive Disorder, Recurrent, Moderate & Adjustment Disorder  Psychosocial & Contextual Factors: Miscarriage Sept 2017, currently 6 weeks pregnant, some strained in marriage and family relationships  WHODAS: 30    Referral / Collaboration:  Referral to another professional/service is not indicated at this time.    Anticipated number of session or this episode of care: 12      MeasurableTreatment Goal(s) related to diagnosis / functional impairment(s)  Goal 1: Client will improve mood as evidenced by decreased score on PHQ 9 from 18 (moderately severe) to 10 or less (moderate).    I will know I've met my goal when I don't see so sad and tired.      Objective #A (Client Action)    Client will increase motivation, interest, engagement, and pleasure in doing things.  Status: New - Date: 1/31/2018     Intervention(s)  Therapist will assign homework of social leisure planning; teach emotional regulation skills and challenging self-defeating thoughts.    Objective #B  Client will increase understanding of steps in the grief process.  Status: New - Date: 1/31/2018     Intervention(s)  Therapist will provide educational materials on grief and loss; role-play  conflict management; teach distraction skills.    Objective #C  Client will practice communicating/identifying her needs 1x week.  Status: New - Date: 1/31/2018     Intervention(s)  Therapist will assign homework of communication practice; role-play effective communication skills; teach emotional recognition/identification.    Objective #D (Client Action)                                    Monitor risk factors associated with hx of SI at every session.   Status: New - Date: 4/16/2018      Intervention(s)  Therapist will assign homework of effective help seeking behaviors; role-play communication skills to secure support; teach about identifying warning signs for risks.      Goal 2: Client will better manage anxiety as evidenced by decreased score on JACINTO 7 from 9 (moderate) to 5 or less (mild).    I will know I've met my goal when I can focus my thoughts and not worrying too much.      Objective #A (Client Action)    Client will learn 3 anxiety management skills to focus on the here and now.  Status: New - Date: 1/31/2018     Intervention(s)  Therapist will assign homework of skill practice; provide educational materials on anxiety management; teach the client how to perform a behavioral chain analysis.      Client has reviewed and agreed to the above plan.      HIMANSHU Staples  January 31, 2018

## 2018-05-21 NOTE — MR AVS SNAPSHOT
MRN:[id number]                      After Visit Summary   5/21/2018    Dayana Moss    MRN: [id number]           Visit Information        Provider Department      5/21/2018 4:30 PM [facility]      Your next 10 appointments already scheduled     Jun 07, 2018  4:30 PM CDT   Return Visit with Karel Wren [facility] ([facility])    [facility]  [address]  [facility] [address]   [phone]            Jun 12, 2018  8:00 AM CDT   Glucose Tolerance Test with WE LAB   [facility] [place] ([facility] [place])    [address] [facility]  [address]  [place] [address]   [phone]            Jun 12, 2018  8:15 AM CDT   US PELVIC COMPLETE W TRANSVAGINAL with WEUS1   [facility] [place] ([facility] [place])    [address] [address]  [address]  [place] [address]   [phone]           Please bring a list of your medicines (including vitamins, minerals and over-the-counter drugs). Also, tell your doctor about any allergies you may have. Wear comfortable clothes and leave your valuables at home.  Adults: Drink six 8-ounce glasses of fluid one hour before your exam. Do NOT empty your bladder.  If you need to empty your bladder before your exam, try to release only a little bit of urine. Then, drink another 8oz glass of fluid.  Children: Children who are potty trained should drink at least 4 cups (32 oz) of liquid 45 minutes to one hour prior to the exam. The child s bladder must be full in order to achieve a diagnostic exam. If your child is very uncomfortable or has an urgent need to pee, please notify a technologist; they will try to find out how much longer the wait may be and provide instructions to help relieve the pressure. Occasionally it is medically necessary to insert a urinary catheter to fill the bladder.   Please call the Imaging Department at your exam site with any questions.            Jun 12, 2018  9:00 AM CDT   ESTABLISHED PRENATAL with Nia Casanova MD   [facility] [place] ([facility] [place])    [address] [place] [address] 100  [place] [address]   [phone]            Jun 26, 2018  8:00 AM CDT   ESTABLISHED PRENATAL with Farrukh Jimenez MD   [facility] [place] ([facility] [place])    [address] [place] [address] 100  [place] [address]   [phone]            Jul 10, 2018  8:30 AM CDT   ESTABLISHED PRENATAL with Nia Casanova MD   [facility] [place] ([facility] [place])    [address] [place] [address] 100  [place] [address]   [phone]            Jul 24, 2018  8:00 AM CDT   ESTABLISHED PRENATAL with Farrukh Jimenez MD   [facility] [place] ([facility] [place])    [address] [place] [address] 100  [place] [address]   [phone]            Jul 25, 2018  4:30 PM CDT   Office Visit with CHEY Hernandez [facility] ([facility]    [address] [address]  [facility] [id number] [phone]           Bring a current list of meds and any records pertaining to this visit. For Physicals, please bring immunization records and any forms needing to be filled out. Please arrive 10 minutes early to complete paperwork.            Aug 07, 2018  8:30 AM CDT   ESTABLISHED PRENATAL with Nia Casanova MD   [facility] [place] ([facility] [place])    [address] [place] [address] [address]  [place] [address]   [phone]            Aug 14, 2018  4:20 PM CDT   ESTABLISHED PRENATAL with Nia Casaonva MD   [facility] [place] ([facility] [place])    [address] [facility] [address]  [place] [address]   [phone]              MyChart Information     MyChart gives  you secure access to your electronic health record. If you see a primary care provider, you can also send messages to your care team and make appointments. If you have questions, please call your primary care clinic.  If you do not have a primary care provider, please call [phone] and they will assist you.        Care EveryWhere ID     This is your Care EveryWhere ID. This could be used by other organizations to access your [place] medical records  [id number]        Equal Access to Services     LEONARDA PORTILLO : Igor Mustafa, darlene theodore, priscilla obrienalterrence marina, jyothi spring. So [facility] [phone].    ATENCIÓN: Si habla leydaañol, tiene a dumont disposición servicios gratuitos de asistencia lingüística. Llame al [phone].    We comply with applicable federal civil rights laws and [place] laws. We do not discriminate on the basis of race, color, national origin, age, disability, sex, sexual orientation, or gender identity.

## 2018-05-22 ASSESSMENT — PATIENT HEALTH QUESTIONNAIRE - PHQ9: SUM OF ALL RESPONSES TO PHQ QUESTIONS 1-9: 8

## 2018-05-22 ASSESSMENT — ANXIETY QUESTIONNAIRES: GAD7 TOTAL SCORE: 8

## 2018-06-04 ENCOUNTER — TELEPHONE (OUTPATIENT)
Dept: NURSING | Facility: CLINIC | Age: 28
End: 2018-06-04

## 2018-06-04 ENCOUNTER — HOSPITAL ENCOUNTER (OUTPATIENT)
Facility: CLINIC | Age: 28
Discharge: HOME | End: 2018-06-04
Attending: OBSTETRICS & GYNECOLOGY | Admitting: OBSTETRICS & GYNECOLOGY
Payer: COMMERCIAL

## 2018-06-04 VITALS
BODY MASS INDEX: 25.77 KG/M2 | HEIGHT: 69 IN | SYSTOLIC BLOOD PRESSURE: 104 MMHG | RESPIRATION RATE: 16 BRPM | TEMPERATURE: 97.8 F | DIASTOLIC BLOOD PRESSURE: 68 MMHG | HEART RATE: 89 BPM | WEIGHT: 174 LBS

## 2018-06-04 PROBLEM — Z34.90 PREGNANCY: Status: ACTIVE | Noted: 2018-06-04

## 2018-06-04 PROCEDURE — 59025 FETAL NON-STRESS TEST: CPT | Mod: 26 | Performed by: OBSTETRICS & GYNECOLOGY

## 2018-06-04 PROCEDURE — 59025 FETAL NON-STRESS TEST: CPT

## 2018-06-04 PROCEDURE — G0463 HOSPITAL OUTPT CLINIC VISIT: HCPCS | Mod: 25

## 2018-06-04 RX ORDER — ONDANSETRON 2 MG/ML
4 INJECTION INTRAMUSCULAR; INTRAVENOUS EVERY 6 HOURS PRN
Status: DISCONTINUED | OUTPATIENT
Start: 2018-06-04 | End: 2018-06-04 | Stop reason: HOSPADM

## 2018-06-04 NOTE — IP AVS SNAPSHOT
MRN:[id number]                      After Visit Summary   6/4/2018    Dayana Moss    MRN: [id number]           Thank you!     Thank you for choosing [facility] for your care. Our goal is always to provide you with excellent care. Hearing back from our patients is one way we can continue to improve our services. Please take a few minutes to complete the written survey that you may receive in the mail after you visit with us. Thank you!        Patient Information     Date Of Birth          1990        About your hospital stay     You were admitted on:  June 4, 2018 You last received care in the:  [facility]    You were discharged on:  June 4, 2018       Who to Call     For medical emergencies, please call 911.  For non-urgent questions about your medical care, please call your primary care provider or clinic, None          Attending Provider     Provider Specialty    Delmy Cleveland MD OB/Gyn       Primary Care Provider Fax #    Physician No Ref-Primary [phone]      Your next 10 appointments already scheduled     Jun 07, 2018  4:30 PM CDT   Return Visit with Karel Wren [facility] ([address] [address]   [phone]            Jun 12, 2018  8:00 AM CDT   Glucose Tolerance Test with WE LAB   [facility] ([facility])    [address]  [facility] [address]   [phone]            Jun 12, 2018  8:15 AM CDT   US PELVIC COMPLETE W TRANSVAGINAL with WEUS1   [facility] ([facility])    [address]  [facility] [address]   [phone]           Please bring a list of your medicines (including vitamins, minerals and over-the-counter drugs). Also, tell your doctor about any allergies you may have. Wear comfortable clothes and leave your valuables at  home.  Adults: Drink six 8-ounce glasses of fluid one hour before your exam. Do NOT empty your bladder.  If you need to empty your bladder before your exam, try to release only a little bit of urine. Then, drink another 8oz glass of fluid.  Children: Children who are potty trained should drink at least 4 cups (32 oz) of liquid 45 minutes to one hour prior to the exam. The child s bladder must be full in order to achieve a diagnostic exam. If your child is very uncomfortable or has an urgent need to pee, please notify a technologist; they will try to find out how much longer the wait may be and provide instructions to help relieve the pressure. Occasionally it is medically necessary to insert a urinary catheter to fill the bladder.  Please call the Imaging Department at your exam site with any questions.            Jun 12, 2018  9:00 AM CDT   ESTABLISHED PRENATAL with Nia Casanova MD   [facility] ([facility])    [address] [place] [address] [address]  [place] [address]   [phone]            Jun 26, 2018  8:00 AM CDT   ESTABLISHED PRENATAL with Farrukh Jimenez MD   [facility] [place] ([facility] [place])    [address] [place] [address] [address]  [place] [address]   [phone]            Jul 10, 2018  8:30 AM CDT   ESTABLISHED PRENATAL with Nia Casanova MD   [facility] [place] ([facility])    [address] [place] [address] [address]  [place] [address]   [phone]            Jul 24, 2018  8:00 AM CDT   ESTABLISHED PRENATAL with Farrukh Jimenez MD   [facility] [place] ([facility] [place])    [address]  [place] [address]   [phone]            Jul 25, 2018  4:30 PM CDT   Office Visit with CHEY Hernandez CNP   [facility] ([facility]    [address] [facility] [address]  [address]  [facility] [address]    [phone]           Bring a current list of meds and any records pertaining to this visit. For Physicals, please bring immunization records and any forms needing to be filled out. Please arrive 10 minutes early to complete paperwork.            Aug 07, 2018  8:30 AM CDT   ESTABLISHED PRENATAL with Nia Casanova MD   [facility] [place] ([facility])    [address]  [facility] [address]   [phone]            Aug 14, 2018  4:20 PM CDT   ESTABLISHED PRENATAL with Nia Casanova MD   [facility] ([facility])    [address]  [facility] [address]   [phone]              Further instructions from your care team       Discharge Instruction for Undelivered Patients      You were seen for: Fetal Assessment  We Consulted: Dr Cleveland  You had (Test or Medicine):Non stress test     Diet:   Drink 8 to 12 glasses of liquids (milk, juice, water) every day.  You may eat meals and snacks.     Activity:  Count fetal kicks everyday (see handout)  Call your doctor or nurse midwife if your baby is moving less than usual.     Call your provider if you notice:  Swelling in your face or increased swelling in your hands or legs.  Headaches that are not relieved by Tylenol (acetaminophen).  Changes in your vision (blurring: seeing spots or stars.)  Nausea (sick to your stomach) and vomiting (throwing up).   Weight gain of 5 pounds or more per week.  Heartburn that doesn't go away.  Signs of bladder infection: pain when you urinate (use the toilet), need to go more often and more urgently.  The bag of yadav (rupture of membranes) breaks, or you notice leaking in your underwear.  Bright red blood in your underwear.  Abdominal (lower belly) or stomach pain.  For first baby: Contractions (tightening) less than 5 minutes apart for one hour or more.  Second (plus) baby: Contractions (tightening) less than 10  "minutes apart and getting stronger.  *If less than 34 weeks: Contractions (tightenings) more than 6 times in one hour.  Increase or change in vaginal discharge (note the color and amount)  Other:     Follow-up:  As scheduled in the clinic          Pending Results     No orders found from 6/2/2018 to 6/5/2018.            Admission Information     Date & Time Provider Department Dept. Phone    6/4/2018 Delmy Cleveland MD [facility] [phone]      Your Vitals Were     Blood Pressure Pulse Temperature Respirations Height Weight    104/68 89 97.8  F (36.6  C) (Temporal) 16 1.753 m (5' 9\") 78.9 kg (174 lb)    Last Period BMI (Body Mass Index)                11/28/2017 25.7 kg/m2          MyChart Information     [facility] gives you secure access to your electronic health record. If you see a primary care provider, you can also send messages to your care team and make appointments. If you have questions, please call your primary care clinic.  If you do not have a primary care provider, please call [phone] and they will assist you.        Care EveryWhere ID     This is your Care EveryWhere ID. This could be used by other organizations to access your [facility] medical records  [id number]        Equal Access to Services     LEONARDA PORTILLO AH: Igor maderao Sochandan, waaxda luqadaha, qaybta kaalmada adeegyada, joythi spring. So [facility] [phone].    ATENCIÓN: Si habla español, tiene a dumont disposición servicios gratuitos de asistencia lingüística. Llame al [phone].    We comply with applicable federal civil rights laws and [place] laws. We do not discriminate on the basis of race, color, national origin, age, disability, sex, sexual orientation, or gender identity.               Review of your medicines      UNREVIEWED medicines. Ask your doctor about these medicines        Dose / Directions    citalopram 40 MG tablet   Commonly known as:  celeXA   Used for:  Moderate " episode of recurrent major depressive disorder (H)        1 tablet orally daily   Quantity:  90 tablet   Refills:  1       famciclovir 500 MG tablet   Commonly known as:  FAMVIR   Used for:  Herpes zoster without complication        Dose:  500 mg   Take 1 tablet (500 mg) by mouth 3 times daily   Quantity:  21 tablet   Refills:  0       lidocaine 5 % ointment   Commonly known as:  XYLOCAINE   Used for:  Herpes zoster without complication        Apply topically as needed for moderate pain   Quantity:  50 g   Refills:  0       prenatal multivitamin plus iron 27-0.8 MG Tabs per tablet   Indication:  2 gummies a day        Dose:  2 tablet   Take 2 tablets by mouth daily   Refills:  0       TYLENOL PO        Refills:  0                Protect others around you: Learn how to safely use, store and throw away your medicines at www.disposemymeds.org.             Medication List: This is a list of all your medications and when to take them. Check marks below indicate your daily home schedule. Keep this list as a reference.      Medications           Morning Afternoon Evening Bedtime As Needed    citalopram 40 MG tablet   Commonly known as:  celeXA   1 tablet orally daily                                famciclovir 500 MG tablet   Commonly known as:  FAMVIR   Take 1 tablet (500 mg) by mouth 3 times daily                                lidocaine 5 % ointment   Commonly known as:  XYLOCAINE   Apply topically as needed for moderate pain                                prenatal multivitamin plus iron 27-0.8 MG Tabs per tablet   Take 2 tablets by mouth daily                                TYLENOL PO

## 2018-06-04 NOTE — PROGRESS NOTES
1500- 26 6/7 weeks gestation to MAC for decreased movement since yesterday.   1515- Pt reports worsening depresion symptoms the last 2 weeks. She is currently on Celexa 40 mg daily.  1525- Pt reports feeling baby move X3 since on fetal monitor, fetal movement is also audible on monitor.

## 2018-06-04 NOTE — PROGRESS NOTES
I spoke with Dr Cleveland and gave her a full report on this patient. NST reactive. Pt discharged to home. DC instructions were reviewed at length with this pt. She states complete  Understanding in knowing when to call her MD if problems occur.Will f/u 6-6-18 with her therapist for depression, and will see her OB next week in clinic. Ana Lackey RN

## 2018-06-04 NOTE — DISCHARGE INSTRUCTIONS
Discharge Instruction for Undelivered Patients      You were seen for: Fetal Assessment  We Consulted: Dr Cleveland  You had (Test or Medicine):Non stress test     Diet:   Drink 8 to 12 glasses of liquids (milk, juice, water) every day.  You may eat meals and snacks.     Activity:  Count fetal kicks everyday (see handout)  Call your doctor or nurse midwife if your baby is moving less than usual.     Call your provider if you notice:  Swelling in your face or increased swelling in your hands or legs.  Headaches that are not relieved by Tylenol (acetaminophen).  Changes in your vision (blurring: seeing spots or stars.)  Nausea (sick to your stomach) and vomiting (throwing up).   Weight gain of 5 pounds or more per week.  Heartburn that doesn't go away.  Signs of bladder infection: pain when you urinate (use the toilet), need to go more often and more urgently.  The bag of yadav (rupture of membranes) breaks, or you notice leaking in your underwear.  Bright red blood in your underwear.  Abdominal (lower belly) or stomach pain.  For first baby: Contractions (tightening) less than 5 minutes apart for one hour or more.  Second (plus) baby: Contractions (tightening) less than 10 minutes apart and getting stronger.  *If less than 34 weeks: Contractions (tightenings) more than 6 times in one hour.  Increase or change in vaginal discharge (note the color and amount)  Other:     Follow-up:  As scheduled in the clinic

## 2018-06-04 NOTE — IP AVS SNAPSHOT
[address], [address]    [facility] [address]    Phone:  [phone]                                       After Visit Summary   6/4/2018    Dayana Moss    MRN: [id number]           After Visit Summary Signature Page     I have received my discharge instructions, and my questions have been answered. I have discussed any challenges I see with this plan with the nurse or doctor.    ..........................................................................................................................................  Patient/Patient Representative Signature      ..........................................................................................................................................  Patient Representative Print Name and Relationship to Patient    ..................................................               ................................................  Date                                            Time    ..........................................................................................................................................  Reviewed by Signature/Title    ...................................................              ..............................................  Date                                                            Time

## 2018-06-04 NOTE — TELEPHONE ENCOUNTER
26w6d, CODY 9/4/18. Pt has not felt fetal movement. Pt states she has tried lying down on left side, has eaten and drank liquids and has only felt 3 movements in the last 8-10 hours. Pt denies contractions, vaginal bleeding or LOF. Pt instructed to go to MAC at [facility]. [facility] MAC notified. Dr. Cleveland, on call notified.

## 2018-06-05 NOTE — PROGRESS NOTES
28 y.o  at 26+6 with decreased fetal movement. NST is category 1 with baseline 130 wtih accels and no decels. Patient d/c'd to home

## 2018-06-07 ENCOUNTER — OFFICE VISIT (OUTPATIENT)
Dept: PSYCHOLOGY | Facility: CLINIC | Age: 28
End: 2018-06-07
Payer: COMMERCIAL

## 2018-06-07 DIAGNOSIS — F41.1 GAD (GENERALIZED ANXIETY DISORDER): ICD-10-CM

## 2018-06-07 DIAGNOSIS — F32.1 MAJOR DEPRESSIVE DISORDER, SINGLE EPISODE, MODERATE (H): Primary | ICD-10-CM

## 2018-06-07 PROCEDURE — 90834 PSYTX W PT 45 MINUTES: CPT | Performed by: COUNSELOR

## 2018-06-07 ASSESSMENT — ANXIETY QUESTIONNAIRES
3. WORRYING TOO MUCH ABOUT DIFFERENT THINGS: MORE THAN HALF THE DAYS
1. FEELING NERVOUS, ANXIOUS, OR ON EDGE: MORE THAN HALF THE DAYS
7. FEELING AFRAID AS IF SOMETHING AWFUL MIGHT HAPPEN: MORE THAN HALF THE DAYS
5. BEING SO RESTLESS THAT IT IS HARD TO SIT STILL: SEVERAL DAYS
2. NOT BEING ABLE TO STOP OR CONTROL WORRYING: MORE THAN HALF THE DAYS
GAD7 TOTAL SCORE: 13
IF YOU CHECKED OFF ANY PROBLEMS ON THIS QUESTIONNAIRE, HOW DIFFICULT HAVE THESE PROBLEMS MADE IT FOR YOU TO DO YOUR WORK, TAKE CARE OF THINGS AT HOME, OR GET ALONG WITH OTHER PEOPLE: VERY DIFFICULT
6. BECOMING EASILY ANNOYED OR IRRITABLE: MORE THAN HALF THE DAYS

## 2018-06-07 ASSESSMENT — PATIENT HEALTH QUESTIONNAIRE - PHQ9: 5. POOR APPETITE OR OVEREATING: MORE THAN HALF THE DAYS

## 2018-06-07 NOTE — PROGRESS NOTES
"                                           Progress Note    Client Name: Dayana Moss  Date: 6/7/2018         Service Type: Individual      Session Start Time: 4:35p  Session End Time: 5:15p      Session Length: 40 minutes     Session #: 8     Attendees: Client attended alone    Treatment Plan Last Reviewed: 6/7/2018  PHQ-9 / JACINTO-7 : 13 & 11       DATA      Progress Since Last Session (Related to Symptoms / Goals / Homework):   Symptoms: Stable, see Epic for PHQ 9 & JACINTO 7 updates     Homework: Completed - client will also practice one mindfully skill as it relates to home and work priorities. Client will focus on completing prenatal appt next week.      Episode of Care Goals: Satisfactory progress - ACTION (Actively working towards change); Intervened by reinforcing change plan / affirming steps taken     Current / Ongoing Stressors and Concerns:   Reported stable mood and anxiety overall, but having a pregnancy scare (could not feel fetal movement) - reached out to mother and came to the realization that mother may not be the best support for her as mother \"freaked out\" with her; reported debating who she wants in the room during delivery; also expressed feelings of insecurity towards  and sister as they get along very well - \"I feel like my  does not think I'm fun to be around, they have more fun together\"; reported feeling conflicted as she does not see herself the same way others see her (e.g., client likes being on medications,  told her he likes her better without medications).      Treatment Objective(s) Addressed in This Session:    Client will practice communicating/identifying her needs 1x week. Client will learn 3 anxiety management skills to focus on the here and now. Client will increase motivation, interest, engagement, and pleasure in doing things.     Intervention:   Motivational Interviewing: open-ended questions, affirmations, reflections and emphasizing personal " control and choices, challenge sustain talk, evoke change talk  DBT: reinforce interpersonal effectiveness/communication skills, teach and practice emotion regulation skills (TIPP), reinforce mindfulness - one mindfully, teach boundary setting  CBT: identify and replace anxious thinking, reinforce proactive problem solving skills, reinforce internal locus of control and proactive help seeking behaviors, teach identifying wants and needs, teach effective communication skills to meet emotional needs        ASSESSMENT: Current Emotional / Mental Status (status of significant symptoms):   Risk status (Self / Other harm or suicidal ideation)   Client denies current fears or concerns for personal safety.  Client denies current or recent suicidal ideation or behaviors. Within last month, passive thoughts of SI: don't want to be here, too much stuff going on. Triggers: when she does not get things done, struggle at work, lack of motivation. Denied plans. No hx of hospitalization.   Client denies current or recent homicidal ideation or behaviors.  Client denies current or recent self injurious behavior or ideation. Hx of cutting in high school.   Client denies other safety concerns.  A safety and risk management plan has not been developed at this time, however client was given the after-hours number / 911 should there be a change in any of these risk factors.  Client reports there are no firearms in the house.     Appearance:   Appropriate    Eye Contact:   Fair   Psychomotor Behavior: Normal    Attitude:   Cooperative    Orientation:   All   Speech    Rate / Production: Normal     Volume:  Normal    Mood:    Tired Some anxiousness Some sadness   Affect:    Appropriate     Thought Content:  Clear  Less rumination    Thought Form:  Coherent  Logical  Circumstantial   Insight:    Good     Medication Review:   See Epic for changes     Medication Compliance:   Yes     Changes in Health Issues:   None reported     Chemical Use  Review:   Substance Use: Chemical use reviewed, no active concerns identified      Tobacco Use: No current tobacco use     Collateral Reports Completed:   Not Applicable      PLAN: (Client Tasks / Therapist Tasks / Other)  Therapist will assign homework of social leisure planning; teach emotional regulation skills and challenging self-defeating thoughts. Reinforce mindfulness/grounding exercises and internal locus of control. Continue to reinforce help seeking and identification of wants and needs. Continue to reinforce anxiety management and interpersonal effectiveness.         Karel Wren, [facility]                                                         ________________________________________________________________________    Treatment Plan    Client's Name: Dayana Moss  YOB: 1990    Date: 1/31/2018    Diagnoses: 296.22 (F32.1)  Major Depressive Disorder, Single Episode, Moderate & 300.02 (F41.1) Generalized Anxiety Disorder; RULE OUT Major Depressive Disorder, Recurrent, Moderate & Adjustment Disorder  Psychosocial & Contextual Factors: Miscarriage Sept 2017, currently 6 weeks pregnant, some strained in marriage and family relationships  WHODAS: 30    Referral / Collaboration:  Referral to another professional/service is not indicated at this time.    Anticipated number of session or this episode of care: 12      MeasurableTreatment Goal(s) related to diagnosis / functional impairment(s)  Goal 1: Client will improve mood as evidenced by decreased score on PHQ 9 from 18 (moderately severe) to 10 or less (moderate).    I will know I've met my goal when I don't see so sad and tired.      Objective #A (Client Action)    Client will increase motivation, interest, engagement, and pleasure in doing things.  Status: New - Date: 1/31/2018     Intervention(s)  Therapist will assign homework of social leisure planning; teach emotional regulation skills and challenging self-defeating thoughts.    Objective  #B  Client will increase understanding of steps in the grief process.  Status: New - Date: 1/31/2018     Intervention(s)  Therapist will provide educational materials on grief and loss; role-play conflict management; teach distraction skills.    Objective #C  Client will practice communicating/identifying her needs 1x week.  Status: New - Date: 1/31/2018     Intervention(s)  Therapist will assign homework of communication practice; role-play effective communication skills; teach emotional recognition/identification.    Objective #D (Client Action)                                    Monitor risk factors associated with hx of SI at every session.   Status: New - Date: 4/16/2018      Intervention(s)  Therapist will assign homework of effective help seeking behaviors; role-play communication skills to secure support; teach about identifying warning signs for risks.      Goal 2: Client will better manage anxiety as evidenced by decreased score on JACINTO 7 from 9 (moderate) to 5 or less (mild).    I will know I've met my goal when I can focus my thoughts and not worrying too much.      Objective #A (Client Action)    Client will learn 3 anxiety management skills to focus on the here and now.  Status: New - Date: 1/31/2018     Intervention(s)  Therapist will assign homework of skill practice; provide educational materials on anxiety management; teach the client how to perform a behavioral chain analysis.      Client has reviewed and agreed to the above plan.      HIMANSHU Staples  January 31, 2018

## 2018-06-07 NOTE — MR AVS SNAPSHOT
MRN:[id number]                      After Visit Summary   6/7/2018    Dayana Moss    MRN: [id number]           Visit Information        Provider Department      6/7/2018 4:30 PM Karel Wren [facility] Generic      Your next 10 appointments already scheduled     Jun 12, 2018  8:00 AM CDT   Glucose Tolerance Test with WE LAB   [facility] ([facility])    [address]  [place] [address]   [phone]            Jun 12, 2018  8:15 AM CDT   US PELVIC COMPLETE W TRANSVAGINAL with WEUS1   [facility] ([facility])    [address]  [place] [address]   [phone]           Please bring a list of your medicines (including vitamins, minerals and over-the-counter drugs). Also, tell your doctor about any allergies you may have. Wear comfortable clothes and leave your valuables at home.  Adults: Drink six 8-ounce glasses of fluid one hour before your exam. Do NOT empty your bladder.  If you need to empty your bladder before your exam, try to release only a little bit of urine. Then, drink another 8oz glass of fluid.  Children: Children who are potty trained should drink at least 4 cups (32 oz) of liquid 45 minutes to one hour prior to the exam. The child s bladder must be full in order to achieve a diagnostic exam. If your child is very uncomfortable or has an urgent need to pee, please notify a technologist; they will try to find out how much longer the wait may be and provide instructions to help relieve the pressure. Occasionally it is medically necessary to insert a urinary catheter to fill the bladder.  Please call the Imaging Department at your exam site with any questions.            Jun 12, 2018  9:00 AM CDT   ESTABLISHED PRENATAL with Nia Casanova MD   [facility] ([facility])     [address]  [place] [address]   [phone]            Jun 26, 2018  8:00 AM CDT   ESTABLISHED PRENATAL with Farrukh Jimenez MD   [facility] [place] ([facility] [place])    [address]  [place] [address]   [phone]            Jun 26, 2018  2:30 PM CDT   Return Visit with Karel Wren [facility]  [address] [place], [place] [address]  [facility] [address]   [phone]            Jul 10, 2018  8:30 AM CDT   ESTABLISHED PRENATAL with Nia Casanova MD   [facility] [place] ([facility] [place])    [address] [place] [address] [address]  [place] [address]   [phone]            Jul 13, 2018  3:30 PM CDT   Return Visit with Karel Wren [facility] ([facility]  [id number]  [facility] [address]   [phone]            Jul 24, 2018  8:00 AM CDT   ESTABLISHED PRENATAL with Farrukh Jimenez MD   [facility] [place] ([facility] [place])    [address] [place] [address] 100  [place] [address]   [phone]            Jul 24, 2018 12:00 PM CDT   Return Visit with Karel Wern [facility]  [address] [place], [place] [address]  [facility] [address]   [phone]            Jul 25, 2018  4:30 PM CDT   Office Visit with CHEY Hernandez [facility] ([facility])    [address] [facility] [address]  [facility] [id number] [phone]           Bring a current list of meds and any records pertaining to this visit. For Physicals, please bring immunization records and any forms needing to be filled out. Please arrive 10 minutes early to complete paperwork.              [employer] Information     Blockboard gives you secure  access to your electronic health record. If you see a primary care provider, you can also send messages to your care team and make appointments. If you have questions, please call your primary care clinic.  If you do not have a primary care provider, please call [phone] and they will assist you.        Care EveryWhere ID     This is your Care EveryWhere ID. This could be used by other organizations to access your [place] medical records  [id number]        Equal Access to Services     LEONARDA PORTILLO : Igor Mustafa, darlene theodore, priscilla kaalterrence [place], jyothi spring. So [facility] [phone].    ATENCIÓN: Si habla español, tiene a dumont disposición servicios gratuitos de asistencia lingüística. Llame al [phone].    We comply with applicable federal civil rights laws and [place] laws. We do not discriminate on the basis of race, color, national origin, age, disability, sex, sexual orientation, or gender identity.

## 2018-06-08 ASSESSMENT — PATIENT HEALTH QUESTIONNAIRE - PHQ9: SUM OF ALL RESPONSES TO PHQ QUESTIONS 1-9: 11

## 2018-06-08 ASSESSMENT — ANXIETY QUESTIONNAIRES: GAD7 TOTAL SCORE: 13

## 2018-06-12 ENCOUNTER — PRENATAL OFFICE VISIT (OUTPATIENT)
Dept: OBGYN | Facility: CLINIC | Age: 28
End: 2018-06-12
Payer: COMMERCIAL

## 2018-06-12 ENCOUNTER — RADIANT APPOINTMENT (OUTPATIENT)
Dept: ULTRASOUND IMAGING | Facility: CLINIC | Age: 28
End: 2018-06-12
Payer: COMMERCIAL

## 2018-06-12 VITALS — DIASTOLIC BLOOD PRESSURE: 68 MMHG | WEIGHT: 193 LBS | BODY MASS INDEX: 28.5 KG/M2 | SYSTOLIC BLOOD PRESSURE: 98 MMHG

## 2018-06-12 DIAGNOSIS — O09.92 SUPERVISION OF HIGH RISK PREGNANCY IN SECOND TRIMESTER: Primary | ICD-10-CM

## 2018-06-12 DIAGNOSIS — O44.20 PLACENTA PREVIA MARGINALIS: ICD-10-CM

## 2018-06-12 DIAGNOSIS — Z23 NEED FOR TDAP VACCINATION: ICD-10-CM

## 2018-06-12 DIAGNOSIS — Z3A.28 28 WEEKS GESTATION OF PREGNANCY: ICD-10-CM

## 2018-06-12 DIAGNOSIS — Z36.9 ENCOUNTER FOR ANTENATAL SCREENING OF MOTHER: Primary | ICD-10-CM

## 2018-06-12 LAB
GLUCOSE 1H P 50 G GLC PO SERPL-MCNC: 96 MG/DL (ref 60–129)
HGB BLD-MCNC: 11.2 G/DL (ref 11.7–15.7)

## 2018-06-12 PROCEDURE — 36415 COLL VENOUS BLD VENIPUNCTURE: CPT | Performed by: OBSTETRICS & GYNECOLOGY

## 2018-06-12 PROCEDURE — 82950 GLUCOSE TEST: CPT | Performed by: OBSTETRICS & GYNECOLOGY

## 2018-06-12 PROCEDURE — 00000218 ZZHCL STATISTIC OBHBG - HEMOGLOBIN: Performed by: OBSTETRICS & GYNECOLOGY

## 2018-06-12 PROCEDURE — 87086 URINE CULTURE/COLONY COUNT: CPT | Performed by: OBSTETRICS & GYNECOLOGY

## 2018-06-12 PROCEDURE — 90715 TDAP VACCINE 7 YRS/> IM: CPT | Performed by: OBSTETRICS & GYNECOLOGY

## 2018-06-12 PROCEDURE — 86780 TREPONEMA PALLIDUM: CPT | Performed by: OBSTETRICS & GYNECOLOGY

## 2018-06-12 PROCEDURE — 90471 IMMUNIZATION ADMIN: CPT | Performed by: OBSTETRICS & GYNECOLOGY

## 2018-06-12 PROCEDURE — 76816 OB US FOLLOW-UP PER FETUS: CPT | Performed by: OBSTETRICS & GYNECOLOGY

## 2018-06-12 PROCEDURE — 99207 ZZC PRENATAL VISIT: CPT | Performed by: OBSTETRICS & GYNECOLOGY

## 2018-06-12 NOTE — MR AVS SNAPSHOT
After Visit Summary   6/12/2018    Dayana Moss    MRN: [id number]           Patient Information     Date Of Birth          1990        Visit Information        Provider Department      6/12/2018 9:00 AM Nia Casanova MD [facility] [place]        Today's Diagnoses     Supervision of high risk pregnancy in second trimester    -  1    Need for Tdap vaccination        28 weeks gestation of pregnancy           Follow-ups after your visit        Follow-up notes from your care team     Return in about 2 weeks (around 6/26/2018) for Routine Prenatal Visit.      Your next 10 appointments already scheduled     Jun 26, 2018  8:00 AM CDT   ESTABLISHED PRENATAL with Farrukh Jimenez MD   [facility] [place] ([facility] [place])    [address]  [place] [address]   [phone]            Jun 26, 2018  2:30 PM CDT   Return Visit with Karel Wren [place] [address] [place], [place] [address]  [facility] [address]   [phone]            Jul 10, 2018  8:30 AM CDT   ESTABLISHED PRENATAL with Nia Casanova MD   [facility] [place] ([facility] [place])    [address]  [place] [address]   [phone]            Jul 13, 2018  3:30 PM CDT   Return Visit with Karel Wren [address] [place], [place] [address]  [facility] [address]   [phone]            Jul 24, 2018  8:00 AM CDT   ESTABLISHED PRENATAL with Farrukh Jimenez MD   [facility] [place] ([facility] [place])    [address]  [place] [address]   [phone]            Jul 24, 2018 12:00 PM CDT   Return Visit with Karel Wren [place] [address]  [facility] [address]   [phone]            Jul 25, 2018  4:30 PM CDT   Office Visit with CHEY Hernandez CNP   [facility] ([facility])    [address] [facility]  [address]  [facility] [address] [phone]           Bring a current list of meds and any records pertaining to this visit. For Physicals, please bring immunization records and any forms needing to be filled out. Please arrive 10 minutes early to complete paperwork.            Aug 07, 2018  8:30 AM CDT   ESTABLISHED PRENATAL with Nia Casanova MD   [facility] [place] ([facility] [place])    [address]  [place] [address]   [phone]            Aug 14, 2018  4:20 PM CDT   ESTABLISHED PRENATAL with Nia Casanova MD   [facility] [place] ([facility] [place])    [address]  [place] [address]   [phone]            Aug 21, 2018  8:50 AM CDT   ESTABLISHED PRENATAL with Nia Casanova MD   [facility] [place] ([facility] [place])    [address]  [place] [address]   [phone]              Who to contact     If you have questions or need follow up information about today's clinic visit or your schedule please contact [facility] [place] directly at [phone].  Normal or non-critical lab and imaging results will be communicated to you by MyChart, letter or phone within 4 business days after the clinic has received the results. If you do not hear from us within 7 days, please contact the clinic through MyChart or phone. If you have a critical or abnormal lab result, we will notify you by phone as soon as possible.  Submit refill requests through [email or web address] or call your pharmacy and they will forward the refill request to us. Please allow 3 business days for your refill to be completed.          Additional  Information About Your Visit        [employer] Science[employer] Information     [employer] gives you secure access to your electronic health record. If you see a primary care provider, you can also send messages to your care team and make appointments. If you have questions, please call your primary care clinic.  If you do not have a primary care provider, please call [phone] and they will assist you.        Care EveryWhere ID     This is your Care EveryWhere ID. This could be used by other organizations to access your [place] medical records  [id number]        Your Vitals Were     Last Period BMI (Body Mass Index)                11/28/2017 28.5 kg/m2           Blood Pressure from Last 3 Encounters:   06/12/18 98/68   06/04/18 104/68   05/15/18 120/74    Weight from Last 3 Encounters:   06/12/18 193 lb (87.5 kg)   06/04/18 174 lb (78.9 kg)   05/15/18 174 lb (78.9 kg)              We Performed the Following     TDAP VACCINE (ADACEL)     Treponema Abs w Reflex to RPR and Titer     Urine Culture Aerobic Bacterial     VACCINE ADMINISTRATION, INITIAL        Primary Care Provider Fax #    Physician No Ref-Primary [phone]       No address on file        Equal Access to Services     COLLIN PORTILLO : Hadii brandon Mustafa, waverada ewelina, qaybta kaalmada salas, jyothi vargas . So [facility] [phone].    ATENCIÓN: Si habla español, tiene a dumont disposición servicios gratuitos de asistencia lingüística. Llame al [phone].    We comply with applicable federal civil rights laws and [place] laws. We do not discriminate on the basis of race, color, national origin, age, disability, sex, sexual orientation, or gender identity.            Thank you!     Thank you for choosing [facility] [place]  for your care. Our goal is always to provide you with excellent care. Hearing back from our patients is one way we can continue to improve our services. Please take a few minutes to complete the  written survey that you may receive in the mail after your visit with us. Thank you!             Your Updated Medication List - Protect others around you: Learn how to safely use, store and throw away your medicines at www.disposemymeds.org.          This list is accurate as of 6/12/18 10:28 AM.  Always use your most recent med list.                   Brand Name Dispense Instructions for use Diagnosis    citalopram 40 MG tablet    celeXA    90 tablet    1 tablet orally daily    Moderate episode of recurrent major depressive disorder (H)       prenatal multivitamin plus iron 27-0.8 MG Tabs per tablet      Take 2 tablets by mouth daily        TYLENOL PO

## 2018-06-12 NOTE — PROGRESS NOTES
Doing fairly well today.  +FM  No ctx; mild cramping; no vb/spotting/lof  Glucola, hgb and Tdap today  US today with resolved low lying placenta; persistent yet stable bilateral renal pylectasis (4 and 5mm) --no follow up needed  Struggling some with depression --will discuss med changes with psychiatrist  Discussed classes, tour (scheduled), birth plan, peds, etc.  Not interested in classes  --gets very anxious in group settings; may consider on line or individual classes  RTC 2wks --seeing MI in my absence

## 2018-06-12 NOTE — PROGRESS NOTES
Syphilis is a sexually transmitted disease that can cause birth defects in the babies of untreated mothers. Every pregnant patient is tested for syphilis early in each pregnancy as part of the routine lab work. The [place] Department of [facility] has seen an increase in the rate of syphilis in [place]. The [facility] now recommends testing for syphilis 3 times during a pregnancy, the new prenatal visit, 28 weeks and when admitted for delivery. Patient accepts lab testing for syphilis.

## 2018-06-13 LAB
BACTERIA SPEC CULT: NORMAL
BACTERIA SPEC CULT: NORMAL
Lab: NORMAL
SPECIMEN SOURCE: NORMAL
T PALLIDUM AB SER QL: NONREACTIVE

## 2018-06-26 ENCOUNTER — PRENATAL OFFICE VISIT (OUTPATIENT)
Dept: OBGYN | Facility: CLINIC | Age: 28
End: 2018-06-26
Payer: COMMERCIAL

## 2018-06-26 ENCOUNTER — OFFICE VISIT (OUTPATIENT)
Dept: PSYCHOLOGY | Facility: CLINIC | Age: 28
End: 2018-06-26
Payer: COMMERCIAL

## 2018-06-26 VITALS — DIASTOLIC BLOOD PRESSURE: 66 MMHG | WEIGHT: 196 LBS | SYSTOLIC BLOOD PRESSURE: 102 MMHG | BODY MASS INDEX: 28.94 KG/M2

## 2018-06-26 DIAGNOSIS — O09.93 SUPERVISION OF HIGH RISK PREGNANCY IN THIRD TRIMESTER: Primary | ICD-10-CM

## 2018-06-26 DIAGNOSIS — F32.1 MAJOR DEPRESSIVE DISORDER, SINGLE EPISODE, MODERATE (H): Primary | ICD-10-CM

## 2018-06-26 DIAGNOSIS — O99.340 DEPRESSION AFFECTING PREGNANCY, ANTEPARTUM: ICD-10-CM

## 2018-06-26 DIAGNOSIS — F32.A DEPRESSION AFFECTING PREGNANCY, ANTEPARTUM: ICD-10-CM

## 2018-06-26 DIAGNOSIS — F41.1 GAD (GENERALIZED ANXIETY DISORDER): ICD-10-CM

## 2018-06-26 PROCEDURE — 99207 ZZC PRENATAL VISIT: CPT | Performed by: OBSTETRICS & GYNECOLOGY

## 2018-06-26 PROCEDURE — 90834 PSYTX W PT 45 MINUTES: CPT | Performed by: COUNSELOR

## 2018-06-26 ASSESSMENT — PATIENT HEALTH QUESTIONNAIRE - PHQ9: 5. POOR APPETITE OR OVEREATING: NEARLY EVERY DAY

## 2018-06-26 ASSESSMENT — ANXIETY QUESTIONNAIRES
5. BEING SO RESTLESS THAT IT IS HARD TO SIT STILL: SEVERAL DAYS
6. BECOMING EASILY ANNOYED OR IRRITABLE: NEARLY EVERY DAY
GAD7 TOTAL SCORE: 15
IF YOU CHECKED OFF ANY PROBLEMS ON THIS QUESTIONNAIRE, HOW DIFFICULT HAVE THESE PROBLEMS MADE IT FOR YOU TO DO YOUR WORK, TAKE CARE OF THINGS AT HOME, OR GET ALONG WITH OTHER PEOPLE: VERY DIFFICULT
1. FEELING NERVOUS, ANXIOUS, OR ON EDGE: MORE THAN HALF THE DAYS
2. NOT BEING ABLE TO STOP OR CONTROL WORRYING: MORE THAN HALF THE DAYS
7. FEELING AFRAID AS IF SOMETHING AWFUL MIGHT HAPPEN: MORE THAN HALF THE DAYS
3. WORRYING TOO MUCH ABOUT DIFFERENT THINGS: MORE THAN HALF THE DAYS

## 2018-06-26 NOTE — MR AVS SNAPSHOT
After Visit Summary   6/26/2018    Dayana Moss    MRN: [id number]           Patient Information     Date Of Birth          1990        Visit Information        Provider Department      6/26/2018 8:00 AM Farrukh Jimenez MD [facility] [place]        Today's Diagnoses     Supervision of high risk pregnancy in third trimester    -  1    Depression affecting pregnancy, antepartum           Follow-ups after your visit        Your next 10 appointments already scheduled     Jun 26, 2018  2:30 PM CDT   Return Visit with Karel Wren [facility]  [address] [facility] [address] [facility]  [facility] [address]   [phone]            Jul 10, 2018  8:30 AM CDT   ESTABLISHED PRENATAL with Nia Casanova MD   [facility] [place] ([facility] [place])    [address] [place] [address] 100  [facility] [address]   [phone]            Jul 13, 2018  3:30 PM CDT   Return Visit with Karel Wren [facility]  [address] [place], [place] [address]  [facility] [address]   [phone]            Jul 24, 2018  8:00 AM CDT   ESTABLISHED PRENATAL with Farrukh Jimenez MD   [facility] [place] ([facility] [place])    [address] [place] [address] 100  [facility] [address]   [phone]            Jul 24, 2018 12:00 PM CDT   Return Visit with Karel Wren [facility]  [address] [place], [place] [address]  [facility] [address]   [phone]            Jul 25, 2018  4:30 PM CDT   Office Visit with CHEY Hernandez [facility] ([facility])    [address] [facility] [address]  [facility] [address] [phone]           Bring a current list of meds and any records pertaining to this visit. For  Physicals, please bring immunization records and any forms needing to be filled out. Please arrive 10 minutes early to complete paperwork.            Aug 07, 2018  8:30 AM CDT   ESTABLISHED PRENATAL with Nia Casanova MD   [facility] [place] ([facility] [place])    [address]  [place] [address]   [phone]            Aug 14, 2018  4:20 PM CDT   ESTABLISHED PRENATAL with Nia Casanova MD   [facility] [place] ([facility] [place])    [address]  [place] [address]   [phone]            Aug 21, 2018  8:50 AM CDT   ESTABLISHED PRENATAL with Nia Casanova MD   [facility] [place] ([facility] [place])    [address] [place] [address] 100  [place] [address]   [phone]            Aug 28, 2018  4:20 PM CDT   ESTABLISHED PRENATAL with Nia Casanova MD   [facility] [place] ([facility] [place])    [address]  [place] [address]   [phone]              Who to contact     If you have questions or need follow up information about today's clinic visit or your schedule please contact [facility] [place] directly at [phone].  Normal or non-critical lab and imaging results will be communicated to you by ContaAzulhart, letter or phone within 4 business days after the clinic has received the results. If you do not hear from us within 7 days, please contact the clinic through ContaAzulhart or phone. If you have a critical or abnormal lab result, we will notify you by phone as soon as possible.  Submit refill requests through Masterseek or call your pharmacy and they will forward the refill request to us. Please allow 3 business days for your refill to be completed.          Additional Information About Your Visit        Masterseek Information     Masterseek gives you secure access to your electronic health record. If you see a  primary care provider, you can also send messages to your care team and make appointments. If you have questions, please call your primary care clinic.  If you do not have a primary care provider, please call [phone] and they will assist you.        Care EveryWhere ID     This is your Care EveryWhere ID. This could be used by other organizations to access your [place] medical records  [id number]        Your Vitals Were     Last Period BMI (Body Mass Index)                11/28/2017 28.94 kg/m2           Blood Pressure from Last 3 Encounters:   06/26/18 102/66   06/12/18 98/68   06/04/18 104/68    Weight from Last 3 Encounters:   06/26/18 196 lb (88.9 kg)   06/12/18 193 lb (87.5 kg)   06/04/18 174 lb (78.9 kg)              Today, you had the following     No orders found for display       Primary Care Provider Fax #    Physician No Ref-Primary [phone]       No address on file        Equal Access to Services     LEONARDA PORTILLO : Hadii brandon maderao Sochandan, waaxda luqadaha, qaybta kaalmada adejena, jyothi vargas . So [facility] [phone].    ATENCIÓN: Si habla español, tiene a dumont disposición servicios gratuitos de asistencia lingüística. Llame al [phone].    We comply with applicable federal civil rights laws and [place] laws. We do not discriminate on the basis of race, color, national origin, age, disability, sex, sexual orientation, or gender identity.            Thank you!     Thank you for choosing [facility] [place]  for your care. Our goal is always to provide you with excellent care. Hearing back from our patients is one way we can continue to improve our services. Please take a few minutes to complete the written survey that you may receive in the mail after your visit with us. Thank you!             Your Updated Medication List - Protect others around you: Learn how to safely use, store and throw away your medicines at www.disposemymeds.org.           This list is accurate as of 6/26/18  8:15 AM.  Always use your most recent med list.                   Brand Name Dispense Instructions for use Diagnosis    citalopram 40 MG tablet    celeXA    90 tablet    1 tablet orally daily    Moderate episode of recurrent major depressive disorder (H)       prenatal multivitamin plus iron 27-0.8 MG Tabs per tablet      Take 2 tablets by mouth daily        TYLENOL PO

## 2018-06-26 NOTE — MR AVS SNAPSHOT
MRN:[id number]                      After Visit Summary   6/26/2018    Dayana Moss    MRN: [id number]           Visit Information        Provider Department      6/26/2018 2:30 PM Karel Wren [facility]      Your next 10 appointments already scheduled     Jul 10, 2018  8:30 AM CDT   ESTABLISHED PRENATAL with Nia Casanova MD   [facility] [place] ([facility] [place])    [address]  [place] [address]   [phone]            Jul 13, 2018  3:30 PM CDT   Return Visit with Karel Wren [facility] ([facility])    [facility]  [address]  [facility] [address]   [phone]            Jul 24, 2018  8:00 AM CDT   ESTABLISHED PRENATAL with Farrukh Jimenez MD   [facility] [place] ([facility] [place])    [address]  [place] [address]   [phone]            Jul 24, 2018 12:00 PM CDT   Return Visit with Karel Wren [facility] ([facility]  [address]  [facility] [address]   [phone]            Jul 25, 2018  4:30 PM CDT   Office Visit with CHEY Hernandez [facility] [place] [facility]    [address] [facility] [address]  [facility] [address]   [phone]           Bring a current list of meds and any records pertaining to this visit. For Physicals, please bring immunization records and any forms needing to be filled out. Please arrive 10 minutes early to complete paperwork.            Aug 07, 2018  8:30 AM CDT   ESTABLISHED PRENATAL with Nia Casanova MD   [facility] [place] ([facility] [place])    [address]  [place] [address]   [phone]            Aug 14, 2018  4:20 PM CDT    ESTABLISHED PRENATAL with Nia Casanova MD   [facility] [place] ([facility] [place])    [address]  [place] [address]   [phone]            Aug 21, 2018  8:50 AM CDT   ESTABLISHED PRENATAL with Nia Casanova MD   [facility] [place] ([facility] [place])    [address]  [place] [address]   [phone]            Aug 28, 2018  4:20 PM CDT   ESTABLISHED PRENATAL with Nia Casanova MD   [facility] [place] ([facility] [place])    [address]  [place] [address]   [phone]            Sep 04, 2018  8:30 AM CDT   ESTABLISHED PRENATAL with Nia Casanova MD   [facility] [place] ([facility] [place])    [address]  [place] [address]   [phone]              [employer] Information     [employer]ared gives you secure access to your electronic health record. If you see a primary care provider, you can also send messages to your care team and make appointments. If you have questions, please call your primary care clinic.  If you do not have a primary care provider, please call [phone] and they will assist you.        Care EveryWhere ID     This is your Care EveryWhere ID. This could be used by other organizations to access your [employer] medical records  [id number]        Equal Access to Services     LEONARDA PORTILLO AH: Hadii brandon maderao Sochandan, waaxda luqadaha, qaybta kaalmada adeegyanick, jyothi spring. So [facility] [phone].    ATENCIÓN: Si habla español, tiene a dumont disposición servicios gratuitos de asistencia lingüística. Llame al [phone].    We comply with applicable federal civil rights laws and [place] laws. We do not discriminate on the basis of race, color, national origin, age, disability, sex, sexual orientation, or gender identity.

## 2018-06-26 NOTE — PROGRESS NOTES
Progress Note    Client Name: Dayana Moss  Date: 6/26/2018         Service Type: Individual      Session Start Time: 2:35p  Session End Time: 3:15p      Session Length: 40 minutes     Session #: 9     Attendees: Client attended alone    Treatment Plan Last Reviewed: 6/26/2018  PHQ-9 / JACINTO-7 : 17 & 15       DATA      Progress Since Last Session (Related to Symptoms / Goals / Homework):   Symptoms: Somewhat worsened, see Epic for PHQ 9 & JACINTO 7 updates     Homework: Completed - client will focus on completing prenatal appt next week. By next appt, client will work on planning and communication with  as it relates to preparing for child birth.       Episode of Care Goals: Satisfactory progress - ACTION (Actively working towards change); Intervened by reinforcing change plan / affirming steps taken     Current / Ongoing Stressors and Concerns:   Reported somewhat worsened stable mood and anxiety due to feeling tired, down, low motivation and also not feeling supported by  - reported  is complaining about her to his dad and called his mother to come over and help with housework; expressed feeling bad about situation and that  could not help her directly; also reported trying to communicate with  about needing positive affirmations and being told by  that he does not know how to communicate that to her; acknowledged the importance of their communication and asserted she may bring  to next appt; reported frustrations towards sister who she described to make poor life decisions - reported setting limits with sister and is questioning if she will allow sister to be in delivery room with her.      Treatment Objective(s) Addressed in This Session:    Client will practice communicating/identifying her needs 1x week. Client will learn 3 anxiety management skills to focus on the here and now. Client will increase motivation,  "interest, engagement, and pleasure in doing things.     Intervention:   Motivational Interviewing: open-ended questions, affirmations, reflections and emphasizing personal control and choices, challenge sustain talk, evoke change talk  DBT: reinforce interpersonal effectiveness/communication skills, teach and practice emotion regulation skills (TIPP), reinforce mindfulness - one mindfully, teach boundary setting  CBT: identify and replace anxious thinking, reinforce proactive problem solving skills, reinforce internal locus of control and proactive help seeking behaviors, teach identifying wants and needs, teach effective communication skills to meet emotional needs        ASSESSMENT: Current Emotional / Mental Status (status of significant symptoms):   Risk status (Self / Other harm or suicidal ideation)   Client denies current fears or concerns for personal safety.  Client denies current or recent suicidal ideation or behaviors. Within last month, passive thoughts of SI: don't want to be here, too much stuff going on. Triggers: when she does not get things done, struggle at work, lack of motivation. Denied plans. No hx of hospitalization.   Client denies current or recent homicidal ideation or behaviors.  Client denies current or recent self injurious behavior or ideation. Hx of cutting in high school.   Client denies other safety concerns.  A safety and risk management plan has not been developed at this time, however client was given the after-hours number / 911 should there be a change in any of these risk factors.  Client reports there are no firearms in the house.     Appearance:   Appropriate    Eye Contact:   Fair   Psychomotor Behavior: Normal    Attitude:   Cooperative    Orientation:   All   Speech    Rate / Production: Normal     Volume:  Normal    Mood:    \"Tired\" Sad Depressed    Affect:    Appropriate  Mood congruent    Thought Content:  Clear  Rumination    Thought Form:  Coherent  Logical  " Circumstantial   Insight:    Good     Medication Review:   See Epic for changes     Medication Compliance:   Yes     Changes in Health Issues:   None reported     Chemical Use Review:   Substance Use: Chemical use reviewed, no active concerns identified      Tobacco Use: No current tobacco use     Collateral Reports Completed:   Not Applicable      PLAN: (Client Tasks / Therapist Tasks / Other)  Therapist will assign homework of social leisure planning; teach emotional regulation skills and challenging self-defeating thoughts. Reinforce mindfulness/grounding exercises and internal locus of control. Continue to reinforce help seeking and identification of wants and needs. Continue to reinforce anxiety management, communication skills, and interpersonal effectiveness.         Karel Wren [facility]                                                         ________________________________________________________________________    Treatment Plan    Client's Name: Dayana Moss  YOB: 1990    Date: 1/31/2018    Diagnoses: 296.22 (F32.1)  Major Depressive Disorder, Single Episode, Moderate & 300.02 (F41.1) Generalized Anxiety Disorder; RULE OUT Major Depressive Disorder, Recurrent, Moderate & Adjustment Disorder  Psychosocial & Contextual Factors: Miscarriage Sept 2017, currently 6 weeks pregnant, some strained in marriage and family relationships  WHODAS: 30    Referral / Collaboration:  Referral to another professional/service is not indicated at this time.    Anticipated number of session or this episode of care: 12      MeasurableTreatment Goal(s) related to diagnosis / functional impairment(s)  Goal 1: Client will improve mood as evidenced by decreased score on PHQ 9 from 18 (moderately severe) to 10 or less (moderate).    I will know I've met my goal when I don't see so sad and tired.      Objective #A (Client Action)    Client will increase motivation, interest, engagement, and pleasure in doing  things.  Status: New - Date: 1/31/2018     Intervention(s)  Therapist will assign homework of social leisure planning; teach emotional regulation skills and challenging self-defeating thoughts.    Objective #B  Client will increase understanding of steps in the grief process.  Status: New - Date: 1/31/2018     Intervention(s)  Therapist will provide educational materials on grief and loss; role-play conflict management; teach distraction skills.    Objective #C  Client will practice communicating/identifying her needs 1x week.  Status: New - Date: 1/31/2018     Intervention(s)  Therapist will assign homework of communication practice; role-play effective communication skills; teach emotional recognition/identification.    Objective #D (Client Action)                                    Monitor risk factors associated with hx of SI at every session.   Status: New - Date: 4/16/2018      Intervention(s)  Therapist will assign homework of effective help seeking behaviors; role-play communication skills to secure support; teach about identifying warning signs for risks.      Goal 2: Client will better manage anxiety as evidenced by decreased score on JACINTO 7 from 9 (moderate) to 5 or less (mild).    I will know I've met my goal when I can focus my thoughts and not worrying too much.      Objective #A (Client Action)    Client will learn 3 anxiety management skills to focus on the here and now.  Status: New - Date: 1/31/2018     Intervention(s)  Therapist will assign homework of skill practice; provide educational materials on anxiety management; teach the client how to perform a behavioral chain analysis.      Client has reviewed and agreed to the above plan.      HIMANSHU Staples  January 31, 2018

## 2018-06-26 NOTE — PROGRESS NOTES
Had hypoglycemic episode at work. Didn't eat breakfast.  Seeing therapist today. Anticipates med change for depression.  RTC 2 weeks.

## 2018-06-27 ASSESSMENT — PATIENT HEALTH QUESTIONNAIRE - PHQ9: SUM OF ALL RESPONSES TO PHQ QUESTIONS 1-9: 17

## 2018-06-27 ASSESSMENT — ANXIETY QUESTIONNAIRES: GAD7 TOTAL SCORE: 15

## 2018-07-10 ENCOUNTER — PRENATAL OFFICE VISIT (OUTPATIENT)
Dept: OBGYN | Facility: CLINIC | Age: 28
End: 2018-07-10
Payer: COMMERCIAL

## 2018-07-10 VITALS — SYSTOLIC BLOOD PRESSURE: 122 MMHG | DIASTOLIC BLOOD PRESSURE: 68 MMHG | WEIGHT: 203 LBS | BODY MASS INDEX: 29.98 KG/M2

## 2018-07-10 DIAGNOSIS — F41.1 GAD (GENERALIZED ANXIETY DISORDER): ICD-10-CM

## 2018-07-10 DIAGNOSIS — Z3A.32 32 WEEKS GESTATION OF PREGNANCY: Primary | ICD-10-CM

## 2018-07-10 DIAGNOSIS — O09.93 SUPERVISION OF HIGH RISK PREGNANCY IN THIRD TRIMESTER: ICD-10-CM

## 2018-07-10 PROCEDURE — 99207 ZZC PRENATAL VISIT: CPT | Performed by: OBSTETRICS & GYNECOLOGY

## 2018-07-10 NOTE — MR AVS SNAPSHOT
After Visit Summary   7/10/2018    Dayana AGUILA    MRN: [id number]           Patient Information     Date Of Birth          1990        Visit Information        Provider Department      7/10/2018 8:30 AM Nia Casanova MD [facility] [place]        Today's Diagnoses     32 weeks gestation of pregnancy    -  1    Supervision of high risk pregnancy in third trimester        JACINTO (generalized anxiety disorder)           Follow-ups after your visit        Follow-up notes from your care team     Return in about 2 weeks (around 7/24/2018) for Routine Prenatal Visit.      Your next 10 appointments already scheduled     Jul 13, 2018  3:30 PM CDT   Return Visit with Karel Wren [facility] ([facility]  [address]  [facility] [address]   [phone]            Jul 24, 2018  8:00 AM CDT   ESTABLISHED PRENATAL with Farrukh Jimenez MD   [facility] [place] ([facility] [place])    [address] [place] [address] [address]  [facility] [address]   [phone]            Jul 24, 2018 12:00 PM CDT   Return Visit with Karel Wren [facility] ([facility]  [address]  [facility] [address]   [phone]            Jul 25, 2018  4:30 PM CDT   Office Visit with CHEY Hernandez [facility] ([facility]    [address] [facility] [address]  [facility] [address] [phone]           Bring a current list of meds and any records pertaining to this visit. For Physicals, please bring immunization records and any forms needing to be filled out. Please arrive 10 minutes early to complete paperwork.            Aug 07, 2018  8:30 AM CDT   ESTABLISHED PRENATAL with Nia Casanova MD   [facility] [place] ([facility] [place])    [address] [facility]  [facility] [address]  [place] [address]   [phone]            Aug 14, 2018  4:20 PM CDT   ESTABLISHED PRENATAL with Nia Casanova MD   [facility] [place] ([facility] [place])    [address]  [place] [address]   [phone]            Aug 21, 2018  8:50 AM CDT   ESTABLISHED PRENATAL with Nia Casanova MD   [facility] [place] ([facility] [place])    [address]  [place] [address]   [phone]            Aug 28, 2018  4:20 PM CDT   ESTABLISHED PRENATAL with Nia Casanova MD   [facility] [place] ([facility])    [address]  [place] [address]   [phone]            Sep 04, 2018  8:30 AM CDT   ESTABLISHED PRENATAL with Nia Casanova MD   [facility] [place] ([facility] [place])    [address]  [place] [address]   [phone]              Who to contact     If you have questions or need follow up information about today's clinic visit or your schedule please contact [facility] [place] directly at [phone].  Normal or non-critical lab and imaging results will be communicated to you by Sabrixhart, letter or phone within 4 business days after the clinic has received the results. If you do not hear from us within 7 days, please contact the clinic through Sabrixhart or phone. If you have a critical or abnormal lab result, we will notify you by phone as soon as possible.  Submit refill requests through Lumiary or call your pharmacy and they will forward the refill request to us. Please allow 3 business days for your refill to be completed.          Additional Information About Your Visit        Lumiary Information     Lumiary gives you secure access to your electronic health record. If you see a primary care provider, you can also send messages to your care team and make  appointments. If you have questions, please call your primary care clinic.  If you do not have a primary care provider, please call [phone] and they will assist you.        Care EveryWhere ID     This is your Care EveryWhere ID. This could be used by other organizations to access your [facility] medical records  [id number]        Your Vitals Were     Last Period Breastfeeding? BMI (Body Mass Index)             11/28/2017 No 29.98 kg/m2          Blood Pressure from Last 3 Encounters:   07/10/18 122/68   06/26/18 102/66   06/12/18 98/68    Weight from Last 3 Encounters:   07/10/18 203 lb (92.1 kg)   06/26/18 196 lb (88.9 kg)   06/12/18 193 lb (87.5 kg)              Today, you had the following     No orders found for display       Primary Care Provider Fax #    Physician No Ref-Primary [phone]       No address on file        Equal Access to Services     LEONARDA PORTILLO : Hadii brandon Mustafa, waaxda luqadaha, qaybta kaalmada adejena, jyothi vargas . So [facility] [phone].    ATENCIÓN: Si habla español, tiene a dumont disposición servicios gratuitos de asistencia lingüística. Deweyame al [phone].    We comply with applicable federal civil rights laws and [place] laws. We do not discriminate on the basis of race, color, national origin, age, disability, sex, sexual orientation, or gender identity.            Thank you!     Thank you for choosing [facility] [place]  for your care. Our goal is always to provide you with excellent care. Hearing back from our patients is one way we can continue to improve our services. Please take a few minutes to complete the written survey that you may receive in the mail after your visit with us. Thank you!             Your Updated Medication List - Protect others around you: Learn how to safely use, store and throw away your medicines at www.disposemymeds.org.          This list is accurate as of 7/10/18  8:35 AM.  Always use your  most recent med list.                   Brand Name Dispense Instructions for use Diagnosis    citalopram 40 MG tablet    celeXA    90 tablet    1 tablet orally daily    Moderate episode of recurrent major depressive disorder (H)       prenatal multivitamin plus iron 27-0.8 MG Tabs per tablet      Take 2 tablets by mouth daily        TYLENOL PO

## 2018-07-10 NOTE — PROGRESS NOTES
Doing well today.  A bit crampy today  +FM  No vb/spotting/lof  Seeing therapist q2wks --stable and feeling a bit better; still on celexa 40mg daily  Had hospital tour  RTC 2wks

## 2018-07-13 ENCOUNTER — OFFICE VISIT (OUTPATIENT)
Dept: PSYCHOLOGY | Facility: CLINIC | Age: 28
End: 2018-07-13
Payer: COMMERCIAL

## 2018-07-13 DIAGNOSIS — F41.1 GAD (GENERALIZED ANXIETY DISORDER): ICD-10-CM

## 2018-07-13 DIAGNOSIS — F32.1 MAJOR DEPRESSIVE DISORDER, SINGLE EPISODE, MODERATE (H): Primary | ICD-10-CM

## 2018-07-13 PROCEDURE — 90834 PSYTX W PT 45 MINUTES: CPT | Performed by: COUNSELOR

## 2018-07-13 NOTE — MR AVS SNAPSHOT
MRN:[id number]                      After Visit Summary   7/13/2018    Dayana AGUILA    MRN: [id number]           Visit Information        Provider Department      7/13/2018 3:30 PM Karel Wren [facility]      Your next 10 appointments already scheduled     Jul 24, 2018  8:00 AM CDT   ESTABLISHED PRENATAL with Farrukh Jimenez MD   [facility] [place] ([facility] [place])    [address]  [place] [address]   [phone]            Jul 24, 2018 12:00 PM CDT   Return Visit with Karel Wren [facility] ([facility]  [address]  [facility] [address]   [phone]            Jul 25, 2018  4:30 PM CDT   Office Visit with CHEY Hernandez [facility]    [address] [facility] [address]  [facility] [address]   [phone]           Bring a current list of meds and any records pertaining to this visit. For Physicals, please bring immunization records and any forms needing to be filled out. Please arrive 10 minutes early to complete paperwork.            Aug 07, 2018  8:30 AM CDT   ESTABLISHED PRENATAL with Nia Casanova MD   [facility] [place] ([facility] [place])    [address]  [place] [address]   [phone]            Aug 09, 2018  2:30 PM CDT   Return Visit with Karel Wren [facility] ([facility]  [address]  [facility] [address]   [phone]            Aug 14, 2018  4:20 PM CDT   ESTABLISHED PRENATAL with Nia Casanova MD   [facility] [place] ([facility] [place])    [address]  [place] [address]   [phone]            Aug 21, 2018  8:50 AM CDT   ESTABLISHED  PRENATAL with Nia Casanova MD   [facility] [place] ([facility] [place])    [address]  [place] [address]   [phone]            Aug 22, 2018  4:30 PM CDT   Return Visit with Karel Wren [facility] ([facility]  [address]  [facility] [address]   [phone]            Aug 28, 2018  4:20 PM CDT   ESTABLISHED PRENATAL with Nia Casanova MD   [facility] [place] ([facility] [place])    [address]  [place] [address]   [phone]            Sep 04, 2018  8:30 AM CDT   ESTABLISHED PRENATAL with Nia Casanova MD   [facility] [place] ([facility] [place])    [address]  [place] [address]   [phone]              MyChart Information     [employer] gives you secure access to your electronic health record. If you see a primary care provider, you can also send messages to your care team and make appointments. If you have questions, please call your primary care clinic.  If you do not have a primary care provider, please call [phone] and they will assist you.        Care EveryWhere ID     This is your Care EveryWhere ID. This could be used by other organizations to access your [facility] medical records  [id number]        Equal Access to Services     LEONARDA PORTILLO AH: Hadii brandon wesley Sochandan, waaxda luqadaha, qaybta kaalmada salas, jyothi spring. So [facility] [phone].    ATENCIÓN: Si habla español, tiene a dumont disposición servicios gratuitos de asistencia lingüística. Llame al [phone].    We comply with applicable federal civil rights laws and [place] laws. We do not discriminate on the basis of race, color, national origin, age, disability, sex, sexual orientation, or gender identity.

## 2018-07-24 ENCOUNTER — OFFICE VISIT (OUTPATIENT)
Dept: PSYCHOLOGY | Facility: CLINIC | Age: 28
End: 2018-07-24
Payer: COMMERCIAL

## 2018-07-24 ENCOUNTER — PRENATAL OFFICE VISIT (OUTPATIENT)
Dept: OBGYN | Facility: CLINIC | Age: 28
End: 2018-07-24
Payer: COMMERCIAL

## 2018-07-24 VITALS — DIASTOLIC BLOOD PRESSURE: 62 MMHG | WEIGHT: 206.8 LBS | SYSTOLIC BLOOD PRESSURE: 90 MMHG | BODY MASS INDEX: 30.54 KG/M2

## 2018-07-24 DIAGNOSIS — F41.1 GAD (GENERALIZED ANXIETY DISORDER): Primary | ICD-10-CM

## 2018-07-24 DIAGNOSIS — O99.340 DEPRESSION AFFECTING PREGNANCY, ANTEPARTUM: ICD-10-CM

## 2018-07-24 DIAGNOSIS — O09.93 SUPERVISION OF HIGH RISK PREGNANCY IN THIRD TRIMESTER: ICD-10-CM

## 2018-07-24 DIAGNOSIS — F41.1 GAD (GENERALIZED ANXIETY DISORDER): ICD-10-CM

## 2018-07-24 DIAGNOSIS — F32.1 MAJOR DEPRESSIVE DISORDER, SINGLE EPISODE, MODERATE (H): Primary | ICD-10-CM

## 2018-07-24 DIAGNOSIS — F32.A DEPRESSION AFFECTING PREGNANCY, ANTEPARTUM: ICD-10-CM

## 2018-07-24 PROCEDURE — 90834 PSYTX W PT 45 MINUTES: CPT | Performed by: COUNSELOR

## 2018-07-24 PROCEDURE — 99207 ZZC PRENATAL VISIT: CPT | Performed by: OBSTETRICS & GYNECOLOGY

## 2018-07-24 ASSESSMENT — ANXIETY QUESTIONNAIRES
IF YOU CHECKED OFF ANY PROBLEMS ON THIS QUESTIONNAIRE, HOW DIFFICULT HAVE THESE PROBLEMS MADE IT FOR YOU TO DO YOUR WORK, TAKE CARE OF THINGS AT HOME, OR GET ALONG WITH OTHER PEOPLE: SOMEWHAT DIFFICULT
2. NOT BEING ABLE TO STOP OR CONTROL WORRYING: SEVERAL DAYS
7. FEELING AFRAID AS IF SOMETHING AWFUL MIGHT HAPPEN: MORE THAN HALF THE DAYS
6. BECOMING EASILY ANNOYED OR IRRITABLE: NEARLY EVERY DAY
GAD7 TOTAL SCORE: 15
5. BEING SO RESTLESS THAT IT IS HARD TO SIT STILL: NEARLY EVERY DAY
3. WORRYING TOO MUCH ABOUT DIFFERENT THINGS: MORE THAN HALF THE DAYS
1. FEELING NERVOUS, ANXIOUS, OR ON EDGE: MORE THAN HALF THE DAYS

## 2018-07-24 ASSESSMENT — PATIENT HEALTH QUESTIONNAIRE - PHQ9: 5. POOR APPETITE OR OVEREATING: MORE THAN HALF THE DAYS

## 2018-07-24 NOTE — PROGRESS NOTES
"                                           Progress Note    Client Name: Dayana Moss  Date: 7/24/2018         Service Type: Individual      Session Start Time: 12:05p  Session End Time: 12:45p      Session Length: 40 minutes     Session #: 11     Attendees: Client attended with      Treatment Plan Last Reviewed: 7/24/2018  PHQ-9 / JACINTO-7 : 13 & 15       DATA      Progress Since Last Session (Related to Symptoms / Goals / Homework):   Symptoms: Stable, see Epic for PHQ 9 & JACINTO 7 updates     Homework: Not completed and continued - client and  will also work to take a break when emotions are high and reconvene to process (no issues in the past 2 weeks). Client will also work to emotion regulation (\"How come I feel this way? Will this reaction help me in the future?\").      Episode of Care Goals: Satisfactory progress - ACTION (Actively working towards change); Intervened by reinforcing change plan / affirming steps taken     Current / Ongoing Stressors and Concerns:   Reported stable mood and anxiety despite moderate scores on PHQ 9 & JACINTO 7; presented as more articulate, communicative, relaxed, but client report feeling unsure why - with prompts, was able to identify a few reasons for shift in presentation (looking forward to baby shower this week, not talking with sister, connecting with , checked out at work, feeling excited about pregnancy and arrival of baby...); reported some lingering questions and concerns about lack of insight - feeling like mind is blank and that she was more quick to make connections when she was on adderall although she is unsure what her based line is because she also feels more empathic and funny without adderall; reported having curious thoughts (I wonder when my water will break, where will I be at...), but not feeling anxious.                 Treatment Objective(s) Addressed in This Session:    Client will practice communicating/identifying her needs 1x " week. Client will learn 3 anxiety management skills to focus on the here and now. Client will increase motivation, interest, engagement, and pleasure in doing things.     Intervention:   Motivational Interviewing: open-ended questions, affirmations, reflections and emphasizing personal control and choices, challenge sustain talk, evoke change talk  DBT: reinforce interpersonal effectiveness/communication skills, teach and practice emotion regulation skills (TIPP), reinforce mindfulness - one mindfully, teach boundary setting, teach and practice mindfulness/relaxation skills  CBT: identify and replace anxious thinking, reinforce proactive problem solving skills, reinforce internal locus of control and proactive help seeking behaviors, teach identifying wants and needs, teach effective communication skills to meet emotional needs        ASSESSMENT: Current Emotional / Mental Status (status of significant symptoms):   Risk status (Self / Other harm or suicidal ideation)   Client denies current fears or concerns for personal safety.  Client denies current or recent suicidal ideation or behaviors. Within last month, passive thoughts of SI: don't want to be here, too much stuff going on. Triggers: when she does not get things done, struggle at work, lack of motivation. Denied plans. No hx of hospitalization.   Client denies current or recent homicidal ideation or behaviors.  Client denies current or recent self injurious behavior or ideation. Hx of cutting in high school.   Client denies other safety concerns.  A safety and risk management plan has not been developed at this time, however client was given the after-hours number / 911 should there be a change in any of these risk factors.  Client reports there are no firearms in the house.     Appearance:   Appropriate    Eye Contact:   Fair   Psychomotor Behavior: Normal    Attitude:   Cooperative    Orientation:   All   Speech    Rate / Production: Normal  "    Volume:  Normal    Mood:    Less anxious \"Tired\"   Affect:    Appropriate  Mood congruent Bright   Thought Content:  Clear  Rumination    Thought Form:  Coherent  Logical  Circumstantial   Insight:    Fair     Medication Review:   See Epic for changes     Medication Compliance:   Yes     Changes in Health Issues:   None reported     Chemical Use Review:   Substance Use: Chemical use reviewed, no active concerns identified      Tobacco Use: No current tobacco use     Collateral Reports Completed:   Not Applicable      PLAN: (Client Tasks / Therapist Tasks / Other)  Therapist will assign homework of social leisure planning; teach emotional regulation skills and challenging self-defeating thoughts. Reinforce mindfulness/grounding exercises and internal locus of control. Continue to reinforce help seeking and identification of wants and needs. Continue to reinforce anxiety management, communication skills, and interpersonal effectiveness. Continue to reinforce insight development.         Karel Wren [facility]                                                         ________________________________________________________________________    Treatment Plan    Client's Name: Dayana Moss  YOB: 1990    Date: 1/31/2018    Diagnoses: 296.22 (F32.1)  Major Depressive Disorder, Single Episode, Moderate & 300.02 (F41.1) Generalized Anxiety Disorder; RULE OUT Major Depressive Disorder, Recurrent, Moderate & Adjustment Disorder  Psychosocial & Contextual Factors: Miscarriage Sept 2017, currently 6 weeks pregnant, some strained in marriage and family relationships  WHODAS: 30    Referral / Collaboration:  Referral to another professional/service is not indicated at this time.    Anticipated number of session or this episode of care: 12      MeasurableTreatment Goal(s) related to diagnosis / functional impairment(s)  Goal 1: Client will improve mood as evidenced by decreased score on PHQ 9 from 18 (moderately " severe) to 10 or less (moderate).    I will know I've met my goal when I don't see so sad and tired.      Objective #A (Client Action)    Client will increase motivation, interest, engagement, and pleasure in doing things.  Status: New - Date: 1/31/2018     Intervention(s)  Therapist will assign homework of social leisure planning; teach emotional regulation skills and challenging self-defeating thoughts.    Objective #B  Client will increase understanding of steps in the grief process.  Status: New - Date: 1/31/2018     Intervention(s)  Therapist will provide educational materials on grief and loss; role-play conflict management; teach distraction skills.    Objective #C  Client will practice communicating/identifying her needs 1x week.  Status: New - Date: 1/31/2018     Intervention(s)  Therapist will assign homework of communication practice; role-play effective communication skills; teach emotional recognition/identification.    Objective #D (Client Action)                                    Monitor risk factors associated with hx of SI at every session.   Status: New - Date: 4/16/2018      Intervention(s)  Therapist will assign homework of effective help seeking behaviors; role-play communication skills to secure support; teach about identifying warning signs for risks.      Goal 2: Client will better manage anxiety as evidenced by decreased score on JACINTO 7 from 9 (moderate) to 5 or less (mild).    I will know I've met my goal when I can focus my thoughts and not worrying too much.      Objective #A (Client Action)    Client will learn 3 anxiety management skills to focus on the here and now.  Status: New - Date: 1/31/2018     Intervention(s)  Therapist will assign homework of skill practice; provide educational materials on anxiety management; teach the client how to perform a behavioral chain analysis.      Client has reviewed and agreed to the above plan.      HIMANSHU Staples  January 31, 2018

## 2018-07-24 NOTE — MR AVS SNAPSHOT
MRN:[id number]                      After Visit Summary   7/24/2018    Dayana AGUILA    MRN: [id number]           Visit Information        Provider Department      7/24/2018 12:00 PM [facility] Generic      Your next 10 appointments already scheduled     Jul 25, 2018  4:30 PM CDT   Office Visit with CHEY Hernandez [facility] ([facility])    [address] [facility] [address]  [facility] [address] [phone]           Bring a current list of meds and any records pertaining to this visit. For Physicals, please bring immunization records and any forms needing to be filled out. Please arrive 10 minutes early to complete paperwork.            Aug 07, 2018  8:30 AM CDT   ESTABLISHED PRENATAL with Nia Casanova MD   [facility] [place] ([facility] [place])    [address] [place] [address] [address]  [place] [address]   [phone]            Aug 09, 2018  2:30 PM CDT   Return Visit with Karel Wren [facility] ([facility]  [address]  [facility] [address]   [phone]            Aug 14, 2018  4:20 PM CDT   ESTABLISHED PRENATAL with Nia Casanova MD   [facility] [place] ([facility] [place])    [address] [place] [address] [address]  [place] [address]   [phone]            Aug 21, 2018  8:50 AM CDT   ESTABLISHED PRENATAL with Nia Casanova MD   [facility] [place] ([facility] [place])    [address] [place] [address] [address]  [place] [address]   [phone]            Aug 22, 2018  4:30 PM CDT   Return Visit with Karel Wren [facility]  [address]  [facility] [address]   [phone]            Aug 28, 2018  4:20 PM CDT    ESTABLISHED PRENATAL with Nia Casanova MD   [facility] [place] ([facility] [place])    [address]  [place] [address]   [phone]            Sep 04, 2018  8:30 AM CDT   ESTABLISHED PRENATAL with Nia Casanova MD   [facility] [place] ([facility] [place])    [address]  [place] [address]   [phone]              MyChart Information     [employer] gives you secure access to your electronic health record. If you see a primary care provider, you can also send messages to your care team and make appointments. If you have questions, please call your primary care clinic.  If you do not have a primary care provider, please call [phone] and they will assist you.        Care EveryWhere ID     This is your Care EveryWhere ID. This could be used by other organizations to access your [place] medical records  [id number]        Equal Access to Services     LEONARDA PORTILLO AH: Hadii brandon Mustafa, waayden theodore, qapako kaalmanick [place], jyothi spring. So [facility] [phone].    ATENCIÓN: Si habla español, tiene a dumont disposición servicios gratuitos de asistencia lingüística. Llame al [phone].    We comply with applicable federal civil rights laws and [place] laws. We do not discriminate on the basis of race, color, national origin, age, disability, sex, sexual orientation, or gender identity.

## 2018-07-24 NOTE — MR AVS SNAPSHOT
After Visit Summary   7/24/2018    Dayana AGUILA    MRN: [id number]           Patient Information     Date Of Birth          1990        Visit Information        Provider Department      7/24/2018 8:00 AM Farrukh Jimenez MD [facility] [place]        Today's Diagnoses     JACINTO (generalized anxiety disorder)    -  1    Supervision of high risk pregnancy in third trimester        Depression affecting pregnancy, antepartum           Follow-ups after your visit        Your next 10 appointments already scheduled     Jul 24, 2018  8:00 AM CDT   ESTABLISHED PRENATAL with Farrukh Jimenez MD   [facility] [place] ([facility] [place])    [address]  [place] [address]   [phone]            Jul 24, 2018 12:00 PM CDT   Return Visit with Krael Wren [facility]  [address]  [facility] [address]   [phone]            Jul 25, 2018  4:30 PM CDT   Office Visit with CHEY Hernandez [facility] ([facility]    [address]  [facility] [address]   [phone]           Bring a current list of meds and any records pertaining to this visit. For Physicals, please bring immunization records and any forms needing to be filled out. Please arrive 10 minutes early to complete paperwork.            Aug 07, 2018  8:30 AM CDT   ESTABLISHED PRENATAL with Nia Casanova MD   [facility] [place] ([facility] [place])    [address]  [place] [address]   [phone]            Aug 09, 2018  2:30 PM CDT   Return Visit with Karel Wren [facility]  [address]  [facility] [address]   [phone]            Aug 14, 2018  4:20 PM CDT    ESTABLISHED PRENATAL with Nia Casanova MD   [facility] [place] ([facility] [place])    [address]  [place] [address]   [phone]            Aug 21, 2018  8:50 AM CDT   ESTABLISHED PRENATAL with Nia Casanova MD   [facility] [place] ([facility] [place])    [address]  [place] [address]   [phone]            Aug 22, 2018  4:30 PM CDT   Return Visit with Karel Wren [facility] ([facility]  [address]  [facility] [address]   [phone]            Aug 28, 2018  4:20 PM CDT   ESTABLISHED PRENATAL with Nia Casanova MD   [facility] [place] ([facility] [place])    [address]  [place] [address]   [phone]            Sep 04, 2018  8:30 AM CDT   ESTABLISHED PRENATAL with Nia Casanova MD   [facility] [place] ([facility] [place])    [address]  [place] [address]   [phone]              Who to contact     If you have questions or need follow up information about today's clinic visit or your schedule please contact [facility] directly at [phone].  Normal or non-critical lab and imaging results will be communicated to you by [employer], letter or phone within 4 business days after the clinic has received the results. If you do not hear from us within 7 days, please contact the clinic through [employer] or phone. If you have a critical or abnormal lab result, we will notify you by phone as soon as possible.  Submit refill requests through [employer] or call your pharmacy and they will forward the refill request to us. Please allow 3 business days for your refill to be completed.          Additional Information About Your Visit        [employer] Information     [employer] gives you secure access  to your electronic health record. If you see a primary care provider, you can also send messages to your care team and make appointments. If you have questions, please call your primary care clinic.  If you do not have a primary care provider, please call [phone] and they will assist you.        Care EveryWhere ID     This is your Care EveryWhere ID. This could be used by other organizations to access your [place] medical records  [id number]        Your Vitals Were     Last Period BMI (Body Mass Index)                11/28/2017 30.54 kg/m2           Blood Pressure from Last 3 Encounters:   07/24/18 90/62   07/10/18 122/68   06/26/18 102/66    Weight from Last 3 Encounters:   07/24/18 206 lb 12.8 oz (93.8 kg)   07/10/18 203 lb (92.1 kg)   06/26/18 196 lb (88.9 kg)              Today, you had the following     No orders found for display       Primary Care Provider Office Phone # Fax #    Cheri CHEY Rodriguez [facility] [phone]       [address] [facility] [address]  [facility] [address]        Equal Access to Services     [place] [place] campusBIA : Hadii aad ku hadasho Sojbali, waaxda luqadaha, qaybta kaalmada adelambertoyada, jyothi vargas . So [facility] [phone].    ATENCIÓN: Si habla español, tiene a dumont disposición servicios gratuitos de asistencia lingüística. [facility] [phone].    We comply with applicable federal civil rights laws and [place] laws. We do not discriminate on the basis of race, color, national origin, age, disability, sex, sexual orientation, or gender identity.            Thank you!     Thank you for choosing [facility]  for your care. Our goal is always to provide you with excellent care. Hearing back from our patients is one way we can continue to improve our services. Please take a few minutes to complete the written survey that you may receive in the mail after your visit with us. Thank you!             Your Updated Medication List - Protect  others around you: Learn how to safely use, store and throw away your medicines at www.disposemymeds.org.          This list is accurate as of 7/24/18  7:55 AM.  Always use your most recent med list.                   Brand Name Dispense Instructions for use Diagnosis    citalopram 40 MG tablet    celeXA    90 tablet    1 tablet orally daily    Moderate episode of recurrent major depressive disorder (H)       prenatal multivitamin plus iron 27-0.8 MG Tabs per tablet      Take 2 tablets by mouth daily        TYLENOL PO

## 2018-07-25 ENCOUNTER — OFFICE VISIT (OUTPATIENT)
Dept: FAMILY MEDICINE | Facility: CLINIC | Age: 28
End: 2018-07-25
Payer: COMMERCIAL

## 2018-07-25 VITALS
TEMPERATURE: 98.5 F | BODY MASS INDEX: 30.89 KG/M2 | WEIGHT: 209.2 LBS | HEART RATE: 89 BPM | SYSTOLIC BLOOD PRESSURE: 128 MMHG | DIASTOLIC BLOOD PRESSURE: 72 MMHG | OXYGEN SATURATION: 98 %

## 2018-07-25 DIAGNOSIS — F32.1 MAJOR DEPRESSIVE DISORDER, SINGLE EPISODE, MODERATE (H): ICD-10-CM

## 2018-07-25 DIAGNOSIS — F33.1 MODERATE EPISODE OF RECURRENT MAJOR DEPRESSIVE DISORDER (H): ICD-10-CM

## 2018-07-25 DIAGNOSIS — F90.2 ATTENTION DEFICIT HYPERACTIVITY DISORDER (ADHD), COMBINED TYPE: Primary | ICD-10-CM

## 2018-07-25 DIAGNOSIS — F41.1 GAD (GENERALIZED ANXIETY DISORDER): ICD-10-CM

## 2018-07-25 PROCEDURE — 99214 OFFICE O/P EST MOD 30 MIN: CPT | Performed by: NURSE PRACTITIONER

## 2018-07-25 ASSESSMENT — PATIENT HEALTH QUESTIONNAIRE - PHQ9: SUM OF ALL RESPONSES TO PHQ QUESTIONS 1-9: 13

## 2018-07-25 ASSESSMENT — ANXIETY QUESTIONNAIRES: GAD7 TOTAL SCORE: 15

## 2018-07-25 NOTE — PROGRESS NOTES
SUBJECTIVE:   Dayana AGUILA is a 28 year old female who presents to clinic today for the following health issues:    Depression and Anxiety Follow-Up    Status since last visit: varies but stable lately     Other associated symptoms: a lot of symptoms but seeing a therapist    Complicating factors:     Significant life event: No     Current substance abuse: None    PHQ-9 6/7/2018 6/26/2018 7/24/2018   Total Score 11 17 13   Q9: Suicide Ideation Not at all Several days Not at all     JACINTO-7 SCORE 6/7/2018 6/26/2018 7/24/2018   Total Score 13 15 15       PHQ-9  English  PHQ-9   Any Language  JACINTO-7  Suicide Assessment Five-step Evaluation and Treatment (SAFE-T)    Amount of exercise or physical activity: 2-3 days/week for an average of 30-45 minutes    Problems taking medications regularly: Yes,  problems remembering to take    Medication side effects: none    Diet: regular (no restrictions)      Feels like mood currently stable. She has about 6-8 more weeks of pregnancy and is feeling very heavy, slow and tired. Has had some successful therapy sessions involving  which she thinks has been helpful and has reduced some anxiety; feels curious about delivery and new motherhood, but not overly anxious. She has felt slightly foggy, and feels like her memory is not great, but she attributes this to not taking her ADHD medications.    -------------------------------------    Problem list and histories reviewed & adjusted, as indicated.  Additional history: as documented    Patient Active Problem List   Diagnosis     Ovarian mass     ADHD     JACINTO (generalized anxiety disorder)     Moderate episode of recurrent major depressive disorder (H)     Major depressive disorder, single episode, moderate (H)     Supervision of high-risk pregnancy     Pregnancy     Past Surgical History:   Procedure Laterality Date     NO HISTORY OF SURGERY         Social History   Substance Use Topics     Smoking status: Former  Smoker     Packs/day: 0.50     Types: Cigarettes     Start date: 5/7/2003     Quit date: 10/2017     Smokeless tobacco: Former User     Quit date: 10/2017      Comment: Used a vapor for the month of October.     Alcohol use No      Comment: None since found out is pregnant.     History reviewed. No pertinent family history.        Reviewed and updated as needed this visit by clinical staff  Tobacco  Allergies  Meds  Med Hx  Surg Hx  Fam Hx  Soc Hx      Reviewed and updated as needed this visit by Provider         ROS:  Constitutional, HEENT, cardiovascular, pulmonary, gi and gu systems are negative, except as otherwise noted.    OBJECTIVE:     /72  Pulse 89  Temp 98.5  F (36.9  C) (Oral)  Wt 209 lb 3.2 oz (94.9 kg)  LMP 11/28/2017  SpO2 98%  BMI 30.89 kg/m2  Body mass index is 30.89 kg/(m^2).   GENERAL: healthy, alert and no distress  NECK: no adenopathy, no asymmetry, masses, or scars and thyroid normal to palpation  RESP: lungs clear to auscultation - no rales, rhonchi or wheezes  CV: regular rate and rhythm, normal S1 S2, no S3 or S4, no murmur, click or rub, no peripheral edema and peripheral pulses strong  MS: no gross musculoskeletal defects noted, no edema  NEURO: Normal strength and tone, mentation intact and speech normal  PSYCH: mentation appears normal, affect normal/bright    Diagnostic Test Results:  No results found for this or any previous visit (from the past 24 hour(s)).    ASSESSMENT/PLAN:     Problem List Items Addressed This Visit     None           ICD-10-CM    1. Attention deficit hyperactivity disorder (ADHD), combined type F90.2    2. JACINTO (generalized anxiety disorder) F41.1    3. Major depressive disorder, single episode, moderate (H) F32.1    4. Moderate episode of recurrent major depressive disorder (H) F33.1      No change to current medications- encouraged her to get lots of rest in the next several weeks, and to follow -up post-partum. She has identified that her  mother will be coming for the first several days, and then she will get additional support from her sisters.        CHEY Hernandez [facility]  Please note greater than 50% of this 30 minute appointment were spent in face to face counseling with the patient of the issues described above in the history of present illness and in the plan, including evaluating medications

## 2018-07-25 NOTE — MR AVS SNAPSHOT
After Visit Summary   7/25/2018    Daynaa AGUILA    MRN: [id number]           Patient Information     Date Of Birth          1990        Visit Information        Provider Department      7/25/2018 4:30 PM Cheri Georges APRN [facility]        Today's Diagnoses     Attention deficit hyperactivity disorder (ADHD), combined type    -  1    JACINTO (generalized anxiety disorder)        Major depressive disorder, single episode, moderate (H)        Moderate episode of recurrent major depressive disorder (H)           Follow-ups after your visit        Follow-up notes from your care team     Return in about 8 weeks (around 9/19/2018).      Your next 10 appointments already scheduled     Aug 07, 2018  8:30 AM CDT   ESTABLISHED PRENATAL with Nia Casanova MD   [facility] [place] ([facility] [place])    [address] [place] [address] [address]  [place] [address]   [phone]            Aug 09, 2018  2:30 PM CDT   Return Visit with Karel Wren [place] [address] [place], [place] [address]  [facility] [address]   [phone]            Aug 14, 2018  4:20 PM CDT   ESTABLISHED PRENATAL with Nia Casanova MD   [facility] ([facility] [place])    [address] [place] [address] [address]  [place] [address]   [phone]            Aug 21, 2018  8:50 AM CDT   ESTABLISHED PRENATAL with Nia Casanova MD   [facility] [place] ([facility])    [address] [place] [address] [address]  [place] [address]   [phone]            Aug 22, 2018  4:30 PM CDT   Return Visit with Karel Wren [facility]  [address]  [facility] [address]   [phone]            Aug 28, 2018  4:20 PM CDT   ESTABLISHED PRENATAL with Nia Burns  MD Edilberto   [facility] ([facility] [place])    [address] [facility] [address]  [place] [address] [phone]            Sep 04, 2018  8:30 AM CDT   ESTABLISHED PRENATAL with Nia Casanova MD   [facility] [place] ([facility] [place])    [address] [facility] [address]  [place] [address]   [phone]              Who to contact     If you have questions or need follow up information about today's clinic visit or your schedule please contact [facility] directly at [phone].  Normal or non-critical lab and imaging results will be communicated to you by MyChart, letter or phone within 4 business days after the clinic has received the results. If you do not hear from us within 7 days, please contact the clinic through [employer] or phone. If you have a critical or abnormal lab result, we will notify you by phone as soon as possible.  Submit refill requests through [employer] or call your pharmacy and they will forward the refill request to us. Please allow 3 business days for your refill to be completed.          Additional Information About Your Visit        Embrane Information     [employer] gives you secure access to your electronic health record. If you see a primary care provider, you can also send messages to your care team and make appointments. If you have questions, please call your primary care clinic.  If you do not have a primary care provider, please call [phone] and they will assist you.        Care EveryWhere ID     This is your Care EveryWhere ID. This could be used by other organizations to access your [facility] medical records  [id number]        Your Vitals Were     Pulse Temperature Last Period Pulse Oximetry BMI (Body Mass Index)       89 98.5  F (36.9  C) (Oral) 11/28/2017 98% 30.89 kg/m2        Blood Pressure from Last 3 Encounters:   07/25/18 128/72   07/24/18 90/62   07/10/18 122/68    Weight from  Last 3 Encounters:   07/25/18 209 lb 3.2 oz (94.9 kg)   07/24/18 206 lb 12.8 oz (93.8 kg)   07/10/18 203 lb (92.1 kg)              Today, you had the following     No orders found for display       Primary Care Provider Office Phone # Fax #    CHEY Hernandez [phone]       [address] [facility] [address]  [facility] [address]        Equal Access to Services     LEONARDA PORTILLO : Hadii aad ku hadasho Soomaali, waaxda luqadaha, qaybta kaalmada adeegyada, waxay idiin hayaan adeeg kharash laisabella . So [facility] [phone].    ATENCIÓN: Si habla español, tiene a dumont disposición servicios gratuitos de asistencia lingüística. [facility] [phone].    We comply with applicable federal civil rights laws and [place] laws. We do not discriminate on the basis of race, color, national origin, age, disability, sex, sexual orientation, or gender identity.            Thank you!     Thank you for choosing [facility]  for your care. Our goal is always to provide you with excellent care. Hearing back from our patients is one way we can continue to improve our services. Please take a few minutes to complete the written survey that you may receive in the mail after your visit with us. Thank you!             Your Updated Medication List - Protect others around you: Learn how to safely use, store and throw away your medicines at www.disposemymeds.org.          This list is accurate as of 7/25/18 11:59 PM.  Always use your most recent med list.                   Brand Name Dispense Instructions for use Diagnosis    citalopram 40 MG tablet    celeXA    90 tablet    1 tablet orally daily    Moderate episode of recurrent major depressive disorder (H)       prenatal multivitamin plus iron 27-0.8 MG Tabs per tablet      Take 2 tablets by mouth daily        TYLENOL PO

## 2018-08-07 ENCOUNTER — PRENATAL OFFICE VISIT (OUTPATIENT)
Dept: OBGYN | Facility: CLINIC | Age: 28
End: 2018-08-07
Payer: COMMERCIAL

## 2018-08-07 VITALS — BODY MASS INDEX: 31.45 KG/M2 | DIASTOLIC BLOOD PRESSURE: 68 MMHG | WEIGHT: 213 LBS | SYSTOLIC BLOOD PRESSURE: 100 MMHG

## 2018-08-07 DIAGNOSIS — Z36.85 SCREENING, ANTENATAL, FOR STREPTOCOCCUS B: ICD-10-CM

## 2018-08-07 DIAGNOSIS — Z3A.36 36 WEEKS GESTATION OF PREGNANCY: ICD-10-CM

## 2018-08-07 DIAGNOSIS — O09.93 SUPERVISION OF HIGH RISK PREGNANCY IN THIRD TRIMESTER: Primary | ICD-10-CM

## 2018-08-07 PROCEDURE — 87653 STREP B DNA AMP PROBE: CPT | Performed by: OBSTETRICS & GYNECOLOGY

## 2018-08-07 PROCEDURE — 99207 ZZC PRENATAL VISIT: CPT | Performed by: OBSTETRICS & GYNECOLOGY

## 2018-08-07 NOTE — MR AVS SNAPSHOT
After Visit Summary   2018    Dayana Danielle    MRN: [id number]           Patient Information     Date Of Birth          1990        Visit Information        Provider Department      2018 8:30 AM Nia Casanova MD [facility] [place]        Today's Diagnoses     Supervision of high risk pregnancy in third trimester    -  1    Screening, , for Streptococcus B        36 weeks gestation of pregnancy           Follow-ups after your visit        Follow-up notes from your care team     Return in about 1 week (around 2018) for Routine Prenatal Visit.      Your next 10 appointments already scheduled     Aug 09, 2018  2:30 PM CDT   Return Visit with Karel Wren [facility]  [address] [place], [place] [address]  [facility] [address]   [phone]            Aug 14, 2018  4:20 PM CDT   ESTABLISHED PRENATAL with Nia Casanova MD   [facility] [place] ([facility] [place])    [address] [facility] [address]  [place] [address]   [phone]            Aug 21, 2018  8:50 AM CDT   ESTABLISHED PRENATAL with Nia Casanova MD   [facility] [place] ([facility] [place])    [address] [facility] [address]  [place] [address]   [phone]            Aug 22, 2018  4:30 PM CDT   Return Visit with Karel Wren [facility]  [address]  [facility] [address]   [phone]            Aug 28, 2018  4:20 PM CDT   ESTABLISHED PRENATAL with Nia Casanova MD   [facility] [place] ([facility] [place])    [address] [facility] [address]  [place] [address]   [phone]            Sep 04, 2018  8:30 AM CDT   ESTABLISHED PRENATAL [facility] [place] [facility]  [place])    [address] [facility] [address]  [place] [address] [phone]              Who to contact     If you have questions or need follow up information about today's clinic visit or your schedule please contact [facility] [place] directly at [phone].  Normal or non-critical lab and imaging results will be communicated to you by MyChart, letter or phone within 4 business days after the clinic has received the results. If you do not hear from us within 7 days, please contact the clinic through MyChart or phone. If you have a critical or abnormal lab result, we will notify you by phone as soon as possible.  Submit refill requests through [employer] or call your pharmacy and they will forward the refill request to us. Please allow 3 business days for your refill to be completed.          Additional Information About Your Visit        MyChart Information     [employer] gives you secure access to your electronic health record. If you see a primary care provider, you can also send messages to your care team and make appointments. If you have questions, please call your primary care clinic.  If you do not have a primary care provider, please call [phone] and they will assist you.        Care EveryWhere ID     This is your Care EveryWhere ID. This could be used by other organizations to access your [facility] medical records  [id number]        Your Vitals Were     Last Period BMI (Body Mass Index)                11/28/2017 31.45 kg/m2           Blood Pressure from Last 3 Encounters:   08/07/18 100/68   07/25/18 128/72   07/24/18 90/62    Weight from Last 3 Encounters:   08/07/18 213 lb (96.6 kg)   07/25/18 209 lb 3.2 oz (94.9 kg)   07/24/18 206 lb 12.8 oz (93.8 kg)              We Performed the Following     Group B strep PCR        Primary Care Provider Office Phone # Fax #    CHEY Hernandez [phone]       [address] [facility] [address]  [facility] [address]        Equal  Access to Services     [facility]: Hadii aad ku hadnancyrustam Mont[place]atchandan, waverada luqvaldoha, qapako micahdeannejyothi neely. So [facility] [phone].    ATENCIÓN: Si habla español, tiene a dumont disposición servicios gratuitos de asistencia lingüística. Llame al [phone].    We comply with applicable federal civil rights laws and [place] laws. We do not discriminate on the basis of race, color, national origin, age, disability, sex, sexual orientation, or gender identity.            Thank you!     Thank you for choosing [facility]  for your care. Our goal is always to provide you with excellent care. Hearing back from our patients is one way we can continue to improve our services. Please take a few minutes to complete the written survey that you may receive in the mail after your visit with us. Thank you!             Your Updated Medication List - Protect others around you: Learn how to safely use, store and throw away your medicines at www.disposemymeds.org.          This list is accurate as of 8/7/18  8:46 AM.  Always use your most recent med list.                   Brand Name Dispense Instructions for use Diagnosis    citalopram 40 MG tablet    celeXA    90 tablet    1 tablet orally daily    Moderate episode of recurrent major depressive disorder (H)       prenatal multivitamin plus iron 27-0.8 MG Tabs per tablet      Take 2 tablets by mouth daily        TYLENOL PO

## 2018-08-07 NOTE — PROGRESS NOTES
Doing well today.  A bit more anxious   +FM  Occ BH contractions; no vb/spotting/lof  Labor precautions reviewed  GBS collected  RTC 1wk

## 2018-08-08 LAB
GP B STREP DNA SPEC QL NAA+PROBE: NEGATIVE
SPECIMEN SOURCE: NORMAL

## 2018-08-09 ENCOUNTER — OFFICE VISIT (OUTPATIENT)
Dept: PSYCHOLOGY | Facility: CLINIC | Age: 28
End: 2018-08-09
Payer: COMMERCIAL

## 2018-08-09 DIAGNOSIS — F32.1 MAJOR DEPRESSIVE DISORDER, SINGLE EPISODE, MODERATE (H): ICD-10-CM

## 2018-08-09 DIAGNOSIS — F41.1 GAD (GENERALIZED ANXIETY DISORDER): Primary | ICD-10-CM

## 2018-08-09 PROCEDURE — 90834 PSYTX W PT 45 MINUTES: CPT | Performed by: COUNSELOR

## 2018-08-09 ASSESSMENT — ANXIETY QUESTIONNAIRES
2. NOT BEING ABLE TO STOP OR CONTROL WORRYING: NEARLY EVERY DAY
GAD7 TOTAL SCORE: 21
3. WORRYING TOO MUCH ABOUT DIFFERENT THINGS: NEARLY EVERY DAY
1. FEELING NERVOUS, ANXIOUS, OR ON EDGE: NEARLY EVERY DAY
6. BECOMING EASILY ANNOYED OR IRRITABLE: NEARLY EVERY DAY
7. FEELING AFRAID AS IF SOMETHING AWFUL MIGHT HAPPEN: NEARLY EVERY DAY
IF YOU CHECKED OFF ANY PROBLEMS ON THIS QUESTIONNAIRE, HOW DIFFICULT HAVE THESE PROBLEMS MADE IT FOR YOU TO DO YOUR WORK, TAKE CARE OF THINGS AT HOME, OR GET ALONG WITH OTHER PEOPLE: VERY DIFFICULT
5. BEING SO RESTLESS THAT IT IS HARD TO SIT STILL: NEARLY EVERY DAY

## 2018-08-09 ASSESSMENT — PATIENT HEALTH QUESTIONNAIRE - PHQ9: 5. POOR APPETITE OR OVEREATING: NEARLY EVERY DAY

## 2018-08-09 NOTE — MR AVS SNAPSHOT
MRN:[id number]                      After Visit Summary   8/9/2018    Dayana Danielle    MRN: [id number]           Visit Information        Provider Department      8/9/2018 2:30 PM Karel Wren [facility]        Your next 10 appointments already scheduled     Aug 14, 2018  4:20 PM CDT   ESTABLISHED PRENATAL with Nia Casanova MD   [facility] [place] ([facility] [place])    [address] [facility] [address]  [place] [address]   [phone]            Aug 21, 2018  8:50 AM CDT   ESTABLISHED PRENATAL with Nia Casanova MD   [facility] ([facility] [place])    [address] [facility] [address]  [place] [address]   [phone]            Aug 22, 2018  4:30 PM CDT   Return Visit with Karel Wren [facility] ([place] [address] [address]   [phone]            Aug 28, 2018  4:20 PM CDT   ESTABLISHED PRENATAL with Nia Casanova MD   [facility] ([facility] [place])    [address] [facility] [address]  [place] [address]   [phone]            Sep 04, 2018  8:30 AM CDT   ESTABLISHED PRENATAL with Nia Casanova MD   [facility] [place] ([facility] [place])    [address] [facility] [address]  [place] [address]   [phone]              MyChart Information     [employer] gives you secure access to your electronic health record. If you see a primary care provider, you can also send messages to your care team and make appointments. If you have questions, please call your primary care clinic.  If you do not have a primary care provider, please call [phone] and they will assist you.        Care EveryWhere ID     This is your Care EveryWhere ID. This could be used by other organizations to access your [facility]  medical records  [id number]        Equal Access to Services     LEONARDA PORTILLO : Igor Mustafa, darlene theodore, priscilla [place], jyothi spring. So [facility] [phone].    ATENCIÓN: Si habla español, tiene a dumont disposición servicios gratuitos de asistencia lingüística. Llame al [phone].    We comply with applicable federal civil rights laws and [place] laws. We do not discriminate on the basis of race, color, national origin, age, disability, sex, sexual orientation, or gender identity.

## 2018-08-09 NOTE — PROGRESS NOTES
"                                           Progress Note    Client Name: Dayana Moss  Date: 8/9/2018         Service Type: Individual      Session Start Time: 2:30p Session End Time: 3:15p      Session Length: 45 minutes     Session #: 12     Attendees: Client attended with      Treatment Plan Last Reviewed: 8/9/2018  PHQ-9 / JACINTO-7 : 15 & 21       DATA      Progress Since Last Session (Related to Symptoms / Goals / Homework):   Symptoms: Stable, see Epic for PHQ 9 & JACINTO 7 updates     Homework: Partially completed and continued - client will also work to emotion regulation (\"How come I feel this way? Will this reaction help me in the future?\"). By next appt, client will continue to identify feelings, communicated, and practice anxiety management/distraction.        Episode of Care Goals: Satisfactory progress - ACTION (Actively working towards change); Intervened by reinforcing change plan / affirming steps taken     Current / Ongoing Stressors and Concerns:   Reported stable mood and high anxiety re: waiting for baby's arrival; reported difficulty being patient, feeling like she finds things to feel anxious about and needs to keep busy; also able to validate/normal anxiety considering current situation; ongoing relationship stress, but feeling like they are working to improve communication - reported some success with articulating emotions and feeling good about it.      Treatment Objective(s) Addressed in This Session:    Client will practice communicating/identifying her needs 1x week. Client will learn 3 anxiety management skills to focus on the here and now. Client will increase motivation, interest, engagement, and pleasure in doing things.     Intervention:   Motivational Interviewing: open-ended questions, affirmations, reflections and emphasizing personal control and choices, challenge sustain talk, evoke change talk  DBT: reinforce interpersonal effectiveness/communication skills, teach and " practice emotion regulation skills (TIPP), reinforce mindfulness - one mindfully, teach boundary setting, teach and practice mindfulness/relaxation skills  CBT: identify and replace anxious thinking, reinforce proactive problem solving skills, reinforce internal locus of control and proactive help seeking behaviors, teach identifying wants and needs, teach effective communication skills to meet emotional needs        ASSESSMENT: Current Emotional / Mental Status (status of significant symptoms):   Risk status (Self / Other harm or suicidal ideation)   Client denies current fears or concerns for personal safety.  Client denies current or recent suicidal ideation or behaviors. Within last month, passive thoughts of SI: don't want to be here, too much stuff going on. Triggers: when she does not get things done, struggle at work, lack of motivation. Denied plans. No hx of hospitalization.   Client denies current or recent homicidal ideation or behaviors.  Client denies current or recent self injurious behavior or ideation. Hx of cutting in high school.   Client denies other safety concerns.  A safety and risk management plan has not been developed at this time, however client was given the after-hours number / 911 should there be a change in any of these risk factors.  Client reports there are no firearms in the house.     Appearance:   Appropriate    Eye Contact:   Fair   Psychomotor Behavior: Normal    Attitude:   Cooperative    Orientation:   All   Speech    Rate / Production: Normal     Volume:  Normal    Mood:    Anxious    Affect:    Appropriate Bright   Thought Content:  Clear  Rumination    Thought Form:  Coherent  Logical  Circumstantial   Insight:    Good     Medication Review:   See Epic for changes     Medication Compliance:   Yes     Changes in Health Issues:   None reported     Chemical Use Review:   Substance Use: Chemical use reviewed, no active concerns identified      Tobacco Use: No current tobacco  use     Collateral Reports Completed:   Not Applicable      PLAN: (Client Tasks / Therapist Tasks / Other)  Therapist will assign homework of social leisure planning; teach emotional regulation skills and challenging self-defeating thoughts. Reinforce mindfulness/grounding exercises and internal locus of control. Continue to reinforce help seeking and identification of wants and needs. Continue to reinforce anxiety management, communication skills, and interpersonal effectiveness. Continue to reinforce insight development and emotion identification.         Karel Wren, [facility]                                                         ________________________________________________________________________    Treatment Plan    Client's Name: Dayana Moss  YOB: 1990    Date: 8/9/2018    Diagnoses: 296.22 (F32.1)  Major Depressive Disorder, Single Episode, Moderate & 300.02 (F41.1) Generalized Anxiety Disorder; RULE OUT Major Depressive Disorder, Recurrent, Moderate & ADHD  Psychosocial & Contextual Factors: Miscarriage Sept 2017, currently 6 weeks pregnant, some strained in marriage and family relationships  WHODAS: 30    Referral / Collaboration:  Referral to another professional/service is not indicated at this time.    Anticipated number of session or this episode of care: 12      MeasurableTreatment Goal(s) related to diagnosis / functional impairment(s)  Goal 1: Client will improve mood as evidenced by decreased score on PHQ 9 from 18 (moderately severe) to 10 or less (moderate).    I will know I've met my goal when I don't see so sad and tired.      Objective #A (Client Action)    Client will increase motivation, interest, engagement, and pleasure in doing things.  Status: Continued - Date: 8/9/2018    Intervention(s)  Therapist will assign homework of social leisure planning; teach emotional regulation skills and challenging self-defeating thoughts.    Objective #B  Client will increase  understanding of steps in the grief process.  Status: Completed - Date: 8/9/2018    Intervention(s)  Therapist will provide educational materials on grief and loss; role-play conflict management; teach distraction skills.    Objective #C  Client will practice communicating/identifying her needs 1x week.  Status: Continued - Date: 8/9/2018    Intervention(s)  Therapist will assign homework of communication practice; role-play effective communication skills; teach emotional recognition/identification.    Objective #D (Client Action)                                    Monitor risk factors associated with hx of SI at every session.   Status: Continued - Date: 8/9/2018      Intervention(s)  Therapist will assign homework of effective help seeking behaviors; role-play communication skills to secure support; teach about identifying warning signs for risks.      Goal 2: Client will better manage anxiety as evidenced by decreased score on JACINTO 7 from 9 (moderate) to 5 or less (mild).    I will know I've met my goal when I can focus my thoughts and not worrying too much.      Objective #A (Client Action)    Client will learn 3 anxiety management skills to focus on the here and now.  Status: Continued - Date: 8/9/2018    Intervention(s)  Therapist will assign homework of skill practice; provide educational materials on anxiety management; teach the client how to perform a behavioral chain analysis.      Client has reviewed and agreed to the above plan.      HIMANSHU Staples  8/9/2018

## 2018-08-10 ASSESSMENT — ANXIETY QUESTIONNAIRES: GAD7 TOTAL SCORE: 21

## 2018-08-10 ASSESSMENT — PATIENT HEALTH QUESTIONNAIRE - PHQ9: SUM OF ALL RESPONSES TO PHQ QUESTIONS 1-9: 15

## 2018-08-14 ENCOUNTER — PRENATAL OFFICE VISIT (OUTPATIENT)
Dept: OBGYN | Facility: CLINIC | Age: 28
End: 2018-08-14
Payer: COMMERCIAL

## 2018-08-14 VITALS — SYSTOLIC BLOOD PRESSURE: 112 MMHG | DIASTOLIC BLOOD PRESSURE: 64 MMHG | WEIGHT: 217 LBS | BODY MASS INDEX: 32.05 KG/M2

## 2018-08-14 DIAGNOSIS — O09.93 SUPERVISION OF HIGH RISK PREGNANCY IN THIRD TRIMESTER: ICD-10-CM

## 2018-08-14 DIAGNOSIS — Z3A.37 37 WEEKS GESTATION OF PREGNANCY: ICD-10-CM

## 2018-08-14 DIAGNOSIS — F41.1 GAD (GENERALIZED ANXIETY DISORDER): Primary | ICD-10-CM

## 2018-08-14 PROCEDURE — 99207 ZZC PRENATAL VISIT: CPT | Performed by: OBSTETRICS & GYNECOLOGY

## 2018-08-14 NOTE — PROGRESS NOTES
Doing well today.  No issues  Has had a bit more cramping since Sunday; no ctx  No vb/spotting/lof  Labor precautions reviewed  GBS collected  RTC 1wk

## 2018-08-14 NOTE — MR AVS SNAPSHOT
After Visit Summary   8/14/2018    Dayana Danielle    MRN: [id number]           Patient Information     Date Of Birth          1990        Visit Information        Provider Department      8/14/2018 4:20 PM Nia Casanova MD [facility] [place]        Today's Diagnoses     JACINTO (generalized anxiety disorder)    -  1    Supervision of high risk pregnancy in third trimester        37 weeks gestation of pregnancy           Follow-ups after your visit        Follow-up notes from your care team     Return in about 1 week (around 8/21/2018) for Routine Prenatal Visit.      Your next 10 appointments already scheduled     Aug 21, 2018  8:50 AM CDT   ESTABLISHED PRENATAL with Nia Casanova MD   [facility] [place] ([facility] [place])    [address]  [place] [address]   [phone]            Aug 22, 2018  4:30 PM CDT   Return Visit with Karel Wren [facility] ([facility]  [address] [address]   [phone]            Aug 28, 2018  8:30 AM CDT   ESTABLISHED PRENATAL with Nia Casanova MD   [facility] [place] ([facility] [place])    [address]  [place] [address] [phone]            Sep 04, 2018  8:30 AM CDT   ESTABLISHED PRENATAL with Nia Casanova MD   [facility] [place] ([facility] [place])    [address]  [place] [address] [phone]              Who to contact     If you have questions or need follow up information about today's clinic visit or your schedule please contact [facility] [place] directly at [phone].  Normal or non-critical lab and imaging results will be communicated to you by MyChart, letter or phone within 4 business days after the clinic has received the results. If you do  not hear from us within 7 days, please contact the clinic through [employer] or phone. If you have a critical or abnormal lab result, we will notify you by phone as soon as possible.  Submit refill requests through [employer] or call your pharmacy and they will forward the refill request to us. Please allow 3 business days for your refill to be completed.          Additional Information About Your Visit        [employer] Information     [employer] gives you secure access to your electronic health record. If you see a primary care provider, you can also send messages to your care team and make appointments. If you have questions, please call your primary care clinic.  If you do not have a primary care provider, please call [phone] and they will assist you.        Care EveryWhere ID     This is your Care EveryWhere ID. This could be used by other organizations to access your [place] medical records  [id number]        Your Vitals Were     Last Period BMI (Body Mass Index)                11/28/2017 32.05 kg/m2           Blood Pressure from Last 3 Encounters:   08/14/18 112/64   08/07/18 100/68   07/25/18 128/72    Weight from Last 3 Encounters:   08/14/18 217 lb (98.4 kg)   08/07/18 213 lb (96.6 kg)   07/25/18 209 lb 3.2 oz (94.9 kg)              Today, you had the following     No orders found for display       Primary Care Provider Office Phone # Fax #    Cheri Schmitz CHEY [facility] [phone]       [address]  [facility] [address]        Equal Access to Services     LEOANRDA PORTILLO : Hadii aad ku hadasho Soomaali, waaxda luqadaha, qaybta kaalmada adeegyada, waxay idiin haywali vargas . So [facility] [phone].    ATENCIÓN: Si habla español, tiene a dumont disposición servicios gratuitos de asistencia lingüística. Llame al [phone].    We comply with applicable federal civil rights laws and [place] laws. We do not discriminate on the basis of race, color, national origin, age,  disability, sex, sexual orientation, or gender identity.            Thank you!     Thank you for choosing [facility]  for your care. Our goal is always to provide you with excellent care. Hearing back from our patients is one way we can continue to improve our services. Please take a few minutes to complete the written survey that you may receive in the mail after your visit with us. Thank you!             Your Updated Medication List - Protect others around you: Learn how to safely use, store and throw away your medicines at www.disposemymeds.org.          This list is accurate as of 8/14/18  4:57 PM.  Always use your most recent med list.                   Brand Name Dispense Instructions for use Diagnosis    citalopram 40 MG tablet    celeXA    90 tablet    1 tablet orally daily    Moderate episode of recurrent major depressive disorder (H)       prenatal multivitamin plus iron 27-0.8 MG Tabs per tablet      Take 2 tablets by mouth daily

## 2018-08-21 ENCOUNTER — PRENATAL OFFICE VISIT (OUTPATIENT)
Dept: OBGYN | Facility: CLINIC | Age: 28
End: 2018-08-21
Payer: COMMERCIAL

## 2018-08-21 VITALS — DIASTOLIC BLOOD PRESSURE: 62 MMHG | WEIGHT: 219 LBS | BODY MASS INDEX: 32.34 KG/M2 | SYSTOLIC BLOOD PRESSURE: 110 MMHG

## 2018-08-21 DIAGNOSIS — Z3A.38 38 WEEKS GESTATION OF PREGNANCY: Primary | ICD-10-CM

## 2018-08-21 DIAGNOSIS — O09.93 SUPERVISION OF HIGH RISK PREGNANCY IN THIRD TRIMESTER: ICD-10-CM

## 2018-08-21 PROCEDURE — 99207 ZZC PRENATAL VISIT: CPT | Performed by: OBSTETRICS & GYNECOLOGY

## 2018-08-21 NOTE — MR AVS SNAPSHOT
After Visit Summary   8/21/2018    Dayana Danielle    MRN: [id number]           Patient Information     Date Of Birth          1990        Visit Information        Provider Department      8/21/2018 8:50 AM Nia Casanova MD [facility] [place]        Today's Diagnoses     38 weeks gestation of pregnancy    -  1    Supervision of high risk pregnancy in third trimester           Follow-ups after your visit        Follow-up notes from your care team     Return in about 1 week (around 8/28/2018) for Routine Prenatal Visit.      Your next 10 appointments already scheduled     Aug 22, 2018  4:30 PM CDT   Return Visit with Karel Wren [facility] ([facility]  [address]  [facility] [address]   [phone]            Aug 28, 2018  8:30 AM CDT   ESTABLISHED PRENATAL with Nia Casanova MD   [facility] [place] ([facility] [place])    [address]  [address]  [place] [address] [phone]            Sep 04, 2018  8:30 AM CDT   ESTABLISHED PRENATAL with Nia Casanova MD   [facility] [place] ([facility] [place])    [address]  [address]  [place] [address] [phone]              Who to contact     If you have questions or need follow up information about today's clinic visit or your schedule please contact [facility] [place] directly at [phone].  Normal or non-critical lab and imaging results will be communicated to you by MyChart, letter or phone within 4 business days after the clinic has received the results. If you do not hear from us within 7 days, please contact the clinic through MyChart or phone. If you have a critical or abnormal lab result, we will notify you by phone as soon as possible.  Submit refill requests through [employer] or call your pharmacy and they will forward the  refill request to us. Please allow 3 business days for your refill to be completed.          Additional Information About Your Visit        MyChart Information     [employer] gives you secure access to your electronic health record. If you see a primary care provider, you can also send messages to your care team and make appointments. If you have questions, please call your primary care clinic.  If you do not have a primary care provider, please call [phone] and they will assist you.        Care EveryWhere ID     This is your Care EveryWhere ID. This could be used by other organizations to access your [place] medical records  [id number]        Your Vitals Were     Last Period BMI (Body Mass Index)                11/28/2017 32.34 kg/m2           Blood Pressure from Last 3 Encounters:   08/21/18 110/62   08/14/18 112/64   08/07/18 100/68    Weight from Last 3 Encounters:   08/21/18 219 lb (99.3 kg)   08/14/18 217 lb (98.4 kg)   08/07/18 213 lb (96.6 kg)              Today, you had the following     No orders found for display       Primary Care Provider Office Phone # Fax #    CHEY Hernandez [phone]       [address]  [facility] [address]        Equal Access to Services     LEONARDA PORTILLO : Hadii aad ku hadasho Soomaali, waaxda luqadaha, qaybta kaalmada adeegyada, waxay maryin haywali vargas . So [facility] [phone].    ATENCIÓN: Si habla español, tiene a dumont disposición servicios gratuitos de asistencia lingüística. Llame al [phone].    We comply with applicable federal civil rights laws and [place] laws. We do not discriminate on the basis of race, color, national origin, age, disability, sex, sexual orientation, or gender identity.            Thank you!     Thank you for choosing [facility] [place]  for your care. Our goal is always to provide you with excellent care. Hearing back from our patients is one way we can continue to improve our  services. Please take a few minutes to complete the written survey that you may receive in the mail after your visit with us. Thank you!             Your Updated Medication List - Protect others around you: Learn how to safely use, store and throw away your medicines at www.disposemymeds.org.          This list is accurate as of 8/21/18  8:58 AM.  Always use your most recent med list.                   Brand Name Dispense Instructions for use Diagnosis    citalopram 40 MG tablet    celeXA    90 tablet    1 tablet orally daily    Moderate episode of recurrent major depressive disorder (H)       prenatal multivitamin plus iron 27-0.8 MG Tabs per tablet      Take 2 tablets by mouth daily

## 2018-08-21 NOTE — PROGRESS NOTES
Doing well today --no changes  +FM  Occ ctx --some painful last week; no lof; had 1.5d of light spotting after last week's check  Labor precautions reviewed  RTC 1wk

## 2018-08-22 ENCOUNTER — OFFICE VISIT (OUTPATIENT)
Dept: PSYCHOLOGY | Facility: CLINIC | Age: 28
End: 2018-08-22
Payer: COMMERCIAL

## 2018-08-22 DIAGNOSIS — F41.1 GAD (GENERALIZED ANXIETY DISORDER): Primary | ICD-10-CM

## 2018-08-22 DIAGNOSIS — F32.1 MAJOR DEPRESSIVE DISORDER, SINGLE EPISODE, MODERATE (H): ICD-10-CM

## 2018-08-22 PROCEDURE — 90834 PSYTX W PT 45 MINUTES: CPT | Performed by: COUNSELOR

## 2018-08-22 ASSESSMENT — ANXIETY QUESTIONNAIRES
7. FEELING AFRAID AS IF SOMETHING AWFUL MIGHT HAPPEN: NEARLY EVERY DAY
5. BEING SO RESTLESS THAT IT IS HARD TO SIT STILL: NEARLY EVERY DAY
2. NOT BEING ABLE TO STOP OR CONTROL WORRYING: NEARLY EVERY DAY
GAD7 TOTAL SCORE: 20
3. WORRYING TOO MUCH ABOUT DIFFERENT THINGS: NEARLY EVERY DAY
1. FEELING NERVOUS, ANXIOUS, OR ON EDGE: NEARLY EVERY DAY
IF YOU CHECKED OFF ANY PROBLEMS ON THIS QUESTIONNAIRE, HOW DIFFICULT HAVE THESE PROBLEMS MADE IT FOR YOU TO DO YOUR WORK, TAKE CARE OF THINGS AT HOME, OR GET ALONG WITH OTHER PEOPLE: EXTREMELY DIFFICULT
6. BECOMING EASILY ANNOYED OR IRRITABLE: NEARLY EVERY DAY

## 2018-08-22 ASSESSMENT — PATIENT HEALTH QUESTIONNAIRE - PHQ9: 5. POOR APPETITE OR OVEREATING: MORE THAN HALF THE DAYS

## 2018-08-22 NOTE — MR AVS SNAPSHOT
MRN:[id number]                      After Visit Summary   8/22/2018    Dayana Danielle    MRN: [id number]           Visit Information        Provider Department      8/22/2018 4:30 PM Karel Wren [facility]      Your next 10 appointments already scheduled     Aug 28, 2018  8:30 AM CDT   ESTABLISHED PRENATAL with Nia Casanova MD   [facility] [place] ([facility] [place])    [address]  [place] [address] [phone]            Sep 04, 2018  8:30 AM CDT   ESTABLISHED PRENATAL with Nia Casanova MD   [facility] [place] ([facility] [place])    [address]  [place] [address] [phone]              MyChart Information     [employer] gives you secure access to your electronic health record. If you see a primary care provider, you can also send messages to your care team and make appointments. If you have questions, please call your primary care clinic.  If you do not have a primary care provider, please call [phone] and they will assist you.        Care EveryWhere ID     This is your Care EveryWhere ID. This could be used by other organizations to access your [place] medical records  [id number]        Equal Access to Services     LEONARDA PORTILLO : Igor maderao Sochandan, waaxda luqadaha, qaybta kaalmada adeegyada, jyothi spring. So [facility] [phone].    ATENCIÓN: Si habla español, tiene a dumont disposición servicios gratuitos de asistencia lingüística. Llame al [phone].    We comply with applicable federal civil rights laws and [place] laws. We do not discriminate on the basis of race, color, national origin, age, disability, sex, sexual orientation, or gender identity.

## 2018-08-22 NOTE — Clinical Note
Hi all, Today was our last appt until after Antonio delivers baby. Please follow up with her re: ongoing anxiety. I'm not sure if you've been informed, but Antonio's reports her CODY is the same date has her marriage and she is feeling triggered by that. She reports significant fears of getting induced and experiencing pain and discomfort like she did during miscarriage. She is also very uncomfortable in her body at this time and would like additional support to address these needs. I apologize for the long message and have also coached Antonio to communicate her needs.   Thank you, Karel Wren MA, [facility]

## 2018-08-22 NOTE — PROGRESS NOTES
"                                           Progress Note    Client Name: Dayana Moss  Date: 8/22/2018         Service Type: Individual      Session Start Time: 4:30p Session End Time: 5:15p      Session Length: 45 minutes     Session #: 13 last appt until client delivers      Attendees: Client attended alone    Treatment Plan Last Reviewed: 8/22/2018  PHQ-9 / JACINTO-7 : 13 & 20       DATA      Progress Since Last Session (Related to Symptoms / Goals / Homework):   Symptoms: Worsened anxiety and physical discomfort related to pregnancy, see Epic for PHQ 9 & JACINTO 7 updates     Homework: Partially completed and continued - client will continue to identify feelings, communicate, and practice anxiety management/distraction.        Episode of Care Goals: Some progress - ACTION (Actively working towards change); Intervened by reinforcing change plan / affirming steps taken     Current / Ongoing Stressors and Concerns:  Ongoing difficulties with mood and anxiety as it relates to physical discomfort from pregnancy, feeling stuck in body, disappointed that her body is not progressing into labor as well as significant fear thinking about needing to get induced (triggered and reminded of pain and grief and loss experienced during miscarriage); described passive coping as she is \"too tired\" to do anything; acknowledged she is not being forth coming with providers re: worries and concerns and agreed to follow up at next visit.      Treatment Objective(s) Addressed in This Session:    Client will practice communicating/identifying her needs 1x week. Client will learn 3 anxiety management skills to focus on the here and now. Client will increase motivation, interest, engagement, and pleasure in doing things.     Intervention:   Motivational Interviewing: open-ended questions, affirmations, reflections and emphasizing personal control and choices, challenge sustain talk, evoke change talk  DBT: reinforce interpersonal " effectiveness/communication skills, teach and practice emotion regulation skills (TIPP), reinforce mindfulness - one mindfully, teach boundary setting, teach and practice mindfulness/relaxation skills  CBT: identify and replace anxious thinking, reinforce proactive problem solving skills, reinforce internal locus of control and proactive help seeking behaviors, teach identifying wants and needs, teach effective communication skills to meet emotional needs        ASSESSMENT: Current Emotional / Mental Status (status of significant symptoms):   Risk status (Self / Other harm or suicidal ideation)   Client denies current fears or concerns for personal safety.  Client denies current or recent suicidal ideation or behaviors. Within last month, passive thoughts of SI: don't want to be here, too much stuff going on. Triggers: when she does not get things done, struggle at work, lack of motivation. Denied plans. No hx of hospitalization.   Client denies current or recent homicidal ideation or behaviors.  Client denies current or recent self injurious behavior or ideation. Hx of cutting in high school.   Client denies other safety concerns.  A safety and risk management plan has not been developed at this time, however client was given the after-hours number / 911 should there be a change in any of these risk factors.  Client reports there are no firearms in the house.     Appearance:   Appropriate    Eye Contact:   Fair   Psychomotor Behavior: Normal    Attitude:   Cooperative    Orientation:   All   Speech    Rate / Production: Normal     Volume:  Normal    Mood:    Anxious Sad Tearful   Affect:    Mood congruent    Thought Content:  Rumination    Thought Form:  Coherent  Logical  Circumstantial   Insight:    Fair-Limited     Medication Review:   See Epic for changes     Medication Compliance:   Yes     Changes in Health Issues:   None reported     Chemical Use Review:   Substance Use: Chemical use reviewed, no active  concerns identified      Tobacco Use: No current tobacco use     Collateral Reports Completed:   Not Applicable      PLAN: (Client Tasks / Therapist Tasks / Other)  Therapist will assign homework of social leisure planning; teach emotional regulation skills and challenging self-defeating thoughts. Reinforce mindfulness/grounding exercises and internal locus of control. Continue to reinforce help seeking and identification of wants and needs. Continue to reinforce anxiety management, communication skills, and interpersonal effectiveness. Continue to reinforce insight development and emotion identification.         Karel Wren [facility]                                                         ________________________________________________________________________    Treatment Plan    Client's Name: Dayana Moss  YOB: 1990    Date: 8/9/2018    Diagnoses: 296.22 (F32.1)  Major Depressive Disorder, Single Episode, Moderate & 300.02 (F41.1) Generalized Anxiety Disorder; RULE OUT Major Depressive Disorder, Recurrent, Moderate & ADHD  Psychosocial & Contextual Factors: Miscarriage Sept 2017, currently 6 weeks pregnant, some strained in marriage and family relationships  WHODAS: 30    Referral / Collaboration:  Referral to another professional/service is not indicated at this time.    Anticipated number of session or this episode of care: 12      MeasurableTreatment Goal(s) related to diagnosis / functional impairment(s)  Goal 1: Client will improve mood as evidenced by decreased score on PHQ 9 from 18 (moderately severe) to 10 or less (moderate).    I will know I've met my goal when I don't see so sad and tired.      Objective #A (Client Action)    Client will increase motivation, interest, engagement, and pleasure in doing things.  Status: Continued - Date: 8/9/2018    Intervention(s)  Therapist will assign homework of social leisure planning; teach emotional regulation skills and challenging self-defeating  thoughts.    Objective #B  Client will increase understanding of steps in the grief process.  Status: Completed - Date: 8/9/2018    Intervention(s)  Therapist will provide educational materials on grief and loss; role-play conflict management; teach distraction skills.    Objective #C  Client will practice communicating/identifying her needs 1x week.  Status: Continued - Date: 8/9/2018    Intervention(s)  Therapist will assign homework of communication practice; role-play effective communication skills; teach emotional recognition/identification.    Objective #D (Client Action)                                    Monitor risk factors associated with hx of SI at every session.   Status: Continued - Date: 8/9/2018      Intervention(s)  Therapist will assign homework of effective help seeking behaviors; role-play communication skills to secure support; teach about identifying warning signs for risks.      Goal 2: Client will better manage anxiety as evidenced by decreased score on JACINTO 7 from 9 (moderate) to 5 or less (mild).    I will know I've met my goal when I can focus my thoughts and not worrying too much.      Objective #A (Client Action)    Client will learn 3 anxiety management skills to focus on the here and now.  Status: Continued - Date: 8/9/2018    Intervention(s)  Therapist will assign homework of skill practice; provide educational materials on anxiety management; teach the client how to perform a behavioral chain analysis.      Client has reviewed and agreed to the above plan.      HIMANSHU Staples  8/9/2018

## 2018-08-24 ASSESSMENT — PATIENT HEALTH QUESTIONNAIRE - PHQ9: SUM OF ALL RESPONSES TO PHQ QUESTIONS 1-9: 13

## 2018-08-24 ASSESSMENT — ANXIETY QUESTIONNAIRES: GAD7 TOTAL SCORE: 20

## 2018-08-28 ENCOUNTER — PRENATAL OFFICE VISIT (OUTPATIENT)
Dept: OBGYN | Facility: CLINIC | Age: 28
End: 2018-08-28
Payer: COMMERCIAL

## 2018-08-28 ENCOUNTER — TELEPHONE (OUTPATIENT)
Dept: OBGYN | Facility: CLINIC | Age: 28
End: 2018-08-28

## 2018-08-28 VITALS — SYSTOLIC BLOOD PRESSURE: 106 MMHG | WEIGHT: 221.8 LBS | DIASTOLIC BLOOD PRESSURE: 62 MMHG | BODY MASS INDEX: 32.75 KG/M2

## 2018-08-28 DIAGNOSIS — Z3A.39 39 WEEKS GESTATION OF PREGNANCY: ICD-10-CM

## 2018-08-28 DIAGNOSIS — F41.1 GAD (GENERALIZED ANXIETY DISORDER): ICD-10-CM

## 2018-08-28 DIAGNOSIS — O09.93 SUPERVISION OF HIGH RISK PREGNANCY IN THIRD TRIMESTER: Primary | ICD-10-CM

## 2018-08-28 PROCEDURE — 99207 ZZC PRENATAL VISIT: CPT | Performed by: OBSTETRICS & GYNECOLOGY

## 2018-08-28 NOTE — PROGRESS NOTES
Doing okay today --very anxious about upcoming delivery and VERY concerned about the need for pitocin/induction  Very very anxious about the contractions and bringing up memories of medical miscarriage with loss 2 years ago  Tearful and very upset with conversation today  Wants a c/s if she does not go in to spontaneous labor --agrees for expectant management until 41wks  +FM  Occ ctx --nothing consistent; no vb/spotting  RTC 1wk

## 2018-08-28 NOTE — TELEPHONE ENCOUNTER
Type of surgery: PRIMARY CS  Location of surgery: [facility] OR  Date and time of surgery: 2018 1pm ARRIVAL 11am  Surgeon: Edilberto  Pre-Op Appt Date: TBD  Post-Op Appt Date: TBD   Packet sent out: MAILED 2018  Pre-cert/Authorization completed:  TBD  Date: 2018 Swathi w/Debbie Drake  Surgery Scheduler        Order Questions      Question Answer Comment     Procedure name(s) - multi select  Delivery      Reason for procedure Generalized anxiety; PTSD      Is this a multi surgeon case? No      Laterality N/A      Request for additional equipment Other (see comments) None     Anesthesia Spinal      Positioning (if different from preference card): Supine      Initiate Pre-op orders for above procedure: Yes, as ordered in Epic additional orders noted there also     Location of Case: [facility] OR L&D     Surgeon Procedure Time (incision to closure) in minutes (per procedure as applicable) 60      Note:  Surgical Case Time Needed (in minutes)     Surgery Date:  2018     Patient Class (for admit prior to surgery, specify number of days in comments): Surgery admit admit day of surgery     Length of Stay: 3 days      Post-Op Appointment 6 weeks      Vendor Needed? No

## 2018-08-28 NOTE — MR AVS SNAPSHOT
After Visit Summary   8/28/2018    Dayana Danielle    MRN: [id number]           Patient Information     Date Of Birth          1990        Visit Information        Provider Department      8/28/2018 8:30 AM Nia Casanova MD [facility] [place]        Today's Diagnoses     Supervision of high risk pregnancy in third trimester    -  1    39 weeks gestation of pregnancy        JACINTO (generalized anxiety disorder)           Follow-ups after your visit        Follow-up notes from your care team     Return in about 1 week (around 9/4/2018) for Routine Prenatal Visit.      Your next 10 appointments already scheduled     Sep 04, 2018  8:30 AM CDT   ESTABLISHED PRENATAL with Nia Casanova MD   [facility] [place] ([facility] [place])    [address] [place] [address] [id number] [phone]              Who to contact     If you have questions or need follow up information about today's clinic visit or your schedule please contact [facility] [place] directly at [phone].  Normal or non-critical lab and imaging results will be communicated to you by MusicXrayhart, letter or phone within 4 business days after the clinic has received the results. If you do not hear from us within 7 days, please contact the clinic through MusicXrayhart or phone. If you have a critical or abnormal lab result, we will notify you by phone as soon as possible.  Submit refill requests through Avosoft or call your pharmacy and they will forward the refill request to us. Please allow 3 business days for your refill to be completed.          Additional Information About Your Visit        MyChart Information     Avosoft gives you secure access to your electronic health record. If you see a primary care provider, you can also send messages to your care team and make appointments. If you have questions, please call your primary care clinic.  If you do not have  a primary care provider, please call [phone] and they will assist you.        Care EveryWhere ID     This is your Care EveryWhere ID. This could be used by other organizations to access your [place] medical records  [id number]        Your Vitals Were     Last Period BMI (Body Mass Index)                11/28/2017 32.75 kg/m2           Blood Pressure from Last 3 Encounters:   08/28/18 106/62   08/21/18 110/62   08/14/18 112/64    Weight from Last 3 Encounters:   08/28/18 221 lb 12.8 oz (100.6 kg)   08/21/18 219 lb (99.3 kg)   08/14/18 217 lb (98.4 kg)              Today, you had the following     No orders found for display       Primary Care Provider Office Phone # Fax #    Cheri Murdockcha Georges, CHEY [facility] [phone]       [address]  [facility] [address]        Equal Access to Services     LEONARDA PORTILLO : Hadii aad ku hadasho Soomaali, waaxda luqadaha, qaybta kaalmada adeegyada, waxay maryin hayfabiann alix vargas . So [facility] [phone].    ATENCIÓN: Si prabhjotla fatimah, tiene a dumont disposición servicios gratuitos de asistencia lingüística. Llame al [phone].    We comply with applicable federal civil rights laws and [place] laws. We do not discriminate on the basis of race, color, national origin, age, disability, sex, sexual orientation, or gender identity.            Thank you!     Thank you for choosing [facility] [place]  for your care. Our goal is always to provide you with excellent care. Hearing back from our patients is one way we can continue to improve our services. Please take a few minutes to complete the written survey that you may receive in the mail after your visit with us. Thank you!             Your Updated Medication List - Protect others around you: Learn how to safely use, store and throw away your medicines at www.disposemymeds.org.          This list is accurate as of 8/28/18  8:38 AM.  Always use your most recent med list.                    Brand Name Dispense Instructions for use Diagnosis    citalopram 40 MG tablet    celeXA    90 tablet    1 tablet orally daily    Moderate episode of recurrent major depressive disorder (H)       prenatal multivitamin plus iron 27-0.8 MG Tabs per tablet      Take 2 tablets by mouth daily

## 2018-09-04 ENCOUNTER — PRENATAL OFFICE VISIT (OUTPATIENT)
Dept: OBGYN | Facility: CLINIC | Age: 28
End: 2018-09-04
Payer: COMMERCIAL

## 2018-09-04 VITALS — BODY MASS INDEX: 33.32 KG/M2 | WEIGHT: 225.6 LBS | SYSTOLIC BLOOD PRESSURE: 116 MMHG | DIASTOLIC BLOOD PRESSURE: 78 MMHG

## 2018-09-04 DIAGNOSIS — Z3A.40 40 WEEKS GESTATION OF PREGNANCY: Primary | ICD-10-CM

## 2018-09-04 DIAGNOSIS — O09.93 SUPERVISION OF HIGH RISK PREGNANCY IN THIRD TRIMESTER: ICD-10-CM

## 2018-09-04 PROCEDURE — 99207 ZZC PRENATAL VISIT: CPT | Performed by: OBSTETRICS & GYNECOLOGY

## 2018-09-04 NOTE — MR AVS SNAPSHOT
After Visit Summary   9/4/2018    Dayana Danielle    MRN: [id number]           Patient Information     Date Of Birth          1990        Visit Information        Provider Department      9/4/2018 8:30 AM Nia Casanova MD [facility] [place]        Today's Diagnoses     40 weeks gestation of pregnancy    -  1    Supervision of high risk pregnancy in third trimester           Follow-ups after your visit        Follow-up notes from your care team     Return if symptoms worsen or fail to improve, for Routine Prenatal Visit.      Your next 10 appointments already scheduled     Sep 11, 2018   Procedure with Nia Casanova MD    Birthplace (--)    [address], [address]  [facility] [address] [phone]              Who to contact     If you have questions or need follow up information about today's clinic visit or your schedule please contact [facility] [place] directly at [phone].  Normal or non-critical lab and imaging results will be communicated to you by MyChart, letter or phone within 4 business days after the clinic has received the results. If you do not hear from us within 7 days, please contact the clinic through Casinityhart or phone. If you have a critical or abnormal lab result, we will notify you by phone as soon as possible.  Submit refill requests through Taptu or call your pharmacy and they will forward the refill request to us. Please allow 3 business days for your refill to be completed.          Additional Information About Your Visit        MyChart Information     Taptu gives you secure access to your electronic health record. If you see a primary care provider, you can also send messages to your care team and make appointments. If you have questions, please call your primary care clinic.  If you do not have a primary care provider, please call [phone] and they will assist you.        Care EveryWhere ID     This is your  Care EveryWhere ID. This could be used by other organizations to access your [place] medical records  [id number]        Your Vitals Were     Last Period BMI (Body Mass Index)                11/28/2017 33.32 kg/m2           Blood Pressure from Last 3 Encounters:   09/04/18 116/78   08/28/18 106/62   08/21/18 110/62    Weight from Last 3 Encounters:   09/04/18 225 lb 9.6 oz (102.3 kg)   08/28/18 221 lb 12.8 oz (100.6 kg)   08/21/18 219 lb (99.3 kg)              Today, you had the following     No orders found for display       Primary Care Provider Office Phone # Fax #    Cheri CHEY Rodriguez [facility] [phone]       [address] [facility] [address]  [facility] [address]        Equal Access to Services     LEONARDA PORTILLO : Hadii aad dakota hadasho Sojbali, waaxda luqadaha, qaybta kaalmada adeegyada, jyothi vargas . So [facility] [phone].    ATENCIÓN: Si habla español, tiene a dumont disposición servicios gratuitos de asistencia lingüística. Tania al [phone].    We comply with applicable federal civil rights laws and [place] laws. We do not discriminate on the basis of race, color, national origin, age, disability, sex, sexual orientation, or gender identity.            Thank you!     Thank you for choosing [facility] [place]  for your care. Our goal is always to provide you with excellent care. Hearing back from our patients is one way we can continue to improve our services. Please take a few minutes to complete the written survey that you may receive in the mail after your visit with us. Thank you!             Your Updated Medication List - Protect others around you: Learn how to safely use, store and throw away your medicines at www.disposemymeds.org.          This list is accurate as of 9/4/18  8:55 AM.  Always use your most recent med list.                   Brand Name Dispense Instructions for use Diagnosis    citalopram 40 MG tablet    celeXA    90 tablet    1 tablet  orally daily    Moderate episode of recurrent major depressive disorder (H)       prenatal multivitamin plus iron 27-0.8 MG Tabs per tablet      Take 2 tablets by mouth daily

## 2018-09-04 NOTE — PROGRESS NOTES
Doing well today.  +FM --definitely running out of room  No ctx/cramping/vb/lof  Some RLQ pain this AM  Discussed anxieties and concerns with any kind of induction/augmentation again today; would like to proceed with expectant management this week and plan for primary c/s with me on Tues if no labor.  If presents in labor, will see how things go.  Discussed the possibility of augmentation with AROM or pitocin, etc.

## 2018-09-11 ENCOUNTER — ANESTHESIA EVENT (OUTPATIENT)
Dept: OBGYN | Facility: CLINIC | Age: 28
End: 2018-09-11
Payer: COMMERCIAL

## 2018-09-11 ENCOUNTER — HOSPITAL ENCOUNTER (INPATIENT)
Facility: CLINIC | Age: 28
LOS: 4 days | Discharge: HOME HEALTH | End: 2018-09-15
Attending: OBSTETRICS & GYNECOLOGY | Admitting: OBSTETRICS & GYNECOLOGY
Payer: COMMERCIAL

## 2018-09-11 ENCOUNTER — ANESTHESIA (OUTPATIENT)
Dept: OBGYN | Facility: CLINIC | Age: 28
End: 2018-09-11
Payer: COMMERCIAL

## 2018-09-11 ENCOUNTER — NURSE TRIAGE (OUTPATIENT)
Dept: NURSING | Facility: CLINIC | Age: 28
End: 2018-09-11

## 2018-09-11 DIAGNOSIS — Z98.891 STATUS POST CESAREAN DELIVERY: Primary | ICD-10-CM

## 2018-09-11 LAB
ABO + RH BLD: NORMAL
ABO + RH BLD: NORMAL
AMPHETAMINES UR QL SCN: NEGATIVE
BASOPHILS # BLD AUTO: 0 10E9/L (ref 0–0.2)
BASOPHILS NFR BLD AUTO: 0.2 %
BLD GP AB SCN SERPL QL: NORMAL
BLOOD BANK CMNT PATIENT-IMP: NORMAL
CANNABINOIDS UR QL: NEGATIVE
COCAINE UR QL: NEGATIVE
DIFFERENTIAL METHOD BLD: ABNORMAL
EOSINOPHIL # BLD AUTO: 0.1 10E9/L (ref 0–0.7)
EOSINOPHIL NFR BLD AUTO: 0.6 %
ERYTHROCYTE [DISTWIDTH] IN BLOOD BY AUTOMATED COUNT: 15 % (ref 10–15)
HCT VFR BLD AUTO: 33.1 % (ref 35–47)
HGB BLD-MCNC: 10.6 G/DL (ref 11.7–15.7)
IMM GRANULOCYTES # BLD: 0.1 10E9/L (ref 0–0.4)
IMM GRANULOCYTES NFR BLD: 0.6 %
LYMPHOCYTES # BLD AUTO: 2.4 10E9/L (ref 0.8–5.3)
LYMPHOCYTES NFR BLD AUTO: 22.8 %
MCH RBC QN AUTO: 27.2 PG (ref 26.5–33)
MCHC RBC AUTO-ENTMCNC: 32 G/DL (ref 31.5–36.5)
MCV RBC AUTO: 85 FL (ref 78–100)
MONOCYTES # BLD AUTO: 0.6 10E9/L (ref 0–1.3)
MONOCYTES NFR BLD AUTO: 5.6 %
NEUTROPHILS # BLD AUTO: 7.3 10E9/L (ref 1.6–8.3)
NEUTROPHILS NFR BLD AUTO: 70.2 %
NRBC # BLD AUTO: 0 10*3/UL
NRBC BLD AUTO-RTO: 0 /100
OPIATES UR QL SCN: NEGATIVE
PCP UR QL SCN: NEGATIVE
PLATELET # BLD AUTO: 246 10E9/L (ref 150–450)
RBC # BLD AUTO: 3.89 10E12/L (ref 3.8–5.2)
SPECIMEN EXP DATE BLD: NORMAL
T PALLIDUM AB SER QL: NONREACTIVE
WBC # BLD AUTO: 10.4 10E9/L (ref 4–11)

## 2018-09-11 PROCEDURE — 86780 TREPONEMA PALLIDUM: CPT | Performed by: OBSTETRICS & GYNECOLOGY

## 2018-09-11 PROCEDURE — 25000128 H RX IP 250 OP 636: Performed by: ANESTHESIOLOGY

## 2018-09-11 PROCEDURE — 25000128 H RX IP 250 OP 636

## 2018-09-11 PROCEDURE — 37000009 ZZH ANESTHESIA TECHNICAL FEE, EACH ADDTL 15 MIN: Performed by: OBSTETRICS & GYNECOLOGY

## 2018-09-11 PROCEDURE — 25000128 H RX IP 250 OP 636: Performed by: OBSTETRICS & GYNECOLOGY

## 2018-09-11 PROCEDURE — 27110038 ZZH RX 271: Performed by: ANESTHESIOLOGY

## 2018-09-11 PROCEDURE — G0463 HOSPITAL OUTPT CLINIC VISIT: HCPCS | Mod: 25

## 2018-09-11 PROCEDURE — 25000125 ZZHC RX 250: Performed by: OBSTETRICS & GYNECOLOGY

## 2018-09-11 PROCEDURE — 86901 BLOOD TYPING SEROLOGIC RH(D): CPT | Performed by: OBSTETRICS & GYNECOLOGY

## 2018-09-11 PROCEDURE — 37000008 ZZH ANESTHESIA TECHNICAL FEE, 1ST 30 MIN: Performed by: OBSTETRICS & GYNECOLOGY

## 2018-09-11 PROCEDURE — 85025 COMPLETE CBC W/AUTO DIFF WBC: CPT | Performed by: OBSTETRICS & GYNECOLOGY

## 2018-09-11 PROCEDURE — 25000132 ZZH RX MED GY IP 250 OP 250 PS 637

## 2018-09-11 PROCEDURE — 25000125 ZZHC RX 250

## 2018-09-11 PROCEDURE — 71000013 ZZH RECOVERY PHASE 1 LEVEL 1 EA ADDTL HR: Performed by: OBSTETRICS & GYNECOLOGY

## 2018-09-11 PROCEDURE — 86850 RBC ANTIBODY SCREEN: CPT | Performed by: OBSTETRICS & GYNECOLOGY

## 2018-09-11 PROCEDURE — 36415 COLL VENOUS BLD VENIPUNCTURE: CPT | Performed by: OBSTETRICS & GYNECOLOGY

## 2018-09-11 PROCEDURE — 80307 DRUG TEST PRSMV CHEM ANLYZR: CPT | Performed by: OBSTETRICS & GYNECOLOGY

## 2018-09-11 PROCEDURE — 36000058 ZZH SURGERY LEVEL 3 EA 15 ADDTL MIN: Performed by: OBSTETRICS & GYNECOLOGY

## 2018-09-11 PROCEDURE — 71000012 ZZH RECOVERY PHASE 1 LEVEL 1 FIRST HR: Performed by: OBSTETRICS & GYNECOLOGY

## 2018-09-11 PROCEDURE — 86900 BLOOD TYPING SEROLOGIC ABO: CPT | Performed by: OBSTETRICS & GYNECOLOGY

## 2018-09-11 PROCEDURE — 59510 CESAREAN DELIVERY: CPT | Performed by: OBSTETRICS & GYNECOLOGY

## 2018-09-11 PROCEDURE — 59025 FETAL NON-STRESS TEST: CPT

## 2018-09-11 PROCEDURE — 36000056 ZZH SURGERY LEVEL 3 1ST 30 MIN: Performed by: OBSTETRICS & GYNECOLOGY

## 2018-09-11 PROCEDURE — 10907ZC DRAINAGE OF AMNIOTIC FLUID, THERAPEUTIC FROM PRODUCTS OF CONCEPTION, VIA NATURAL OR ARTIFICIAL OPENING: ICD-10-PCS | Performed by: OBSTETRICS & GYNECOLOGY

## 2018-09-11 PROCEDURE — 25000125 ZZHC RX 250: Performed by: ANESTHESIOLOGY

## 2018-09-11 PROCEDURE — 27210794 ZZH OR GENERAL SUPPLY STERILE: Performed by: OBSTETRICS & GYNECOLOGY

## 2018-09-11 PROCEDURE — 37000011 ZZH ANESTHESIA WARD SERVICE

## 2018-09-11 PROCEDURE — 25000132 ZZH RX MED GY IP 250 OP 250 PS 637: Performed by: OBSTETRICS & GYNECOLOGY

## 2018-09-11 PROCEDURE — 12000037 ZZH R&B POSTPARTUM INTERMEDIATE

## 2018-09-11 RX ORDER — AMOXICILLIN 250 MG
2 CAPSULE ORAL 2 TIMES DAILY PRN
Status: DISCONTINUED | OUTPATIENT
Start: 2018-09-11 | End: 2018-09-15 | Stop reason: HOSPADM

## 2018-09-11 RX ORDER — NALOXONE HYDROCHLORIDE 0.4 MG/ML
.1-.4 INJECTION, SOLUTION INTRAMUSCULAR; INTRAVENOUS; SUBCUTANEOUS
Status: DISCONTINUED | OUTPATIENT
Start: 2018-09-11 | End: 2018-09-15 | Stop reason: HOSPADM

## 2018-09-11 RX ORDER — ONDANSETRON 2 MG/ML
4 INJECTION INTRAMUSCULAR; INTRAVENOUS EVERY 6 HOURS PRN
Status: DISCONTINUED | OUTPATIENT
Start: 2018-09-11 | End: 2018-09-11

## 2018-09-11 RX ORDER — ONDANSETRON 2 MG/ML
INJECTION INTRAMUSCULAR; INTRAVENOUS
Status: DISCONTINUED
Start: 2018-09-11 | End: 2018-09-11 | Stop reason: HOSPADM

## 2018-09-11 RX ORDER — EPHEDRINE SULFATE 50 MG/ML
INJECTION, SOLUTION INTRAMUSCULAR; INTRAVENOUS; SUBCUTANEOUS PRN
Status: DISCONTINUED | OUTPATIENT
Start: 2018-09-11 | End: 2018-09-11

## 2018-09-11 RX ORDER — CARBOPROST TROMETHAMINE 250 UG/ML
250 INJECTION, SOLUTION INTRAMUSCULAR
Status: DISCONTINUED | OUTPATIENT
Start: 2018-09-11 | End: 2018-09-11

## 2018-09-11 RX ORDER — FENTANYL CITRATE 50 UG/ML
INJECTION, SOLUTION INTRAMUSCULAR; INTRAVENOUS
Status: COMPLETED
Start: 2018-09-11 | End: 2018-09-11

## 2018-09-11 RX ORDER — ONDANSETRON 2 MG/ML
4 INJECTION INTRAMUSCULAR; INTRAVENOUS EVERY 6 HOURS PRN
Status: DISCONTINUED | OUTPATIENT
Start: 2018-09-11 | End: 2018-09-15 | Stop reason: HOSPADM

## 2018-09-11 RX ORDER — MORPHINE SULFATE 1 MG/ML
2 INJECTION, SOLUTION EPIDURAL; INTRATHECAL; INTRAVENOUS ONCE
Status: CANCELLED | OUTPATIENT
Start: 2018-09-11 | End: 2018-09-11

## 2018-09-11 RX ORDER — ONDANSETRON 2 MG/ML
4 INJECTION INTRAMUSCULAR; INTRAVENOUS EVERY 30 MIN PRN
Status: CANCELLED | OUTPATIENT
Start: 2018-09-11

## 2018-09-11 RX ORDER — FENTANYL CITRATE 50 UG/ML
INJECTION, SOLUTION INTRAMUSCULAR; INTRAVENOUS PRN
Status: DISCONTINUED | OUTPATIENT
Start: 2018-09-11 | End: 2018-09-12 | Stop reason: HOSPADM

## 2018-09-11 RX ORDER — CHLOROPROCAINE HYDROCHLORIDE 30 MG/ML
INJECTION, SOLUTION EPIDURAL; INFILTRATION; INTRACAUDAL; PERINEURAL
Status: DISCONTINUED
Start: 2018-09-11 | End: 2018-09-11 | Stop reason: WASHOUT

## 2018-09-11 RX ORDER — CITALOPRAM HYDROBROMIDE 20 MG/1
40 TABLET ORAL DAILY
Status: DISCONTINUED | OUTPATIENT
Start: 2018-09-11 | End: 2018-09-15 | Stop reason: HOSPADM

## 2018-09-11 RX ORDER — OXYTOCIN/0.9 % SODIUM CHLORIDE 30/500 ML
100-340 PLASTIC BAG, INJECTION (ML) INTRAVENOUS CONTINUOUS PRN
Status: COMPLETED | OUTPATIENT
Start: 2018-09-11 | End: 2018-09-11

## 2018-09-11 RX ORDER — ACETAMINOPHEN 325 MG/1
975 TABLET ORAL ONCE
Status: COMPLETED | OUTPATIENT
Start: 2018-09-11 | End: 2018-09-11

## 2018-09-11 RX ORDER — BISACODYL 10 MG
10 SUPPOSITORY, RECTAL RECTAL DAILY PRN
Status: DISCONTINUED | OUTPATIENT
Start: 2018-09-13 | End: 2018-09-15 | Stop reason: HOSPADM

## 2018-09-11 RX ORDER — IBUPROFEN 400 MG/1
800 TABLET, FILM COATED ORAL
Status: DISCONTINUED | OUTPATIENT
Start: 2018-09-11 | End: 2018-09-11

## 2018-09-11 RX ORDER — KETOROLAC TROMETHAMINE 30 MG/ML
30 INJECTION, SOLUTION INTRAMUSCULAR; INTRAVENOUS EVERY 6 HOURS
Status: COMPLETED | OUTPATIENT
Start: 2018-09-11 | End: 2018-09-12

## 2018-09-11 RX ORDER — CEFAZOLIN SODIUM 2 G/100ML
2 INJECTION, SOLUTION INTRAVENOUS
Status: COMPLETED | OUTPATIENT
Start: 2018-09-11 | End: 2018-09-11

## 2018-09-11 RX ORDER — OXYTOCIN/0.9 % SODIUM CHLORIDE 30/500 ML
340 PLASTIC BAG, INJECTION (ML) INTRAVENOUS CONTINUOUS PRN
Status: DISCONTINUED | OUTPATIENT
Start: 2018-09-11 | End: 2018-09-15 | Stop reason: HOSPADM

## 2018-09-11 RX ORDER — NALOXONE HYDROCHLORIDE 0.4 MG/ML
.1-.4 INJECTION, SOLUTION INTRAMUSCULAR; INTRAVENOUS; SUBCUTANEOUS
Status: DISCONTINUED | OUTPATIENT
Start: 2018-09-11 | End: 2018-09-11

## 2018-09-11 RX ORDER — LIDOCAINE HYDROCHLORIDE AND EPINEPHRINE 15; 5 MG/ML; UG/ML
INJECTION, SOLUTION EPIDURAL PRN
Status: DISCONTINUED | OUTPATIENT
Start: 2018-09-11 | End: 2018-09-12 | Stop reason: HOSPADM

## 2018-09-11 RX ORDER — ONDANSETRON 4 MG/1
4 TABLET, ORALLY DISINTEGRATING ORAL EVERY 30 MIN PRN
Status: CANCELLED | OUTPATIENT
Start: 2018-09-11

## 2018-09-11 RX ORDER — LIDOCAINE 40 MG/G
CREAM TOPICAL
Status: DISCONTINUED | OUTPATIENT
Start: 2018-09-11 | End: 2018-09-11

## 2018-09-11 RX ORDER — NALBUPHINE HYDROCHLORIDE 10 MG/ML
2.5-5 INJECTION, SOLUTION INTRAMUSCULAR; INTRAVENOUS; SUBCUTANEOUS EVERY 6 HOURS PRN
Status: CANCELLED | OUTPATIENT
Start: 2018-09-11

## 2018-09-11 RX ORDER — LANOLIN 100 %
OINTMENT (GRAM) TOPICAL
Status: DISCONTINUED | OUTPATIENT
Start: 2018-09-11 | End: 2018-09-15 | Stop reason: HOSPADM

## 2018-09-11 RX ORDER — KETOROLAC TROMETHAMINE 30 MG/ML
INJECTION, SOLUTION INTRAMUSCULAR; INTRAVENOUS
Status: COMPLETED
Start: 2018-09-11 | End: 2018-09-11

## 2018-09-11 RX ORDER — FENTANYL CITRATE 50 UG/ML
50-100 INJECTION, SOLUTION INTRAMUSCULAR; INTRAVENOUS
Status: DISCONTINUED | OUTPATIENT
Start: 2018-09-11 | End: 2018-09-11

## 2018-09-11 RX ORDER — NALOXONE HYDROCHLORIDE 0.4 MG/ML
.1-.4 INJECTION, SOLUTION INTRAMUSCULAR; INTRAVENOUS; SUBCUTANEOUS
Status: CANCELLED | OUTPATIENT
Start: 2018-09-11 | End: 2018-09-12

## 2018-09-11 RX ORDER — OXYTOCIN/0.9 % SODIUM CHLORIDE 30/500 ML
100 PLASTIC BAG, INJECTION (ML) INTRAVENOUS CONTINUOUS
Status: DISCONTINUED | OUTPATIENT
Start: 2018-09-11 | End: 2018-09-15 | Stop reason: HOSPADM

## 2018-09-11 RX ORDER — ACETAMINOPHEN 325 MG/1
975 TABLET ORAL EVERY 8 HOURS
Status: COMPLETED | OUTPATIENT
Start: 2018-09-11 | End: 2018-09-14

## 2018-09-11 RX ORDER — LIDOCAINE 40 MG/G
CREAM TOPICAL
Status: DISCONTINUED | OUTPATIENT
Start: 2018-09-11 | End: 2018-09-15 | Stop reason: HOSPADM

## 2018-09-11 RX ORDER — LIDOCAINE HCL/EPINEPHRINE/PF 2%-1:200K
VIAL (ML) INJECTION
Status: COMPLETED
Start: 2018-09-11 | End: 2018-09-11

## 2018-09-11 RX ORDER — AMOXICILLIN 250 MG
1 CAPSULE ORAL 2 TIMES DAILY PRN
Status: DISCONTINUED | OUTPATIENT
Start: 2018-09-11 | End: 2018-09-15 | Stop reason: HOSPADM

## 2018-09-11 RX ORDER — EPHEDRINE SULFATE 50 MG/ML
5 INJECTION, SOLUTION INTRAMUSCULAR; INTRAVENOUS; SUBCUTANEOUS
Status: CANCELLED | OUTPATIENT
Start: 2018-09-11

## 2018-09-11 RX ORDER — SODIUM CHLORIDE, SODIUM LACTATE, POTASSIUM CHLORIDE, CALCIUM CHLORIDE 600; 310; 30; 20 MG/100ML; MG/100ML; MG/100ML; MG/100ML
INJECTION, SOLUTION INTRAVENOUS CONTINUOUS
Status: DISCONTINUED | OUTPATIENT
Start: 2018-09-11 | End: 2018-09-11

## 2018-09-11 RX ORDER — FENTANYL CITRATE 50 UG/ML
25-50 INJECTION, SOLUTION INTRAMUSCULAR; INTRAVENOUS
Status: CANCELLED | OUTPATIENT
Start: 2018-09-11

## 2018-09-11 RX ORDER — NALBUPHINE HYDROCHLORIDE 10 MG/ML
2.5-5 INJECTION, SOLUTION INTRAMUSCULAR; INTRAVENOUS; SUBCUTANEOUS EVERY 6 HOURS PRN
Status: DISCONTINUED | OUTPATIENT
Start: 2018-09-11 | End: 2018-09-11

## 2018-09-11 RX ORDER — ROPIVACAINE HYDROCHLORIDE 2 MG/ML
10 INJECTION, SOLUTION EPIDURAL; INFILTRATION; PERINEURAL ONCE
Status: DISCONTINUED | OUTPATIENT
Start: 2018-09-11 | End: 2018-09-11

## 2018-09-11 RX ORDER — CEFAZOLIN SODIUM 1 G/3ML
1 INJECTION, POWDER, FOR SOLUTION INTRAMUSCULAR; INTRAVENOUS SEE ADMIN INSTRUCTIONS
Status: DISCONTINUED | OUTPATIENT
Start: 2018-09-11 | End: 2018-09-11

## 2018-09-11 RX ORDER — HYDROMORPHONE HYDROCHLORIDE 1 MG/ML
.3-.5 INJECTION, SOLUTION INTRAMUSCULAR; INTRAVENOUS; SUBCUTANEOUS EVERY 30 MIN PRN
Status: DISCONTINUED | OUTPATIENT
Start: 2018-09-11 | End: 2018-09-15 | Stop reason: HOSPADM

## 2018-09-11 RX ORDER — MORPHINE SULFATE 1 MG/ML
INJECTION, SOLUTION EPIDURAL; INTRATHECAL; INTRAVENOUS
Status: DISCONTINUED
Start: 2018-09-11 | End: 2018-09-11 | Stop reason: WASHOUT

## 2018-09-11 RX ORDER — SIMETHICONE 80 MG
80 TABLET,CHEWABLE ORAL 4 TIMES DAILY PRN
Status: DISCONTINUED | OUTPATIENT
Start: 2018-09-11 | End: 2018-09-15 | Stop reason: HOSPADM

## 2018-09-11 RX ORDER — OXYTOCIN/0.9 % SODIUM CHLORIDE 30/500 ML
PLASTIC BAG, INJECTION (ML) INTRAVENOUS
Status: DISCONTINUED
Start: 2018-09-11 | End: 2018-09-11 | Stop reason: HOSPADM

## 2018-09-11 RX ORDER — LIDOCAINE 40 MG/G
CREAM TOPICAL
Status: CANCELLED | OUTPATIENT
Start: 2018-09-11

## 2018-09-11 RX ORDER — NALBUPHINE HYDROCHLORIDE 10 MG/ML
2.5-5 INJECTION, SOLUTION INTRAMUSCULAR; INTRAVENOUS; SUBCUTANEOUS EVERY 6 HOURS PRN
Status: DISCONTINUED | OUTPATIENT
Start: 2018-09-11 | End: 2018-09-15 | Stop reason: HOSPADM

## 2018-09-11 RX ORDER — SODIUM CHLORIDE, SODIUM LACTATE, POTASSIUM CHLORIDE, CALCIUM CHLORIDE 600; 310; 30; 20 MG/100ML; MG/100ML; MG/100ML; MG/100ML
INJECTION, SOLUTION INTRAVENOUS CONTINUOUS
Status: CANCELLED | OUTPATIENT
Start: 2018-09-11

## 2018-09-11 RX ORDER — BUPIVACAINE HYDROCHLORIDE 7.5 MG/ML
INJECTION, SOLUTION INTRASPINAL
Status: DISCONTINUED
Start: 2018-09-11 | End: 2018-09-11 | Stop reason: HOSPADM

## 2018-09-11 RX ORDER — ROPIVACAINE HYDROCHLORIDE 2 MG/ML
INJECTION, SOLUTION EPIDURAL; INFILTRATION; PERINEURAL PRN
Status: DISCONTINUED | OUTPATIENT
Start: 2018-09-11 | End: 2018-09-12 | Stop reason: HOSPADM

## 2018-09-11 RX ORDER — IBUPROFEN 400 MG/1
800 TABLET, FILM COATED ORAL EVERY 6 HOURS PRN
Status: DISCONTINUED | OUTPATIENT
Start: 2018-09-11 | End: 2018-09-15 | Stop reason: HOSPADM

## 2018-09-11 RX ORDER — CITRIC ACID/SODIUM CITRATE 334-500MG
30 SOLUTION, ORAL ORAL
Status: DISCONTINUED | OUTPATIENT
Start: 2018-09-11 | End: 2018-09-11

## 2018-09-11 RX ORDER — ACETAMINOPHEN 325 MG/1
650 TABLET ORAL EVERY 4 HOURS PRN
Status: DISCONTINUED | OUTPATIENT
Start: 2018-09-14 | End: 2018-09-15 | Stop reason: HOSPADM

## 2018-09-11 RX ORDER — CITRIC ACID/SODIUM CITRATE 334-500MG
SOLUTION, ORAL ORAL PRN
Status: DISCONTINUED | OUTPATIENT
Start: 2018-09-11 | End: 2018-09-11

## 2018-09-11 RX ORDER — ONDANSETRON 4 MG/1
4 TABLET, ORALLY DISINTEGRATING ORAL EVERY 6 HOURS PRN
Status: DISCONTINUED | OUTPATIENT
Start: 2018-09-11 | End: 2018-09-15 | Stop reason: HOSPADM

## 2018-09-11 RX ORDER — ACETAMINOPHEN 325 MG/1
650 TABLET ORAL EVERY 4 HOURS PRN
Status: DISCONTINUED | OUTPATIENT
Start: 2018-09-11 | End: 2018-09-11

## 2018-09-11 RX ORDER — DEXTROSE, SODIUM CHLORIDE, SODIUM LACTATE, POTASSIUM CHLORIDE, AND CALCIUM CHLORIDE 5; .6; .31; .03; .02 G/100ML; G/100ML; G/100ML; G/100ML; G/100ML
INJECTION, SOLUTION INTRAVENOUS CONTINUOUS
Status: DISCONTINUED | OUTPATIENT
Start: 2018-09-11 | End: 2018-09-15 | Stop reason: HOSPADM

## 2018-09-11 RX ORDER — EPHEDRINE SULFATE 50 MG/ML
5 INJECTION, SOLUTION INTRAMUSCULAR; INTRAVENOUS; SUBCUTANEOUS
Status: DISCONTINUED | OUTPATIENT
Start: 2018-09-11 | End: 2018-09-15 | Stop reason: HOSPADM

## 2018-09-11 RX ORDER — HYDROCORTISONE 2.5 %
CREAM (GRAM) TOPICAL 3 TIMES DAILY PRN
Status: DISCONTINUED | OUTPATIENT
Start: 2018-09-11 | End: 2018-09-15 | Stop reason: HOSPADM

## 2018-09-11 RX ORDER — LIDOCAINE HYDROCHLORIDE AND EPINEPHRINE 15; 5 MG/ML; UG/ML
3 INJECTION, SOLUTION EPIDURAL
Status: DISCONTINUED | OUTPATIENT
Start: 2018-09-11 | End: 2018-09-11

## 2018-09-11 RX ORDER — HYDROMORPHONE HYDROCHLORIDE 1 MG/ML
.3-.5 INJECTION, SOLUTION INTRAMUSCULAR; INTRAVENOUS; SUBCUTANEOUS EVERY 5 MIN PRN
Status: CANCELLED | OUTPATIENT
Start: 2018-09-11

## 2018-09-11 RX ORDER — OXYCODONE HYDROCHLORIDE 5 MG/1
5-10 TABLET ORAL
Status: DISCONTINUED | OUTPATIENT
Start: 2018-09-11 | End: 2018-09-15 | Stop reason: HOSPADM

## 2018-09-11 RX ORDER — OXYTOCIN 10 [USP'U]/ML
10 INJECTION, SOLUTION INTRAMUSCULAR; INTRAVENOUS
Status: DISCONTINUED | OUTPATIENT
Start: 2018-09-11 | End: 2018-09-11

## 2018-09-11 RX ORDER — EPHEDRINE SULFATE 50 MG/ML
5 INJECTION, SOLUTION INTRAMUSCULAR; INTRAVENOUS; SUBCUTANEOUS
Status: DISCONTINUED | OUTPATIENT
Start: 2018-09-11 | End: 2018-09-11

## 2018-09-11 RX ORDER — FENTANYL CITRATE 50 UG/ML
100 INJECTION, SOLUTION INTRAMUSCULAR; INTRAVENOUS ONCE
Status: COMPLETED | OUTPATIENT
Start: 2018-09-11 | End: 2018-09-11

## 2018-09-11 RX ORDER — METHYLERGONOVINE MALEATE 0.2 MG/ML
200 INJECTION INTRAVENOUS
Status: DISCONTINUED | OUTPATIENT
Start: 2018-09-11 | End: 2018-09-11

## 2018-09-11 RX ORDER — LIDOCAINE HCL/EPINEPHRINE/PF 2%-1:200K
VIAL (ML) INJECTION PRN
Status: DISCONTINUED | OUTPATIENT
Start: 2018-09-11 | End: 2018-09-11

## 2018-09-11 RX ORDER — ONDANSETRON 2 MG/ML
4 INJECTION INTRAMUSCULAR; INTRAVENOUS EVERY 6 HOURS PRN
Status: DISCONTINUED | OUTPATIENT
Start: 2018-09-11 | End: 2018-09-11 | Stop reason: CLARIF

## 2018-09-11 RX ORDER — OXYCODONE AND ACETAMINOPHEN 5; 325 MG/1; MG/1
1 TABLET ORAL
Status: DISCONTINUED | OUTPATIENT
Start: 2018-09-11 | End: 2018-09-11

## 2018-09-11 RX ORDER — NALOXONE HYDROCHLORIDE 0.4 MG/ML
.1-.4 INJECTION, SOLUTION INTRAMUSCULAR; INTRAVENOUS; SUBCUTANEOUS
Status: CANCELLED | OUTPATIENT
Start: 2018-09-11

## 2018-09-11 RX ORDER — ONDANSETRON 2 MG/ML
INJECTION INTRAMUSCULAR; INTRAVENOUS PRN
Status: DISCONTINUED | OUTPATIENT
Start: 2018-09-11 | End: 2018-09-11

## 2018-09-11 RX ORDER — OXYTOCIN 10 [USP'U]/ML
10 INJECTION, SOLUTION INTRAMUSCULAR; INTRAVENOUS
Status: DISCONTINUED | OUTPATIENT
Start: 2018-09-11 | End: 2018-09-15 | Stop reason: HOSPADM

## 2018-09-11 RX ADMIN — LIDOCAINE HYDROCHLORIDE,EPINEPHRINE BITARTRATE 10 ML: 20; .005 INJECTION, SOLUTION EPIDURAL; INFILTRATION; INTRACAUDAL; PERINEURAL at 13:05

## 2018-09-11 RX ADMIN — ROPIVACAINE HYDROCHLORIDE 9 ML: 2 INJECTION, SOLUTION EPIDURAL; INFILTRATION at 11:27

## 2018-09-11 RX ADMIN — CEFAZOLIN SODIUM 2 G: 2 INJECTION, SOLUTION INTRAVENOUS at 13:10

## 2018-09-11 RX ADMIN — SODIUM CHLORIDE, SODIUM LACTATE, POTASSIUM CHLORIDE, CALCIUM CHLORIDE AND DEXTROSE MONOHYDRATE: 5; 600; 310; 30; 20 INJECTION, SOLUTION INTRAVENOUS at 22:00

## 2018-09-11 RX ADMIN — KETOROLAC TROMETHAMINE 30 MG: 30 INJECTION, SOLUTION INTRAMUSCULAR at 20:53

## 2018-09-11 RX ADMIN — Medication 340 ML/HR: at 13:24

## 2018-09-11 RX ADMIN — SODIUM CITRATE AND CITRIC ACID MONOHYDRATE 30 ML: 500; 334 SOLUTION ORAL at 13:00

## 2018-09-11 RX ADMIN — SODIUM CHLORIDE, POTASSIUM CHLORIDE, SODIUM LACTATE AND CALCIUM CHLORIDE 1000 ML: 600; 310; 30; 20 INJECTION, SOLUTION INTRAVENOUS at 12:00

## 2018-09-11 RX ADMIN — Medication 0.3 MG: at 17:54

## 2018-09-11 RX ADMIN — ONDANSETRON 4 MG: 2 INJECTION INTRAMUSCULAR; INTRAVENOUS at 13:06

## 2018-09-11 RX ADMIN — KETOROLAC TROMETHAMINE 30 MG: 30 INJECTION, SOLUTION INTRAMUSCULAR at 14:50

## 2018-09-11 RX ADMIN — LIDOCAINE HYDROCHLORIDE,EPINEPHRINE BITARTRATE 3 ML: 15; .005 INJECTION, SOLUTION EPIDURAL; INFILTRATION; INTRACAUDAL; PERINEURAL at 11:22

## 2018-09-11 RX ADMIN — OXYTOCIN-SODIUM CHLORIDE 0.9% IV SOLN 30 UNIT/500ML 100 ML/HR: 30-0.9/5 SOLUTION at 17:27

## 2018-09-11 RX ADMIN — SODIUM CHLORIDE, POTASSIUM CHLORIDE, SODIUM LACTATE AND CALCIUM CHLORIDE 1000 ML: 600; 310; 30; 20 INJECTION, SOLUTION INTRAVENOUS at 10:38

## 2018-09-11 RX ADMIN — SODIUM CHLORIDE, POTASSIUM CHLORIDE, SODIUM LACTATE AND CALCIUM CHLORIDE: 600; 310; 30; 20 INJECTION, SOLUTION INTRAVENOUS at 12:30

## 2018-09-11 RX ADMIN — FENTANYL CITRATE 100 MCG: 50 INJECTION, SOLUTION INTRAMUSCULAR; INTRAVENOUS at 11:27

## 2018-09-11 RX ADMIN — PHENYLEPHRINE HYDROCHLORIDE 50 MCG: 10 INJECTION, SOLUTION INTRAMUSCULAR; INTRAVENOUS; SUBCUTANEOUS at 13:06

## 2018-09-11 RX ADMIN — ACETAMINOPHEN 975 MG: 325 TABLET, FILM COATED ORAL at 19:48

## 2018-09-11 RX ADMIN — Medication 10 MG: at 13:07

## 2018-09-11 RX ADMIN — LIDOCAINE HYDROCHLORIDE,EPINEPHRINE BITARTRATE 5 ML: 20; .005 INJECTION, SOLUTION EPIDURAL; INFILTRATION; INTRACAUDAL; PERINEURAL at 13:11

## 2018-09-11 RX ADMIN — Medication 10 MG: at 13:05

## 2018-09-11 RX ADMIN — Medication 12 ML/HR: at 11:52

## 2018-09-11 RX ADMIN — SODIUM CHLORIDE, POTASSIUM CHLORIDE, SODIUM LACTATE AND CALCIUM CHLORIDE: 600; 310; 30; 20 INJECTION, SOLUTION INTRAVENOUS at 07:45

## 2018-09-11 RX ADMIN — Medication 10 MG: at 13:13

## 2018-09-11 RX ADMIN — ACETAMINOPHEN 975 MG: 325 TABLET ORAL at 12:30

## 2018-09-11 RX ADMIN — Medication 0.5 MG: at 19:48

## 2018-09-11 RX ADMIN — FENTANYL CITRATE 100 MCG: 50 INJECTION, SOLUTION INTRAMUSCULAR; INTRAVENOUS at 13:14

## 2018-09-11 ASSESSMENT — ACTIVITIES OF DAILY LIVING (ADL)
RETIRED_EATING: 0-->INDEPENDENT
BATHING: 0-->INDEPENDENT
COGNITION: 0 - NO COGNITION ISSUES REPORTED
FALL_HISTORY_WITHIN_LAST_SIX_MONTHS: NO
TRANSFERRING: 0-->INDEPENDENT
DRESS: 0-->INDEPENDENT
AMBULATION: 0-->INDEPENDENT
TOILETING: 0-->INDEPENDENT
SWALLOWING: 0-->SWALLOWS FOODS/LIQUIDS WITHOUT DIFFICULTY
RETIRED_COMMUNICATION: 0-->UNDERSTANDS/COMMUNICATES WITHOUT DIFFICULTY

## 2018-09-11 NOTE — PLAN OF CARE
[facility] for R/O labor.  External monitors applied.  Assessment and admission completed.

## 2018-09-11 NOTE — TELEPHONE ENCOUNTER
"  Reason for Disposition    [1] First baby (primipara) AND [2] contractions < 6 minutes apart  AND [3] present 2 hours    Additional Information    Negative: Passed out (i.e., lost consciousness, collapsed and was not responding)    Negative: Shock suspected (e.g., cold/pale/clammy skin, too weak to stand, low BP, rapid pulse)    Negative: Difficult to awaken or acting confused  (e.g., disoriented, slurred speech)    Negative: [1] SEVERE abdominal pain (e.g., excruciating) AND [2] constant AND [3] present > 1 hour    Negative: Severe bleeding (e.g., continuous red blood from vagina, or large blood clots)    Negative: Umbilical cord hanging out of the vagina (shiny, white, curled appearance, \"like telephone cord\")    Negative: Uncontrollable urge to push (i.e., feels like baby is coming out now)    Negative: Can see baby    Negative: Sounds like a life-threatening emergency to the triager    Negative: Pregnant < 37 weeks (i.e., )    Negative: [1] Uncertain delivery date AND [2] possibly pregnant < 37 weeks (i.e., )    Protocols used: PREGNANCY - LABOR-ADULT-  Caller is pregnant and states she is having contractions that are 4 minutes apart for at least 2 hours. Callers CODY is 17. Triage guidelines recommend to go to labor and delivery. Triager called out to labor and delivery at [facility] to give staff nurse update on patient's arrival.  "

## 2018-09-11 NOTE — PLAN OF CARE
Data: Dayana Danielle transferred to [facility] via bed at 1600. Baby transferred via parent's arms.  Action: Receiving unit notified of transfer: Yes. Patient and family notified of room change. Report given to Jackie Munroe RN at 1605. Belongings sent to receiving unit. Accompanied by Registered Nurse. Oriented patient to surroundings. Call light within reach. ID bands double-checked with receiving RN.  Response: Patient tolerated transfer and is stable.

## 2018-09-11 NOTE — ANESTHESIA CARE TRANSFER NOTE
Patient: Dayana Danielle    Procedure(s):  PRIMARY  SECTION  - Wound Class: II-Clean Contaminated    Diagnosis: GENERALIZED ANXIETY ; PTSD   Diagnosis Additional Information: No value filed.    Anesthesia Type:   Epidural     Note:  Airway :Room Air  Patient transferred to:PACU        Vitals: (Last set prior to Anesthesia Care Transfer)    CR[facility] VITALS  2018 1339 - 2018 1413      2018             Resp Rate (set): 10    EKG: NSR                Electronically Signed By: CHEY Vaca CRNA  2018  2:13 PM

## 2018-09-11 NOTE — ANESTHESIA PREPROCEDURE EVALUATION
Anesthesia Evaluation     .             ROS/MED HX    ENT/Pulmonary:  - neg pulmonary ROS     Neurologic:       Cardiovascular:  - neg cardiovascular ROS       METS/Exercise Tolerance:  >4 METS   Hematologic:         Musculoskeletal:         GI/Hepatic:        (-) GERD   Renal/Genitourinary:         Endo:         Psychiatric:     (+) psychiatric history anxiety and depression      Infectious Disease:         Malignancy:         Other:                     Physical Exam  Normal systems: dental    Airway   Mallampati: II  TM distance: >3 FB  Neck ROM: full    Dental     Cardiovascular   Rhythm and rate: regular and normal      Pulmonary    breath sounds clear to auscultation                    Anesthesia Plan      History & Physical Review  History and physical reviewed and following examination; no interval change.    ASA Status:  2 .        Plan for Epidural          Postoperative Care      Consents  Anesthetic plan, risks, benefits and alternatives discussed with:  Patient..                          .

## 2018-09-11 NOTE — ANESTHESIA POSTPROCEDURE EVALUATION
Patient: Dayana Danielle    Procedure(s):  PRIMARY  SECTION  - Wound Class: II-Clean Contaminated    Diagnosis:GENERALIZED ANXIETY ; PTSD   Diagnosis Additional Information: No value filed.    Anesthesia Type:  Epidural    Note:  Anesthesia Post Evaluation    Patient location during evaluation: PACU  Patient participation: Able to fully participate in evaluation  Level of consciousness: awake  Pain management: adequate  Airway patency: patent  Cardiovascular status: acceptable  Respiratory status: acceptable  Hydration status: acceptable  PONV: none     Anesthetic complications: None          Last vitals:  Vitals:    18 1555 18 1556 18 1557   BP:   110/69   Pulse:      Resp:   19   Temp:      SpO2: 97% 96% 96%         Electronically Signed By: Barry Kaplan MD  2018  4:22 PM

## 2018-09-11 NOTE — PLAN OF CARE
0715-Bedside report received from Consuelo Griffin RN. Care of pt assumed.   Prenatal record reviewed. Providers plan reviewed. Physical assessment obtained. POC reviewed with pt and spouse. Both verbalize understanding and agreement. Pt is currently toya every 3-5 minutes and breathing well through them. States she plans for a possible epidural if she continues to progress toward a vaginal delivery.   0745-PIV placed without difficulty.   0830-Dr. Casanova  At the bedside. Labor POC reviewed with pt. Verbal consent for AROM received. SVE/AROM performed. Scant fluid noted.   0900-Pt requesting an epidural. SVE performed. Dr. Casanova consulted. Pt is ok to use Nitrous or get an epidural if she wishes per provider.   0915-Nitrous set up per pt request. Nitrous patient agreement reviewed and signed.  0930-Pt reports good pain relief with the Nitrous. Fht's 135 CAT I.  0945-Dr. Casanova updated by text page regarding pt preference for spinal anesthesia for surgery, use of nitrous for labor support in the meantime, SVE and fht's.   1030-Pt requesting epidural for pain management. IVF bolus started. Dr. Casanova updated regarding pts request.  1230-Dr. Casanova at bedside. Informed consent for primary  section as previously planned signed with provider.   1258-Out of room to OB OR1

## 2018-09-11 NOTE — L&D DELIVERY NOTE
Obstetrics Brief Operative Note    Pre-operative diagnosis: 41+0wks  anxiety   Post-operative diagnosis: Same   Procedure: Primary low transverse  section   Surgeon: Nia Casanova MD   Assistant(s): None   Anesthesia: Spinal anesthesia   Estimated blood loss: 600ml   Complications: None   Findings: Live   Male  APGARS: 1 minute: 8   5 minute: 9  Weight: 8lbs 6oz   Placenta: normal  Tubes: normal  Uterus: normal  Ovaries: normal     Primary OB: Casanova

## 2018-09-11 NOTE — IP AVS SNAPSHOT
[address]    [place] [address]    Phone:  [phone]                                       After Visit Summary   9/11/2018    Dayana Danielle    MRN: [id number]           After Visit Summary Signature Page     I have received my discharge instructions, and my questions have been answered. I have discussed any challenges I see with this plan with the nurse or doctor.    ..........................................................................................................................................  Patient/Patient Representative Signature      ..........................................................................................................................................  Patient Representative Print Name and Relationship to Patient    ..................................................               ................................................  Date                                   Time    ..........................................................................................................................................  Reviewed by Signature/Title    ...................................................              ..............................................  Date                                               Time          22EPIC Rev 08/18

## 2018-09-11 NOTE — IP AVS SNAPSHOT
MRN:[id number]                      After Visit Summary   2018    Dayana Danielle    MRN: [id number]           Thank you!     Thank you for choosing [facility] for your care. Our goal is always to provide you with excellent care. Hearing back from our patients is one way we can continue to improve our services. Please take a few minutes to complete the written survey that you may receive in the mail after you visit with us. Thank you!        Patient Information     Date Of Birth          1990        Designated Caregiver       Most Recent Value    Caregiver    Will someone help with your care after discharge? no      About your hospital stay     You were admitted on:  2018 You last received care in the:  [address]    You were discharged on:  September 15, 2018        Reason for your hospital stay       Maternity care                  Who to Call     For medical emergencies, please call 911.  For non-urgent questions about your medical care, please call your primary care provider or clinic, [phone]  For questions related to your surgery, please call your surgery clinic        Attending Provider     Provider Specialty    Nia Casanova MD OB/Gyn       Primary Care Provider Office Phone # Fax #    Cheri CHEY Rodriguez [facility] [phone]      After Care Instructions     Activity       Review discharge instructions            Diet       Resume previous diet            Discharge Instructions - Postpartum visit       Schedule postpartum visit with your provider and return to clinic in 6 weeks.                  Further instructions from your care team       Postop  Birth Instructions    Activity       Do not lift more than 10 pounds for 6 weeks after surgery.  Ask family and friends for help when you need it.    No driving until you have stopped taking your pain medications (usually two weeks after  surgery).    No heavy exercise or activity for 6 weeks.  Don't do anything that will put a strain on your surgery site.    Don't strain when using the toilet.  Your care team may prescribe a stool softener if you have problems with your bowel movements.     To care for your incision:       Keep the incision clean and dry.    Do not soak your incision in water. No swimming or hot tubs until it has fully healed. You may soak in the bathtub if the water level is below your incision.    Do not use peroxide, gel, cream, lotion, or ointment on your incision.    Adjust your clothes to avoid pressure on your surgery site (check the elastic in your underwear for example).     You may see a small amount of clear or pink drainage and this is normal.  Check with your health care provider:       If the drainage increases or has an odor.    If the incision reddens, you have swelling, or develop a rash.    If you have increased pain and the medicine we prescribed doesn't help.    If you have a fever above 100.4 F (38 C) with or without chills when placing thermometer under your tongue.   The area around your incision (surgery wound), will feel numb.  This is normal. The numbness should go away in less than a year.     Keep your hands clean:  Always wash your hands before touching your incision (surgery wound). This helps reduce your risk of infection. If your hands aren't dirty, you may use an alcohol hand-rub to clean your hands. Keep your nails clean and short.    Call your healthcare provider if you have any of these symptoms:       You soak a sanitary pad with blood within 1 hour, or you see blood clots larger than a golf ball.    Bleeding that lasts more than 6 weeks.    Vaginal discharge that smells bad.    Severe pain, cramping or tenderness in your lower belly area.    A need to urinate more frequently (use the toilet more often), more urgently (use the toilet very quickly), or it burns when you urinate.    Nausea and  "vomiting.    Redness, swelling or pain around a vein in your leg.    Problems breastfeeding or a red or painful area on your breast.    Chest pain and cough or are gasping for air.    Problems with coping with sadness, anxiety or depression. If you have concerns about hurting yourself or the baby, call your provider immediately.      You have questions or concerns after you return home.                  Pending Results     No orders found from 9/9/2018 to 9/12/2018.            Statement of Approval     Ordered          09/15/18 0846  I have reviewed and agree with all the recommendations and orders detailed in this document.  EFFECTIVE NOW     Approved and electronically signed by:  Teresa Cruz MD             Admission Information     Date & Time Provider Department Dept. Phone    9/11/2018 Nia Casanova MD [address] [phone]      Your Vitals Were     Blood Pressure Pulse Temperature Respirations Height Weight    100/52 66 97.9  F (36.6  C) (Oral) 16 1.753 m (5' 9\") 102.1 kg (225 lb)    Last Period Pulse Oximetry BMI (Body Mass Index)             11/28/2017 98% 33.23 kg/m2         MyChart Information     [email or web address] gives you secure access to your electronic health record. If you see a primary care provider, you can also send messages to your care team and make appointments. If you have questions, please call your primary care clinic.  If you do not have a primary care provider, please call [phone] and they will assist you.        Care EveryWhere ID     This is your Care EveryWhere ID. This could be used by other organizations to access your [facility] medical records  [id number]        Equal Access to Services     [facility]: Hadii brandon Mustafa, waverada luqadaha, qaybta kaaljyothi almanza . [facility] [phone].    ATENCIÓN: Si habla español, tiene a dumont disposición servicios gratuitos de asistencia " lingüísticclauGopi Marin al [phone].    We comply with applicable federal civil rights laws and [place] laws. We do not discriminate on the basis of race, color, national origin, age, disability, sex, sexual orientation, or gender identity.               Review of your medicines      START taking        Dose / Directions    oxyCODONE IR 5 MG tablet   Commonly known as:  ROXICODONE        Dose:  5 mg   Take 1 tablet (5 mg) by mouth every 4 hours as needed for other (pain control or improvement in physical function. Hold dose for analgesic side effects.)   Quantity:  30 tablet   Refills:  0         CONTINUE these medicines which have NOT CHANGED        Dose / Directions    citalopram 40 MG tablet   Commonly known as:  celeXA   Used for:  Moderate episode of recurrent major depressive disorder (H)        1 tablet orally daily   Quantity:  90 tablet   Refills:  1       prenatal multivitamin plus iron 27-0.8 MG Tabs per tablet   Indication:  2 gummies a day        Dose:  2 tablet   Take 2 tablets by mouth daily   Refills:  0            Where to get your medicines      Some of these will need a paper prescription and others can be bought over the counter. Ask your nurse if you have questions.     Bring a paper prescription for each of these medications     oxyCODONE IR 5 MG tablet                Protect others around you: Learn how to safely use, store and throw away your medicines at www.disposemymeds.org.        Information about OPIOIDS     PRESCRIPTION OPIOIDS: WHAT YOU NEED TO KNOW   We gave you an opioid (narcotic) pain medicine. It is important to manage your pain, but opioids are not always the best choice. You should first try all the other options your care team gave you. Take this medicine for as short a time (and as few doses) as possible.    Some activities can increase your pain, such as bandage changes or therapy sessions. It may help to take your pain medicine 30 to 60 minutes before these activities.  Reduce your stress by getting enough sleep, working on hobbies you enjoy and practicing relaxation or meditation. Talk to your care team about ways to manage your pain beyond prescription opioids.    These medicines have risks:    DO NOT drive when on new or higher doses of pain medicine. These medicines can affect your alertness and reaction times, and you could be arrested for driving under the influence (DUI). If you need to use opioids long-term, talk to your care team about driving.    DO NOT operate heavy machinery    DO NOT do any other dangerous activities while taking these medicines.    DO NOT drink any alcohol while taking these medicines.     If the opioid prescribed includes acetaminophen, DO NOT take with any other medicines that contain acetaminophen. Read all labels carefully. Look for the word  acetaminophen  or  Tylenol.  Ask your pharmacist if you have questions or are unsure.    You can get addicted to pain medicines, especially if you have a history of addiction (chemical, alcohol or substance dependence). Talk to your care team about ways to reduce this risk.    All opioids tend to cause constipation. Drink plenty of water and eat foods that have a lot of fiber, such as fruits, vegetables, prune juice, apple juice and high-fiber cereal. Take a laxative (Miralax, milk of magnesia, Colace, Senna) if you don t move your bowels at least every other day. Other side effects include upset stomach, sleepiness, dizziness, throwing up, tolerance (needing more of the medicine to have the same effect), physical dependence and slowed breathing.    Store your pills in a secure place, locked if possible. We will not replace any lost or stolen medicine. If you don t finish your medicine, please throw away (dispose) as directed by your pharmacist. The [place] Pollution Control Agency has more information about safe disposal: https://www.pca.[facility].[place].us/living-green/managing-unwanted-medications              Medication List: This is a list of all your medications and when to take them. Check marks below indicate your daily home schedule. Keep this list as a reference.      Medications           Morning Afternoon Evening Bedtime As Needed    citalopram 40 MG tablet   Commonly known as:  celeXA   1 tablet orally daily   Last time this was given:  40 mg on 9/15/2018  9:35 AM                                oxyCODONE IR 5 MG tablet   Commonly known as:  ROXICODONE   Take 1 tablet (5 mg) by mouth every 4 hours as needed for other (pain control or improvement in physical function. Hold dose for analgesic side effects.)   Last time this was given:  10 mg on 9/15/2018  9:35 AM                                prenatal multivitamin plus iron 27-0.8 MG Tabs per tablet   Take 2 tablets by mouth daily

## 2018-09-11 NOTE — ANESTHESIA PROCEDURE NOTES
Peripheral nerve/Neuraxial procedure note : epidural catheter  Pre-Procedure  Performed by CONSTANTINO GONZALEZ  Location: OB      Pre-Anesthestic Checklist: patient identified, IV checked, risks and benefits discussed, informed consent and pre-op evaluation    Timeout  Correct Patient: Yes   Correct Procedure: Yes   Correct Site: Yes   Correct Laterality: N/A   Correct Position: Yes   Site Marked: N/A   .   Procedure Documentation    .    Procedure:    Epidural catheter.  Insertion Site:L3-4  (midline approach) Injection technique: LORT saline   Local skin infiltrated with mL of 1% lidocaine.  JOSEFINA at 5 cm     Patient Prep;mask, sterile gloves, povidone-iodine 7.5% surgical scrub, patient draped.  .  Needle: [employer] needle Needle Gauge: 17.    Needle Length (Inches) 3.5  # of attempts: 1 and # of redirects:  .   . .  Catheter threaded easily  4 cm epidural space.  9 cm at skin.   .    Assessment/Narrative  Paresthesias: No.  .  .  Aspiration negative for heme or CSF  . Test dose of 3 mL lidocaine 1.5% w/ 1:200,000 epinephrine at. Test dose negative for signs of intravascular, subdural or intrathecal injection. Comments:  [place] JOSEFINA at 4.  Catheter threaded easily.  Negative aspiration, Negative test dose.  No abnormal pain or paresthesia throughout.  Patient tolerated well.

## 2018-09-12 LAB — HGB BLD-MCNC: 8.8 G/DL (ref 11.7–15.7)

## 2018-09-12 PROCEDURE — 25000132 ZZH RX MED GY IP 250 OP 250 PS 637: Performed by: OBSTETRICS & GYNECOLOGY

## 2018-09-12 PROCEDURE — 36415 COLL VENOUS BLD VENIPUNCTURE: CPT | Performed by: OBSTETRICS & GYNECOLOGY

## 2018-09-12 PROCEDURE — 25000128 H RX IP 250 OP 636: Performed by: OBSTETRICS & GYNECOLOGY

## 2018-09-12 PROCEDURE — 12000037 ZZH R&B POSTPARTUM INTERMEDIATE

## 2018-09-12 PROCEDURE — 85018 HEMOGLOBIN: CPT | Performed by: OBSTETRICS & GYNECOLOGY

## 2018-09-12 RX ORDER — HYDROXYZINE HYDROCHLORIDE 25 MG/1
25-50 TABLET, FILM COATED ORAL EVERY 4 HOURS PRN
Status: DISCONTINUED | OUTPATIENT
Start: 2018-09-12 | End: 2018-09-15 | Stop reason: HOSPADM

## 2018-09-12 RX ORDER — FERROUS SULFATE 325(65) MG
325 TABLET ORAL DAILY
Status: DISCONTINUED | OUTPATIENT
Start: 2018-09-12 | End: 2018-09-15 | Stop reason: HOSPADM

## 2018-09-12 RX ADMIN — OXYCODONE HYDROCHLORIDE 10 MG: 5 TABLET ORAL at 12:29

## 2018-09-12 RX ADMIN — SODIUM CHLORIDE, SODIUM LACTATE, POTASSIUM CHLORIDE, CALCIUM CHLORIDE AND DEXTROSE MONOHYDRATE: 5; 600; 310; 30; 20 INJECTION, SOLUTION INTRAVENOUS at 06:13

## 2018-09-12 RX ADMIN — HYDROXYZINE HYDROCHLORIDE 50 MG: 25 TABLET ORAL at 23:39

## 2018-09-12 RX ADMIN — ACETAMINOPHEN 975 MG: 325 TABLET, FILM COATED ORAL at 13:41

## 2018-09-12 RX ADMIN — IBUPROFEN 800 MG: 400 TABLET ORAL at 22:04

## 2018-09-12 RX ADMIN — SIMETHICONE CHEW TAB 80 MG 80 MG: 80 TABLET ORAL at 23:03

## 2018-09-12 RX ADMIN — SENNOSIDES AND DOCUSATE SODIUM 1 TABLET: 8.6; 5 TABLET ORAL at 09:21

## 2018-09-12 RX ADMIN — ACETAMINOPHEN 975 MG: 325 TABLET, FILM COATED ORAL at 06:09

## 2018-09-12 RX ADMIN — OXYCODONE HYDROCHLORIDE 10 MG: 5 TABLET ORAL at 22:03

## 2018-09-12 RX ADMIN — ACETAMINOPHEN 975 MG: 325 TABLET, FILM COATED ORAL at 22:03

## 2018-09-12 RX ADMIN — Medication 0.5 MG: at 01:00

## 2018-09-12 RX ADMIN — SODIUM CHLORIDE 500 ML: 9 INJECTION, SOLUTION INTRAVENOUS at 00:28

## 2018-09-12 RX ADMIN — OXYCODONE HYDROCHLORIDE 10 MG: 5 TABLET ORAL at 15:22

## 2018-09-12 RX ADMIN — Medication 0.5 MG: at 03:24

## 2018-09-12 RX ADMIN — SENNOSIDES AND DOCUSATE SODIUM 1 TABLET: 8.6; 5 TABLET ORAL at 22:03

## 2018-09-12 RX ADMIN — FERROUS SULFATE TAB 325 MG (65 MG ELEMENTAL FE) 325 MG: 325 (65 FE) TAB at 14:57

## 2018-09-12 RX ADMIN — OXYCODONE HYDROCHLORIDE 5 MG: 5 TABLET ORAL at 19:03

## 2018-09-12 RX ADMIN — KETOROLAC TROMETHAMINE 30 MG: 30 INJECTION, SOLUTION INTRAMUSCULAR at 09:22

## 2018-09-12 RX ADMIN — OXYCODONE HYDROCHLORIDE 10 MG: 5 TABLET ORAL at 09:21

## 2018-09-12 RX ADMIN — KETOROLAC TROMETHAMINE 30 MG: 30 INJECTION, SOLUTION INTRAMUSCULAR at 03:24

## 2018-09-12 RX ADMIN — CITALOPRAM HYDROBROMIDE 40 MG: 20 TABLET ORAL at 09:22

## 2018-09-12 RX ADMIN — OXYCODONE HYDROCHLORIDE 5 MG: 5 TABLET ORAL at 18:36

## 2018-09-12 RX ADMIN — IBUPROFEN 800 MG: 400 TABLET ORAL at 15:24

## 2018-09-12 NOTE — H&P
No significant change in general health status based on examination of the patient, review of Nursing Admission Database and prenatal record.  27yo  admitted at 41wks in early labor.  Scheduled for primary  section later today due to extreme anxiety about labor and the possibility of labor augmentation.  Would like to manage expectantly for the morning but would like to keep option of scheduled c/s at 1300 open.  Pt requests AROM.  Cervix still 3/80/-2 and quite posterior.  AROM performed for thin meconium stained fluid.  Epidural if desired and will re-evaluate between - for plan.  Pregnancy complicated by anxiety and managed with citalopram and therapy.    EFW: 8lb 8oz-9lbs    Nia Casanova MD

## 2018-09-12 NOTE — ANESTHESIA POSTPROCEDURE EVALUATION
Patient: Dayana Danielle    * No procedures listed *    Diagnosis:* No pre-op diagnosis entered *  Diagnosis Additional Information: No value filed.    Anesthesia Type:  No value filed.    Note:  Anesthesia Post Evaluation    Patient location during evaluation: floor  Patient participation: Able to fully participate in evaluation  Level of consciousness: awake and alert  Pain management: adequate  Airway patency: patent  Cardiovascular status: acceptable  Respiratory status: acceptable     Anesthetic complications: None    Comments: Epidural has worn off and no complications        Last vitals:  Vitals:    09/12/18 1200 09/12/18 1300 09/12/18 1343   BP:   116/73   Pulse:      Resp: 16 16 16   Temp:      SpO2: 100% 100%          Electronically Signed By: Nish Glynn MD  September 12, 2018  4:01 PM

## 2018-09-12 NOTE — PLAN OF CARE
Problem: Patient Care Overview  Goal: Plan of Care/Patient Progress Review  Outcome: No Change  VSS. Tylenol, Toradol, and Dilaudid managing pain. Urine output increased after NS bolus and increased oral intake. Ambulated to bathroom with 1 assist. Mary care done. Scant bleeding. Breastfeeding well with shield.

## 2018-09-12 NOTE — PROGRESS NOTES
[facility]    Obstetrics Post-Op / Progress Note    Assessment & Plan   Assessment:  -1 Day Post-Op  Procedure(s):  PRIMARY  SECTION  - Wound Class: II-Clean Contaminated    Doing well.  No immediate surgical complications identified.  No excessive bleeding  Pain well-controlled.    Plan:  Ambulation encouraged  Hemoglobin in AM  Lactation consultation  Monitor wound for signs of infection  Pain control measures as needed  D./C nam catheter this AM  ADAT    Nia Burns Casanova     Interval History   Doing well.  Pain is well-controlled --will transition to oral meds today.  No fevers.  No history of wound drainage, warmth or significant erythema.  Good appetite -no n/v.  +flatus.  Denies chest pain, shortness of breath, nausea or vomiting.  Ambulatory briefly this AM.  Breastfeeding well.      Medications     dextrose 5% lactated ringers 150 mL/hr at 18 0613     - MEDICATION INSTRUCTIONS -       - MEDICATION INSTRUCTIONS -       NO Rho (D) immune globulin (RhoGam) needed - mother Rh POSITIVE       - MEDICATION INSTRUCTIONS -       - MEDICATION INSTRUCTIONS -       oxytocin in 0.9% NaCl Stopped (18 2200)     oxytocin in 0.9% NaCl         acetaminophen  975 mg Oral Q8H     citalopram  40 mg Oral Daily     ketorolac  30 mg Intravenous Q6H     sodium chloride (PF)  3 mL Intracatheter Q8H     sodium chloride (PF)  3 mL Intracatheter Q8H       Physical Exam   Temp: 97.8  F (36.6  C) Temp src: Axillary BP: 104/63 Pulse: 62 Heart Rate: 62 Resp: 16 SpO2: 100 % O2 Device: None (Room air)    Vitals:    18 0628   Weight: 102.1 kg (225 lb)     Vital Signs with Ranges  Temp:  [97.4  F (36.3  C)-98.7  F (37.1  C)] 97.8  F (36.6  C)  Pulse:  [62-89] 62  Heart Rate:  [] 62  Resp:  [14-21] 16  BP: ()/(48-85) 104/63  SpO2:  [95 %-100 %] 100 %  I/O last 3 completed shifts:  In: 4813.25 [P.O.:600; I.V.:3213.25; IV Piggyback:1000]  Out: 1825 [Urine:1225; Blood:600]    Uterine fundus  is firm, non-tender and at the level of the umbilicus  Incision C/D/I  Extremities Non-tender    Data   Recent Labs   Lab Test  09/11/18   0710   ABO  A   RH  Pos   AS  Neg     Recent Labs   Lab Test  09/11/18   0710  06/12/18   0758   HGB  10.6*  11.2*     Recent Labs   Lab Test  02/07/18   1551   RUQIGG  12

## 2018-09-12 NOTE — LACTATION NOTE
Initial visit. Infant at breast at time of visit.  Infant is fussy and having difficulty latching on.  Mother has larger breasts.  Encouraged Mother to take swaddle and t-shirt off of baby, assisted Mother with pillows and positioning infant in football hold, infant still unable to stay calm and latch on.  Nipple shield explained and helped placed on Mother's nipple, infant immediately latched on and began to vigorously suck.  Mother shown how to check for a good latch and assess infant lips for good latch.    Breastfeeding general information reviewed. Breastfeeding section in admission booklet reviewed with Mother and Father.  Advised to breastfeed exclusively, on demand, avoid pacifiers, bottles and formula unless medically indicated.  Encouraged rooming in, skin to skin, feeding on demand 8-12x/day or sooner if baby cues.  No further questions at this time. Will follow as needed.   Xenia Harris RN, IBCLC

## 2018-09-12 NOTE — PLAN OF CARE
Problem: Patient Care Overview  Goal: Plan of Care/Patient Progress Review  Outcome: Improving  The pt arrived to the unit at 1615, initial teaching and safety measures reviewed with the pt and her spouse.  VSS, FFU/1, viv velasquez, the pt reports her pain rated 2-3/10, and her diet was advanced to clears.  The infant is working on breastfeeding with the shield, latch verified, and on demand feedings encouraged.

## 2018-09-12 NOTE — PLAN OF CARE
Problem: Patient Care Overview  Goal: Plan of Care/Patient Progress Review  Outcome: Improving  Patient up and ambulating and tolerating activity. VSS  Tolerating regular diet.  IV saline locked.  Fundus firm with no free flow or clots.  Frias dc'd and patient has voided once 400cc.  Taking oxycodone and Tylenol and scheduled Torodol.  Doing well with breastfeeding and baby cares.  Will continue to monitor.

## 2018-09-12 NOTE — OP NOTE
Procedure Date: 2018      PREOPERATIVE DIAGNOSES:   1.  41+0 weeks' gestation.   2.  Anxiety regarding labor/contractions.      POSTOPERATIVE DIAGNOSES:     1.  41+0 weeks' gestation.   2.  Anxiety regarding labor/contractions.      PROCEDURE:  Primary low transverse  section.      SURGEON:  Nia Casanova MD      ASSISTANT:  None.      COMPLICATIONS:  None.      ESTIMATED BLOOD LOSS:  600 mL.      FINDINGS:  A male infant in the right occiput transverse position with a weight of 8 pounds 6 ounces and Apgars of 8 and 9.  Normal uterus, tubes and bilateral ovaries.      INDICATIONS:  Yumiko is a 28-year-old G2, P0-0-1-0 who was on Labor and Delivery at 41+0 weeks' gestation for scheduled primary  section.  Yumiko has a history of a very traumatic medical management of an early miscarriage and for this reason had severe apprehension and anxiety regarding labor and the use of any augmented labor medications.  Risks, benefits and alternatives of primary  section were discussed in detail.  Yumiko actually presented to Labor and Delivery approximately 6 hours prior to scheduled  section in active labor.  She elected to be expectantly managed with keeping the option of primary  section open.  Bri underwent artificial rupture of membranes for clear fluid at which time contraction pains increased significantly.  Bri did receive an epidural for pain control and elected to proceed with primary  section.      DESCRIPTION OF PROCEDURE:  Yumiko was taken to the operating room where epidural anesthesia was redosed and appropriate for  section.  She was prepared and draped in the normal sterile fashion in dorsal supine position with left lateral tilt.  A timeout was performed to identify correct patient and procedure.  Pfannenstiel skin incision was made using the scalpel and this was carried down to the underlying layer of fascia using the Bovie.  Fascia was then  incised in the midline and this incision was extended laterally using the Reyes scissor.  Superior aspect of the fascial incision was then grasped with Kocher clamps x2, elevated, and the rectus muscles dissected.  Attention was similarly turned to the inferior border.  Rectus muscles were then  in the midline and the peritoneum was identified, tented and entered sharply.  This incision was extended both superiorly and inferiorly with good visualization of the bladder.  The bladder blade was then placed and the vesicouterine peritoneum was identified, tented and bladder flap created.  The bladder blade was replaced and the lower uterine segment was incised in a transverse fashion.  Infant head was delivered from right occiput anterior position without difficulty.  No nuchal cords were noted.  Remainder of infant was delivered and placed on the maternal abdomen.  Cord clamping was delayed by 1 minute.  Placenta was delivered manually and intact.  A 3-vessel cord was noted.  Uterus was then exteriorized and cleared of all clot and debris.  Uterine incision was reapproximated using a 0 Vicryl in a running locked fashion with excellent hemostasis.  A second imbricating layer was similarly placed.  The uterus was then replaced in the pelvic cavity and gutters were cleared of all clot and debris.  Peritoneum was reapproximated using a 3-0 Vicryl in a running fashion.  The fascial incision was reapproximated using a 0 looped PDS in a running fashion.  Irrigation was performed prior to skin closure, which was completed using a 4-0 Monocryl in a subcuticular stitch.  Bandages were placed and the patient was taken to recovery room in stable condition.         VINAYAK SEALS MD             D: 2018   T: 2018   MT: SHARAN      Name:     VANITA AGUILA   MRN:      [id number]        Account:        [id number]   :      1990           Procedure Date: 2018      Document: [id number]

## 2018-09-12 NOTE — LACTATION NOTE
Routine visit. Continues to nurse well per mom.  Breastfeeding well with nipple shield.  Mother attempts for a few minutes without shield and infant unable to latch then Mother will place shield on.  Encouraged lots of skin to skin when infant sleepy at breast and even in between good feedings.  Encouraged feeding on demand 8-12x/day or sooner if baby cues.  Using lanolin.  No further questions at this time.  Will follow as needed.  Xenia Harris RNC-IBCLC

## 2018-09-12 NOTE — PROVIDER NOTIFICATION
MD Notification    Notified Person: MD    Notified Person Name:  Anthony    Notification Date/Time: 0015, 9/12/18    Notification Interaction: phone    Purpose of Notification: low urine output: 275 ml since nam placed at noon.      Orders Received: 500 ml bolus NS over 1 hour. Increase D5 LR to 150 ml/hr.     Comments: Asked if Toradol or Ibuprofen should be discontinued and MD stated that it is okay to continue these medications.

## 2018-09-12 NOTE — PLAN OF CARE
Problem: Postpartum ( Delivery) (Adult,Obstetrics,Pediatric)  Goal: Signs and Symptoms of Listed Potential Problems Will be Absent, Minimized or Managed (Postpartum)  Signs and symptoms of listed potential problems will be absent, minimized or managed by discharge/transition of care (reference Postpartum ( Delivery) (Adult,Obstetrics,Pediatric) CPG).   Outcome: No Change  VSS. Pain controlled with tylenol, tolradol and dilaudid. Incisional dressing CDI. Frias patent. Breastfeeding well with the shield. Encouraged to call for latch checks, questions and concerns. Will continue to monitor.

## 2018-09-13 PROCEDURE — 12000037 ZZH R&B POSTPARTUM INTERMEDIATE

## 2018-09-13 PROCEDURE — 25000132 ZZH RX MED GY IP 250 OP 250 PS 637: Performed by: OBSTETRICS & GYNECOLOGY

## 2018-09-13 RX ADMIN — OXYCODONE HYDROCHLORIDE 10 MG: 5 TABLET ORAL at 00:58

## 2018-09-13 RX ADMIN — ACETAMINOPHEN 975 MG: 325 TABLET, FILM COATED ORAL at 06:48

## 2018-09-13 RX ADMIN — OXYCODONE HYDROCHLORIDE 10 MG: 5 TABLET ORAL at 16:36

## 2018-09-13 RX ADMIN — SENNOSIDES AND DOCUSATE SODIUM 1 TABLET: 8.6; 5 TABLET ORAL at 08:51

## 2018-09-13 RX ADMIN — OXYCODONE HYDROCHLORIDE 10 MG: 5 TABLET ORAL at 22:47

## 2018-09-13 RX ADMIN — OXYCODONE HYDROCHLORIDE 10 MG: 5 TABLET ORAL at 06:48

## 2018-09-13 RX ADMIN — SENNOSIDES AND DOCUSATE SODIUM 1 TABLET: 8.6; 5 TABLET ORAL at 19:43

## 2018-09-13 RX ADMIN — IBUPROFEN 800 MG: 400 TABLET ORAL at 10:32

## 2018-09-13 RX ADMIN — IBUPROFEN 800 MG: 400 TABLET ORAL at 03:46

## 2018-09-13 RX ADMIN — IBUPROFEN 800 MG: 400 TABLET ORAL at 16:36

## 2018-09-13 RX ADMIN — OXYCODONE HYDROCHLORIDE 10 MG: 5 TABLET ORAL at 10:32

## 2018-09-13 RX ADMIN — HYDROXYZINE HYDROCHLORIDE 25 MG: 25 TABLET ORAL at 03:47

## 2018-09-13 RX ADMIN — OXYCODONE HYDROCHLORIDE 10 MG: 5 TABLET ORAL at 19:43

## 2018-09-13 RX ADMIN — CITALOPRAM HYDROBROMIDE 40 MG: 20 TABLET ORAL at 08:50

## 2018-09-13 RX ADMIN — ACETAMINOPHEN 975 MG: 325 TABLET, FILM COATED ORAL at 13:45

## 2018-09-13 RX ADMIN — ACETAMINOPHEN 975 MG: 325 TABLET, FILM COATED ORAL at 21:50

## 2018-09-13 RX ADMIN — IBUPROFEN 800 MG: 400 TABLET ORAL at 22:47

## 2018-09-13 RX ADMIN — FERROUS SULFATE TAB 325 MG (65 MG ELEMENTAL FE) 325 MG: 325 (65 FE) TAB at 08:51

## 2018-09-13 RX ADMIN — OXYCODONE HYDROCHLORIDE 10 MG: 5 TABLET ORAL at 03:46

## 2018-09-13 RX ADMIN — OXYCODONE HYDROCHLORIDE 10 MG: 5 TABLET ORAL at 13:45

## 2018-09-13 NOTE — PLAN OF CARE
Problem: Patient Care Overview  Goal: Plan of Care/Patient Progress Review  Outcome: Improving  Pt VS and temperature stable during shift; voiding appropriately, bleeding minimal with no clots; incision CDI with steri strips; pain controlled well with Ibuprofen, Tylenol, oxycodone 10mg q3 hr; After 2200 dose of all pain medications, pt expressed no pain relief @ 2230 and in extreme pain, very anxious and tearful, unable to relax, see flowsheet for interventions; On call OB paged and ordered PRN Atarax, see note; ambulating ad ba independently; fundus firm at U; breastfeeding infant well with breast shield; parents bonding well with infant; instructed to call with any questions or concerns; will continue to monitor

## 2018-09-13 NOTE — PLAN OF CARE
Problem: Patient Care Overview  Goal: Plan of Care/Patient Progress Review  Outcome: Improving  Patient up and ambulating in hallway.  Fundus firm with no free flow or clots.  Incision intact with steri strips.  Voiding without difficulty.  Taking Ibuprofen and oxycodone for pain control.  Talking about anxiety attack last night but feels fine today.  Doing well with baby cares and breastfeeding.  Planning on discharge tomorrow.

## 2018-09-13 NOTE — PROGRESS NOTES
"[facility]    Obstetrics Post-Op / Progress Note    Assessment & Plan   Assessment:  -2 Days Post-Op  Procedure(s):  PRIMARY  SECTION  - Wound Class: II-Clean Contaminated    Doing well.  Clean wound without signs of infection.  No immediate surgical complications identified.  No excessive bleeding  Pain well-controlled.    Plan:  Ambulation encouraged  Lactation consultation  Monitor wound for signs of infection  Pain control measures as needed  Reportable signs and symptoms dicussed with the patient  Anticipate discharge in 1-2 days    Nia Casanova     Interval History   Doing well.  Pain is fairly well-controlled -had some pain issues overnight.  Tried taking only one oxycodone and then struggled to \"catch up\".  Also improved with some vistaril with anxiety.  No fevers.  No history of wound drainage, warmth or significant erythema.  Good appetite -no n/v.  +flatus.  Denies chest pain, shortness of breath, nausea or vomiting.  Ambulatory without lightheadedness/dizziness --had first shower this AM.  Bleeding similar to menses.  Breastfeeding well.      Medications     dextrose 5% lactated ringers 150 mL/hr at 18 0613     - MEDICATION INSTRUCTIONS -       - MEDICATION INSTRUCTIONS -       NO Rho (D) immune globulin (RhoGam) needed - mother Rh POSITIVE       - MEDICATION INSTRUCTIONS -       - MEDICATION INSTRUCTIONS -       oxytocin in 0.9% NaCl Stopped (18 2200)     oxytocin in 0.9% NaCl         acetaminophen  975 mg Oral Q8H     citalopram  40 mg Oral Daily     ferrous sulfate  325 mg Oral Daily     sodium chloride (PF)  3 mL Intracatheter Q8H       Physical Exam   Temp: 97.8  F (36.6  C) Temp src: Oral BP: 113/62   Heart Rate: 58 Resp: 18 SpO2: 100 %      Vitals:    18 0628   Weight: 102.1 kg (225 lb)     Vital Signs with Ranges  Temp:  [97.8  F (36.6  C)-98  F (36.7  C)] 97.8  F (36.6  C)  Heart Rate:  [58-75] 58  Resp:  [16-18] 18  BP: (106-116)/(57-73) " 113/62  SpO2:  [100 %] 100 %  I/O last 3 completed shifts:  In: -   Out: 1500 [Urine:1500]    Uterine fundus is firm, non-tender and at the level of the umbilicus  Incision C/D/I  Extremities Non-tender    Data   Recent Labs   Lab Test  09/11/18   0710   ABO  A   RH  Pos   AS  Neg     Recent Labs   Lab Test  09/12/18   0843  09/11/18   0710   HGB  8.8*  10.6*     Recent Labs   Lab Test  02/07/18   1551   RUQIGG  12

## 2018-09-13 NOTE — PLAN OF CARE
Problem: Postpartum ( Delivery) (Adult,Obstetrics,Pediatric)  Goal: Signs and Symptoms of Listed Potential Problems Will be Absent, Minimized or Managed (Postpartum)  Signs and symptoms of listed potential problems will be absent, minimized or managed by discharge/transition of care (reference Postpartum ( Delivery) (Adult,Obstetrics,Pediatric) CPG).   Outcome: Improving  Vital signs stable. Voiding without difficulty. Lung sounds clear and equal. Using oxycodone/ibuprofen/tylenol for pain management. Patient in a lot of pain when I first came on shift. RN obtained order for Atarax to help patient tolerate pain. Pain and anxiety decreased after a couple hours in bed. Up ambulating free of dizziness. Working on breastfeeding every 2-3 hours with nipple sheild. Infant tolerating well. Encouraged to call with questions/concerns. Will continue to monitor.

## 2018-09-14 PROCEDURE — 25000132 ZZH RX MED GY IP 250 OP 250 PS 637: Performed by: OBSTETRICS & GYNECOLOGY

## 2018-09-14 PROCEDURE — 12000037 ZZH R&B POSTPARTUM INTERMEDIATE

## 2018-09-14 RX ADMIN — OXYCODONE HYDROCHLORIDE 10 MG: 5 TABLET ORAL at 07:46

## 2018-09-14 RX ADMIN — OXYCODONE HYDROCHLORIDE 10 MG: 5 TABLET ORAL at 01:28

## 2018-09-14 RX ADMIN — OXYCODONE HYDROCHLORIDE 10 MG: 5 TABLET ORAL at 14:24

## 2018-09-14 RX ADMIN — IBUPROFEN 800 MG: 400 TABLET ORAL at 11:06

## 2018-09-14 RX ADMIN — CITALOPRAM HYDROBROMIDE 40 MG: 20 TABLET ORAL at 07:45

## 2018-09-14 RX ADMIN — ACETAMINOPHEN 975 MG: 325 TABLET, FILM COATED ORAL at 14:24

## 2018-09-14 RX ADMIN — SENNOSIDES AND DOCUSATE SODIUM 2 TABLET: 8.6; 5 TABLET ORAL at 21:01

## 2018-09-14 RX ADMIN — OXYCODONE HYDROCHLORIDE 10 MG: 5 TABLET ORAL at 11:06

## 2018-09-14 RX ADMIN — ACETAMINOPHEN 975 MG: 325 TABLET, FILM COATED ORAL at 05:57

## 2018-09-14 RX ADMIN — IBUPROFEN 800 MG: 400 TABLET ORAL at 04:27

## 2018-09-14 RX ADMIN — OXYCODONE HYDROCHLORIDE 10 MG: 5 TABLET ORAL at 04:27

## 2018-09-14 RX ADMIN — IBUPROFEN 800 MG: 400 TABLET ORAL at 17:55

## 2018-09-14 RX ADMIN — SENNOSIDES AND DOCUSATE SODIUM 1 TABLET: 8.6; 5 TABLET ORAL at 07:45

## 2018-09-14 RX ADMIN — FERROUS SULFATE TAB 325 MG (65 MG ELEMENTAL FE) 325 MG: 325 (65 FE) TAB at 11:05

## 2018-09-14 RX ADMIN — OXYCODONE HYDROCHLORIDE 10 MG: 5 TABLET ORAL at 17:55

## 2018-09-14 RX ADMIN — OXYCODONE HYDROCHLORIDE 10 MG: 5 TABLET ORAL at 21:01

## 2018-09-14 RX ADMIN — ACETAMINOPHEN 650 MG: 325 TABLET, FILM COATED ORAL at 22:29

## 2018-09-14 NOTE — PLAN OF CARE
Problem: Patient Care Overview  Goal: Plan of Care/Patient Progress Review  Outcome: Improving  Vital signs stable. Voiding without difficulty. Lung sounds clear and equal. Using oxycodone/ibuprofen/tylenol for pain management. Pain under control throughout the night, no tears or saddness. Up ambulating free of dizziness. Supplementing infant at breast. Parents understand the importance of supplementation for infant weight. Mom pumping after feedings. Encouraged to call with questions/concerns. Will continue to monitor.

## 2018-09-14 NOTE — PLAN OF CARE
Problem: Patient Care Overview  Goal: Plan of Care/Patient Progress Review  Outcome: Improving  BP 88/54 this morning, aware,recheck 103/57,up independently in room,voiding with out difficulty,pain control with tylenol,ibuprfrofen&oxycodone,incision dry&intact with steri strips,baby breast feeding ok&supplementing donor milk at breast using syringe with tube&pumping.Will continue to monitor.

## 2018-09-14 NOTE — PLAN OF CARE
Problem: Patient Care Overview  Goal: Plan of Care/Patient Progress Review  Outcome: Improving  Pt VS and temperature stable during shift; voiding appropriately, bleeding minimal with no clots; incision CDI with steri strips; pain controlled well with Ibuprofen and Tylenol and 10mg oxycodone q3hr; ambulating ad ba independently; fundus firm at U; breastfeeding infant well, started pumping @ 2245; parents bonding well with infant; instructed to call with any questions or concerns; will continue to monitor

## 2018-09-14 NOTE — PROGRESS NOTES
[facility]    Obstetrics Post-Op / Progress Note    Assessment & Plan   Assessment:  -3 Days Post-Op  Procedure(s):  PRIMARY  SECTION  - Wound Class: II-Clean Contaminated    Doing well.  Clean wound without signs of infection.  No excessive bleeding  Pain well-controlled.    Plan:  Ambulation encouraged  Lactation consultation  Monitor wound for signs of infection  Pain control measures as needed  Reportable signs and symptoms dicussed with the patient  Anticipate discharge tomorrow --will follow up with me in 6 weeks  Will set up therapy appt in the first 1-2 weeks due to hx of anxiety; continue on citalopram    Nia Casanova     Interval History   Doing well.  Pain is well-controlled --no issues since Wed PM.  No fevers.  No history of wound drainage, warmth or significant erythema.  Good appetite -no n/v.  Denies chest pain, shortness of breath, nausea or vomiting.  Ambulatory.  Breastfeeding okay --a bit anxious with troubles feeding last night and this AM.  Moods fairly good --a bit anxious at times and tearful with feeding struggles    Medications     dextrose 5% lactated ringers 150 mL/hr at 18 0613     - MEDICATION INSTRUCTIONS -       - MEDICATION INSTRUCTIONS -       NO Rho (D) immune globulin (RhoGam) needed - mother Rh POSITIVE       - MEDICATION INSTRUCTIONS -       - MEDICATION INSTRUCTIONS -       oxytocin in 0.9% NaCl Stopped (18 2200)     oxytocin in 0.9% NaCl         acetaminophen  975 mg Oral Q8H     citalopram  40 mg Oral Daily     ferrous sulfate  325 mg Oral Daily     sodium chloride (PF)  3 mL Intracatheter Q8H       Physical Exam   Temp: 98.4  F (36.9  C) Temp src: Oral BP: (!) 88/54 Pulse: 56 Heart Rate: 64 Resp: 16 SpO2: 98 % O2 Device: None (Room air)    Vitals:    18 0628   Weight: 102.1 kg (225 lb)     Vital Signs with Ranges  Temp:  [98.1  F (36.7  C)-98.4  F (36.9  C)] 98.4  F (36.9  C)  Pulse:  [56] 56  Heart Rate:  [64-85] 64  Resp:   [16] 16  BP: ()/(54-60) 88/54  SpO2:  [98 %] 98 %       Uterine fundus is firm, non-tender and at the level of the umbilicus  Incision C/D/I  Extremities Non-tender    Data   Recent Labs   Lab Test  09/11/18   0710   ABO  A   RH  Pos   AS  Neg     Recent Labs   Lab Test  09/12/18   0843  09/11/18   0710   HGB  8.8*  10.6*     Recent Labs   Lab Test  02/07/18   1551   RUQIGG  12

## 2018-09-14 NOTE — CONSULTS
"SWS Progress Note    Data: SW consult for postpartum assessment due to score of 11 on the EPDS. Pt is Dayana, a 27 yo who delivered her baby, Zoie, on 9/11/18. Pt spouse/significant other and FOB is Flavio who is present and supportive. This is their first baby.   Intervention: SW has reviewed pt records. SW met with pt this morning to introduce self/role, perform assessment, and discuss resources. SW inquired about pt mental health history, pt shared history of severe anxiety. Pt has been taking Celexa since beginning of pregnancy. SW normalized \"rollercoaster\" of emotions that may occur following delivery as well as discussed s/s that may be a concern for potential PPD/PPA. Pt has insight on the s/s to look for, SW discussed techniques for coping and the importance of family awareness and support. SW also encouraged pt to alert her MD of any concerns at her follow-up appointment. SW discussed PPD/PPA resource folder and provided brief overview of information included. Pt appreciative of the resources. Pt feels prepared for discharge and has no additional questions/concerns.  Assessment: Pt pleasant and welcoming of SW involvement. Pt observed to have bright affect during conversation. Baby was crying and hungry when SW entered room. SW did not see any concerns with mom's response to baby-cut visit short to allow mom to feed baby. Parents are supportive of one another, bonding well with baby, no concerns.  Plan: No further SW follow-up indicated. Pt and baby to discharge today with above mentioned resources. SW provided this writer's contact information if any specific questions arise about the resources provided.    Reanna ANNA, TIFFANY   Daytime (8:00am-4:30pm): [phone]  After-Hours SW Pager (4:30pm-11:30pm): [phone]   "

## 2018-09-14 NOTE — LACTATION NOTE
Follow up visit.  Infant attempting to feed at time of visit but sleepy.  Noelle was visibly frustrated.  She stated feedings were going poorly and that baby was sleepy and not latching.  Encouraged her to hold her breast and be more direct with latching baby.  Infant latched but only to the tip of the nipples.  Breasts are larger and nipples are short.  Noelle had a shield she used but said baby did not like it.  Suggested trying it and she was willing.  Infant latched well with shield at and was able to take the supplement well.  Switched sides and feeding continued to go well with the shield.  Audible swallowing heard during feeding when baby  Noelle was excited to hear swallowing and said she felt much more encouraged about feeding.  She is planning to stay and work on feedings today.  She did feel that feeding was more comfortable with the shield.  Plan made with Noelle to use the shield today when breast feeding, give supplement at breast and then pump after.    Will continue to follow.  Kelly Yoo  RN, IBCLC

## 2018-09-15 VITALS
RESPIRATION RATE: 16 BRPM | HEIGHT: 69 IN | DIASTOLIC BLOOD PRESSURE: 52 MMHG | WEIGHT: 225 LBS | BODY MASS INDEX: 33.33 KG/M2 | OXYGEN SATURATION: 98 % | TEMPERATURE: 97.9 F | HEART RATE: 66 BPM | SYSTOLIC BLOOD PRESSURE: 100 MMHG

## 2018-09-15 PROCEDURE — 25000132 ZZH RX MED GY IP 250 OP 250 PS 637: Performed by: OBSTETRICS & GYNECOLOGY

## 2018-09-15 RX ORDER — OXYCODONE HYDROCHLORIDE 5 MG/1
5 TABLET ORAL EVERY 4 HOURS PRN
Qty: 30 TABLET | Refills: 0 | Status: SHIPPED | OUTPATIENT
Start: 2018-09-15 | End: 2019-01-31

## 2018-09-15 RX ADMIN — CITALOPRAM HYDROBROMIDE 40 MG: 20 TABLET ORAL at 09:35

## 2018-09-15 RX ADMIN — IBUPROFEN 800 MG: 400 TABLET ORAL at 00:02

## 2018-09-15 RX ADMIN — SENNOSIDES AND DOCUSATE SODIUM 2 TABLET: 8.6; 5 TABLET ORAL at 09:35

## 2018-09-15 RX ADMIN — IBUPROFEN 800 MG: 400 TABLET ORAL at 11:58

## 2018-09-15 RX ADMIN — FERROUS SULFATE TAB 325 MG (65 MG ELEMENTAL FE) 325 MG: 325 (65 FE) TAB at 09:35

## 2018-09-15 RX ADMIN — ACETAMINOPHEN 650 MG: 325 TABLET, FILM COATED ORAL at 02:48

## 2018-09-15 RX ADMIN — OXYCODONE HYDROCHLORIDE 10 MG: 5 TABLET ORAL at 00:02

## 2018-09-15 RX ADMIN — ACETAMINOPHEN 650 MG: 325 TABLET, FILM COATED ORAL at 06:56

## 2018-09-15 RX ADMIN — ACETAMINOPHEN 650 MG: 325 TABLET, FILM COATED ORAL at 11:58

## 2018-09-15 RX ADMIN — OXYCODONE HYDROCHLORIDE 10 MG: 5 TABLET ORAL at 02:48

## 2018-09-15 RX ADMIN — OXYCODONE HYDROCHLORIDE 10 MG: 5 TABLET ORAL at 09:35

## 2018-09-15 RX ADMIN — IBUPROFEN 800 MG: 400 TABLET ORAL at 06:01

## 2018-09-15 RX ADMIN — OXYCODONE HYDROCHLORIDE 10 MG: 5 TABLET ORAL at 06:01

## 2018-09-15 NOTE — PLAN OF CARE
Problem: Patient Care Overview  Goal: Plan of Care/Patient Progress Review  Outcome: Improving  VSS. Voiding. Scant vaginal bleeding. Incision WDL. Pain controlled with ibuprofen, tylenol, and oxycodone. Breastfeeding infant, pumping and supplementing with EBM and donor milk.  at bedside, supportive and involved in cares. Will continue to monitor.

## 2018-09-15 NOTE — PROGRESS NOTES
PPD#2    Subjective: doing well. Tolerating meals. Passing flatus. Mood is stable. Breast feeding and pumping    Vitals:    09/14/18 2229 09/15/18 0000 09/15/18 0248 09/15/18 0750   BP:  104/65  100/52   BP Location:       Pulse:       Resp: 16 16 16 18   Temp:  97.8  F (36.6  C)  97.9  F (36.6  C)   TempSrc:  Axillary  Oral   SpO2:       Weight:       Height:       GEN: NAD  GI: incision is clean, dry and intact with suture    Hemoglobin   Date Value Ref Range Status   09/12/2018 8.8 (L) 11.7 - 15.7 g/dL Final   ]    A/P POD#4 from [facility] doing well and meeting all postoperative milestones  Ready for discharge to home today  Follow up with Dr. Casanova in 6 weeks  Wound care instructions were given.    Teresa Cruz MD

## 2018-09-15 NOTE — PLAN OF CARE
Problem: Patient Care Overview  Goal: Plan of Care/Patient Progress Review  Outcome: Adequate for Discharge Date Met: 09/15/18  Doing well,vss,voiding with out difficulty,pain control with tylenol,ibuprofen&oxycodone,incision intact with steri strips,baby breast feeding well,pumping&supplementing EBM&donor milk.Plan to discharge today&follow in clinic in 6 weeks or sooner with any concerns.

## 2018-09-15 NOTE — LACTATION NOTE
Follow up visit.  Dayana was in much better spirits today.   She is starting to get more milk when pumping.  She was pumping at time of visit.  She said she was too tired to feed the last feeding and had baby finger feed.  She said baby is latching for a few minutes and supplementing with feeding tube at breast.  She said using the tube with the shield does not work well.  Discussed and encouraged feeding without supplement as milk is starting to come in and then finger feed after if needed.  Dayana said she felt better about feedings today.  She declined needing any assistance today, encouraged her to call and ask for lactation if needing anything.  Reviewed outpatient lactation resources and follow up,.  Encouraged her to make an appointment for next week if needing to continue to pump and finger feed.  Nipples are intact.  Kelly Yoo  RN, IBCLC

## 2018-09-17 NOTE — DISCHARGE SUMMARY
Admit Date:     2018   Discharge Date:     09/15/2018      DATE OF ADMISSION:  2018      DATE OF DISCHARGE:  09/15/2018      HISTORY OF PRESENT ILLNESS:  Bri is a 28-year-old  who was admitted to the postpartum service following scheduled primary  section.  Bri was admitted to labor and delivery at 41-plus 0 weeks' gestation in active labor on the day of her scheduled primary  section which was arranged due to severe anxiety regarding labor, contractions and augmentation.  Bri was given the option of expectant management, a possible vaginal delivery versus proceeding with scheduled primary  section and opted for the latter.  Bri delivered a healthy female infant via primary  section on the afternoon of 2018.  Her weight was 8 pounds 6 ounces and Apgars were 8 and 9.  Gases.      HOSPITAL COURSE:  Bri's postoperative hospital course was overall uncomplicated.  Pain was well controlled using a combination of IV as well as oral medications.  Bri was allowed to advance her diet with return of bowel function and was tolerating a regular diet prior to discharge home.  Frias catheter was discontinued on postoperative day #1 and she was able to void without issue.  Bri remained hemodynamically stable throughout postoperative hospital course now with a postoperative hemoglobin of 8.8 g/dL which is down from 10.6 g/dL.  She was started on oral iron for acute blood loss anemia secondary to  section.  Bri showed no signs or symptoms of infection throughout postoperative hospital course.        Bri has a history of severe anxiety and this was monitored closely throughout the postoperative hospital course.  Bri was continued on her oral citalopram and will have close followup with outpatient psychiatrist and therapist.        DISPOSITION:  Bri was discharged to home on postoperative day #4 after meeting all requirements for discharge.   She verbalized understanding of the discharge instructions and was given a list of contact numbers should any issues or problems arise.  Bri will follow up with me for routine postpartum visit at 6 weeks' time.         VINAYAK SEALS MD             D: 2018   T: 2018   MT: SHARAN      Name:     VANITA AGUILA   MRN:      [id number]        Account:        [id number]   :      1990           Admit Date:     2018                                  Discharge Date: 09/15/2018      Document: [id number]       cc: Vinayak Seals MD

## 2018-09-18 DIAGNOSIS — Z91.89 BREASTFEEDING PROBLEM: Primary | ICD-10-CM

## 2018-09-19 ENCOUNTER — DOCUMENTATION ONLY (OUTPATIENT)
Dept: CARE COORDINATION | Facility: CLINIC | Age: 28
End: 2018-09-19

## 2018-09-19 NOTE — PROGRESS NOTES
[facility] will be sharing updates with you on Maternal Child Health Referral requests for home care services.  This is for care coordination purposes and alert you to referral status.  We received the referral for  Dayana Danielle; MRN [id number] and want to update you:    [facility] has made 2 attempts to contact patient by phone and text message over the last 4 days. We have not had any response from patient.  Final message was left advising patient to follow up with Primary Care Providers for mom and baby.     Sincerely [facility]  Danielle Yang RN  [phone]

## 2018-09-21 ENCOUNTER — OFFICE VISIT (OUTPATIENT)
Dept: FAMILY MEDICINE | Facility: CLINIC | Age: 28
End: 2018-09-21
Payer: COMMERCIAL

## 2018-09-21 PROCEDURE — 99214 OFFICE O/P EST MOD 30 MIN: CPT | Performed by: NURSE PRACTITIONER

## 2018-09-21 NOTE — MR AVS SNAPSHOT
After Visit Summary   9/21/2018    Dayana Danielle    MRN: [id number]           Patient Information     Date Of Birth          1990        Visit Information        Provider Department      9/21/2018 12:30 PM Connie Smith APRN CNP [facility]        Today's Diagnoses     Postpartum nipple cracking    -  1       Follow-ups after your visit        Who to contact     If you have questions or need follow up information about today's clinic visit or your schedule please contact [facility] directly at [phone].  Normal or non-critical lab and imaging results will be communicated to you by [facility], letter or phone within 4 business days after the clinic has received the results. If you do not hear from us within 7 days, please contact the clinic through [facility] or phone. If you have a critical or abnormal lab result, we will notify you by phone as soon as possible.  Submit refill requests through [facility] or call your pharmacy and they will forward the refill request to us. Please allow 3 business days for your refill to be completed.          Additional Information About Your Visit        MyChart Information     [employer] gives you secure access to your electronic health record. If you see a primary care provider, you can also send messages to your care team and make appointments. If you have questions, please call your primary care clinic.  If you do not have a primary care provider, please call [phone] and they will assist you.        Care EveryWhere ID     This is your Care EveryWhere ID. This could be used by other organizations to access your [place] medical records  [id number]        Your Vitals Were     Last Period                   11/28/2017            Blood Pressure from Last 3 Encounters:   10/16/18 102/70   09/15/18 100/52   09/04/18 116/78    Weight from Last 3 Encounters:   10/16/18 197 lb (89.4 kg)   09/11/18 225 lb (102.1 kg)    09/04/18 225 lb 9.6 oz (102.3 kg)              Today, you had the following     No orders found for display       Primary Care Provider Office Phone # Fax #    CHEY Hernandez [facility] [phone]       [address] [facility] [address]  [facility] [address]        Equal Access to Services     [facility]: Hadii aad ku hadasho Soomaali, waaxda luqadaha, qaybta kaalmada adeegyada, waxay idiin hayaan adeeg kharash la'aan . So [facility] [phone].    ATENCIÓN: Si habla español, tiene a dumont disposición servicios gratuitos de asistencia lingüística. Deweyame al [phone].    We comply with applicable federal civil rights laws and [place] laws. We do not discriminate on the basis of race, color, national origin, age, disability, sex, sexual orientation, or gender identity.            Thank you!     Thank you for choosing [facility]  for your care. Our goal is always to provide you with excellent care. Hearing back from our patients is one way we can continue to improve our services. Please take a few minutes to complete the written survey that you may receive in the mail after your visit with us. Thank you!             Your Updated Medication List - Protect others around you: Learn how to safely use, store and throw away your medicines at www.disposemymeds.org.          This list is accurate as of 9/21/18 11:59 PM.  Always use your most recent med list.                   Brand Name Dispense Instructions for use Diagnosis    citalopram 40 MG tablet    celeXA    90 tablet    1 tablet orally daily    Moderate episode of recurrent major depressive disorder (H)       order for DME     1 Units    Equipment being ordered:hospital grade breast pump    Breastfeeding problem       oxyCODONE IR 5 MG tablet    ROXICODONE    30 tablet    Take 1 tablet (5 mg) by mouth every 4 hours as needed for other (pain control or improvement in physical function. Hold dose for analgesic side effects.)    Status post   delivery       prenatal multivitamin plus iron 27-0.8 MG Tabs per tablet      Take 2 tablets by mouth daily

## 2018-09-24 ENCOUNTER — TELEPHONE (OUTPATIENT)
Dept: PSYCHOLOGY | Facility: CLINIC | Age: 28
End: 2018-09-24

## 2018-10-03 ENCOUNTER — OFFICE VISIT (OUTPATIENT)
Dept: FAMILY MEDICINE | Facility: CLINIC | Age: 28
End: 2018-10-03
Payer: COMMERCIAL

## 2018-10-03 VITALS — HEART RATE: 94 BPM | OXYGEN SATURATION: 96 % | TEMPERATURE: 97.8 F

## 2018-10-03 DIAGNOSIS — Z23 NEED FOR PROPHYLACTIC VACCINATION AND INOCULATION AGAINST INFLUENZA: Primary | ICD-10-CM

## 2018-10-03 PROCEDURE — 99214 OFFICE O/P EST MOD 30 MIN: CPT | Mod: 25 | Performed by: NURSE PRACTITIONER

## 2018-10-03 PROCEDURE — 90471 IMMUNIZATION ADMIN: CPT | Performed by: NURSE PRACTITIONER

## 2018-10-03 PROCEDURE — 90686 IIV4 VACC NO PRSV 0.5 ML IM: CPT | Performed by: NURSE PRACTITIONER

## 2018-10-03 NOTE — MR AVS SNAPSHOT
After Visit Summary   10/3/2018    Dayana Danielle    MRN: [id number]           Patient Information     Date Of Birth          1990        Visit Information        Provider Department      10/3/2018 5:00 PM Connie Smith APRN CNP [facility]        Care Instructions    Milk is good in the fridge freezer for 2-4 months  In the fridge 4-7 days  At room temp is 4-6 hours    BioGaia probiotic or similar product    By 4 weeks, most babies are eating around 3 oz fairly consistently - sometimes more and sometimes less  Her weight looks perfect    12 oz in 12 days    Wt Readings from Last 5 Encounters:   10/03/18 9 lb 6 oz (4.252 kg) (71 %)*   09/21/18 8 lb 10.5 oz (3.926 kg) (77 %)*   09/18/18 8 lb (3.629 kg) (64 %)*   09/14/18 7 lb 11.5 oz (3.502 kg) (64 %)*     * Growth percentiles are based on WHO (Girls, 0-2 years) data.         I love what you are doing with the skin to skin comfort nursing in between            Follow-ups after your visit        Your next 10 appointments already scheduled     Oct 16, 2018 11:00 AM CDT   Post Partum with Nia Casanova MD   [facility] [place] ([facility])    [address] [place] [id number] [phone]              Who to contact     If you have questions or need follow up information about today's clinic visit or your schedule please contact [facility] directly at [phone].  Normal or non-critical lab and imaging results will be communicated to you by MyChart, letter or phone within 4 business days after the clinic has received the results. If you do not hear from us within 7 days, please contact the clinic through MyChart or phone. If you have a critical or abnormal lab result, we will notify you by phone as soon as possible.  Submit refill requests through [employer] or call your pharmacy and they will forward the refill request to us. Please allow 3  business days for your refill to be completed.          Additional Information About Your Visit        MyChart Information     [employer] gives you secure access to your electronic health record. If you see a primary care provider, you can also send messages to your care team and make appointments. If you have questions, please call your primary care clinic.  If you do not have a primary care provider, please call [phone] and they will assist you.        Care EveryWhere ID     This is your Care EveryWhere ID. This could be used by other organizations to access your [place] medical records  [id number]        Your Vitals Were     Pulse Temperature Last Period Pulse Oximetry          94 97.8  F (36.6  C) (Oral) 11/28/2017 96%         Blood Pressure from Last 3 Encounters:   09/15/18 100/52   09/04/18 116/78   08/28/18 106/62    Weight from Last 3 Encounters:   09/11/18 225 lb (102.1 kg)   09/04/18 225 lb 9.6 oz (102.3 kg)   08/28/18 221 lb 12.8 oz (100.6 kg)              Today, you had the following     No orders found for display       Primary Care Provider Office Phone # Fax #    Cheri Ainsley Georges, CHEY [facility] [phone]       [address] [facility] [address]  [facility] [address]        Equal Access to Services     LEONARDA PORTILLO : Hadii aad ku hadasho Soomaali, waaxda luqadaha, qaybta kaalmada adeegyada, waxay idiin hayfabiann alix vargas . So [facility] [phone].    ATENCIÓN: Si habla español, tiene a dumont disposición servicios gratuitos de asistencia lingüística. Llame al [phone].    We comply with applicable federal civil rights laws and [place] laws. We do not discriminate on the basis of race, color, national origin, age, disability, sex, sexual orientation, or gender identity.            Thank you!     Thank you for choosing [facility]  for your care. Our goal is always to provide you with excellent care. Hearing back from our patients is one way we can continue to improve our  services. Please take a few minutes to complete the written survey that you may receive in the mail after your visit with us. Thank you!             Your Updated Medication List - Protect others around you: Learn how to safely use, store and throw away your medicines at www.disposemymeds.org.          This list is accurate as of 10/3/18  5:36 PM.  Always use your most recent med list.                   Brand Name Dispense Instructions for use Diagnosis    citalopram 40 MG tablet    celeXA    90 tablet    1 tablet orally daily    Moderate episode of recurrent major depressive disorder (H)       order for DME     1 Units    Equipment being ordered:hospital grade breast pump    Breastfeeding problem       oxyCODONE IR 5 MG tablet    ROXICODONE    30 tablet    Take 1 tablet (5 mg) by mouth every 4 hours as needed for other (pain control or improvement in physical function. Hold dose for analgesic side effects.)    Status post  delivery       prenatal multivitamin plus iron 27-0.8 MG Tabs per tablet      Take 2 tablets by mouth daily

## 2018-10-03 NOTE — PATIENT INSTRUCTIONS
Milk is good in the fridge freezer for 2-4 months  In the fridge 4-7 days  At room temp is 4-6 hours    BioGaia probiotic or similar product    By 4 weeks, most babies are eating around 3 oz fairly consistently - sometimes more and sometimes less  Her weight looks perfect    12 oz in 12 days    Wt Readings from Last 5 Encounters:   10/03/18 9 lb 6 oz (4.252 kg) (71 %)*   09/21/18 8 lb 10.5 oz (3.926 kg) (77 %)*   09/18/18 8 lb (3.629 kg) (64 %)*   09/14/18 7 lb 11.5 oz (3.502 kg) (64 %)*     * Growth percentiles are based on WHO (Girls, 0-2 years) data.         I love what you are doing with the skin to skin comfort nursing in between

## 2018-10-03 NOTE — PROGRESS NOTES

## 2018-10-03 NOTE — PROGRESS NOTES
Follow-up Lactation Consultation    Baby:  Ayleen Danielle     MRN: [id number]  Mom:  Dayana Danielle         MRN:  [id number]    Consultation Date: 10/3/2018    HPI  Update since last visit:  Has not been able to do it very much due to not having time.   Feeling not sane with the schedule of pumping and nursing and bottling  Will put her to the breast for comfort between feedings and use shield  Infant is taking vitamin D drop    Butt rash - using thick ointment and planning to start aquaphor    Mom still having vaginal bleeding - no clots, sometimes gushes, happens all day long  No fever.  Mild crampy pain.    Nursing 1 times per day/every 2-3 hours.  Nursing on both side(s).  Nursing sessions last 10-15 minutes per side.    Nipple pain: yes, both sides but more on right side.     PUMPING: Other: Medela Advanced  # times per day:  7  Dual or single pumping:  Dual     Amount pumped per pumping session:  Average 5 oz.    SUPPLEMENTATION  Feeding 75 to 80 ml  Has leftover from pumping after she eats    Baby's OUTPUT:   A few stooled diapers with yellow seeding appearance    MOTHER    Current Medications  Celexa 40 mg    Herbals:  None    ASSESSMENT OF MOTHER    Physical:   Breast appearance      BABY       Name: Ayleen Danielle Birth Date: [birth year] Age: 3 weeks     Doctor: Cheri Georges CNP [facility]     BABY'S WEIGHT HISTORY  Last interval weight:  11.5 oz.in 12 days.      Wt Readings from Last 5 Encounters:   10/03/18 9 lb 6 oz (4.252 kg) (71 %)*   09/21/18 8 lb 10.5 oz (3.926 kg) (77 %)*   09/18/18 8 lb (3.629 kg) (64 %)*   09/14/18 7 lb 11.5 oz (3.502 kg) (64 %)*     * Growth percentiles are based on WHO (Girls, 0-2 years) data.         ASSESSMENT OF BABY    Physical:   Pulse 94  Temp 97.8  F (36.6  C) (Oral)  LMP 11/28/2017  SpO2 96%    GENERAL: Alert, vigorous, is in no acute distress.  SKIN: skin is clear, no rash or abnormal pigmentation  HEAD: The head is normocephalic. The fontanels and sutures are  normal  EYES: The eyes are normal. The conjunctivae and cornea normal.   NOSE: Clear, no discharge or congestion  LYMPH NODES: No adenopathy  LUNGS: The lung fields are clear to auscultation,no rales, rhonchi, wheezing or retractions  HEART: The precordium is quiet. Rhythm is regular. S1 and S2 are normal. No murmurs. The femoral pulses are normal.  ABDOMEN: The umbilicus is normal. The bowel sounds are normal. Abdomen soft, non tender,  non distended, no masses or hepatosplenomegaly.  NEUROLOGIC: Normal tone throughout.       FEEDING ASSESSMENT  Not observed     INTERVENTIONS/EVALUATION:  Other: pumping discussion      SUMMARY  Infant has had great weight gain with family's current strategy.  Mom would like to stop trying at the breast due to stress and time constraints.  Affirmed choice and efforts toward breastfeeding.  She has found that they have a lot of cuddle and skin-to-skin time outside of nursing attempts.  Mom is pumping more than infant taking currently.  Advised that volume needs will likely increase with age to at least 3 oz on average.  Paced feedings have helped a lot with decreasing fussiness and gas.  Can continue these.  Discussed pumping and storing milk, diaper rash and probiotics.    RECOMMENDATIONS  Patient Instructions     Milk is good in the fridge freezer for 2-4 months  In the fridge 4-7 days  At room temp is 4-6 hours    BioGaia probiotic or similar product    By 4 weeks, most babies are eating around 3 oz fairly consistently - sometimes more and sometimes less  Her weight looks perfect    12 oz in 12 days    Wt Readings from Last 5 Encounters:   10/03/18 9 lb 6 oz (4.252 kg) (71 %)*   09/21/18 8 lb 10.5 oz (3.926 kg) (77 %)*   09/18/18 8 lb (3.629 kg) (64 %)*   09/14/18 7 lb 11.5 oz (3.502 kg) (64 %)*     * Growth percentiles are based on WHO (Girls, 0-2 years) data.         I love what you are doing with the skin to skin comfort nursing in between        Follow up: at 2 month [employer] with  PCP    45 minutes time spent face-to-face, 30 with mother and 15 with baby, with over 50% spent in counseling/coordination of care regarding breastfeeding goals, latch, nipple care, weight gain expectations, and pumping.       BALJINDER Joy

## 2018-10-15 PROBLEM — Z34.90 PREGNANCY: Status: RESOLVED | Noted: 2018-06-04 | Resolved: 2018-10-15

## 2018-10-16 ENCOUNTER — PRENATAL OFFICE VISIT (OUTPATIENT)
Dept: OBGYN | Facility: CLINIC | Age: 28
End: 2018-10-16
Payer: COMMERCIAL

## 2018-10-16 VITALS — BODY MASS INDEX: 29.09 KG/M2 | DIASTOLIC BLOOD PRESSURE: 70 MMHG | WEIGHT: 197 LBS | SYSTOLIC BLOOD PRESSURE: 102 MMHG

## 2018-10-16 DIAGNOSIS — Z98.891 STATUS POST CESAREAN DELIVERY: ICD-10-CM

## 2018-10-16 PROBLEM — O09.90 SUPERVISION OF HIGH-RISK PREGNANCY: Status: RESOLVED | Noted: 2018-02-07 | Resolved: 2018-10-16

## 2018-10-16 PROCEDURE — 99207 ZZC POST PARTUM EXAM: CPT | Performed by: OBSTETRICS & GYNECOLOGY

## 2018-10-16 PROCEDURE — G0145 SCR C/V CYTO,THINLAYER,RESCR: HCPCS | Performed by: OBSTETRICS & GYNECOLOGY

## 2018-10-16 NOTE — PROGRESS NOTES
SUBJECTIVE:  Dayana Danielle,  is here for a postpartum visit.  She had a  Section  on 18 delivering a healthy baby girl, named Ayleen Mackay weighing 8 lbs 6 oz at term.      HPI:  Here today for postpartum visit --doing really well!  Baby Ayleen Mackay, has been good baby.  Good eater and sleeper.  Exclusively breast feeding/pumping --mostly pumping due to sore nipples.    Yumiko is doing well.  Eating/drinking well.  No bowel/bladder issues.  Had constipation initially but resolved.  No leaking or urgency issues  Has not resumed SA but interested.  Will use condoms for contraception.  Moods good --on her citalopram; has not seen her counselor yet but has been in contact  Has already had her flu shot  Unsure if she will return to work --lots of personnel changes    Last PHQ-9 score on record=   PHQ-9 SCORE 2018   Total Score 13     JACINTO-7 SCORE 2018   Total Score 15 21 20       Delivery complications:  No  Breast feeding:  Yes  Bladder problems:  No  Bowel problems/hemorrhoids:  No  Episiotomy/laceration/incision healed? Yes  Vaginal flow:  None  Holley:  No  Contraception: unsure  Emotional adjustment:  doing well and happy  Back to work:  2018    Constitutional: Fatigue  Breast: Tenderness  Genitourinary: Vaginal Discharge  Endocrine: Excessive Thirst  Psychiatric: Anxiety    OBJECTIVE:  Vitals: /70  Wt 197 lb (89.4 kg)  LMP 2017  Breastfeeding? Yes  BMI 29.09 kg/m2  BMI= Body mass index is 29.09 kg/(m^2).  General - pleasant female in no acute distress.  Breast -  deferred  Abdomen - Incision well-healed  Pelvic - EG: normal adult female, BUS: within normal limits, Vagina: well rugated, no discharge, Cervix: no lesions or CMT, Uterus: firm, normal sized and nontender, anteverted in position. Adnexae: no masses or tenderness.  Rectovaginal - deferred.    ASSESSMENT:    ICD-10-CM    1. Encounter for   visit Z39.2 Pap imaged thin layer screen reflex to HPV if ASCUS - recommended age 25 - 29 years   2. Status post  delivery Z98.891        PLAN:  May resume normal activities without restrictions.  Pap smear was done today.    Full counseling was provided, and all questions were answered.   Return to clinic in one year for an annual visit.     Patient Instructions   Follow up with your primary care provider for your other medical problems.  Continue self breast exam.  Increase physical activity and exercise.  PHQ-9/JACINTO-7 scores were discussed.  Will continue working with psychiatry/counselor for mood/anxiety issues.  Lab and pap smear results will be called to the patient.  Reflex pap smear done today and will repeat in 3yrs if negative.  Usual safety and preventative measures counseling done.  BMI >25  Weight loss encouraged.  May resume all normal activities.    Nia Casanova MD

## 2018-10-16 NOTE — PATIENT INSTRUCTIONS
Follow up with your primary care provider for your other medical problems.  Continue self breast exam.  Increase physical activity and exercise.  PHQ-9/JACINTO-7 scores were discussed.  Will continue working with psychiatry/counselor for mood/anxiety issues.  Lab and pap smear results will be called to the patient.  Reflex pap smear done today and will repeat in 3yrs if negative.  Usual safety and preventative measures counseling done.  BMI >25  Weight loss encouraged.  May resume all normal activities.

## 2018-10-16 NOTE — MR AVS SNAPSHOT
After Visit Summary   10/16/2018    Dayana Danielle    MRN: [id number]           Patient Information     Date Of Birth          1990        Visit Information        Provider Department      10/16/2018 11:00 AM Nia Casanova MD [facility]        Today's Diagnoses     Encounter for  visit    -  1    Status post  delivery          Care Instructions    Follow up with your primary care provider for your other medical problems.  Continue self breast exam.  Increase physical activity and exercise.  PHQ-9/JACINTO-7 scores were discussed.  Will continue working with psychiatry/counselor for mood/anxiety issues.  Lab and pap smear results will be called to the patient.  Reflex pap smear done today and will repeat in 3yrs if negative.  Usual safety and preventative measures counseling done.  BMI >25  Weight loss encouraged.  May resume all normal activities.          Follow-ups after your visit        Follow-up notes from your care team     Return in about 1 year (around 10/16/2019) for Annual Exam.      Who to contact     If you have questions or need follow up information about today's clinic visit or your schedule please contact [facility] directly at [phone].  Normal or non-critical lab and imaging results will be communicated to you by MyChart, letter or phone within 4 business days after the clinic has received the results. If you do not hear from us within 7 days, please contact the clinic through [employer] or phone. If you have a critical or abnormal lab result, we will notify you by phone as soon as possible.  Submit refill requests through [employer] or call your pharmacy and they will forward the refill request to us. Please allow 3 business days for your refill to be completed.          Additional Information About Your Visit        MyChart Information     [employer] gives you secure access to your electronic health record. If  you see a primary care provider, you can also send messages to your care team and make appointments. If you have questions, please call your primary care clinic.  If you do not have a primary care provider, please call [phone] and they will assist you.        Care EveryWhere ID     This is your Care EveryWhere ID. This could be used by other organizations to access your [place] medical records  [id number]        Your Vitals Were     Last Period Breastfeeding? BMI (Body Mass Index)             11/28/2017 Yes 29.09 kg/m2          Blood Pressure from Last 3 Encounters:   10/16/18 102/70   09/15/18 100/52   09/04/18 116/78    Weight from Last 3 Encounters:   10/16/18 197 lb (89.4 kg)   09/11/18 225 lb (102.1 kg)   09/04/18 225 lb 9.6 oz (102.3 kg)              We Performed the Following     Pap imaged thin layer screen reflex to HPV if ASCUS - recommended age 25 - 29 years        Primary Care Provider Office Phone # Fax #    CHEY Hernandez [facility] [phone]       [address] [facility] [address]  [facility] [address]        Equal Access to Services     LEONARDA PORTILLO : Hadii aad ku hadasho Sojbali, waaxda luqadaha, qaybta kaalmada adeegyada, jyothi vargas . So [facility] [phone].    ATENCIÓN: Si habla español, tiene a dumont disposición servicios gratuitos de asistencia lingüística. Llame al [phone].    We comply with applicable federal civil rights laws and [place] laws. We do not discriminate on the basis of race, color, national origin, age, disability, sex, sexual orientation, or gender identity.            Thank you!     Thank you for choosing [facility]  for your care. Our goal is always to provide you with excellent care. Hearing back from our patients is one way we can continue to improve our services. Please take a few minutes to complete the written survey that you may receive in the mail after your visit with us. Thank you!             Your  Updated Medication List - Protect others around you: Learn how to safely use, store and throw away your medicines at www.disposemymeds.org.          This list is accurate as of 10/16/18 11:37 AM.  Always use your most recent med list.                   Brand Name Dispense Instructions for use Diagnosis    citalopram 40 MG tablet    celeXA    90 tablet    1 tablet orally daily    Moderate episode of recurrent major depressive disorder (H)       order for DME     1 Units    Equipment being ordered:hospital grade breast pump    Breastfeeding problem       oxyCODONE IR 5 MG tablet    ROXICODONE    30 tablet    Take 1 tablet (5 mg) by mouth every 4 hours as needed for other (pain control or improvement in physical function. Hold dose for analgesic side effects.)    Status post  delivery       prenatal multivitamin plus iron 27-0.8 MG Tabs per tablet      Take 2 tablets by mouth daily

## 2018-10-18 LAB
COPATH REPORT: NORMAL
PAP: NORMAL

## 2018-10-25 NOTE — PROGRESS NOTES
Initial Lactation Consultation    Baby:  Ayleen Danielle         MRN:  [id number]  Mom:  Dayana Danielle       MRN:  [id number]    Consultation Date: 2018    HPI  Breastfeeding long-term goals: states she is unsure  Breastfeeding story :  After birth nursing wasn't great and the baby tore apart one of mom's nipples. Mother states that she hasn't been breast feeding for the past couple of days and she's been pumping with bottle feeding.   Infant does not take the whole nipple and has a strong suck.   Painful even with shield Feels like it is too much to do the nurse/supplement/pump scenario.    Feeding baby every three hours. Nursing on no side(s).  Nipple pain: mod-severe       PUMPING: Other: uknown pump  # times per day:  Every 3 hours getting 100-200 cc    SUPPLEMENTATION: bottle feeding all expressed milk    Baby's OUTPUT:   >4 stooled diapers with yellow and seedy appearance    MOTHER      Breastfeeding History  NoN/A    Medical History  Patient Active Problem List   Diagnosis     Ovarian mass     ADHD     JACINTO (generalized anxiety disorder)     Moderate episode of recurrent major depressive disorder (H)     Major depressive disorder, single episode, moderate (H)     Status post  delivery       Pregnancy History (any complications in this pregnancy)      Delivery History  Primary  for  traumatic medical miscarriage history    Labor Meds/Anesthesia  Epidural    Current Medications  Current Outpatient Prescriptions   Medication     citalopram (CELEXA) 40 MG tablet     order for DME     oxyCODONE IR (ROXICODONE) 5 MG tablet     Prenatal Vit-Fe Fumarate-FA (PRENATAL MULTIVITAMIN PLUS IRON) 27-0.8 MG TABS per tablet     No current facility-administered medications for this visit.        Herbals:  None    ASSESSMENT OF MOTHER    Physical:   Breast appearance  Breast Size: large  Nipple Appearance - Left: abraded and cracked  Nipple Appearance - Right: abraded and cracked  Nipple Erectility - Left:  "erect with stimulation  Nipple Erectility - Right: erect with stimulation  Areolas Compressibility: firm  Nipple Size: large  Milk Supply: mature      BABY       Name: Ayleen Danielle Birth Date: [birth year] Age: 10 days     Doctor: RFP     BABY'S WEIGHT HISTORY  Last interval weight:  10.5 oz.in 4 days.    Wt Readings from Last 5 Encounters:   10/03/18 9 lb 6 oz (4.252 kg) (71 %)*   09/21/18 8 lb 10.5 oz (3.926 kg) (77 %)*   09/18/18 8 lb (3.629 kg) (64 %)*   09/14/18 7 lb 11.5 oz (3.502 kg) (64 %)*     * Growth percentiles are based on WHO (Girls, 0-2 years) data.     Percentage wt. change from birth:       Since discharge from hospital, baby has a gain   Note: Normal weight gain is 1/2 to 1 oz/day in the first 6 months of life.    ASSESSMENT OF BABY    Physical:   Pulse 140  Temp 98.4  F (36.9  C) (Temporal)  Resp 32  Ht 1' 8.47\" (0.52 m)  Wt 8 lb 10.5 oz (3.926 kg)  HC 14.17\" (36 cm)  SpO2 100%  BMI 14.52 kg/m2    GENERAL: Alert, vigorous, is in no acute distress.  SKIN: skin is clear, no rash or abnormal pigmentation  HEAD: The head is normocephalic. The fontanels and sutures are normal  EYES: The eyes are normal. The conjunctivae and cornea normal.   NOSE: Clear, no discharge or congestion  MOUTH: The mouth is clear.  NECK: The neck is supple and thyroid is normal, no masses  LYMPH NODES: No adenopathy  LUNGS: The lung fields are clear to auscultation,no rales, rhonchi, wheezing or retractions  HEART: The precordium is quiet. Rhythm is regular. S1 and S2 are normal. No murmurs.   ABDOMEN: The umbilicus is normal. The bowel sounds are normal. Abdomen soft, non tender,  non distended, no masses or hepatosplenomegaly.  NEUROLOGIC: Normal tone throughout.         FEEDING ASSESSMENT    Initial position and latch strategy observed: unable to latch without shield  Effort to Latch: gentle stimulation needed for infant to latch  briefly sustained latch  Nipple pain:  yes  Weight gain at breast:  10 cc    A latch was " "observed today.    Latch:  1 - Repeated Attempts  Audible Swallowin - Few  Type of Nipple:  1 - Flat  Comfort+: 0 - Engorged, cracked or severe pain  Hold:  0 - Full Assist  Suckin - Short fast bursts,  2 or more sucks per second  TOTAL LATCHES SCORE:  4     INTERVENTIONS/EVALUATION:  Cross Cradle, Asymmetric Latch, Breast Compression, Shield and Other: laid back      SUMMARY  Excellent infant weight gain  Good maternal supply - likely oversupply  Latch - not latching, uncoordinated suck, poor milk transfer even with shield  Mom's health - very sad, lack of sleep, high stress    RECOMMENDATIONS  Patient Instructions   Your milk supply is great and Ayleen's weight gain is great.  You are doing such a good job  Continue to empty breasts with the pump 7 times a day  Get at least 5 hours sleep every night    Have Ayleen at the breast each day - the number of times depends on what you can fit in   The more you nurse at the breast and Ayleen gets practice the quicker she will get to using the breast    Return in two weeks    When she gets pumped breast milk, use paced feeding  Paced feeding videos  https://www.Divided.com/watch?v=PU1hg74NwvX AND https://www.breastfeeding-problems.com/paced-bottle-feeding.html)    Positioning and latch  Goal is to have a deep latch with areola in the baby's mouth instead of just nipple and to have baby pulling tongue along breast to get milk instead of \"chomping\" or sucking  Shallowly    Here is one way to achieve that:  1. Sit back with feet up and shoulders relaxed - you'll be bringing baby to you instead of your breast to baby  2. Bring baby snug up to you (skin to skin is best!) with baby's tummy to your tummy and with baby's ear, shoulder and hip aligned  3.  The baby's nose (not mouth) should be aligned with your nipple  4.  Hold breast behind areola in a \"U shape\" to help mold the breast tissue and make it easy for baby to latch  5.  Hand on neck/bottom of head and baby's " "chin on the breast  6.  Wait for a big open mouth and \"pop\" baby onto breast    For a football hold, you would hold your breast in a more \"C\" shape      Follow up: 1-2 weeks    60 minutes time spent face-to-face, 30 with mother and 30 with baby, with over 50% spent in counseling/coordination of care regarding breastfeeding goals, latch, nipple care, weight gain expectations, and pumping.     BALJINDER Joy      "

## 2018-12-06 DIAGNOSIS — F33.1 MODERATE EPISODE OF RECURRENT MAJOR DEPRESSIVE DISORDER (H): ICD-10-CM

## 2018-12-06 RX ORDER — CITALOPRAM HYDROBROMIDE 40 MG/1
TABLET ORAL
Qty: 30 TABLET | Refills: 0 | Status: SHIPPED | OUTPATIENT
Start: 2018-12-06 | End: 2018-12-17 | Stop reason: ALTCHOICE

## 2018-12-06 NOTE — TELEPHONE ENCOUNTER
"Requested Prescriptions   Pending Prescriptions Disp Refills     citalopram (CELEXA) 40 MG tablet 90 tablet 1     Si tablet orally daily    SSRIs Protocol Failed    2018  3:32 PM       Failed - PHQ-9 score less than 5 in past 6 months    Please review last PHQ-9 score.          Passed - Patient is age 18 or older       Passed - No active pregnancy on record       Passed - No positive pregnancy test in last 12 months       Passed - Recent (6 mo) or future (30 days) visit within the authorizing provider's specialty    Patient had office visit in the last 6 months or has a visit in the next 30 days with authorizing provider or within the authorizing provider's specialty.  See \"Patient Info\" tab in inbasket, or \"Choose Columns\" in Meds & Orders section of the refill encounter.            "

## 2018-12-06 NOTE — TELEPHONE ENCOUNTER
Medication is being filled for 1 time refill only due to:  Patient needs to be seen because due for f/u.     Spoke with patient-per OV 07/25/18, pt to return in 8 weeks. Pt scheduled appt with Cheri on 12/17/18.  PHQ-9 on 08/22/18 was 13.    Yun Miller RN  [facility]

## 2018-12-17 ENCOUNTER — OFFICE VISIT (OUTPATIENT)
Dept: FAMILY MEDICINE | Facility: CLINIC | Age: 28
End: 2018-12-17
Payer: COMMERCIAL

## 2018-12-17 VITALS
HEART RATE: 76 BPM | BODY MASS INDEX: 28.43 KG/M2 | SYSTOLIC BLOOD PRESSURE: 113 MMHG | DIASTOLIC BLOOD PRESSURE: 70 MMHG | OXYGEN SATURATION: 99 % | TEMPERATURE: 98.6 F | WEIGHT: 192.5 LBS

## 2018-12-17 DIAGNOSIS — F41.1 GAD (GENERALIZED ANXIETY DISORDER): Primary | ICD-10-CM

## 2018-12-17 DIAGNOSIS — Z98.891 STATUS POST CESAREAN DELIVERY: ICD-10-CM

## 2018-12-17 DIAGNOSIS — F90.2 ATTENTION DEFICIT HYPERACTIVITY DISORDER (ADHD), COMBINED TYPE: ICD-10-CM

## 2018-12-17 DIAGNOSIS — F32.1 MAJOR DEPRESSIVE DISORDER, SINGLE EPISODE, MODERATE (H): ICD-10-CM

## 2018-12-17 PROCEDURE — 99214 OFFICE O/P EST MOD 30 MIN: CPT | Performed by: NURSE PRACTITIONER

## 2018-12-17 RX ORDER — SERTRALINE HYDROCHLORIDE 100 MG/1
100 TABLET, FILM COATED ORAL DAILY
Qty: 30 TABLET | Refills: 3 | Status: SHIPPED | OUTPATIENT
Start: 2018-12-17 | End: 2019-01-31

## 2018-12-17 RX ORDER — HYDROXYZINE HYDROCHLORIDE 25 MG/1
12.5-5 TABLET, FILM COATED ORAL
Qty: 30 TABLET | Refills: 1 | Status: SHIPPED | OUTPATIENT
Start: 2018-12-17 | End: 2019-01-31

## 2018-12-17 ASSESSMENT — PATIENT HEALTH QUESTIONNAIRE - PHQ9
SUM OF ALL RESPONSES TO PHQ QUESTIONS 1-9: 17
5. POOR APPETITE OR OVEREATING: NEARLY EVERY DAY

## 2018-12-17 ASSESSMENT — ANXIETY QUESTIONNAIRES
2. NOT BEING ABLE TO STOP OR CONTROL WORRYING: MORE THAN HALF THE DAYS
GAD7 TOTAL SCORE: 14
5. BEING SO RESTLESS THAT IT IS HARD TO SIT STILL: MORE THAN HALF THE DAYS
1. FEELING NERVOUS, ANXIOUS, OR ON EDGE: NEARLY EVERY DAY
3. WORRYING TOO MUCH ABOUT DIFFERENT THINGS: MORE THAN HALF THE DAYS
7. FEELING AFRAID AS IF SOMETHING AWFUL MIGHT HAPPEN: SEVERAL DAYS
6. BECOMING EASILY ANNOYED OR IRRITABLE: SEVERAL DAYS

## 2018-12-17 NOTE — PROGRESS NOTES
SUBJECTIVE:   Dayana Danielle is a 28 year old female who presents to clinic today for the following health issues:    Depression and Anxiety Follow-Up    Status since last visit: Worsened     Other associated symptoms:None    Complicating factors:     Significant life event: Yes-  Had a baby in September      Current substance abuse: None    PHQ 2018   PHQ-9 Total Score 15 13 17   Q9: Suicide Ideation Not at all Not at all Not at all     JACINTO-7 SCORE 2018   Total Score 21 20 14     Has been experiencing low energy and low motivation. Attention level has been stable overall, still doing well without a stimulant.  Feels like anxiety overall stable, although feels tense, especially in her legs, before going to bed. Feels like baby care is going really well and has been enjoying being a mom. Still pumping every three hours during the day, and once at night. Plans to keep going at least for the next two months. Not seeing a counselor right now. Will be transitioning to working part-time, and staying home with baby two days per week.     PHQ-9   Any Language  JACINTO-7  Suicide Assessment Five-step Evaluation and Treatment (SAFE-T)    Amount of exercise or physical activity: None    Problems taking medications regularly: No    Medication side effects: none    Diet: regular (no restrictions)      -------------------------------------    Problem list and histories reviewed & adjusted, as indicated.  Additional history: as documented    Patient Active Problem List   Diagnosis     Ovarian mass     ADHD     JACINTO (generalized anxiety disorder)     Moderate episode of recurrent major depressive disorder (H)     Major depressive disorder, single episode, moderate (H)     Status post  delivery     Past Surgical History:   Procedure Laterality Date      SECTION N/A 2018    Procedure:  SECTION;  PRIMARY  SECTION ;  Surgeon: Nia Casanova  MD [place];  Location:  [facility]     NO HISTORY OF SURGERY         Social History     Tobacco Use     Smoking status: Former Smoker     Packs/day: 0.50     Types: Cigarettes     Start date: 2003     Last attempt to quit: 10/2017     Years since quittin.2     Smokeless tobacco: Former User     Quit date: 10/2017     Tobacco comment: Used a vapor for the month of October.   Substance Use Topics     Alcohol use: No     Alcohol/week: 8.4 oz     Types: 14 Cans of beer per week     Comment: None since found out is pregnant.     History reviewed. No pertinent family history.        Reviewed and updated as needed this visit by clinical staff  Tobacco  Allergies  Meds  Med Hx  Surg Hx  Fam Hx  Soc Hx      Reviewed and updated as needed this visit by Provider         ROS:  Constitutional, HEENT, cardiovascular, pulmonary, gi and gu systems are negative, except as otherwise noted.    OBJECTIVE:     /70 (BP Location: Left arm, Patient Position: Sitting, Cuff Size: Adult Regular)   Pulse 76   Temp 98.6  F (37  C) (Oral)   Wt 87.3 kg (192 lb 8 oz)   LMP 2018 (Approximate)   SpO2 99%   BMI 28.43 kg/m    Body mass index is 28.43 kg/m .   GENERAL: healthy, alert and no distress  NECK: no adenopathy, no asymmetry, masses, or scars and thyroid normal to palpation  RESP: lungs clear to auscultation - no rales, rhonchi or wheezes  CV: regular rate and rhythm, normal S1 S2, no S3 or S4, no murmur, click or rub, no peripheral edema and peripheral pulses strong  MS: no gross musculoskeletal defects noted, no edema  PSYCH: mentation appears normal, affect normal/bright    Diagnostic Test Results:  No results found for this or any previous visit (from the past 24 hour(s)).    ASSESSMENT/PLAN:     Problem List Items Addressed This Visit     Status post  delivery    Major depressive disorder, single episode, moderate (H)    Relevant Medications    hydrOXYzine (ATARAX) 25 MG tablet    sertraline (ZOLOFT) 100 MG  tablet    JACINTO (generalized anxiety disorder) - Primary    Relevant Medications    hydrOXYzine (ATARAX) 25 MG tablet    sertraline (ZOLOFT) 100 MG tablet    ADHD       Follow up 1 month  CHEY Hernandez [facility]  Please note greater than 50% of this 30 minute appointment were spent in face to face counseling with the patient of the issues described above in the history of present illness and in the plan, including adjusting medications

## 2018-12-18 ASSESSMENT — ANXIETY QUESTIONNAIRES: GAD7 TOTAL SCORE: 14

## 2019-01-16 NOTE — PROGRESS NOTES
SUBJECTIVE:   Dayana Danielle is a 28 year old female who presents to clinic today for the following health issues:      Depression and Anxiety Follow-Up    Status since last visit: Worsened     Other associated symptoms:None    Complicating factors:     Significant life event: No     Current substance abuse: None    PHQ 2018   PHQ-9 Total Score 15 13 17   Q9: Suicide Ideation Not at all Not at all Not at all     JACINTO-7 SCORE 2018   Total Score 21 20 14     PHQ-9  English  PHQ-9   Any Language  JACINTO-7  Suicide Assessment Five-step Evaluation and Treatment (SAFE-T)    Amount of exercise or physical activity: None    Problems taking medications regularly: No    Medication side effects: none    Diet: regular (no restrictions)      Unable to get out of bed, low energy; feels unmotivated. Feels that this is much worse than Celexa, but feels that Celexa wasn't working that well either. Has not had problems caring for or connecting with her baby, no fear of self harm, just low energy and sadness.   -------------------------------------    Problem list and histories reviewed & adjusted, as indicated.  Additional history: as documented    Patient Active Problem List   Diagnosis     Ovarian mass     ADHD     JACINTO (generalized anxiety disorder)     Moderate episode of recurrent major depressive disorder (H)     Major depressive disorder, single episode, moderate (H)     Status post  delivery     Past Surgical History:   Procedure Laterality Date      SECTION N/A 2018    Procedure:  SECTION;  PRIMARY  SECTION ;  Surgeon: Nia Casanova MD;  [facility]     NO HISTORY OF SURGERY         Social History     Tobacco Use     Smoking status: Former Smoker     Packs/day: 0.50     Types: Cigarettes     Start date: 2003     Last attempt to quit: 10/2017     Years since quittin.3     Smokeless tobacco: Former User     Quit  "date: 10/2017     Tobacco comment: Used a vapor for the month of October.   Substance Use Topics     Alcohol use: Yes     Alcohol/week: 8.4 oz     Types: 14 Cans of beer per week     Comment: couple beers a night     History reviewed. No pertinent family history.        Reviewed and updated as needed this visit by clinical staff       Reviewed and updated as needed this visit by Provider         ROS:  Constitutional, HEENT, cardiovascular, pulmonary, GI, , musculoskeletal, neuro, skin, endocrine and psych systems are negative, except as otherwise noted.    OBJECTIVE:     /82   Pulse 75   Temp 97.5  F (36.4  C) (Temporal)   Resp 12   Ht 1.745 m (5' 8.7\")   Wt 89.6 kg (197 lb 8 oz)   LMP 01/14/2019 (Approximate)   SpO2 98%   Breastfeeding? Yes   BMI 29.42 kg/m    Body mass index is 29.42 kg/m .   GENERAL: healthy and no distress  NECK: no adenopathy, no asymmetry, masses, or scars and thyroid normal to palpation  RESP: lungs clear to auscultation - no rales, rhonchi or wheezes  CV: regular rate and rhythm, normal S1 S2, no S3 or S4, no murmur, click or rub, no peripheral edema and peripheral pulses strong  MS: no gross musculoskeletal defects noted, no edema  PSYCH: mentation appears normal, affect normal/bright, tearful, anxious and fatigued    Diagnostic Test Results:  No results found for this or any previous visit (from the past 24 hour(s)).    ASSESSMENT/PLAN:     Problem List Items Addressed This Visit     JACINTO (generalized anxiety disorder) - Primary    Relevant Medications    hydrOXYzine (ATARAX) 25 MG tablet    sertraline (ZOLOFT) 100 MG tablet    Other Relevant Orders    MENTAL HEALTH REFERRAL  - Adult; Psychiatry and Medication Management; Psychiatry; [facility]: [facility]ry [facility] [phone].  Medication management & future refills will be returned to G PCP upon completion of evaluation; We malcolm...      Other Visit Diagnoses     Recurrent major depressive disorder, " remission status unspecified (H)        Relevant Medications    hydrOXYzine (ATARAX) 25 MG tablet    sertraline (ZOLOFT) 100 MG tablet    Other Relevant Orders    MENTAL HEALTH REFERRAL  - Adult; Psychiatry and Medication Management; [place]iatry[place] [facility] [facility] [phone].  Medication management & future refills will be returned to [facility] upon completion of evaluation; We malcolm...         Depression worsened- will try increasing dose of Zoloft and adding psych referral; discussed genesight testing  CHEY Hernandez [facility]  Please note greater than 50% of this 30 minute appointment were spent in face to face counseling with the patient of the issues described above in the history of present illness and in the plan, including changing medication

## 2019-01-17 ENCOUNTER — OFFICE VISIT (OUTPATIENT)
Dept: FAMILY MEDICINE | Facility: CLINIC | Age: 29
End: 2019-01-17
Payer: COMMERCIAL

## 2019-01-17 VITALS
RESPIRATION RATE: 12 BRPM | TEMPERATURE: 97.5 F | HEART RATE: 75 BPM | WEIGHT: 197.5 LBS | BODY MASS INDEX: 29.25 KG/M2 | HEIGHT: 69 IN | SYSTOLIC BLOOD PRESSURE: 118 MMHG | OXYGEN SATURATION: 98 % | DIASTOLIC BLOOD PRESSURE: 82 MMHG

## 2019-01-17 DIAGNOSIS — F33.9 RECURRENT MAJOR DEPRESSIVE DISORDER, REMISSION STATUS UNSPECIFIED (H): ICD-10-CM

## 2019-01-17 DIAGNOSIS — F41.1 GAD (GENERALIZED ANXIETY DISORDER): Primary | ICD-10-CM

## 2019-01-17 PROCEDURE — 99214 OFFICE O/P EST MOD 30 MIN: CPT | Performed by: NURSE PRACTITIONER

## 2019-01-17 RX ORDER — HYDROXYZINE HYDROCHLORIDE 25 MG/1
25 TABLET, FILM COATED ORAL
COMMUNITY
End: 2019-01-31 | Stop reason: DRUGHIGH

## 2019-01-17 RX ORDER — SERTRALINE HYDROCHLORIDE 100 MG/1
150 TABLET, FILM COATED ORAL DAILY
Qty: 45 TABLET | Refills: 1 | Status: SHIPPED | OUTPATIENT
Start: 2019-01-17 | End: 2019-01-31 | Stop reason: DRUGHIGH

## 2019-01-17 ASSESSMENT — MIFFLIN-ST. JEOR: SCORE: 1680.48

## 2019-01-31 ENCOUNTER — OFFICE VISIT (OUTPATIENT)
Dept: PSYCHIATRY | Facility: CLINIC | Age: 29
End: 2019-01-31
Payer: COMMERCIAL

## 2019-01-31 VITALS
BODY MASS INDEX: 29.2 KG/M2 | WEIGHT: 196 LBS | DIASTOLIC BLOOD PRESSURE: 80 MMHG | OXYGEN SATURATION: 95 % | SYSTOLIC BLOOD PRESSURE: 119 MMHG | HEART RATE: 77 BPM | RESPIRATION RATE: 16 BRPM | TEMPERATURE: 98.5 F

## 2019-01-31 DIAGNOSIS — F32.1 MAJOR DEPRESSIVE DISORDER, SINGLE EPISODE, MODERATE (H): ICD-10-CM

## 2019-01-31 DIAGNOSIS — F41.1 GAD (GENERALIZED ANXIETY DISORDER): Primary | ICD-10-CM

## 2019-01-31 PROCEDURE — 99214 OFFICE O/P EST MOD 30 MIN: CPT | Performed by: NURSE PRACTITIONER

## 2019-01-31 RX ORDER — SERTRALINE HYDROCHLORIDE 100 MG/1
TABLET, FILM COATED ORAL
Qty: 60 TABLET | Refills: 1 | Status: SHIPPED | OUTPATIENT
Start: 2019-01-31 | End: 2019-04-08

## 2019-01-31 RX ORDER — HYDROXYZINE HYDROCHLORIDE 25 MG/1
TABLET, FILM COATED ORAL
Qty: 60 TABLET | Refills: 1 | Status: SHIPPED | OUTPATIENT
Start: 2019-01-31 | End: 2019-04-08

## 2019-01-31 RX ORDER — BUSPIRONE HYDROCHLORIDE 15 MG/1
15 TABLET ORAL 2 TIMES DAILY
Qty: 60 TABLET | Refills: 1 | Status: SHIPPED | OUTPATIENT
Start: 2019-01-31 | End: 2019-04-08

## 2019-01-31 ASSESSMENT — ANXIETY QUESTIONNAIRES
4. TROUBLE RELAXING: NEARLY EVERY DAY
GAD7 TOTAL SCORE: 17
3. WORRYING TOO MUCH ABOUT DIFFERENT THINGS: MORE THAN HALF THE DAYS
5. BEING SO RESTLESS THAT IT IS HARD TO SIT STILL: SEVERAL DAYS
GAD7 TOTAL SCORE: 17
6. BECOMING EASILY ANNOYED OR IRRITABLE: NEARLY EVERY DAY
2. NOT BEING ABLE TO STOP OR CONTROL WORRYING: MORE THAN HALF THE DAYS
GAD7 TOTAL SCORE: 17
7. FEELING AFRAID AS IF SOMETHING AWFUL MIGHT HAPPEN: NEARLY EVERY DAY
7. FEELING AFRAID AS IF SOMETHING AWFUL MIGHT HAPPEN: NEARLY EVERY DAY
1. FEELING NERVOUS, ANXIOUS, OR ON EDGE: NEARLY EVERY DAY

## 2019-01-31 ASSESSMENT — PATIENT HEALTH QUESTIONNAIRE - PHQ9
10. IF YOU CHECKED OFF ANY PROBLEMS, HOW DIFFICULT HAVE THESE PROBLEMS MADE IT FOR YOU TO DO YOUR WORK, TAKE CARE OF THINGS AT HOME, OR GET ALONG WITH OTHER PEOPLE: EXTREMELY DIFFICULT
SUM OF ALL RESPONSES TO PHQ QUESTIONS 1-9: 21
SUM OF ALL RESPONSES TO PHQ QUESTIONS 1-9: 21

## 2019-01-31 NOTE — PROGRESS NOTES
"                                                         Outpatient Psychiatric Evaluation- Standard  Adult    Name:  Dayana Danielle  : 1990    Source of Referral:  Primary Care Provider: CHEY Hernandez CNP   Last visit: 2019  Current Psychotherapist: Not currently   intresed: Not sure     Identifying Data:  Patient is a 28 year old,   White American female  who presents for initial visit with me.  Patient is currently employed part time. Patient attended the session alone. Consent to communicate signed for Flavio Danielle patient's Spouse/Partner. Consent for treatment signed and included in electronic medical record. Discussed limits of confidentiality today. My Practice Policy was reviewed and signed.     Patient prefers to be called: \"Antonio\"      Chief Complaint:    Patient reports: \"I hit my max with Celexa and Zoloft is still not working.\"      HPI:    Patient reports a history of depression and anxiety since at least college. Saw a therapist in college about her anxiety. Therapist suspected ADHD and she saw her GP about treatment. Was prescribed Adderall up to 45 mg daily and took from  to . States Adderall was \"incredibly helpful\" \"but may have been masking\" her underlying depression. States  is a psychology associate (for ) and recommended she get a neuro-psych eval to suss out ADHD from mood/anxiety issues. Had neuro-psych eval in 10/2017 with Suzy Castaneda PsyD at ; ADHD ruled out and JACINTO, MDD ruled in.    She stopped taking Adderall around time of neuro-psych findings, and miscarriage in 2017. At that time, started psychotherapy with Karel DOWNS at , and started Celexa. Quickly tapered Celexa to 40 mg daily. States mood was better, emotions not as intense; no obvious side effects. Found out she was pregnant in early  and after consult with PCP decided to maintain Celexa at 40 mg daily.    Gave birth to daughter on 18; uncomplicated " ". First 2 weeks she felt \"hormonall insane\" but then this lifted. Physically healed, and reports healthy attachments to daughter. However, depression set in again. Endorsing \"nothing is enjoyable, low energy, low motivation, worthlessness, feeling lazy, feeling like a shell of a person, participating in anything feels like climbing a mountain, shame, embarrassment, fear, numbness, auto , no self-esteem, irritable, poor concentration.\" She denies any recent SI. As per physical symptoms, she reports poor sleep, increased appetite, teeth clenching, stomach aches and nausea.    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 In mid-2018 PCP switched her directly from Celexa to Zoloft. 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    Patient has now been on Zoloft for past 6 weeks; only started noticing mild therapuetic effect when increased 2 weeks ago to 150 mg daily. Tolerating mostly well, though notes sexual side effects (hard to orgasm).    Patient also taking Hydroxyzine 25 to 50 mg as needed for nighttime restlessness; takes this alongside Unisom.    Patient has not see Dr. Wren for therapy since 2018. States she feels too depressed to make proper use of therapy at this time, but would return once feeling more stable.      Psychiatric Review of Symptoms:     PHQ-9 scores:   PHQ-9 SCORE 2018   PHQ-9 Total Score MyChart - - 21 (Severe depression)   PHQ-9 Total Score 13 17 21        JACINTO-7 scores:    JACINTO-7 SCORE 2018   Total Score - - 17 (severe anxiety)   Total Score 20 14 17     Answers for HPI/ROS submitted by the patient on 2019   If you checked off any problems, how difficult have these problems made it for you to do your work, take care of things at home, or get along " "with other people?: Extremely difficult  PHQ9 TOTAL SCORE: 21  JACINTO 7 TOTAL SCORE: 17      Psychiatric History:   No psychiatric hospitalizations  No suicide attempts      Substance Use History:  Etoh: couple beers/wine every night; \"makes me feel better\"  Cannabis: no  Substances: no  Caff: minimal caffene; 1 a day  Tobacco: no      Past Medical History:  Past Medical History:   Diagnosis Date     ADHD     States that it's not an ongoing diagnosis, the person who did her psych eval for it did not think she had it. Was on Adderall in the past and stopped that in 2017.     Anemia     10 years ago     Anxiety     Started the Citalopram early 2017 by her PCP.      Depressive disorder     Started the Citalopram early 2017 by her PCP.      Ovarian cyst     Right sided     Smoking     Stopped 2017.      Surgery:   Past Surgical History:   Procedure Laterality Date      SECTION N/A 2018    Procedure:  SECTION;  PRIMARY  SECTION ;  Surgeon: Nia Casanova MD;  Location:  L+D     NO HISTORY OF SURGERY       Allergies:     Allergies   Allergen Reactions     Seasonal Allergies      Primary Care Provider: CHEY Hernandez CNP  Seizures or Head Injury: No    Ovarian cysts occasionally  \"Little bit\" anemia  Currently breastfeeeding  Would like to have another child sometime in next 1-2 years    Current Medications:    Current Outpatient Medications:      hydrOXYzine (ATARAX) 25 MG tablet, Take 25 mg by mouth every evening as needed, Disp: , Rfl:      order for DME, Equipment being ordered:hospital grade breast pump, Disp: 1 Units, Rfl: 1     oxyCODONE IR (ROXICODONE) 5 MG tablet, Take 1 tablet (5 mg) by mouth every 4 hours as needed for other (pain control or improvement in physical function. Hold dose for analgesic side effects.) (Patient not taking: Reported on 2018), Disp: 30 tablet, Rfl: 0     Prenatal Vit-Fe Fumarate-FA (PRENATAL MULTIVITAMIN PLUS " "IRON) 27-0.8 MG TABS per tablet, Take 2 tablets by mouth daily, Disp: , Rfl:      sertraline (ZOLOFT) 100 MG tablet, Take 1.5 tablets (150 mg) by mouth daily, Disp: 45 tablet, Rfl: 1     sertraline (ZOLOFT) 100 MG tablet, Take 1 tablet (100 mg) by mouth daily, Disp: 30 tablet, Rfl: 3    The [place] Prescription Monitoring Program has been reviewed and there are no concerns about diversionary activity for controlled substances at this time.    Vital Signs:  Vitals: /80 (BP Location: Right arm, Patient Position: Chair, Cuff Size: Adult Regular)   Pulse 77   Temp 98.5  F (36.9  C) (Oral)   Resp 16   Wt 88.9 kg (196 lb)   LMP 01/14/2019 (Approximate)   SpO2 95%   Breastfeeding? Yes   BMI 29.20 kg/m      Labs:  Most recent labs reviewed and no new labs.       Review of Systems:  10 systems (general, cardiovascular, respiratory, eyes, ENT, endocrine, GI, , M/S, neurological) were reviewed. Most pertinent finding(s) is/are: fatigue, increased appetite. The remaining systems are all unremarkable.    Family History:   Patient reported family history includes: No family history on file.    Father: \"severe anxiety, moderate depression\"; takes Ativan, maybe Zoloft  Mother: \"moderate anxiety\"; past unknown meds  Sister: \"severe anxiety and depression\"; hospitalized once; takes Xanax  Step-mother: bipolar, OCD, personality disorder    Social History:   Grew up in [place]  1 sister and 3 step-sisters  Has bachelor degree in social work  Works in    with   Has 4.5 m/o daughter      Mental Status Examination:     Appearance:  awake, alert and adequately groomed  Attitude:  cooperative   Eye Contact:  good  Gait and Station: Normal  Psychomotor Behavior:  intact station, gait and muscle tone  Oriented to:  time, person, and place  Attention Span and Concentration:  Normal  Speech:  clear, coherent  Mood:  anxious and depressed  Affect:  appropriate and in normal range  Associations:  " no loose associations  Thought Process:  logical, linear and goal oriented  Thought Content:  Appropriate to Interview  Recent and Remote Memory:  intact Not formally assessed. No amnesia.  Fund of Knowledge: appropriate  Insight:  good  Judgment:  intact  Impulse Control:  intact    Suicide Risk Assessment:  Today Dayana Danielle denies suicidal ideation or self-harm impulses. Therefore, based on all available evidence including the factors cited above, Dayana Danielle does not appear to be at imminent risk for self-harm, does not meet criteria for a 72-hr hold, and therefore remains appropriate for ongoing outpatient level of care.  A thorough assessment of risk factors related to suicide and self-harm have been reviewed and are noted above. The patient convincingly denies acute suicidality on several occasions. Local community safety resources reviewed and printed for patient to use if needed. There was no deceit detected, and the patient presented in a manner that was believable.     DSM5  Diagnosis:  296.32 (F33.1) Major Depressive Disorder, Recurrent Episode, Moderate _  300.02 (F41.1) Generalized Anxiety Disorder    Medical Comorbidities Include:   Patient Active Problem List    Diagnosis Date Noted     Status post  delivery 2018     Priority: Medium     Major depressive disorder, single episode, moderate (H) 2018     Priority: Medium     JACINTO (generalized anxiety disorder) 2017     Priority: Medium     Moderate episode of recurrent major depressive disorder (H) 2017     Priority: Medium     ADHD      Priority: Medium     Ovarian mass 08/15/2015     Priority: Medium       A 12-item WHODAS 2.0 assessment was completed by the patient today and recorded in EPIC.    WHODAS 2.0 Total Score 2019   Total Score 39   Total Score MyChart 39       The Patient Activation Measure (DHAVAL) score was completed and recorded in [facility]. This assesses patient knowledge,  skill, and confidence for self-management. No flowsheet data found.             Impression:  Dayana Danielle is a 28 year old female with a long-standing history of depression and past 7 year use of Adderall. While she had some moderate benefit with Celexa during course of pregnancy, it appears to have depreciated in effect after birthing. She is now 6 weeks into Zoloft initiation, and has already proceeded to 150 mg daily. I indicate to the patient that there may yet still be some unexplored potential of Zoloft at a maximum dosage, though I would not expect dramatic results. I propose a treatment sequence:    First, try Zoloft at 200 mg daily, with addition of Buspar  If this should not prove effective, then we will switch to Prozac  If this should not prove effective, then we will switch to Effexor  Should we find effective response to one of the above, and her concentration/motivaiton/energy continue to flag, we can consider the addition of Wellbutrin    We will continue to keep in mind her ongoing breastfeeding and future family planning. She's encouraged to return to psychotherapy when ready.    Medication side effects and alternatives reviewed. Health promotion activities recommended and reviewed today. All questions addressed. Education and counseling completed regarding risks and benefits of medications and psychotherapy options. Collaborative Care Psychiatry Service model reviewed today. Recommend therapy for additional support.     Treatment Plan:    Increase Zoloft to 200 mg daily    Start Buspar 7.5 mg BID for 1 week, then increase to 15 mg BID    May use Hydroxyzine 25 to 50 mg as needed for sleep    Continue all other medical direections per primary care provider.     Continue all other medications as reviewed per electronic medical record today.     Safety plan reviewed. To the Emergency Department as needed or call after hours crisis line at [phone] or [phone]. [place] Crisis  Text Line: Text [place] to 664203  or  Suicide LifeLine Chat: [employer].org/chat/    Continue individual therapy as planned with Karel Wren [facility].    Schedule an appointment with me in 3 weeks or sooner as needed.  Call [facility] at [phone] to schedule.    Follow up with primary care provider as planned or for acute medical concerns.    Call the psychiatric nurse line with medication questions or concerns at [phone].    [employer] may be used to communicate with your provider, but this is not intended to be used for emergencies.      Community Resources:    National Suicide Prevention Lifeline: 937.414.4238 (TTY: 569.667.7628). Call anytime for help.  (www.suicidepreventionlifeline.org)  National [place] [employer] [place] (www.jhonatan.org): 634.204.1444 or 990-038-3391.   Mental Health Association (www.mentalhealth.org): 431.564.5105 or 775-373-4196.  [place] Crisis Text Line: Text [place] to 319197  Suicide LifeLine Chat: suicideCity[employer].org/chat    Administrative Billing:   Time spent with patient was 60 minutes and greater than 50% of time or 40 minutes was spent in counseling and coordination of care regarding above diagnoses and treatment plan.    Patient Status:  Patient will continue to be seen for ongoing consultation and stabilization.    Signed:   Ck Musa, CNP  Psychiatry

## 2019-02-01 ASSESSMENT — ANXIETY QUESTIONNAIRES: GAD7 TOTAL SCORE: 17

## 2019-02-20 ENCOUNTER — MYC MEDICAL ADVICE (OUTPATIENT)
Dept: OBGYN | Facility: CLINIC | Age: 29
End: 2019-02-20

## 2019-02-21 NOTE — TELEPHONE ENCOUNTER
"Pt states when the bleeding started 2/10 it was heavier than her normal period.   Day 5 it lightened up and she was using just a couple of reg tampons a day  Since then the bleeding has gotten heavier-she is using 2-3 pads a day-not soaking through    No pain-just twinges of cramps  UPT last night \"strong positive\"    Pt is hesitant to come in even though her bleeding has not tapered off at this point. Will route to Dr. Casanova to advise.  Pt aware of s/s that would warrant immediate evaluation  Tawana Roberts RN on 2/21/2019 at 4:37 PM                "

## 2019-02-22 NOTE — TELEPHONE ENCOUNTER
"\"She doesn't need to make an appointment. As long as bleeding is not so heavy that she is worried, she can continue to monitor.  I would recommend checking another home UPT ~1 week after she stops bleeding.  If positive, would recommend making an appt\"     Left detailed msg regarding Dr. Diaz's recommendations  Tawana Roberts RN on 2/22/2019 at 8:22 AM    "

## 2019-02-28 ENCOUNTER — OFFICE VISIT (OUTPATIENT)
Dept: PSYCHIATRY | Facility: CLINIC | Age: 29
End: 2019-02-28
Payer: COMMERCIAL

## 2019-02-28 VITALS
RESPIRATION RATE: 16 BRPM | OXYGEN SATURATION: 97 % | TEMPERATURE: 98.1 F | BODY MASS INDEX: 28.75 KG/M2 | SYSTOLIC BLOOD PRESSURE: 136 MMHG | WEIGHT: 193 LBS | HEART RATE: 84 BPM | DIASTOLIC BLOOD PRESSURE: 81 MMHG

## 2019-02-28 DIAGNOSIS — F41.1 GAD (GENERALIZED ANXIETY DISORDER): Primary | ICD-10-CM

## 2019-02-28 DIAGNOSIS — F33.1 MODERATE EPISODE OF RECURRENT MAJOR DEPRESSIVE DISORDER (H): ICD-10-CM

## 2019-02-28 PROCEDURE — 99214 OFFICE O/P EST MOD 30 MIN: CPT | Performed by: NURSE PRACTITIONER

## 2019-02-28 RX ORDER — DULOXETIN HYDROCHLORIDE 30 MG/1
30 CAPSULE, DELAYED RELEASE ORAL 2 TIMES DAILY
Qty: 60 CAPSULE | Refills: 0 | Status: SHIPPED | OUTPATIENT
Start: 2019-02-28 | End: 2019-04-08

## 2019-02-28 ASSESSMENT — ANXIETY QUESTIONNAIRES
7. FEELING AFRAID AS IF SOMETHING AWFUL MIGHT HAPPEN: MORE THAN HALF THE DAYS
GAD7 TOTAL SCORE: 17
7. FEELING AFRAID AS IF SOMETHING AWFUL MIGHT HAPPEN: MORE THAN HALF THE DAYS
GAD7 TOTAL SCORE: 17
3. WORRYING TOO MUCH ABOUT DIFFERENT THINGS: NEARLY EVERY DAY
6. BECOMING EASILY ANNOYED OR IRRITABLE: MORE THAN HALF THE DAYS
GAD7 TOTAL SCORE: 17
4. TROUBLE RELAXING: NEARLY EVERY DAY
5. BEING SO RESTLESS THAT IT IS HARD TO SIT STILL: SEVERAL DAYS
1. FEELING NERVOUS, ANXIOUS, OR ON EDGE: NEARLY EVERY DAY
2. NOT BEING ABLE TO STOP OR CONTROL WORRYING: NEARLY EVERY DAY

## 2019-02-28 ASSESSMENT — PATIENT HEALTH QUESTIONNAIRE - PHQ9
10. IF YOU CHECKED OFF ANY PROBLEMS, HOW DIFFICULT HAVE THESE PROBLEMS MADE IT FOR YOU TO DO YOUR WORK, TAKE CARE OF THINGS AT HOME, OR GET ALONG WITH OTHER PEOPLE: VERY DIFFICULT
SUM OF ALL RESPONSES TO PHQ QUESTIONS 1-9: 20
SUM OF ALL RESPONSES TO PHQ QUESTIONS 1-9: 20

## 2019-02-28 NOTE — PATIENT INSTRUCTIONS
Stop Buspar    Decrease Zoloft to 150 mg (1 and 1/2 tab) daily for 3 days,  Then decrease Zoloft to 100 mg (1 tab) daily for 3 days,  Then decrease Zoloft to 50 mg (1/2 tab) daily for 3 days, then stop Zoloft  Start Cymbalta 30 mg (1 cap) daily for 1 week,  Then increase Cymbalta to 60 mg (2 caps) daily thereafter

## 2019-02-28 NOTE — PROGRESS NOTES
"    Outpatient Psychiatric Progress Note    Name: Dayana Danielle   : 1990                    Primary Care Provider: CHEY Hernandez CNP  Therapist: None     PHQ-9 scores:  PHQ-9 SCORE 2018   PHQ-9 Total Score MyChart - 21 (Severe depression) 20 (Severe depression)   PHQ-9 Total Score 17 21 20       JACINTO-7 scores:  JACINTO-7 SCORE 2018   Total Score - 17 (severe anxiety) 17 (severe anxiety)   Total Score 14 17 17     Answers for HPI/ROS submitted by the patient on 2019   If you checked off any problems, how difficult have these problems made it for you to do your work, take care of things at home, or get along with other people?: Very difficult  PHQ9 TOTAL SCORE: 20  JACINTO 7 TOTAL SCORE: 17        Patient Identification:  Patient is a 28 year old year old,   White American female  who presents for return visit with me.  Patient is currently employed part time. Patient attended the session with Teresita , who they agreed to have interview with. Patient prefers to be called: \"Antonio\".    Interim History:  I last saw Dayana Danielle for outpatient psychiatry Consultation on 19.     During that appointment, we reviewed her psychiatric history and recent post-partum depression. She had recently started Zoloft with quick taper, via PCP, and wasn't seeing benefit. We decided at the time to fully vet Zoloft at max dosage, alongside trial of Buspar. She was counseled about starting therapy, but she very honestly said she didn't have motivation to follow through.    Current medications include:   Current Outpatient Medications   Medication Sig     busPIRone (BUSPAR) 15 MG tablet Take 1 tablet (15 mg) by mouth 2 times daily     hydrOXYzine (ATARAX) 25 MG tablet 2 tabs nightly as needed for sleep     order for DME Equipment being ordered:hospital grade breast pump     sertraline (ZOLOFT) 100 MG tablet 2 tabs nightly     No " "current facility-administered medications for this visit.        The [place] Prescription Monitoring Program has been reviewed and there are no concerns about diversionary activity for controlled substances at this time.      I was able to review most recent Primary Care Provider, specialty provider, and therapy visit notes that I have access to.     Today, patient reports no change in mood symptoms; primary complaint being no motivation. She increased Zoloft to 200 mg daily and tapered Buspar to 15 mg BID. States her head feels wooshy. Last week has been stressful as she may be having a miscarriage; had positive pregnancy test at home, and is now having protracted bleeding; OBGYN aware.    Patient states she found out sister and father take SNRI. Called sister in session and she reports taking Cymbalta with good benefit. Patient would like to switch to Cymbalta.    Patient still not motivated for psychotherapy.        Past Medical History:   Diagnosis Date     ADHD     States that it's not an ongoing diagnosis, the person who did her psych eval for it did not think she had it. Was on Adderall in the past and stopped that in June 2017.     Anemia     10 years ago     Anxiety     Started the Citalopram early December 2017 by her PCP.      Depressive disorder     Started the Citalopram early December 2017 by her PCP.      Ovarian cyst     Right sided     Smoking     Stopped October 2017.      has a past medical history of ADHD, Anemia, Anxiety, Depressive disorder, Ovarian cyst, and Smoking.    Social history updates:  See above    Substance use updates:  Etoh: couple beers/wine every night; \"makes me feel better\"  Cannabis: no  Substances: no  Caff: minimal caffene; 1 a day  Tobacco: no      Vital Signs:   /81 (BP Location: Right arm, Patient Position: Chair, Cuff Size: Adult Regular)   Pulse 84   Temp 98.1  F (36.7  C) (Oral)   Resp 16   Wt 87.5 kg (193 lb)   LMP 02/10/2019   SpO2 97%   Breastfeeding? " Yes   BMI 28.75 kg/m      Labs:  Most recent laboratory results reviewed and no new labs.    Review of Systems:  10 systems (general, cardiovascular, respiratory, eyes, ENT, endocrine, GI, , M/S, neurological) were reviewed. Most pertinent finding(s) is/are: fatigue, likely miscariage. The remaining systems are all unremarkable.    Mental Status Examination:     Appearance:  awake, alert and adequately groomed  Attitude:  cooperative   Eye Contact:  good  Gait and Station: Normal  Psychomotor Behavior:  intact station, gait and muscle tone  Oriented to:  time, person, and place  Attention Span and Concentration:  Normal  Speech:  clear, coherent  Mood:  anxious and depressed  Affect:  appropriate and in normal range  Associations:  no loose associations  Thought Process:  logical, linear and goal oriented  Thought Content:  Appropriate to Interview  Recent and Remote Memory:  intact Not formally assessed. No amnesia.  Fund of Knowledge: appropriate  Insight:  good  Judgment:  intact  Impulse Control:  intact     Suicide Risk Assessment:  Today Dayana Danielle denies suicidal ideation or self-harm impulses. Therefore, based on all available evidence including the factors cited above, Dayana Danielle does not appear to be at imminent risk for self-harm, does not meet criteria for a 72-hr hold, and therefore remains appropriate for ongoing outpatient level of care.  A thorough assessment of risk factors related to suicide and self-harm have been reviewed and are noted above. The patient convincingly denies acute suicidality on several occasions. Local community safety resources reviewed and printed for patient to use if needed. There was no deceit detected, and the patient presented in a manner that was believable.      DSM5  Diagnosis:  296.32 (F33.1) Major Depressive Disorder, Recurrent Episode, Moderate _  300.02 (F41.1) Generalized Anxiety Disorder      Medical comorbidities include:    Patient Active Problem List    Diagnosis Date Noted     Status post  delivery 2018     Priority: Medium     Major depressive disorder, single episode, moderate (H) 2018     Priority: Medium     JACINTO (generalized anxiety disorder) 2017     Priority: Medium     Moderate episode of recurrent major depressive disorder (H) 2017     Priority: Medium     ADHD      Priority: Medium     Ovarian mass 08/15/2015     Priority: Medium         Assessment:  Dayana Diaz Lolis reports no measurable change to her mood or anxiety with maximum dosage of Zoloft, as we might have anticipated. I suspect the Buspar is causing the wooshy head feeling, so she may discontinue this. We previously discussed a SNRI as an alterative treatment option, and having learned two first degree family members are treated with Cymbalta, this seems all the more so a obvious direction. Given that she had no incident with a previous abrupt switch in her antidepressant, in addition to the fact she has been taking Zoloft just a short while, we can proceed with a more expeditious cross-taper to Cymbalta.    I have forwarded her [facility]'s literature on use of psychiatric medication during pregnancy and breastfeeding. I summarized this for her at our last visit.    Medication side effects and alternatives were reviewed. Health promotion activities recommended and reviewed today. All questions addressed. Education and counseling completed regarding risks and benefits of medications and psychotherapy options.    Treatment Plan:    Discontinue Buspar    Switch from Zoloft to Cymbalta:  Decrease Zoloft to 150 mg (1 and 1/2 tab) daily for 3 days,  Then decrease Zoloft to 100 mg (1 tab) daily for 3 days,  Then decrease Zoloft to 50 mg (1/2 tab) daily for 3 days, then stop Zoloft  Start Cymbalta 30 mg (1 cap) daily for 1 week,  Then increase Cymbalta to 60 mg (2 caps) daily thereafter    May use Hydroxyzine 25 to  50 mg as needed for sleep    Continue all other medical direections per primary care provider.     Continue all other medications as reviewed per electronic medical record today.     Safety plan reviewed. To the Emergency Department as needed or call after hours crisis line at [phone] or [phone]. [place] Crisis Text Line: Text [place] to 908590  or  Suicide LifeLine Chat: suicidepreventionlifeline.org/chat/    Continue individual therapy as planned with Karel Wren [facility].    Schedule an appointment with me in 4-6 weeks or sooner as needed.  Call [facility] at [phone] to schedule.    Follow up with primary care provider as planned or for acute medical concerns.    Call the psychiatric nurse line with medication questions or concerns at [phone].    [employer] may be used to communicate with your provider, but this is not intended to be used for emergencies    Administrative Billing:   Time spent with patient was 30 minutes and greater than 50% of time or 20 minutes was spent in counseling and coordination of care regarding above diagnoses and treatment plan.    Patient Status:  Patient will continue to be seen for ongoing consultation and stabilization.    Signed:   Ck Musa CNP   Psychiatry

## 2019-03-01 ASSESSMENT — ANXIETY QUESTIONNAIRES: GAD7 TOTAL SCORE: 17

## 2019-03-01 ASSESSMENT — PATIENT HEALTH QUESTIONNAIRE - PHQ9: SUM OF ALL RESPONSES TO PHQ QUESTIONS 1-9: 20

## 2019-03-06 ENCOUNTER — TELEPHONE (OUTPATIENT)
Dept: FAMILY MEDICINE | Facility: CLINIC | Age: 29
End: 2019-03-06

## 2019-03-06 NOTE — TELEPHONE ENCOUNTER
Panel Management Review      Patient has the following on her problem list:     Depression / Dysthymia review    Measure:  Needs PHQ-9 score of 4 or less during index window.  Administer PHQ-9 and if score is 5 or more, send encounter to provider for next steps.    5 - 7 month window range: Yes     PHQ-9 SCORE 12/17/2018 1/31/2019 2/28/2019   PHQ-9 Total Score MyChart - 21 (Severe depression) 20 (Severe depression)   PHQ-9 Total Score 17 21 20       If PHQ-9 recheck is 5 or more, route to provider for next steps.    Patient is due for:  PHQ9      Composite cancer screening  Chart review shows that this patient is due/due soon for the following None  Summary:    Patient is due/failing the following:   PHQ9    Action needed:   Patient needs to do PHQ9.    Type of outreach:    Sent [employer] message.    Questions for provider review:    None                                                                                                                                    Consuelo Caldwell MA      .

## 2019-03-22 ENCOUNTER — MYC MEDICAL ADVICE (OUTPATIENT)
Dept: OBGYN | Facility: CLINIC | Age: 29
End: 2019-03-22

## 2019-03-22 DIAGNOSIS — Z32.01 PREGNANCY TEST POSITIVE: Primary | ICD-10-CM

## 2019-03-25 NOTE — TELEPHONE ENCOUNTER
"Advised pt to make an appointment. Do you want an US? Routing to Dr. Casanova.         2/22/19 She doesn't need to make an appointment. As long as bleeding is not so heavy that she is worried, she can continue to monitor.  I would recommend checking another home UPT ~1 week after she stops bleeding.  If positive, would recommend making an appt\"   "

## 2019-03-25 NOTE — TELEPHONE ENCOUNTER
Pt informed. Pt will come for labs Tues and Thursday. Lab order entered. Pt transferred to scheduling to make appt.       Can either set up an appt with me or can simply schedule 2 lab visits for HCG quants --approx 48hrs apart.  If rising, will need an ultrasound and visit   ec

## 2019-03-26 DIAGNOSIS — Z32.01 PREGNANCY TEST POSITIVE: ICD-10-CM

## 2019-03-26 LAB — B-HCG SERPL-ACNC: ABNORMAL IU/L (ref 0–5)

## 2019-03-26 PROCEDURE — 84702 CHORIONIC GONADOTROPIN TEST: CPT | Performed by: OBSTETRICS & GYNECOLOGY

## 2019-03-26 PROCEDURE — 36415 COLL VENOUS BLD VENIPUNCTURE: CPT | Performed by: OBSTETRICS & GYNECOLOGY

## 2019-03-26 NOTE — RESULT ENCOUNTER NOTE
Please inform of results!  Pregnancy hormone consistent with new pregnancy --should come in for ultrasound to determine how far along she is

## 2019-03-27 ENCOUNTER — TELEPHONE (OUTPATIENT)
Dept: OBGYN | Facility: CLINIC | Age: 29
End: 2019-03-27

## 2019-03-28 ENCOUNTER — ANCILLARY PROCEDURE (OUTPATIENT)
Dept: ULTRASOUND IMAGING | Facility: CLINIC | Age: 29
End: 2019-03-28
Payer: COMMERCIAL

## 2019-03-28 ENCOUNTER — TELEPHONE (OUTPATIENT)
Dept: OBGYN | Facility: CLINIC | Age: 29
End: 2019-03-28

## 2019-03-28 ENCOUNTER — PRENATAL OFFICE VISIT (OUTPATIENT)
Dept: MIDWIFE SERVICES | Facility: CLINIC | Age: 29
End: 2019-03-28
Payer: COMMERCIAL

## 2019-03-28 VITALS
BODY MASS INDEX: 28.44 KG/M2 | WEIGHT: 192 LBS | HEIGHT: 69 IN | DIASTOLIC BLOOD PRESSURE: 66 MMHG | SYSTOLIC BLOOD PRESSURE: 114 MMHG

## 2019-03-28 DIAGNOSIS — O46.90 VAGINAL BLEEDING IN PREGNANCY: Primary | ICD-10-CM

## 2019-03-28 DIAGNOSIS — O03.4 INCOMPLETE MISCARRIAGE: Primary | ICD-10-CM

## 2019-03-28 DIAGNOSIS — O46.90 VAGINAL BLEEDING IN PREGNANCY: ICD-10-CM

## 2019-03-28 PROCEDURE — 99213 OFFICE O/P EST LOW 20 MIN: CPT | Performed by: ADVANCED PRACTICE MIDWIFE

## 2019-03-28 PROCEDURE — 76817 TRANSVAGINAL US OBSTETRIC: CPT | Performed by: OBSTETRICS & GYNECOLOGY

## 2019-03-28 ASSESSMENT — MIFFLIN-ST. JEOR: SCORE: 1660.29

## 2019-03-28 NOTE — TELEPHONE ENCOUNTER
Per Shelby Ross Suction D&C for Missed AB    Type of surgery: SUCTION D&C  Location of surgery: [facility]  Date and time of surgery: 4/4/2019 10:20am ARRIVAL 8:20am  Surgeon: Edilberto  Pre-Op Appt Date: HOSPITAL  Post-Op Appt Date: TBD   Packet sent out: HANDED 3/28/2019  Pre-cert/Authorization completed:  TBD.  Date: 3/28/2019 Swathi odom/Angelic Drake  Surgery Scheduler

## 2019-03-28 NOTE — PROGRESS NOTES
Dayana Danielle is a 28 year old female. Client of MD Edilberto. Is here today d/t SAB.    For past 2 months Antonio has had abnormal menses. She will get her menses on her normal date (Feb 10 and ) and spot for 10-15 days. Spotting can be light or heavy, but never more than spotting. Brown in color. She is crampy. Home pregnancy test positive. HCG quant 46, 493 on 3/26/19. C/S in 2018 and has had normal menses since 1 month postpartum.     Ultrasound performed here in clinic today confirms missed AB, no fetal heartrate.   Patient is seen here with her  and daughter and reviewed results of ultrasound. She is accepting and coping well.     Patient Active Problem List   Diagnosis     Ovarian mass     ADHD     JACINTO (generalized anxiety disorder)     Moderate episode of recurrent major depressive disorder (H)     Major depressive disorder, single episode, moderate (H)     Status post  delivery     Past Medical History:   Diagnosis Date     ADHD     States that it's not an ongoing diagnosis, the person who did her psych eval for it did not think she had it. Was on Adderall in the past and stopped that in 2017.     Anemia     10 years ago     Anxiety     Started the Citalopram early 2017 by her PCP.      Depressive disorder     Started the Citalopram early 2017 by her PCP.      Ovarian cyst     Right sided     Smoking     Stopped 2017.     Past Surgical History:   Procedure Laterality Date      SECTION N/A 2018    Procedure:  SECTION;  PRIMARY  SECTION ;  Surgeon: Nia Casanova MD;  Location:  L+D     NO HISTORY OF SURGERY       Current Outpatient Medications   Medication Sig Dispense Refill     doxylamine (UNISOM) 25 MG TABS tablet Take by mouth At Bedtime       DULoxetine (CYMBALTA) 30 MG capsule Take 1 capsule (30 mg) by mouth 2 times daily 60 capsule 0     hydrOXYzine (ATARAX) 25 MG tablet 2 tabs nightly as needed  "for sleep 60 tablet 1     busPIRone (BUSPAR) 15 MG tablet Take 1 tablet (15 mg) by mouth 2 times daily (Patient not taking: Reported on 3/28/2019) 60 tablet 1     order for DME Equipment being ordered:hospital grade breast pump 1 Units 1     sertraline (ZOLOFT) 100 MG tablet 2 tabs nightly (Patient not taking: Reported on 3/28/2019) 60 tablet 1     Allergies   Allergen Reactions     Seasonal Allergies        Health maintenance updated:  yes     /66   Ht 1.753 m (5' 9\")   Wt 87.1 kg (192 lb)   LMP 03/11/2019 (Approximate)   BMI 28.35 kg/m       Exam:  Constitutional: healthy, alert and no distress  Head: negative  Cardiovascular: negative, PMI normal. No lifts, heaves, or thrills. RRR. No murmurs, clicks gallops or rub  Respiratory: negative, Percussion normal. Good diaphragmatic excursion. Lungs clear  Musculoskeletal: extremities normal- no gross deformities noted, gait normal and normal muscle tone  Skin: no suspicious lesions or rashes  Neurologic: Gait normal. Reflexes normal and symmetric. Sensation grossly WNL.  Psychiatric: mentation appears normal      Assess need for Rhogam:  NO; A+      ASSESSMENT/PLAN:     ICD-10-CM    1. Incomplete miscarriage O03.4          COUNSELING:    Discussed options for SAB management including expectant management, medical management, and surgical management. She has had an SAB in the past and had a bad experience with medical management. She prefers a D&C.     Client of MD Edilberto. She would prefer to have Edilberto preform the D&C. Arrangement made for this. Edilberto out of the office, but will be notified via [facility] or in office. If problem with scheduling arises we will make alternative arrangements.     Will call or present to the emergency room if develops heavy bleeding saturating a maxi pad more frequently than every hour or passing large clots.         Shelby Ross, DNP, APRN, CNM            "

## 2019-04-04 ENCOUNTER — ANESTHESIA EVENT (OUTPATIENT)
Dept: SURGERY | Facility: CLINIC | Age: 29
End: 2019-04-04
Payer: COMMERCIAL

## 2019-04-04 ENCOUNTER — ANESTHESIA (OUTPATIENT)
Dept: SURGERY | Facility: CLINIC | Age: 29
End: 2019-04-04
Payer: COMMERCIAL

## 2019-04-04 ENCOUNTER — HOSPITAL ENCOUNTER (OUTPATIENT)
Facility: CLINIC | Age: 29
Discharge: HOME | End: 2019-04-04
Attending: OBSTETRICS & GYNECOLOGY | Admitting: OBSTETRICS & GYNECOLOGY
Payer: COMMERCIAL

## 2019-04-04 VITALS
DIASTOLIC BLOOD PRESSURE: 69 MMHG | BODY MASS INDEX: 28.06 KG/M2 | OXYGEN SATURATION: 99 % | TEMPERATURE: 97.2 F | WEIGHT: 190 LBS | RESPIRATION RATE: 14 BRPM | SYSTOLIC BLOOD PRESSURE: 114 MMHG | HEART RATE: 69 BPM

## 2019-04-04 DIAGNOSIS — O02.1 MISSED ABORTION: Primary | ICD-10-CM

## 2019-04-04 PROCEDURE — 36000050 ZZH SURGERY LEVEL 2 1ST 30 MIN: Performed by: OBSTETRICS & GYNECOLOGY

## 2019-04-04 PROCEDURE — 40000170 ZZH STATISTIC PRE-PROCEDURE ASSESSMENT II: Performed by: OBSTETRICS & GYNECOLOGY

## 2019-04-04 PROCEDURE — 00000159 ZZHCL STATISTIC H-SEND OUTS PREP: Performed by: OBSTETRICS & GYNECOLOGY

## 2019-04-04 PROCEDURE — 37000009 ZZH ANESTHESIA TECHNICAL FEE, EACH ADDTL 15 MIN: Performed by: OBSTETRICS & GYNECOLOGY

## 2019-04-04 PROCEDURE — 25000132 ZZH RX MED GY IP 250 OP 250 PS 637: Performed by: ANESTHESIOLOGY

## 2019-04-04 PROCEDURE — 71000027 ZZH RECOVERY PHASE 2 EACH 15 MINS: Performed by: OBSTETRICS & GYNECOLOGY

## 2019-04-04 PROCEDURE — 27210794 ZZH OR GENERAL SUPPLY STERILE: Performed by: OBSTETRICS & GYNECOLOGY

## 2019-04-04 PROCEDURE — 88305 TISSUE EXAM BY PATHOLOGIST: CPT | Performed by: OBSTETRICS & GYNECOLOGY

## 2019-04-04 PROCEDURE — 36000052 ZZH SURGERY LEVEL 2 EA 15 ADDTL MIN: Performed by: OBSTETRICS & GYNECOLOGY

## 2019-04-04 PROCEDURE — 59820 CARE OF MISCARRIAGE: CPT | Performed by: OBSTETRICS & GYNECOLOGY

## 2019-04-04 PROCEDURE — 25000125 ZZHC RX 250: Performed by: OBSTETRICS & GYNECOLOGY

## 2019-04-04 PROCEDURE — 25000128 H RX IP 250 OP 636: Performed by: ANESTHESIOLOGY

## 2019-04-04 PROCEDURE — 25000128 H RX IP 250 OP 636: Performed by: NURSE ANESTHETIST, CERTIFIED REGISTERED

## 2019-04-04 PROCEDURE — 88305 TISSUE EXAM BY PATHOLOGIST: CPT | Mod: 26 | Performed by: OBSTETRICS & GYNECOLOGY

## 2019-04-04 PROCEDURE — 25000132 ZZH RX MED GY IP 250 OP 250 PS 637: Performed by: OBSTETRICS & GYNECOLOGY

## 2019-04-04 PROCEDURE — 71000012 ZZH RECOVERY PHASE 1 LEVEL 1 FIRST HR: Performed by: OBSTETRICS & GYNECOLOGY

## 2019-04-04 PROCEDURE — 37000008 ZZH ANESTHESIA TECHNICAL FEE, 1ST 30 MIN: Performed by: OBSTETRICS & GYNECOLOGY

## 2019-04-04 PROCEDURE — 25000125 ZZHC RX 250: Performed by: NURSE ANESTHETIST, CERTIFIED REGISTERED

## 2019-04-04 PROCEDURE — 25800030 ZZH RX IP 258 OP 636: Performed by: NURSE ANESTHETIST, CERTIFIED REGISTERED

## 2019-04-04 RX ORDER — LIDOCAINE HYDROCHLORIDE 20 MG/ML
INJECTION, SOLUTION INFILTRATION; PERINEURAL PRN
Status: DISCONTINUED | OUTPATIENT
Start: 2019-04-04 | End: 2019-04-04

## 2019-04-04 RX ORDER — DOXYCYCLINE 100 MG/1
100 CAPSULE ORAL 2 TIMES DAILY
Qty: 8 CAPSULE | Refills: 0 | Status: SHIPPED | OUTPATIENT
Start: 2019-04-04 | End: 2019-04-08

## 2019-04-04 RX ORDER — METHYLERGONOVINE MALEATE 0.2 MG/1
200 TABLET ORAL ONCE
Status: COMPLETED | OUTPATIENT
Start: 2019-04-04 | End: 2019-04-04

## 2019-04-04 RX ORDER — HYDROCODONE BITARTRATE AND ACETAMINOPHEN 5; 325 MG/1; MG/1
1 TABLET ORAL
Status: COMPLETED | OUTPATIENT
Start: 2019-04-04 | End: 2019-04-04

## 2019-04-04 RX ORDER — FENTANYL CITRATE 50 UG/ML
25-50 INJECTION, SOLUTION INTRAMUSCULAR; INTRAVENOUS
Status: DISCONTINUED | OUTPATIENT
Start: 2019-04-04 | End: 2019-04-04 | Stop reason: HOSPADM

## 2019-04-04 RX ORDER — FENTANYL CITRATE 50 UG/ML
INJECTION, SOLUTION INTRAMUSCULAR; INTRAVENOUS PRN
Status: DISCONTINUED | OUTPATIENT
Start: 2019-04-04 | End: 2019-04-04

## 2019-04-04 RX ORDER — ONDANSETRON 2 MG/ML
4 INJECTION INTRAMUSCULAR; INTRAVENOUS EVERY 30 MIN PRN
Status: DISCONTINUED | OUTPATIENT
Start: 2019-04-04 | End: 2019-04-04 | Stop reason: HOSPADM

## 2019-04-04 RX ORDER — DOXYCYCLINE 100 MG/10ML
100 INJECTION, POWDER, LYOPHILIZED, FOR SOLUTION INTRAVENOUS
Status: COMPLETED | OUTPATIENT
Start: 2019-04-04 | End: 2019-04-04

## 2019-04-04 RX ORDER — IBUPROFEN 600 MG/1
600 TABLET, FILM COATED ORAL EVERY 6 HOURS PRN
Qty: 30 TABLET | Refills: 0 | COMMUNITY
Start: 2019-04-04 | End: 2020-04-08

## 2019-04-04 RX ORDER — DEXAMETHASONE SODIUM PHOSPHATE 4 MG/ML
INJECTION, SOLUTION INTRA-ARTICULAR; INTRALESIONAL; INTRAMUSCULAR; INTRAVENOUS; SOFT TISSUE PRN
Status: DISCONTINUED | OUTPATIENT
Start: 2019-04-04 | End: 2019-04-04

## 2019-04-04 RX ORDER — ACETAMINOPHEN 325 MG/1
650 TABLET ORAL
Status: DISCONTINUED | OUTPATIENT
Start: 2019-04-04 | End: 2019-04-04 | Stop reason: HOSPADM

## 2019-04-04 RX ORDER — ONDANSETRON 2 MG/ML
INJECTION INTRAMUSCULAR; INTRAVENOUS PRN
Status: DISCONTINUED | OUTPATIENT
Start: 2019-04-04 | End: 2019-04-04

## 2019-04-04 RX ORDER — ACETAMINOPHEN 325 MG/1
650 TABLET ORAL EVERY 4 HOURS PRN
Qty: 50 TABLET | Refills: 0 | Status: ON HOLD | COMMUNITY
Start: 2019-04-04 | End: 2020-11-14

## 2019-04-04 RX ORDER — PROPOFOL 10 MG/ML
INJECTION, EMULSION INTRAVENOUS PRN
Status: DISCONTINUED | OUTPATIENT
Start: 2019-04-04 | End: 2019-04-04

## 2019-04-04 RX ORDER — NALOXONE HYDROCHLORIDE 0.4 MG/ML
.1-.4 INJECTION, SOLUTION INTRAMUSCULAR; INTRAVENOUS; SUBCUTANEOUS
Status: DISCONTINUED | OUTPATIENT
Start: 2019-04-04 | End: 2019-04-04 | Stop reason: HOSPADM

## 2019-04-04 RX ORDER — ONDANSETRON 4 MG/1
4 TABLET, ORALLY DISINTEGRATING ORAL EVERY 30 MIN PRN
Status: DISCONTINUED | OUTPATIENT
Start: 2019-04-04 | End: 2019-04-04 | Stop reason: HOSPADM

## 2019-04-04 RX ORDER — PROPOFOL 10 MG/ML
INJECTION, EMULSION INTRAVENOUS CONTINUOUS PRN
Status: DISCONTINUED | OUTPATIENT
Start: 2019-04-04 | End: 2019-04-04

## 2019-04-04 RX ORDER — KETOROLAC TROMETHAMINE 30 MG/ML
INJECTION, SOLUTION INTRAMUSCULAR; INTRAVENOUS PRN
Status: DISCONTINUED | OUTPATIENT
Start: 2019-04-04 | End: 2019-04-04

## 2019-04-04 RX ORDER — HYDROMORPHONE HYDROCHLORIDE 1 MG/ML
.3-.5 INJECTION, SOLUTION INTRAMUSCULAR; INTRAVENOUS; SUBCUTANEOUS EVERY 10 MIN PRN
Status: DISCONTINUED | OUTPATIENT
Start: 2019-04-04 | End: 2019-04-04 | Stop reason: HOSPADM

## 2019-04-04 RX ORDER — SODIUM CHLORIDE, SODIUM LACTATE, POTASSIUM CHLORIDE, CALCIUM CHLORIDE 600; 310; 30; 20 MG/100ML; MG/100ML; MG/100ML; MG/100ML
INJECTION, SOLUTION INTRAVENOUS CONTINUOUS PRN
Status: DISCONTINUED | OUTPATIENT
Start: 2019-04-04 | End: 2019-04-04

## 2019-04-04 RX ORDER — HYDROXYZINE HYDROCHLORIDE 25 MG/1
50 TABLET, FILM COATED ORAL ONCE
Status: COMPLETED | OUTPATIENT
Start: 2019-04-04 | End: 2019-04-04

## 2019-04-04 RX ORDER — SODIUM CHLORIDE, SODIUM LACTATE, POTASSIUM CHLORIDE, CALCIUM CHLORIDE 600; 310; 30; 20 MG/100ML; MG/100ML; MG/100ML; MG/100ML
INJECTION, SOLUTION INTRAVENOUS CONTINUOUS
Status: DISCONTINUED | OUTPATIENT
Start: 2019-04-04 | End: 2019-04-04 | Stop reason: HOSPADM

## 2019-04-04 RX ORDER — ACETAMINOPHEN 325 MG/1
975 TABLET ORAL ONCE
Status: COMPLETED | OUTPATIENT
Start: 2019-04-04 | End: 2019-04-04

## 2019-04-04 RX ORDER — METHYLERGONOVINE MALEATE 0.2 MG/1
0.2 TABLET ORAL EVERY 6 HOURS
Qty: 3 TABLET | Refills: 0 | Status: SHIPPED | OUTPATIENT
Start: 2019-04-04 | End: 2019-04-08

## 2019-04-04 RX ORDER — MEPERIDINE HYDROCHLORIDE 25 MG/ML
12.5 INJECTION INTRAMUSCULAR; INTRAVENOUS; SUBCUTANEOUS
Status: DISCONTINUED | OUTPATIENT
Start: 2019-04-04 | End: 2019-04-04 | Stop reason: HOSPADM

## 2019-04-04 RX ADMIN — FENTANYL CITRATE 50 MCG: 50 INJECTION, SOLUTION INTRAMUSCULAR; INTRAVENOUS at 11:26

## 2019-04-04 RX ADMIN — PROPOFOL 200 MCG/KG/MIN: 10 INJECTION, EMULSION INTRAVENOUS at 10:42

## 2019-04-04 RX ADMIN — KETOROLAC TROMETHAMINE 30 MG: 30 INJECTION, SOLUTION INTRAMUSCULAR at 10:58

## 2019-04-04 RX ADMIN — DOXYCYCLINE 100 MG: 100 INJECTION, POWDER, LYOPHILIZED, FOR SOLUTION INTRAVENOUS at 10:46

## 2019-04-04 RX ADMIN — MIDAZOLAM 2 MG: 1 INJECTION INTRAMUSCULAR; INTRAVENOUS at 10:38

## 2019-04-04 RX ADMIN — SODIUM CHLORIDE, POTASSIUM CHLORIDE, SODIUM LACTATE AND CALCIUM CHLORIDE: 600; 310; 30; 20 INJECTION, SOLUTION INTRAVENOUS at 10:36

## 2019-04-04 RX ADMIN — FENTANYL CITRATE 50 MCG: 50 INJECTION, SOLUTION INTRAMUSCULAR; INTRAVENOUS at 10:42

## 2019-04-04 RX ADMIN — LIDOCAINE HYDROCHLORIDE 100 MG: 20 INJECTION, SOLUTION INFILTRATION; PERINEURAL at 10:42

## 2019-04-04 RX ADMIN — DEXMEDETOMIDINE HYDROCHLORIDE 12 MCG: 100 INJECTION, SOLUTION INTRAVENOUS at 10:56

## 2019-04-04 RX ADMIN — PROPOFOL 200 MG: 10 INJECTION, EMULSION INTRAVENOUS at 10:42

## 2019-04-04 RX ADMIN — DOXYCYCLINE 100 MG: 100 INJECTION, POWDER, LYOPHILIZED, FOR SOLUTION INTRAVENOUS at 10:44

## 2019-04-04 RX ADMIN — FENTANYL CITRATE 25 MCG: 50 INJECTION, SOLUTION INTRAMUSCULAR; INTRAVENOUS at 10:54

## 2019-04-04 RX ADMIN — ACETAMINOPHEN 975 MG: 325 TABLET, FILM COATED ORAL at 09:21

## 2019-04-04 RX ADMIN — METHYLERGONOVINE MALEATE 200 MCG: 0.2 TABLET ORAL at 12:05

## 2019-04-04 RX ADMIN — HYDROXYZINE HYDROCHLORIDE 50 MG: 50 TABLET, FILM COATED ORAL at 09:46

## 2019-04-04 RX ADMIN — HYDROCODONE BITARTRATE AND ACETAMINOPHEN 1 TABLET: 5; 325 TABLET ORAL at 12:31

## 2019-04-04 RX ADMIN — DEXAMETHASONE SODIUM PHOSPHATE 4 MG: 4 INJECTION, SOLUTION INTRA-ARTICULAR; INTRALESIONAL; INTRAMUSCULAR; INTRAVENOUS; SOFT TISSUE at 10:49

## 2019-04-04 RX ADMIN — FENTANYL CITRATE 50 MCG: 50 INJECTION, SOLUTION INTRAMUSCULAR; INTRAVENOUS at 11:43

## 2019-04-04 RX ADMIN — PROPOFOL 50 MG: 10 INJECTION, EMULSION INTRAVENOUS at 10:44

## 2019-04-04 RX ADMIN — ONDANSETRON 4 MG: 2 INJECTION INTRAMUSCULAR; INTRAVENOUS at 10:58

## 2019-04-04 NOTE — ANESTHESIA POSTPROCEDURE EVALUATION
Patient: Dayana Danielle    Procedure(s):  DILATION AND CURETTAGE SUCTION    Diagnosis:MISSED AB  Diagnosis Additional Information: No value filed.    Anesthesia Type:  MAC    Note:  Anesthesia Post Evaluation    Patient location during evaluation: PACU  Patient participation: Able to fully participate in evaluation  Level of consciousness: awake and alert  Pain management: adequate  Airway patency: patent  Cardiovascular status: acceptable and hemodynamically stable  Respiratory status: acceptable and unassisted  Hydration status: acceptable  PONV: none             Last vitals:  Vitals:    04/04/19 0905   BP: 123/73   Resp: 16   Temp: 36.3  C (97.4  F)         Electronically Signed By: Be Monroe DO  April 4, 2019  11:21 AM

## 2019-04-04 NOTE — ANESTHESIA PREPROCEDURE EVALUATION
Anesthesia Evaluation     .             ROS/MED HX    ENT/Pulmonary:  - neg pulmonary ROS     Neurologic:  - neg neurologic ROS     Cardiovascular:  - neg cardiovascular ROS       METS/Exercise Tolerance:     Hematologic:         Musculoskeletal:         GI/Hepatic:        (-) GERD and liver disease   Renal/Genitourinary:      (-) renal disease   Endo:      (-) Type II DM and thyroid disease   Psychiatric:     (+) psychiatric history depression and anxiety      Infectious Disease:         Malignancy:         Other:                     Physical Exam  Normal systems: cardiovascular, pulmonary and dental    Airway   Mallampati: II  TM distance: > 3 FB  Neck ROM: full  Mouth opening: > 3 cm    Dental     Cardiovascular       Pulmonary           neg OB ROS                   Anesthesia Plan      History & Physical Review  History and physical reviewed and following examination; no interval change.    ASA Status:  2 .  OB Epidural Asa: 2   NPO Status:  > 8 hours    Plan for General and LMA with Intravenous and Propofol induction. Maintenance will be TIVA.    PONV prophylaxis:  Ondansetron (or other 5HT-3) and Dexamethasone or Solumedrol       Postoperative Care      Consents  Anesthetic plan, risks, benefits and alternatives discussed with:  Patient..                .                    .

## 2019-04-04 NOTE — BRIEF OP NOTE
[facility]    Brief Operative Note    Pre-operative diagnosis: MISSED AB  Post-operative diagnosis missed   Procedure: Procedure(s):  DILATION AND CURETTAGE SUCTION  Surgeon: Surgeon(s) and Role:     * Nia Casanova MD - Primary  Anesthesia: General   Estimated blood loss: 5mL  Drains: None  Specimens:   ID Type Source Tests Collected by Time Destination   A :  Products of Conception Placenta/ Fragments/ Fetus from Miscarriage or Termination SURGICAL PATHOLOGY EXAM Nia Casanova MD 2019 10:27 AM      Findings:   Normal midline uterus which sounded to 12cm.  Normal cervix.  Moderate amount of products of conception  Complications: None.  Implants: None.

## 2019-04-04 NOTE — OR NURSING
PNDS met, po per I&O sheet. Pt dressed, up in recliner and transported to Phase 2.   Glasses returned to patient

## 2019-04-04 NOTE — ANESTHESIA CARE TRANSFER NOTE
Patient: Dayana Danielle    Procedure(s):  DILATION AND CURETTAGE SUCTION    Diagnosis: MISSED AB  Diagnosis Additional Information: No value filed.    Anesthesia Type:   MAC     Note:  Airway :Face Mask  Patient transferred to:PACU  Comments: Pt exhibits spont resps, all monitors and alarms on in pacu, report given to RN, vss.Handoff Report: Identifed the Patient, Identified the Reponsible Provider, Reviewed the pertinent medical history, Discussed the surgical course, Reviewed Intra-OP anesthesia mangement and issues during anesthesia, Set expectations for post-procedure period and Allowed opportunity for questions and acknowledgement of understanding      Vitals: (Last set prior to Anesthesia Care Transfer)    CRNA VITALS  4/4/2019 1040 - 4/4/2019 1115      4/4/2019             Pulse:  73    SpO2:  99 %    Resp Rate (observed):  7  (Abnormal)                 Electronically Signed By: CHEY Toussaint CRNA  April 4, 2019  11:15 AM

## 2019-04-04 NOTE — DISCHARGE INSTRUCTIONS
Same Day Surgery Discharge Instructions for  Sedation and General Anesthesia       It's not unusual to feel dizzy, light-headed or faint for up to 24 hours after surgery or while taking pain medication.  If you have these symptoms: sit for a few minutes before standing and have someone assist you when you get up to walk or use the bathroom.      You should rest and relax for the next 24 hours. We recommend you make arrangements to have an adult stay with you for at least 24 hours after your discharge.  Avoid hazardous and strenuous activity.      DO NOT DRIVE any vehicle or operate mechanical equipment for 24 hours following the end of your surgery.  Even though you may feel normal, your reactions may be affected by the medication you have received.      Do not drink alcoholic beverages for 24 hours following surgery.       Slowly progress to your regular diet as you feel able. It's not unusual to feel nauseated and/or vomit after receiving anesthesia.  If you develop these symptoms, drink clear liquids (apple juice, ginger ale, broth, 7-up, etc. ) until you feel better.  If your nausea and vomiting persists for 24 hours, please notify your surgeon.        All narcotic pain medications, along with inactivity and anesthesia, can cause constipation. Drinking plenty of liquids and increasing fiber intake will help.      For any questions of a medical nature, call your surgeon.      Do not make important decisions for 24 hours.      If you had general anesthesia, you may have a sore throat for a couple of days related to the breathing tube used during surgery.  You may use Cepacol lozenges to help with this discomfort.  If it worsens or if you develop a fever, contact your surgeon.       If you feel your pain is not well managed with the pain medications prescribed by your surgeon, please contact your surgeon's office to let them know so they can address your concerns.       POST-OPERATIVE INSTRUCTIONS FOLLOWING  D & C      ACTIVITY:  You may resume normal activities including lifting as needed.  It is permissible to drive a car and to climb stairs.  Baths or showers are perfectly acceptable.    CHECK-UP:  You should be seen 1 month after discharge unless home instruction sheet states otherwise and please phone the office the day after procedure and schedule an appointment with your physician.    VAGINAL DISCHARGE:  You may have some vaginal bleeding or discharge for about a week after discharge.  You should avoid douches, tampons, and intercourse for the first week.    TEMPERATURE:  If you develop temperature levels to over 100.4 F your physician should be called immediately.    DIET:  [place] or light diet is advisable the day of surgery.  If nausea persists, continue this diet.  If severe, call.    [facility]  [phone]      **If you have questions or concerns about your procedure,   call Dr. Casanova at [phone]**

## 2019-04-04 NOTE — OP NOTE
Procedure Date: 2019      PREOPERATIVE DIAGNOSES:  Missed .      POSTOPERATIVE DIAGNOSIS:  Missed .      PROCEDURE:  Suction dilation and curettage.      SURGEON:  Nia Casanova MD      ANESTHESIA:  General.      COMPLICATIONS:  None.      ESTIMATED BLOOD LOSS:  5 mL.      FINDINGS:  Normal midplane uterus which sounded to 12 cm.  Moderate amount of products of conception.      INDICATIONS:  Yumiko is a 28-year-old, , who had originally called in with vaginal bleeding and faintly positive pregnancy test approximately 8 weeks ago.  She repeated a pregnancy test which was lighter, and was presumed to have had a spontaneous miscarriage.  Yumiko called last week with ongoing bleeding and a more strongly positive pregnancy test with no interval sexual activity, and was brought in for further evaluation.  Ultrasound revealed an irregular gestational sac measuring approximately 8 weeks size with no fetal pole or yolk sac.  Yumiko was therefore counseled and consented regarding options of management and opted for surgical suction D and C.      DESCRIPTION OF PROCEDURE:  Yumiko was taken to the operating room where general anesthesia was administered without difficulty.  She was then prepared and draped in the normal sterile fashion in dorsal lithotomy position.  A timeout was performed to identify correct patient and procedure.  Open-sided speculum was placed into the vagina and anterior lip of the cervix was grasped using a single-tooth tenaculum.  The uterus was then gently sounded to 12 cm and cervix was serially dilated to 8 mm to allow our 8 mm curved curet passage.  Curet was then gently advanced to the uterine fundus and suction was employed.  Products of conception were removed with 3 passes.  Gentle sharp curettage was then performed in all quadrants with gritty texture revealed.  One final pass with suction was performed and procedure was complete.  A single-tooth tenaculum was removed as  well as speculum.  The patient was taken to recovery room in stable condition.         VINAYAK SEALS MD             D: 2019   T: 2019   MT: RUBIN      Name:     VANITA AGUILA   MRN:      [id number]        Account:        [id number]   :      1990           Procedure Date: 2019      Document: [id number]

## 2019-04-05 LAB — COPATH REPORT: NORMAL

## 2019-04-08 ENCOUNTER — OFFICE VISIT (OUTPATIENT)
Dept: PSYCHIATRY | Facility: CLINIC | Age: 29
End: 2019-04-08
Payer: COMMERCIAL

## 2019-04-08 VITALS
RESPIRATION RATE: 16 BRPM | BODY MASS INDEX: 28.94 KG/M2 | SYSTOLIC BLOOD PRESSURE: 125 MMHG | WEIGHT: 196 LBS | HEART RATE: 101 BPM | DIASTOLIC BLOOD PRESSURE: 85 MMHG | TEMPERATURE: 98.1 F | OXYGEN SATURATION: 94 %

## 2019-04-08 DIAGNOSIS — F32.1 MAJOR DEPRESSIVE DISORDER, SINGLE EPISODE, MODERATE (H): ICD-10-CM

## 2019-04-08 DIAGNOSIS — F41.1 GAD (GENERALIZED ANXIETY DISORDER): Primary | ICD-10-CM

## 2019-04-08 DIAGNOSIS — F33.1 MODERATE EPISODE OF RECURRENT MAJOR DEPRESSIVE DISORDER (H): ICD-10-CM

## 2019-04-08 PROCEDURE — 99214 OFFICE O/P EST MOD 30 MIN: CPT | Performed by: NURSE PRACTITIONER

## 2019-04-08 RX ORDER — DULOXETIN HYDROCHLORIDE 60 MG/1
60 CAPSULE, DELAYED RELEASE ORAL DAILY
Qty: 30 CAPSULE | Refills: 1 | Status: SHIPPED | OUTPATIENT
Start: 2019-04-08 | End: 2019-05-21

## 2019-04-08 RX ORDER — HYDROXYZINE HYDROCHLORIDE 25 MG/1
TABLET, FILM COATED ORAL
Qty: 60 TABLET | Refills: 1 | Status: SHIPPED | OUTPATIENT
Start: 2019-04-08 | End: 2019-05-21

## 2019-04-08 ASSESSMENT — ANXIETY QUESTIONNAIRES
GAD7 TOTAL SCORE: 14
GAD7 TOTAL SCORE: 14
2. NOT BEING ABLE TO STOP OR CONTROL WORRYING: MORE THAN HALF THE DAYS
7. FEELING AFRAID AS IF SOMETHING AWFUL MIGHT HAPPEN: SEVERAL DAYS
4. TROUBLE RELAXING: MORE THAN HALF THE DAYS
3. WORRYING TOO MUCH ABOUT DIFFERENT THINGS: MORE THAN HALF THE DAYS
5. BEING SO RESTLESS THAT IT IS HARD TO SIT STILL: MORE THAN HALF THE DAYS
1. FEELING NERVOUS, ANXIOUS, OR ON EDGE: NEARLY EVERY DAY
6. BECOMING EASILY ANNOYED OR IRRITABLE: MORE THAN HALF THE DAYS
GAD7 TOTAL SCORE: 14
7. FEELING AFRAID AS IF SOMETHING AWFUL MIGHT HAPPEN: SEVERAL DAYS

## 2019-04-08 ASSESSMENT — PATIENT HEALTH QUESTIONNAIRE - PHQ9
SUM OF ALL RESPONSES TO PHQ QUESTIONS 1-9: 15
SUM OF ALL RESPONSES TO PHQ QUESTIONS 1-9: 15
10. IF YOU CHECKED OFF ANY PROBLEMS, HOW DIFFICULT HAVE THESE PROBLEMS MADE IT FOR YOU TO DO YOUR WORK, TAKE CARE OF THINGS AT HOME, OR GET ALONG WITH OTHER PEOPLE: SOMEWHAT DIFFICULT

## 2019-04-08 NOTE — PROGRESS NOTES
"    Outpatient Psychiatric Progress Note    Name: Dayana Danielle   : 1990                    Primary Care Provider: CHEY Hernandez CNP  Therapist: None     PHQ-9 scores:  PHQ-9 SCORE 2019 3/6/2019 2019   PHQ-9 Total Score MyChart 20 (Severe depression) 22 (Severe depression) 15 (Moderately severe depression)   PHQ-9 Total Score 20 22 15       JACINTO-7 scores:  JACINTO-7 SCORE 2019   Total Score 17 (severe anxiety) 17 (severe anxiety) 14 (moderate anxiety)   Total Score 17 17 14     Answers for HPI/ROS submitted by the patient on 2019   If you checked off any problems, how difficult have these problems made it for you to do your work, take care of things at home, or get along with other people?: Somewhat difficult  PHQ9 TOTAL SCORE: 15  JACINTO 7 TOTAL SCORE: 14        Patient Identification:  Patient is a 28 year old year old,   White American female  who presents for return visit with me.  Patient is currently employed part time. Patient attended the session alone. Patient prefers to be called: \"Antonio\".    Interim History:  I last saw Dayana Danielle for outpatient psychiatry Return Visit on 19.     During that appointment, we decided to cross-taper her from Zoloft to Cymbalta, and discontinue Buspar as she was having side effects. Her sister and father had good benefit with Cymbalta, so we felt more confident in this move. She continued to defer on psychotherapy.    Current medications include:   Current Outpatient Medications   Medication Sig     acetaminophen (TYLENOL) 325 MG tablet Take 2 tablets (650 mg) by mouth every 4 hours as needed for mild pain     doxycycline hyclate (VIBRAMYCIN) 100 MG capsule Take 1 capsule (100 mg) by mouth 2 times daily for 4 days     doxylamine (UNISOM) 25 MG TABS tablet Take by mouth At Bedtime     DULoxetine (CYMBALTA) 30 MG capsule Take 1 capsule (30 mg) by mouth 2 times daily     hydrOXYzine " "(ATARAX) 25 MG tablet 2 tabs nightly as needed for sleep     ibuprofen (ADVIL/MOTRIN) 600 MG tablet Take 1 tablet (600 mg) by mouth every 6 hours as needed for other (mild and/or inflammatory pain)     order for DME Equipment being ordered:hospital grade breast pump     No current facility-administered medications for this visit.        The [place] Prescription Monitoring Program has been reviewed and there are no concerns about diversionary activity for controlled substances at this time.      I was able to review most recent Primary Care Provider, specialty provider, and therapy visit notes that I have access to.     Today, patient reports she had a \"rough\" transition from Zoloft to Cymbalta. Had headaches coming off Zoloft with relatively rapid taper, but these did resolve in time. She has now been taking Cymbalta x 4 weeks; tapering to 60 mg qAM. Notes some improvement to mood and energy. \"Much better than Zoloft.\" Denies side effects. She discontinued Buspar, but continues to use Hydroxyzine 50 mg nightly for sleep/RLS.    She had a miscarriage since our last visit. Feels she's psychologically coping, but still physically recovering.        Past Medical History:   Diagnosis Date     ADHD     States that it's not an ongoing diagnosis, the person who did her psych eval for it did not think she had it. Was on Adderall in the past and stopped that in June 2017.     Anemia     10 years ago     Anxiety     Started the Citalopram early December 2017 by her PCP.      Depressive disorder     Started the Citalopram early December 2017 by her PCP.      Ovarian cyst     Right sided     Smoking     Stopped October 2017.      has a past medical history of ADHD, Anemia, Anxiety, Depressive disorder, Ovarian cyst, and Smoking.    Social history updates:  No significant updtaes    Substance use updates:  Etoh: couple beers/wine every night; \"makes me feel better\"  Cannabis: no  Substances: no  Caff: minimal caffene; 1 a " day  Tobacco: no        Vital Signs:   /85 (BP Location: Right arm, Patient Position: Chair, Cuff Size: Adult Large)   Pulse 101   Temp 98.1  F (36.7  C) (Oral)   Resp 16   Wt 88.9 kg (196 lb)   LMP 03/11/2019 (Approximate)   SpO2 94%   Breastfeeding? No   BMI 28.94 kg/m      Labs:  Most recent laboratory results reviewed and no new labs.    Review of Systems:  10 systems (general, cardiovascular, respiratory, eyes, ENT, endocrine, GI, , M/S, neurological) were reviewed. Most pertinent finding(s) is/are: recent miscarriage. The remaining systems are all unremarkable.    Mental Status Examination:     Appearance:  awake, alert and adequately groomed  Attitude:  cooperative   Eye Contact:  good  Gait and Station: Normal  Psychomotor Behavior:  intact station, gait and muscle tone  Oriented to:  time, person, and place  Attention Span and Concentration:  Normal  Speech:  clear, coherent  Mood:  better  Affect:  appropriate and in normal range  Associations:  no loose associations  Thought Process:  logical, linear and goal oriented  Thought Content:  Appropriate to Interview  Recent and Remote Memory:  intact Not formally assessed. No amnesia.  Fund of Knowledge: appropriate  Insight:  good  Judgment:  intact  Impulse Control:  intact     Suicide Risk Assessment:  Today Dayana Danielle denies suicidal ideation or self-harm impulses. Therefore, based on all available evidence including the factors cited above, Dayana Danielle does not appear to be at imminent risk for self-harm, does not meet criteria for a 72-hr hold, and therefore remains appropriate for ongoing outpatient level of care.  A thorough assessment of risk factors related to suicide and self-harm have been reviewed and are noted above. The patient convincingly denies acute suicidality on several occasions. Local community safety resources reviewed and printed for patient to use if needed. There was no deceit  detected, and the patient presented in a manner that was believable.      DSM5  Diagnosis:  296.32 (F33.1) Major Depressive Disorder, Recurrent Episode, Moderate _  300.02 (F41.1) Generalized Anxiety Disorder        Medical comorbidities include:   Patient Active Problem List    Diagnosis Date Noted     Status post  delivery 2018     Priority: Medium     Major depressive disorder, single episode, moderate (H) 2018     Priority: Medium     JACINTO (generalized anxiety disorder) 2017     Priority: Medium     Moderate episode of recurrent major depressive disorder (H) 2017     Priority: Medium     ADHD      Priority: Medium     Ovarian mass 08/15/2015     Priority: Medium         Assessment:  Dayanarick Diaz Lolis reports a promising stabilization of her mood and anxiety with recent initiation of Cymbalta, which she is also tolerating quite well. We agree to give Cymbalta 60 mg daily a few more weeks to fully optimize before making a decision as to whether a higher dosage is warranted. She may continue to use Hydroxyzine nightly.    I had previously forwarded her [facility]'s literature on use of psychiatric medication during pregnancy and breastfeeding. I summarized this for her at our last visit.    Medication side effects and alternatives were reviewed. Health promotion activities recommended and reviewed today. All questions addressed. Education and counseling completed regarding risks and benefits of medications and psychotherapy options.    Treatment Plan:      Continue Cymbalta 60 mg daily    May use Hydroxyzine 25 to 50 mg as needed for sleep    Continue all other medical direections per primary care provider.     Continue all other medications as reviewed per electronic medical record today.     Safety plan reviewed. To the Emergency Department as needed or call after hours crisis line at [phone] or [phone]. [place] Crisis Text Line: Text [place] to 815911   or  Suicide LifeLine Chat: suicidepreventionlifeline.org/chat/    Continue individual therapy as planned with [facility].    Schedule an appointment with me in 3-4 weeks or sooner as needed.  Call [facility] at [phone] to schedule.    Follow up with primary care provider as planned or for acute medical concerns.    Call the psychiatric nurse line with medication questions or concerns at [phone].    [employer] may be used to communicate with your provider, but this is not intended to be used for emergencies      Administrative Billing:   Time spent with patient was 30 minutes and greater than 50% of time or 20 minutes was spent in counseling and coordination of care regarding above diagnoses and treatment plan.    Patient Status:  Patient will continue to be seen for ongoing consultation and stabilization.    Signed:   Ck Musa CNP   Psychiatry

## 2019-04-09 ASSESSMENT — ANXIETY QUESTIONNAIRES: GAD7 TOTAL SCORE: 14

## 2019-04-09 ASSESSMENT — PATIENT HEALTH QUESTIONNAIRE - PHQ9: SUM OF ALL RESPONSES TO PHQ QUESTIONS 1-9: 15

## 2019-06-11 ENCOUNTER — TELEPHONE (OUTPATIENT)
Dept: FAMILY MEDICINE | Facility: CLINIC | Age: 29
End: 2019-06-11

## 2019-06-12 ENCOUNTER — MYC REFILL (OUTPATIENT)
Dept: PSYCHIATRY | Facility: CLINIC | Age: 29
End: 2019-06-12

## 2019-06-12 DIAGNOSIS — F33.1 MODERATE EPISODE OF RECURRENT MAJOR DEPRESSIVE DISORDER (H): ICD-10-CM

## 2019-06-12 DIAGNOSIS — F41.1 GAD (GENERALIZED ANXIETY DISORDER): ICD-10-CM

## 2019-06-12 RX ORDER — DULOXETIN HYDROCHLORIDE 60 MG/1
60 CAPSULE, DELAYED RELEASE ORAL DAILY
Qty: 90 CAPSULE | Refills: 1 | OUTPATIENT
Start: 2019-06-12

## 2019-06-19 NOTE — PROGRESS NOTES
Subjective     Dayana Danielle is a 29 year old female who presents to clinic today for the following health issues:    HPI   Vaginal Symptoms  Onset: last month    Description:  Vaginal Discharge: white   Itching (Pruritis): YES- little bit less than it was  Burning sensation:  no   Odor: no     Accompanying Signs & Symptoms:  Pain with Urination: no   Abdominal Pain: no   Fever: no     History:   Sexually active: YES  New Partner: no   Possibility of Pregnancy:  Don't Know    Precipitating factors:   Recent Antibiotic Use: no     Alleviating factors:  Monistat didn't help    Therapies Tried and outcome: none      Patient Active Problem List   Diagnosis     Ovarian mass     ADHD     JACINTO (generalized anxiety disorder)     Moderate episode of recurrent major depressive disorder (H)     Major depressive disorder, single episode, moderate (H)     Status post  delivery     Past Surgical History:   Procedure Laterality Date      SECTION N/A 2018    Procedure:  SECTION;  PRIMARY  SECTION ;  Surgeon: Nia Casanova MD;  Location:  L+D     DILATION AND CURETTAGE SUCTION N/A 2019    Procedure: DILATION AND CURETTAGE SUCTION;  Surgeon: Nia Casanova MD;  Location:  OR     NO HISTORY OF SURGERY         Social History     Tobacco Use     Smoking status: Former Smoker     Packs/day: 0.50     Types: Cigarettes     Start date: 2003     Last attempt to quit: 10/2017     Years since quittin.7     Smokeless tobacco: Former User     Quit date: 10/2017     Tobacco comment: Used a vapor for the month of October.   Substance Use Topics     Alcohol use: Yes     Alcohol/week: 8.4 oz     Types: 14 Cans of beer per week     Comment: couple beers a night     History reviewed. No pertinent family history.        -------------------------------------  Reviewed and updated as needed this visit by Provider         Review of Systems   ROS COMP: Constitutional, HEENT,  "cardiovascular, pulmonary, gi and gu systems are negative, except as otherwise noted.      Objective    LMP 03/11/2019 (Approximate)   There is no height or weight on file to calculate BMI.  Physical Exam   GENERAL: healthy, alert and no distress  RESP: lungs clear to auscultation - no rales, rhonchi or wheezes  CV: regular rate and rhythm, normal S1 S2, no S3 or S4, no murmur, click or rub, no peripheral edema and peripheral pulses strong  ABDOMEN: soft, nontender, no hepatosplenomegaly, no masses and bowel sounds normal  MS: no gross musculoskeletal defects noted, no edema    Diagnostic Test Results:  Labs reviewed in Epic  Results for orders placed or performed in visit on 06/20/19   Wet prep   Result Value Ref Range    Specimen Description Vagina     Wet Prep Rare  WBC'S seen       Wet Prep Few  Clue cells seen   (A)     Wet Prep No Trichomonas seen     Wet Prep No yeast seen            Assessment & Plan   Problem List Items Addressed This Visit     None      Visit Diagnoses     Vaginal discharge    -  Primary    Relevant Medications    metroNIDAZOLE (FLAGYL) 500 MG tablet    Other Relevant Orders    Wet prep (Completed)             BMI:   Estimated body mass index is 31.2 kg/m  as calculated from the following:    Height as of this encounter: 1.739 m (5' 8.47\").    Weight as of this encounter: 94.3 kg (208 lb).           See Patient Instructions  No follow-ups on file.    CHEY Hernandez [facility]      "

## 2019-06-20 ENCOUNTER — OFFICE VISIT (OUTPATIENT)
Dept: FAMILY MEDICINE | Facility: CLINIC | Age: 29
End: 2019-06-20
Payer: COMMERCIAL

## 2019-06-20 VITALS
HEIGHT: 68 IN | BODY MASS INDEX: 31.52 KG/M2 | WEIGHT: 208 LBS | DIASTOLIC BLOOD PRESSURE: 88 MMHG | OXYGEN SATURATION: 98 % | TEMPERATURE: 95.1 F | HEART RATE: 92 BPM | RESPIRATION RATE: 14 BRPM | SYSTOLIC BLOOD PRESSURE: 110 MMHG

## 2019-06-20 DIAGNOSIS — N89.8 VAGINAL DISCHARGE: Primary | ICD-10-CM

## 2019-06-20 LAB
SPECIMEN SOURCE: ABNORMAL
WET PREP SPEC: ABNORMAL

## 2019-06-20 PROCEDURE — 99213 OFFICE O/P EST LOW 20 MIN: CPT | Performed by: NURSE PRACTITIONER

## 2019-06-20 PROCEDURE — 87210 SMEAR WET MOUNT SALINE/INK: CPT | Performed by: NURSE PRACTITIONER

## 2019-06-20 RX ORDER — METRONIDAZOLE 500 MG/1
500 TABLET ORAL 2 TIMES DAILY
Qty: 14 TABLET | Refills: 0 | Status: SHIPPED | OUTPATIENT
Start: 2019-06-20 | End: 2019-06-27

## 2019-06-20 ASSESSMENT — MIFFLIN-ST. JEOR: SCORE: 1719.36

## 2019-08-01 ENCOUNTER — MYC MEDICAL ADVICE (OUTPATIENT)
Dept: FAMILY MEDICINE | Facility: CLINIC | Age: 29
End: 2019-08-01

## 2019-08-01 NOTE — TELEPHONE ENCOUNTER
WireImage message sent to patient. New encounter started on pt's daughter's chart.    Yun Miller RN  [facility]

## 2019-08-06 ENCOUNTER — MYC MEDICAL ADVICE (OUTPATIENT)
Dept: FAMILY MEDICINE | Facility: CLINIC | Age: 29
End: 2019-08-06

## 2019-08-06 ENCOUNTER — MYC REFILL (OUTPATIENT)
Dept: PSYCHIATRY | Facility: CLINIC | Age: 29
End: 2019-08-06

## 2019-08-06 DIAGNOSIS — F32.1 MAJOR DEPRESSIVE DISORDER, SINGLE EPISODE, MODERATE (H): ICD-10-CM

## 2019-08-06 DIAGNOSIS — N76.0 BV (BACTERIAL VAGINOSIS): Primary | ICD-10-CM

## 2019-08-06 DIAGNOSIS — B96.89 BV (BACTERIAL VAGINOSIS): Primary | ICD-10-CM

## 2019-08-06 DIAGNOSIS — F41.1 GAD (GENERALIZED ANXIETY DISORDER): ICD-10-CM

## 2019-08-06 RX ORDER — HYDROXYZINE HYDROCHLORIDE 25 MG/1
TABLET, FILM COATED ORAL
Qty: 180 TABLET | Refills: 1 | OUTPATIENT
Start: 2019-08-06

## 2019-08-06 NOTE — TELEPHONE ENCOUNTER
Patient should have ample supply if taken as directed. She chose to follow-up with her PCP based on a My Chart message from May 21, 2019. Refill declined as it is a duplicate. Patient redirected to PCP for future refills.

## 2019-08-06 NOTE — TELEPHONE ENCOUNTER
Cheri,     Please see mychart message from patient. Cued new Rx for metronidazole cream if okay, otherwise we can recommend E-visit. Rx for tablets was sent 06/20/19.    Yun Miller RN  [facility]

## 2019-08-07 RX ORDER — METRONIDAZOLE 7.5 MG/G
1 GEL VAGINAL 2 TIMES DAILY
Qty: 50 G | Refills: 0 | Status: SHIPPED | OUTPATIENT
Start: 2019-08-07 | End: 2019-08-12

## 2019-09-13 ENCOUNTER — MYC REFILL (OUTPATIENT)
Dept: PSYCHIATRY | Facility: CLINIC | Age: 29
End: 2019-09-13

## 2019-09-13 DIAGNOSIS — F33.1 MODERATE EPISODE OF RECURRENT MAJOR DEPRESSIVE DISORDER (H): ICD-10-CM

## 2019-09-13 DIAGNOSIS — F41.1 GAD (GENERALIZED ANXIETY DISORDER): ICD-10-CM

## 2019-09-16 NOTE — TELEPHONE ENCOUNTER
Refill for: DULoxetine (CYMBALTA) 60 MG capsule    Last Appointment: 4/8 with Ck, 6/20 with PCP    Last Refill in Epic (date and amount/how many days):    Disp Refills Start End RONIT   DULoxetine (CYMBALTA) 60 MG capsule 90 capsule 1 5/21/2019  No   Sig - Route: Take 1 capsule (60 mg) by mouth daily - Oral     Per MyChart encounter on 5/21:  Patient self-dismissed from [facility] and was returned back to PCP.      Routing to PCP for review of request.    Lida Hernandez RN  09/16/19  10:44 AM

## 2019-09-17 RX ORDER — DULOXETIN HYDROCHLORIDE 60 MG/1
60 CAPSULE, DELAYED RELEASE ORAL DAILY
Qty: 90 CAPSULE | Refills: 1 | Status: SHIPPED | OUTPATIENT
Start: 2019-09-17 | End: 2020-02-26

## 2019-11-07 NOTE — ADDENDUM NOTE
Addendum  created 04/04/19 1124 by Be Monroe, DO    Order list changed, Order sets accessed       Detail Level: Detailed

## 2020-01-28 ENCOUNTER — TELEPHONE (OUTPATIENT)
Dept: FAMILY MEDICINE | Facility: CLINIC | Age: 30
End: 2020-01-28

## 2020-01-28 NOTE — TELEPHONE ENCOUNTER
Patient Quality Outreach      Summary:    Patient is due/failing the following:   PHQ-9 Needed    Type of outreach:    Sent [employer] message.    Questions for provider review:    None                                                                                   Patient has the following on her problem list:     Depression / Dysthymia review       PHQ-9 SCORE 2/28/2019 3/6/2019 4/8/2019   PHQ-9 Total Score MyChart 20 (Severe depression) 22 (Severe depression) 15 (Moderately severe depression)   PHQ-9 Total Score 20 22 15       If PHQ-9 recheck is 5 or more, route to provider for next steps.    **Start Working phrase here:**                                                 Aminah Tellez    Care Management Coordinator - [facility]    [facility]  [facility]

## 2020-02-26 ENCOUNTER — MYC REFILL (OUTPATIENT)
Dept: PSYCHIATRY | Facility: CLINIC | Age: 30
End: 2020-02-26

## 2020-02-26 DIAGNOSIS — F33.1 MODERATE EPISODE OF RECURRENT MAJOR DEPRESSIVE DISORDER (H): ICD-10-CM

## 2020-02-26 DIAGNOSIS — F41.1 GAD (GENERALIZED ANXIETY DISORDER): ICD-10-CM

## 2020-02-28 ENCOUNTER — MYC MEDICAL ADVICE (OUTPATIENT)
Dept: FAMILY MEDICINE | Facility: CLINIC | Age: 30
End: 2020-02-28

## 2020-02-28 RX ORDER — DULOXETIN HYDROCHLORIDE 60 MG/1
60 CAPSULE, DELAYED RELEASE ORAL DAILY
Qty: 90 CAPSULE | Refills: 1 | Status: SHIPPED | OUTPATIENT
Start: 2020-02-28 | End: 2020-06-23

## 2020-02-28 ASSESSMENT — PATIENT HEALTH QUESTIONNAIRE - PHQ9
SUM OF ALL RESPONSES TO PHQ QUESTIONS 1-9: 10
10. IF YOU CHECKED OFF ANY PROBLEMS, HOW DIFFICULT HAVE THESE PROBLEMS MADE IT FOR YOU TO DO YOUR WORK, TAKE CARE OF THINGS AT HOME, OR GET ALONG WITH OTHER PEOPLE: SOMEWHAT DIFFICULT
SUM OF ALL RESPONSES TO PHQ QUESTIONS 1-9: 10

## 2020-02-28 NOTE — TELEPHONE ENCOUNTER
"Requested Prescriptions   Pending Prescriptions Disp Refills     DULoxetine (CYMBALTA) 60 MG capsule 90 capsule 1     Sig: Take 1 capsule (60 mg) by mouth daily   Last Written Prescription Date:  9/17/2019  Last Fill Quantity: 90,  # refills: 1   Last office visit: 4/8/2019 with prescribing provider   Future Office Visit:        Serotonin-Norepinephrine Reuptake Inhibitors  Failed - 2/26/2020 12:19 PM        Failed - PHQ-9 score of less than 5 in past 6 months     Please review last PHQ-9 score.      LAST PHQ-9 SCORE OF 15 on 4/8/2019     Failed - No active pregnancy on record        Failed - No positive pregnancy test in past 12 months        Failed - Recent (6 mo) or future (30 days) visit within the authorizing provider's specialty     Patient had office visit in the last 6 months or has a visit in the next 30 days with authorizing provider or within the authorizing provider's specialty.  See \"Patient Info\" tab in inbasket, or \"Choose Columns\" in Meds & Orders section of the refill encounter.            Passed - Blood pressure under 140/90 in past 12 months     BP Readings from Last 3 Encounters:   06/20/19 110/88   04/08/19 125/85   04/04/19 114/69                 Passed - Medication is active on med list        Passed - Patient is age 18 or older          "

## 2020-02-28 NOTE — TELEPHONE ENCOUNTER
Wrote patient on MyChart with PHQ-9 attached. Requested patient make her annual appointment. Sia Hidalgo RN on 2/28/2020 at 4:44 PM

## 2020-02-28 NOTE — TELEPHONE ENCOUNTER
Patient filled out PHQ-9 on MyChart. Medication refills given.  Sia Hidalgo RN on 2/28/2020 at 5:10 PM

## 2020-02-29 ASSESSMENT — PATIENT HEALTH QUESTIONNAIRE - PHQ9: SUM OF ALL RESPONSES TO PHQ QUESTIONS 1-9: 10

## 2020-03-10 ENCOUNTER — HEALTH MAINTENANCE LETTER (OUTPATIENT)
Age: 30
End: 2020-03-10

## 2020-04-02 DIAGNOSIS — O36.80X0 PREGNANCY WITH INCONCLUSIVE FETAL VIABILITY: Primary | ICD-10-CM

## 2020-04-02 NOTE — PROGRESS NOTES
Pt has NOB scheduled with ultrasound. Needed order to be placed. Future ultrasound order placed and linked with appt. Closing encounter.   Nguyen Hodges RN on 4/2/2020 at 9:48 AM

## 2020-04-07 PROBLEM — Z98.891 STATUS POST CESAREAN DELIVERY: Status: RESOLVED | Noted: 2018-09-11 | Resolved: 2020-04-07

## 2020-04-07 PROBLEM — Z87.59 HISTORY OF MISCARRIAGE: Status: ACTIVE | Noted: 2019-04-01

## 2020-04-08 ENCOUNTER — ANCILLARY PROCEDURE (OUTPATIENT)
Dept: ULTRASOUND IMAGING | Facility: CLINIC | Age: 30
End: 2020-04-08
Payer: COMMERCIAL

## 2020-04-08 ENCOUNTER — PRENATAL OFFICE VISIT (OUTPATIENT)
Dept: OBGYN | Facility: CLINIC | Age: 30
End: 2020-04-08
Payer: COMMERCIAL

## 2020-04-08 VITALS
BODY MASS INDEX: 31.37 KG/M2 | HEART RATE: 78 BPM | HEIGHT: 68 IN | SYSTOLIC BLOOD PRESSURE: 108 MMHG | WEIGHT: 207 LBS | DIASTOLIC BLOOD PRESSURE: 73 MMHG

## 2020-04-08 DIAGNOSIS — Z23 NEED FOR PROPHYLACTIC VACCINATION AND INOCULATION AGAINST INFLUENZA: Primary | ICD-10-CM

## 2020-04-08 DIAGNOSIS — O36.80X0 PREGNANCY WITH INCONCLUSIVE FETAL VIABILITY: ICD-10-CM

## 2020-04-08 DIAGNOSIS — O34.219 PREVIOUS CESAREAN SECTION COMPLICATING PREGNANCY: ICD-10-CM

## 2020-04-08 DIAGNOSIS — F41.1 GAD (GENERALIZED ANXIETY DISORDER): ICD-10-CM

## 2020-04-08 DIAGNOSIS — O09.91 SUPERVISION OF HIGH RISK PREGNANCY IN FIRST TRIMESTER: ICD-10-CM

## 2020-04-08 PROBLEM — O09.90 SUPERVISION OF HIGH-RISK PREGNANCY: Status: ACTIVE | Noted: 2020-04-08

## 2020-04-08 LAB
ABO + RH BLD: NORMAL
ABO + RH BLD: NORMAL
ALBUMIN UR-MCNC: NEGATIVE MG/DL
APPEARANCE UR: CLEAR
BILIRUB UR QL STRIP: NEGATIVE
BLD GP AB SCN SERPL QL: NORMAL
BLOOD BANK CMNT PATIENT-IMP: NORMAL
COLOR UR AUTO: YELLOW
ERYTHROCYTE [DISTWIDTH] IN BLOOD BY AUTOMATED COUNT: 12.2 % (ref 10–15)
GLUCOSE UR STRIP-MCNC: NEGATIVE MG/DL
HCT VFR BLD AUTO: 41.5 % (ref 35–47)
HGB BLD-MCNC: 13.9 G/DL (ref 11.7–15.7)
HGB UR QL STRIP: NEGATIVE
KETONES UR STRIP-MCNC: ABNORMAL MG/DL
LEUKOCYTE ESTERASE UR QL STRIP: NEGATIVE
MCH RBC QN AUTO: 30.3 PG (ref 26.5–33)
MCHC RBC AUTO-ENTMCNC: 33.5 G/DL (ref 31.5–36.5)
MCV RBC AUTO: 91 FL (ref 78–100)
NITRATE UR QL: NEGATIVE
PH UR STRIP: 5.5 PH (ref 5–7)
PLATELET # BLD AUTO: 215 10E9/L (ref 150–450)
RBC # BLD AUTO: 4.58 10E12/L (ref 3.8–5.2)
SOURCE: ABNORMAL
SP GR UR STRIP: >1.03 (ref 1–1.03)
SPECIMEN EXP DATE BLD: NORMAL
UROBILINOGEN UR STRIP-ACNC: 0.2 EU/DL (ref 0.2–1)
WBC # BLD AUTO: 12.5 10E9/L (ref 4–11)

## 2020-04-08 PROCEDURE — 76817 TRANSVAGINAL US OBSTETRIC: CPT | Performed by: OBSTETRICS & GYNECOLOGY

## 2020-04-08 PROCEDURE — 81003 URINALYSIS AUTO W/O SCOPE: CPT | Performed by: OBSTETRICS & GYNECOLOGY

## 2020-04-08 PROCEDURE — 90686 IIV4 VACC NO PRSV 0.5 ML IM: CPT | Performed by: OBSTETRICS & GYNECOLOGY

## 2020-04-08 PROCEDURE — 99207 ZZC FIRST OB VISIT: CPT | Performed by: OBSTETRICS & GYNECOLOGY

## 2020-04-08 PROCEDURE — 85027 COMPLETE CBC AUTOMATED: CPT | Performed by: OBSTETRICS & GYNECOLOGY

## 2020-04-08 PROCEDURE — 86780 TREPONEMA PALLIDUM: CPT | Performed by: OBSTETRICS & GYNECOLOGY

## 2020-04-08 PROCEDURE — 36415 COLL VENOUS BLD VENIPUNCTURE: CPT | Performed by: OBSTETRICS & GYNECOLOGY

## 2020-04-08 PROCEDURE — 86901 BLOOD TYPING SEROLOGIC RH(D): CPT | Performed by: OBSTETRICS & GYNECOLOGY

## 2020-04-08 PROCEDURE — 90471 IMMUNIZATION ADMIN: CPT | Performed by: OBSTETRICS & GYNECOLOGY

## 2020-04-08 PROCEDURE — 87086 URINE CULTURE/COLONY COUNT: CPT | Performed by: OBSTETRICS & GYNECOLOGY

## 2020-04-08 PROCEDURE — 86900 BLOOD TYPING SEROLOGIC ABO: CPT | Performed by: OBSTETRICS & GYNECOLOGY

## 2020-04-08 PROCEDURE — 86762 RUBELLA ANTIBODY: CPT | Performed by: OBSTETRICS & GYNECOLOGY

## 2020-04-08 PROCEDURE — 87389 HIV-1 AG W/HIV-1&-2 AB AG IA: CPT | Performed by: OBSTETRICS & GYNECOLOGY

## 2020-04-08 PROCEDURE — 86850 RBC ANTIBODY SCREEN: CPT | Performed by: OBSTETRICS & GYNECOLOGY

## 2020-04-08 PROCEDURE — 87340 HEPATITIS B SURFACE AG IA: CPT | Performed by: OBSTETRICS & GYNECOLOGY

## 2020-04-08 ASSESSMENT — ANXIETY QUESTIONNAIRES
5. BEING SO RESTLESS THAT IT IS HARD TO SIT STILL: MORE THAN HALF THE DAYS
6. BECOMING EASILY ANNOYED OR IRRITABLE: SEVERAL DAYS
IF YOU CHECKED OFF ANY PROBLEMS ON THIS QUESTIONNAIRE, HOW DIFFICULT HAVE THESE PROBLEMS MADE IT FOR YOU TO DO YOUR WORK, TAKE CARE OF THINGS AT HOME, OR GET ALONG WITH OTHER PEOPLE: SOMEWHAT DIFFICULT
3. WORRYING TOO MUCH ABOUT DIFFERENT THINGS: MORE THAN HALF THE DAYS
2. NOT BEING ABLE TO STOP OR CONTROL WORRYING: MORE THAN HALF THE DAYS
GAD7 TOTAL SCORE: 13
1. FEELING NERVOUS, ANXIOUS, OR ON EDGE: MORE THAN HALF THE DAYS
7. FEELING AFRAID AS IF SOMETHING AWFUL MIGHT HAPPEN: MORE THAN HALF THE DAYS

## 2020-04-08 ASSESSMENT — PATIENT HEALTH QUESTIONNAIRE - PHQ9
5. POOR APPETITE OR OVEREATING: MORE THAN HALF THE DAYS
SUM OF ALL RESPONSES TO PHQ QUESTIONS 1-9: 8

## 2020-04-08 ASSESSMENT — MIFFLIN-ST. JEOR: SCORE: 1714.91

## 2020-04-08 NOTE — PROGRESS NOTES
SUBJECTIVE:     HPI:    This is a 29 year old female patient,  who presents for her first obstetrical visit.    CODY: 11/15/2020, by Last Menstrual Period.  She is 8w3d weeks.  Her cycles are regular.  Her last menstrual period was normal.   Since her LMP, she has experienced  nausea, fatigue and headache).       Additional History: NA    New OB visit!  3rd pregnancy --Ayleen 17mo at home and had early miscarriage and D&C with me last year.  Sure LMP and us today confirms dating.  No vb/spotting/cramping.  Overall feeling good --anxious about today's visit and COVID but doing well overall.  No nausea.  No bowel/bladder issues.  +fatigue.  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 Was on Adderall in the past and stopped that in 2017.     Anemia     10 years ago     Anxiety     Started the Citalopram early 2017 by her PCP.      Depressive disorder     Started the Citalopram early 2017 by her PCP.      History of miscarriage 2019     Ovarian cyst     Right sided     Smoking     Stopped 2017.   .      She has a history of  prior  section    Since her last LMP she  "denies use of alcohol, tobacco and street drugs.    Past medical, surgical, social and family history were reviewed and updated in [facility].        Current Outpatient Medications   Medication     acetaminophen (TYLENOL) 325 MG tablet     doxylamine (UNISOM) 25 MG TABS tablet     DULoxetine (CYMBALTA) 60 MG capsule     Prenat w/o Q-JO-Ucvqxbo-FA-DHA (PNV-DHA PO)     order for DME     No current facility-administered medications for this visit.        ROS:   12 point review of systems negative other than symptoms noted below or in the HPI.  Constitutional: Fatigue  Gastrointestinal: Nausea      OBJECTIVE:     EXAM:  /73   Pulse 78   Ht 1.739 m (5' 8.47\")   Wt 93.9 kg (207 lb)   LMP 02/09/2020   BMI 31.04 kg/m   Body mass index is 31.04 kg/m .    GENERAL: healthy, alert and no distress  EYES: Eyes grossly normal to inspection, PERRL and conjunctivae and sclerae normal  HENT: ear canals and TM's normal, nose and mouth without ulcers or lesions  NECK: no adenopathy, no asymmetry, masses, or scars and thyroid normal to palpation  RESP: lungs clear to auscultation - no rales, rhonchi or wheezes  CV: regular rate and rhythm, normal S1 S2, no S3 or S4, no murmur, click or rub, no peripheral edema and peripheral pulses strong  ABDOMEN: soft, nontender, no hepatosplenomegaly, no masses and bowel sounds normal  MS: no gross musculoskeletal defects noted, no edema  SKIN: no suspicious lesions or rashes  NEURO: Normal strength and tone, mentation intact and speech normal  PSYCH: mentation appears normal, affect normal/bright    ASSESSMENT/PLAN:       ICD-10-CM    1. Need for prophylactic vaccination and inoculation against influenza  Z23 INFLUENZA VACCINE IM > 6 MONTHS VALENT IIV4 [15265]     Vaccine Administration, Initial [20311]   2. Supervision of high risk pregnancy in first trimester  O09.91 ABO/Rh type and screen     Hepatitis B surface antigen     CBC with platelets     HIV Antigen Antibody Combo     Rubella " Antibody IgG Quantitative     Treponema Abs w Reflex to RPR and Titer     Urine Culture Aerobic Bacterial     *UA reflex to Microscopic   3. Previous  section complicating pregnancy  O34.219    4. JACINTO (generalized anxiety disorder)  F41.1        29 year old , 8w3d weeks of pregnancy with CODY of 11/15/2020, by Last Menstrual Period    Discussed as follows:COVID, prior c/s; vaccinations    Counseling given:   - Follow up in 4-6 weeks for return OB visit.  - Recommended weight gain for pregnancy: 15-25 lbs.      PLAN/PATIENT INSTRUCTIONS:    Patient Instructions   Avoid alcoholic beverages.  Patient encouraged not to smoke.  Discussed previous  section delivery.   risks and benefits discussed.  Discussed risk of rupture in detail.   Would like to plan for repeat  section with me at 39wks.  Will arrange at a later date.  Discussed all genetic testing options.  Patient not interested at this time.  Discussed depression/anxiety and medication risks and benefits.  Explained increased risk for post-partum depression.  Will continue working with her PCP Dr. Loving with mood medications.  Discussed COVID and pregnancy and importance of social distancing, limiting possible exposures, modified schedules, etc.  Will do phone visit together in one month () and clinic visit at 16wks ().       Nia Casanova MD

## 2020-04-08 NOTE — NURSING NOTE
Prior to immunization administration, verified patients identity using patient s name and date of birth. Please see Immunization Activity for additional information.     Screening Questionnaire for Adult Immunization    Are you sick today?   No   Do you have allergies to medications, food, a vaccine component or latex?   No   Have you ever had a serious reaction after receiving a vaccination?   No   Do you have a long-term health problem with heart, lung, kidney, or metabolic disease (e.g., diabetes), asthma, a blood disorder, no spleen, complement component deficiency, a cochlear implant, or a spinal fluid leak?  Are you on long-term aspirin therapy?   No   Do you have cancer, leukemia, HIV/AIDS, or any other immune system problem?   No   Do you have a parent, brother, or sister with an immune system problem?   No   In the past 3 months, have you taken medications that affect  your immune system, such as prednisone, other steroids, or anticancer drugs; drugs for the treatment of rheumatoid arthritis, Crohn s disease, or psoriasis; or have you had radiation treatments?   No   Have you had a seizure, or a brain or other nervous system problem?   No   During the past year, have you received a transfusion of blood or blood    products, or been given immune (gamma) globulin or antiviral drug?   No   For women: Are you pregnant or is there a chance you could become       pregnant during the next month?   Yes   Have you received any vaccinations in the past 4 weeks?   No     Immunization questionnaire was positive for at least one answer.  Notified Dr. Casanova.        Per orders of Dr. Casanova, injection of Flu given by Yoli Moon MA. Patient instructed to remain in clinic for 15 minutes afterwards, and to report any adverse reaction to me immediately.       Screening performed by Yoli Moon MA on 4/8/2020 at 2:24 PM.

## 2020-04-08 NOTE — PATIENT INSTRUCTIONS
Avoid alcoholic beverages.  Patient encouraged not to smoke.  Discussed previous  section delivery.   risks and benefits discussed.  Discussed risk of rupture in detail.   Would like to plan for repeat  section with me at 39wks.  Will arrange at a later date.  Discussed all genetic testing options.  Patient not interested at this time.  Discussed depression/anxiety and medication risks and benefits.  Explained increased risk for post-partum depression.  Will continue working with her PCP Dr. Loving with mood medications.  Discussed COVID and pregnancy and importance of social distancing, limiting possible exposures, modified schedules, etc.  Will do phone visit together in one month () and clinic visit at 16wks ().

## 2020-04-09 LAB
BACTERIA SPEC CULT: NO GROWTH
HBV SURFACE AG SERPL QL IA: NONREACTIVE
HIV 1+2 AB+HIV1 P24 AG SERPL QL IA: NONREACTIVE
Lab: NORMAL
RUBV IGG SERPL IA-ACNC: 12 IU/ML
SPECIMEN SOURCE: NORMAL
T PALLIDUM AB SER QL: NONREACTIVE

## 2020-04-09 ASSESSMENT — ANXIETY QUESTIONNAIRES: GAD7 TOTAL SCORE: 13

## 2020-04-28 ENCOUNTER — VIRTUAL VISIT (OUTPATIENT)
Dept: OBGYN | Facility: CLINIC | Age: 30
End: 2020-04-28
Payer: COMMERCIAL

## 2020-04-28 DIAGNOSIS — O34.219 PREVIOUS CESAREAN SECTION COMPLICATING PREGNANCY: ICD-10-CM

## 2020-04-28 DIAGNOSIS — O09.91 SUPERVISION OF HIGH RISK PREGNANCY IN FIRST TRIMESTER: ICD-10-CM

## 2020-04-28 DIAGNOSIS — Z3A.11 11 WEEKS GESTATION OF PREGNANCY: Primary | ICD-10-CM

## 2020-04-28 PROCEDURE — 99207 ZZC PRENATAL VISIT: CPT | Performed by: OBSTETRICS & GYNECOLOGY

## 2020-04-28 NOTE — PROGRESS NOTES
"Dayana Danielle is a 29 year old female who is being evaluated via a billable telephone visit.      The patient has been notified of following:     \"This telephone visit will be conducted via a call between you and your physician/provider. We have found that certain health care needs can be provided without the need for a physical exam.  This service lets us provide the care you need with a short phone conversation.  If a prescription is necessary we can send it directly to your pharmacy.  If lab work is needed we can place an order for that and you can then stop by our lab to have the test done at a later time.    Telephone visits are billed at different rates depending on your insurance coverage. During this emergency period, for some insurers they may be billed the same as an in-person visit.  Please reach out to your insurance provider with any questions.    If during the course of the call the physician/provider feels a telephone visit is not appropriate, you will not be charged for this service.\"    Patient has given verbal consent for Telephone visit?  Yes    How would you like to obtain your AVS? MyChart    Phone call duration: 9 minutes    Nia Casanova MD        "

## 2020-04-28 NOTE — PROGRESS NOTES
**PHONE VISIT**  Doing well; nausea improving.  Still fatigued  No cramping/vb/spotting  No bowel/bladder issues; slight constipation  Some anxieties with not being able to hear heartbeat but doing well overall  Social distancing going okay  RTC 4wks  (6/2) for in clinic 16wk visit  Also discussed timing of RLTCS --will discuss Thurs 11/12 with her  and may schedule at next visit

## 2020-06-02 ENCOUNTER — PRENATAL OFFICE VISIT (OUTPATIENT)
Dept: OBGYN | Facility: CLINIC | Age: 30
End: 2020-06-02
Payer: COMMERCIAL

## 2020-06-02 ENCOUNTER — TELEPHONE (OUTPATIENT)
Dept: OBGYN | Facility: CLINIC | Age: 30
End: 2020-06-02

## 2020-06-02 ENCOUNTER — PREP FOR PROCEDURE (OUTPATIENT)
Dept: OBGYN | Facility: CLINIC | Age: 30
End: 2020-06-02

## 2020-06-02 VITALS — DIASTOLIC BLOOD PRESSURE: 72 MMHG | SYSTOLIC BLOOD PRESSURE: 112 MMHG | WEIGHT: 204 LBS | BODY MASS INDEX: 30.59 KG/M2

## 2020-06-02 DIAGNOSIS — O34.219 PREVIOUS CESAREAN SECTION COMPLICATING PREGNANCY: ICD-10-CM

## 2020-06-02 DIAGNOSIS — O34.219 PREVIOUS CESAREAN DELIVERY, ANTEPARTUM: Primary | ICD-10-CM

## 2020-06-02 DIAGNOSIS — Z11.59 ENCOUNTER FOR SCREENING FOR OTHER VIRAL DISEASES: Primary | ICD-10-CM

## 2020-06-02 DIAGNOSIS — O09.92 SUPERVISION OF HIGH RISK PREGNANCY IN SECOND TRIMESTER: ICD-10-CM

## 2020-06-02 DIAGNOSIS — Z3A.16 16 WEEKS GESTATION OF PREGNANCY: Primary | ICD-10-CM

## 2020-06-02 PROCEDURE — 99207 ZZC PRENATAL VISIT: CPT | Performed by: OBSTETRICS & GYNECOLOGY

## 2020-06-02 NOTE — TELEPHONE ENCOUNTER
Type of surgery: CS RPT  Location of surgery: [facility] OR  Date and time of surgery: 11/12/2020 7a  Surgeon: Edilberto odom/Taylor to Assist  Pre-Op Appt Date: TBD  Post-Op Appt Date: TBD   Packet sent out: HANDED 6/2/2020  Pre-cert/Authorization completed:  TBD  Date: 6/2/2020 Spoke w/Fiona then Spoke w/Belle to add Taylor Drake  Surgery Scheduler        Additional Instructions for the Case    ASSIST: Taylor if available    H&P TO BE COMPLETED BY:   PCP  FOR H&P TO BE DONE BY SURGEON   To be done at 38wk OB visit  SAME DAY/OBSERVATION/INPATIENT: NA    EQUIPMENT: none  VENDOR NEEDED AT CASE: no  LABS/SPECIAL INSTRUCTIONS: none  TIME OFF WORK: 8 weeks

## 2020-06-02 NOTE — PROGRESS NOTES
Doing okay today --lives near [facility] so quite stressed with current social situation  No flutters yet  No vb/spotting/cramping   No bowel/bladder issues  Has not seen counselor recently --hoping to find new one  Declines genetic screening  Will schedule repeat c/s with me 11/12; order placed today  RTC 4wks --anatomy us at that time

## 2020-06-23 ENCOUNTER — MYC REFILL (OUTPATIENT)
Dept: PSYCHIATRY | Facility: CLINIC | Age: 30
End: 2020-06-23

## 2020-06-23 DIAGNOSIS — F41.1 GAD (GENERALIZED ANXIETY DISORDER): ICD-10-CM

## 2020-06-23 DIAGNOSIS — F33.1 MODERATE EPISODE OF RECURRENT MAJOR DEPRESSIVE DISORDER (H): ICD-10-CM

## 2020-06-25 RX ORDER — DULOXETIN HYDROCHLORIDE 60 MG/1
60 CAPSULE, DELAYED RELEASE ORAL DAILY
Qty: 90 CAPSULE | Refills: 1 | Status: SHIPPED | OUTPATIENT
Start: 2020-06-25 | End: 2020-09-10

## 2020-06-25 NOTE — TELEPHONE ENCOUNTER
Routing refill request to provider for review/approval because:  PHQ-9>4  Currently pregnant  No recent OV within 6 months    Provider review needed.

## 2020-07-01 ENCOUNTER — ANCILLARY PROCEDURE (OUTPATIENT)
Dept: ULTRASOUND IMAGING | Facility: CLINIC | Age: 30
End: 2020-07-01
Payer: COMMERCIAL

## 2020-07-01 ENCOUNTER — PRENATAL OFFICE VISIT (OUTPATIENT)
Dept: OBGYN | Facility: CLINIC | Age: 30
End: 2020-07-01
Payer: COMMERCIAL

## 2020-07-01 VITALS — DIASTOLIC BLOOD PRESSURE: 62 MMHG | BODY MASS INDEX: 31.04 KG/M2 | WEIGHT: 207 LBS | SYSTOLIC BLOOD PRESSURE: 112 MMHG

## 2020-07-01 DIAGNOSIS — Z3A.20 20 WEEKS GESTATION OF PREGNANCY: Primary | ICD-10-CM

## 2020-07-01 DIAGNOSIS — M54.50 LOW BACK PAIN DURING PREGNANCY IN SECOND TRIMESTER: ICD-10-CM

## 2020-07-01 DIAGNOSIS — O09.92 SUPERVISION OF HIGH RISK PREGNANCY IN SECOND TRIMESTER: ICD-10-CM

## 2020-07-01 DIAGNOSIS — O34.219 PREVIOUS CESAREAN DELIVERY, ANTEPARTUM: ICD-10-CM

## 2020-07-01 DIAGNOSIS — O26.892 LOW BACK PAIN DURING PREGNANCY IN SECOND TRIMESTER: ICD-10-CM

## 2020-07-01 PROCEDURE — 76805 OB US >/= 14 WKS SNGL FETUS: CPT | Performed by: OBSTETRICS & GYNECOLOGY

## 2020-07-01 PROCEDURE — 99207 ZZC PRENATAL VISIT: CPT | Performed by: OBSTETRICS & GYNECOLOGY

## 2020-07-01 NOTE — PROGRESS NOTES
Doing well today.  No issues/concerns today  +FM  No ctx/cramping/vb/spotting  No bowel/bladder issues  Stress improving  Anatomy us normal today; variable lie, EFW 384g, post placenta -circumvellate placenta --will check growth at 34wks  RTC 4wks

## 2020-07-21 ENCOUNTER — THERAPY VISIT (OUTPATIENT)
Dept: PHYSICAL THERAPY | Facility: CLINIC | Age: 30
End: 2020-07-21
Attending: OBSTETRICS & GYNECOLOGY
Payer: COMMERCIAL

## 2020-07-21 DIAGNOSIS — M54.50 LOW BACK PAIN DURING PREGNANCY IN SECOND TRIMESTER: ICD-10-CM

## 2020-07-21 DIAGNOSIS — O26.892 LOW BACK PAIN DURING PREGNANCY IN SECOND TRIMESTER: ICD-10-CM

## 2020-07-21 PROCEDURE — 97140 MANUAL THERAPY 1/> REGIONS: CPT | Mod: GP | Performed by: PHYSICAL THERAPIST

## 2020-07-21 PROCEDURE — 97161 PT EVAL LOW COMPLEX 20 MIN: CPT | Mod: GP | Performed by: PHYSICAL THERAPIST

## 2020-07-21 PROCEDURE — 97530 THERAPEUTIC ACTIVITIES: CPT | Mod: GP | Performed by: PHYSICAL THERAPIST

## 2020-07-21 NOTE — PROGRESS NOTES
[facility] Initial Evaluation  Subjective:  The history is provided by the patient. No  was used.   Therapist Generated HPI Evaluation         Type of problem:  Lumbar (May 2018, left LBP ).      Occurance: She was pregnant at time and feels it was associated with pregnancy   Where condition occurred: for unknown reasons.  Site of Pain: left low back   Pain is described as sharp and shooting (3-8/10 ) and is intermittent.  Radiates to: left buttock  Pain is worse during the night.  Since onset symptoms are unchanged.  Associated symptoms:  Pregnancy. Symptoms are exacerbated by lying down and lifting (rolling over in bed, transition from sit to stand)  Relieved by: rest.  Imaging testing: na.  Past treatment: none. Improved with treatment: na.  Restrictions due to condition include:  Working in normal job without restrictions.  Barriers include:  None as reported by patient.                        Objective:  System         Lumbar/SI Evaluation  ROM:    AROM Lumbar:   Flexion:          75% with left low back pain and increased with ascending   Ext:                    65%   Side Bend:        Left:  50%    Right:  50%  Rotation:           Left:     Right:   Side Glide:        Left:     Right:           Lumbar Myotomes:  not assessed            Lumbar DTR's:  not assessed      Cord Signs:  not assessed    Lumbar Dermtomes:  not assessed                Neural Tension/Mobility:  Lumbar:  Not assessed  Thoracic:  Not assessed      Lumbar Palpation:  Palpation (lumbar): left greater than right lumbar erector spinae and left piriformis       Functional Tests:  not assessed        Lumbar Provocation:  not assessed                                                 moderate left tight piriformis with palpation, faulty abdominal recruitment pattern with compensatory abdominal bulging, negative diastisis recti, left posterior psis, increased trunk flexion with transitioning from sit to  stand     General     ROS    Assessment/Plan:    Patient is a 30 year old female with lumbar complaints.    Patient has the following significant findings with corresponding treatment plan.                Diagnosis 1:  Low back pain during pregnancy in second trimester   Pain -  hot/cold therapy, manual therapy, self management, education, directional preference exercise and home program  Decreased ROM/flexibility - manual therapy, therapeutic exercise, therapeutic activity and home program  Impaired muscle performance - neuro re-education and home program  Decreased function - therapeutic activities and home program  Impaired posture - neuro re-education, therapeutic activities and home program    Therapy Evaluation Codes:   .  1)  Clinical presentation characteristics are:   Stable/Uncomplicated.  2) Decision-Making    Low complexity using standardized patient assessment instrument and/or measureable assessment of functional outcome.  Cumulative Therapy Evaluation is: Low complexity.    Previous and current functional limitations:  (See Goal Flow Sheet for this information)    Short term and Long term goals: (See Goal Flow Sheet for this information)     Communication ability:  Patient appears to be able to clearly communicate and understand verbal and written communication and follow directions correctly.  Treatment Explanation - The following has been discussed with the patient:   RX ordered/plan of care  Anticipated outcomes  Possible risks and side effects  This patient would benefit from PT intervention to resume normal activities.   Rehab potential is good.    Frequency:  1 X week, once daily  Duration:  for 6 weeks  Discharge Plan:  Achieve all LTG.  Independent in home treatment program.  Reach maximal therapeutic benefit.    Please refer to the daily flowsheet for treatment today, total treatment time and time spent performing 1:1 timed codes.

## 2020-07-22 NOTE — PROGRESS NOTES
[facility] Initial Evaluation  Subjective:    Patient Health History         Pain is reported as 3/10 on pain scale.  General health as reported by patient is excellent.  Pertinent medical history includes: overweight, currently pregnant and depression.        Surgeries include:  Other. Other surgery history details:  18.    Current medications:  Anti-depressants. Other medications details: benedryl, prenatal vitamins, unisom.    Current occupation is adminstrative assistant.   Primary job tasks include:  Computer work and lifting/carrying.   Other job/home tasks details: lifting/carrying 2 year old.                                  Objective:  System    Physical Exam    General     ROS    Assessment/Plan:

## 2020-07-28 ENCOUNTER — PRENATAL OFFICE VISIT (OUTPATIENT)
Dept: OBGYN | Facility: CLINIC | Age: 30
End: 2020-07-28
Payer: COMMERCIAL

## 2020-07-28 VITALS — WEIGHT: 213 LBS | DIASTOLIC BLOOD PRESSURE: 68 MMHG | BODY MASS INDEX: 31.94 KG/M2 | SYSTOLIC BLOOD PRESSURE: 114 MMHG

## 2020-07-28 DIAGNOSIS — Z3A.24 24 WEEKS GESTATION OF PREGNANCY: Primary | ICD-10-CM

## 2020-07-28 DIAGNOSIS — F41.1 GAD (GENERALIZED ANXIETY DISORDER): ICD-10-CM

## 2020-07-28 DIAGNOSIS — O09.92 SUPERVISION OF HIGH RISK PREGNANCY IN SECOND TRIMESTER: ICD-10-CM

## 2020-07-28 DIAGNOSIS — O34.219 PREVIOUS CESAREAN DELIVERY, ANTEPARTUM: ICD-10-CM

## 2020-07-28 PROCEDURE — 99207 ZZC PRENATAL VISIT: CPT | Performed by: OBSTETRICS & GYNECOLOGY

## 2020-07-28 RX ORDER — DIPHENHYDRAMINE HCL 25 MG
CAPSULE ORAL
COMMUNITY
End: 2022-06-09

## 2020-07-28 NOTE — PROGRESS NOTES
Doing well today.  +FM --very active!  Occ BH contractions; no vb/spotting/lof  No bowel/bladder issues  Bought a home in [place]!!  Close on Monday and will move next week!  Anxiety increased but coping well  RTC 4wks --glucola, hgb and Tdap at that time

## 2020-08-15 ENCOUNTER — NURSE TRIAGE (OUTPATIENT)
Dept: NURSING | Facility: CLINIC | Age: 30
End: 2020-08-15

## 2020-08-16 ENCOUNTER — HOSPITAL ENCOUNTER (OUTPATIENT)
Facility: CLINIC | Age: 30
LOS: 1 days | Discharge: HOME | End: 2020-08-16
Attending: OBSTETRICS & GYNECOLOGY | Admitting: OBSTETRICS & GYNECOLOGY
Payer: COMMERCIAL

## 2020-08-16 VITALS
SYSTOLIC BLOOD PRESSURE: 111 MMHG | TEMPERATURE: 98.2 F | HEART RATE: 75 BPM | DIASTOLIC BLOOD PRESSURE: 67 MMHG | BODY MASS INDEX: 31.4 KG/M2 | RESPIRATION RATE: 16 BRPM | HEIGHT: 69 IN | WEIGHT: 212 LBS

## 2020-08-16 DIAGNOSIS — O34.219 PREVIOUS CESAREAN DELIVERY, ANTEPARTUM: ICD-10-CM

## 2020-08-16 PROCEDURE — 25000132 ZZH RX MED GY IP 250 OP 250 PS 637: Performed by: OBSTETRICS & GYNECOLOGY

## 2020-08-16 PROCEDURE — 59025 FETAL NON-STRESS TEST: CPT | Mod: 26 | Performed by: OBSTETRICS & GYNECOLOGY

## 2020-08-16 PROCEDURE — G0463 HOSPITAL OUTPT CLINIC VISIT: HCPCS | Mod: 25

## 2020-08-16 PROCEDURE — 59025 FETAL NON-STRESS TEST: CPT

## 2020-08-16 RX ORDER — CITRIC ACID/SODIUM CITRATE 334-500MG
SOLUTION, ORAL ORAL
Status: DISCONTINUED
Start: 2020-08-16 | End: 2020-08-16 | Stop reason: HOSPADM

## 2020-08-16 RX ORDER — ONDANSETRON 2 MG/ML
4 INJECTION INTRAMUSCULAR; INTRAVENOUS EVERY 6 HOURS PRN
Status: DISCONTINUED | OUTPATIENT
Start: 2020-08-16 | End: 2020-08-16 | Stop reason: HOSPADM

## 2020-08-16 RX ORDER — CITRIC ACID/SODIUM CITRATE 334-500MG
30 SOLUTION, ORAL ORAL ONCE
Status: COMPLETED | OUTPATIENT
Start: 2020-08-16 | End: 2020-08-16

## 2020-08-16 RX ADMIN — SODIUM CITRATE AND CITRIC ACID MONOHYDRATE 30 ML: 500; 334 SOLUTION ORAL at 01:06

## 2020-08-16 ASSESSMENT — MIFFLIN-ST. JEOR: SCORE: 1741.01

## 2020-08-16 NOTE — TELEPHONE ENCOUNTER
"Patient calling and states that she feels like she is in labor    26 weeks    2 prior miscarriages   Sees Dr. Casanova at [facility]    Started at 1030  Keeps having abdominal pain, stomach is getting hard, Having trouble timing them, don't feel like benja contractions, nauseated    Feels like needs to have a bowel movement  -did have a bowel movement  -did not help    Baby stopped moving about the same time as the contractions started    No fluid from vagina  No vaginal bleeding   No fever    \"double placenta\" circumvellate placenta   -does not know if this increases risks    Triaged to a disposition of Go to L&D Now. Patient is agreeable.     Paged Dr. Cruz and notified her that patient was sent to L&D    COVID 19 Nurse Triage Plan/Patient Instructions    Please be aware that novel coronavirus (COVID-19) may be circulating in the community. If you develop symptoms such as fever, cough, or SOB or if you have concerns about the presence of another infection including coronavirus (COVID-19), please contact your health care provider or visit [email or web address].     Disposition/Instructions    ED Visit recommended. Follow protocol based instructions.     Bring Your Own Device:  Please also bring your smart device(s) (smart phones, tablets, laptops) and their charging cables for your personal use and to communicate with your care team during your visit.    Thank you for taking steps to prevent the spread of this virus.  o Limit your contact with others.  o Wear a simple mask to cover your cough.  o Wash your hands well and often.    Resources    [facility]: About COVID-19: [email or web address]    CDC: What to Do If You're Sick: www.cdc.gov/coronavirus/2019-ncov/about/steps-when-sick.html    CDC: Ending Home Isolation: www.cdc.gov/coronavirus/2019-ncov/hcp/disposition-in-home-patients.html     CDC: Caring for Someone: " "www.cdc.gov/coronavirus/2019-ncov/if-you-are-sick/care-for-someone.html     [facility]: Interim Guidance for Hospital Discharge to Home: www.health.[facility].[place].us/diseases/coronavirus/hcp/hospdischarge.pdf    [facility] clinical trials (COVID-19 research studies): [email or web address]-clinical-trials     Below are the COVID-19 hotlines at the [place] Department of Health ([facility]). Interpreters are available.   o For health questions: Call 400-615-4373 or 1-622.839.9621 (7 a.m. to 7 p.m.)  o For questions about schools and childcare: Call 861-081-7630 or 1-425.814.4859 (7 a.m. to 7 p.m.)     Reason for Disposition    MODERATE-SEVERE abdominal pain    [1] Baby moving less today (e.g., kick count < 5 in 1 hour or < 10 in 2 hours) AND [2] pregnant 23 or more weeks    Additional Information    Negative: Passed out (i.e., lost consciousness, collapsed and was not responding)    Negative: Shock suspected (e.g., cold/pale/clammy skin, too weak to stand, low BP, rapid pulse)    Negative: Difficult to awaken or acting confused (e.g., disoriented, slurred speech)    Negative: [1] SEVERE abdominal pain (e.g., excruciating) AND [2] constant AND [3] present > 1 hour    Negative: Severe bleeding (e.g., continuous red blood from vagina, or large blood clots)    Negative: Umbilical cord hanging out of the vagina (shiny, white, curled appearance, \"like telephone cord\")    Negative: Uncontrollable urge to push (i.e., feels like baby is coming out now)    Negative: Can see baby    Negative: Sounds like a life-threatening emergency to the triager    Protocols used: PREGNANCY - LABOR - [id number]    Melissa Espinal RN on 2020 at 12:11 AM    "

## 2020-08-16 NOTE — PLAN OF CARE
"[place] [facility] [place] stating she was having abdominal pain that started around 2230. Patient rating her pain 6/10 and said it was in her mid abdomen- it felt constant but also had worsening waves. Pt said the baby had been \"quiet\" in the evening but did feel movement on the car ride over. Denies leaking of blood/ fluid. RN unable to palpate contractions when patient stated she was having pain- no contractions picked up on toco. Pt denied urinary symptoms and stated she had a bowel movement 8/15. She had not eaten anything different than usual but did complain of frequent heartburn during pregnancy.    Dr. Cruz updated on patient's pain, not picking up or palpating contractions, FHT's, recent bowel movement, no urinary symptoms, unable to reach cervix. Received orders for bicitra and to instruct patient that she can begin prilosec at home. Patient instructed to call back if pain does not go away or gets worse.     Reviewed discharge instructions with patient and . Patient verbalized instructions and discharged ambulatory to home.   "

## 2020-08-16 NOTE — DISCHARGE INSTRUCTIONS
Discharge Instruction for Undelivered Patients      You were seen for: Labor Assessment  We Consulted: Dr. Cruz  You had (Test or Medicine): fetal/ uterine monitoring, cervical exam     Diet:   Drink 8 to 12 glasses of liquids (milk, juice, water) every day.  You may eat meals and snacks.   Avoid foods that can cause an increase in heartburn- caffeine, tomatoes, chocolate, citrus fruit, peppermint     Activity:  Count fetal kicks everyday (see handout)  Call your doctor or nurse midwife if your baby is moving less than usual.     Call your provider if you notice:  Swelling in your face or increased swelling in your hands or legs.  Headaches that are not relieved by Tylenol (acetaminophen).  Changes in your vision (blurring: seeing spots or stars.)  Nausea (sick to your stomach) and vomiting (throwing up).   Weight gain of 5 pounds or more per week.  Heartburn that doesn't go away.  Signs of bladder infection: pain when you urinate (use the toilet), need to go more often and more urgently.  The bag of yadav (rupture of membranes) breaks, or you notice leaking in your underwear.  Bright red blood in your underwear.  Abdominal (lower belly) or stomach pain.  Second (plus) baby: Contractions (tightening) less than 10 minutes apart and getting stronger.  *If less than 34 weeks: Contractions (tightenings) more than 6 times in one hour.  Increase or change in vaginal discharge (note the color and amount)  Other:     Follow-up:  As scheduled in the clinic

## 2020-08-20 DIAGNOSIS — Z36.9 ENCOUNTER FOR ANTENATAL SCREENING OF MOTHER: Primary | ICD-10-CM

## 2020-08-20 DIAGNOSIS — Z23 NEED FOR TDAP VACCINATION: ICD-10-CM

## 2020-08-25 ENCOUNTER — PRENATAL OFFICE VISIT (OUTPATIENT)
Dept: OBGYN | Facility: CLINIC | Age: 30
End: 2020-08-25
Payer: COMMERCIAL

## 2020-08-25 VITALS — BODY MASS INDEX: 32.49 KG/M2 | DIASTOLIC BLOOD PRESSURE: 70 MMHG | SYSTOLIC BLOOD PRESSURE: 118 MMHG | WEIGHT: 220 LBS

## 2020-08-25 DIAGNOSIS — Z3A.28 28 WEEKS GESTATION OF PREGNANCY: Primary | ICD-10-CM

## 2020-08-25 DIAGNOSIS — O09.93 SUPERVISION OF HIGH RISK PREGNANCY IN THIRD TRIMESTER: ICD-10-CM

## 2020-08-25 DIAGNOSIS — Z23 NEED FOR TDAP VACCINATION: ICD-10-CM

## 2020-08-25 DIAGNOSIS — O34.219 PREVIOUS CESAREAN DELIVERY, ANTEPARTUM: ICD-10-CM

## 2020-08-25 DIAGNOSIS — Z36.9 ENCOUNTER FOR ANTENATAL SCREENING OF MOTHER: ICD-10-CM

## 2020-08-25 DIAGNOSIS — O43.93: ICD-10-CM

## 2020-08-25 LAB
ERYTHROCYTE [DISTWIDTH] IN BLOOD BY AUTOMATED COUNT: 13.1 % (ref 10–15)
GLUCOSE 1H P 50 G GLC PO SERPL-MCNC: 95 MG/DL (ref 60–129)
HCT VFR BLD AUTO: 32.7 % (ref 35–47)
HGB BLD-MCNC: 10.4 G/DL (ref 11.7–15.7)
MCH RBC QN AUTO: 28.3 PG (ref 26.5–33)
MCHC RBC AUTO-ENTMCNC: 31.8 G/DL (ref 31.5–36.5)
MCV RBC AUTO: 89 FL (ref 78–100)
PLATELET # BLD AUTO: 272 10E9/L (ref 150–450)
RBC # BLD AUTO: 3.68 10E12/L (ref 3.8–5.2)
WBC # BLD AUTO: 11 10E9/L (ref 4–11)

## 2020-08-25 PROCEDURE — 36415 COLL VENOUS BLD VENIPUNCTURE: CPT | Performed by: OBSTETRICS & GYNECOLOGY

## 2020-08-25 PROCEDURE — 85027 COMPLETE CBC AUTOMATED: CPT | Performed by: OBSTETRICS & GYNECOLOGY

## 2020-08-25 PROCEDURE — 82950 GLUCOSE TEST: CPT | Performed by: OBSTETRICS & GYNECOLOGY

## 2020-08-25 PROCEDURE — 90471 IMMUNIZATION ADMIN: CPT

## 2020-08-25 PROCEDURE — 90715 TDAP VACCINE 7 YRS/> IM: CPT

## 2020-08-25 PROCEDURE — 87086 URINE CULTURE/COLONY COUNT: CPT | Performed by: OBSTETRICS & GYNECOLOGY

## 2020-08-25 PROCEDURE — 99207 ZZC PRENATAL VISIT: CPT | Performed by: OBSTETRICS & GYNECOLOGY

## 2020-08-25 NOTE — PROGRESS NOTES
Doing well today.  +FM --very active  No ctx/cramping/vb/lof  No bowel/bladder issues  Glucola, hgb and Tdap today  Moved in and getting settled in new place!  RTC 2wks; will plan for growth us at 34wks with circumvellate placenta

## 2020-08-25 NOTE — PROGRESS NOTES
Prior to immunization administration, verified patients identity using patient s name and date of birth. Please see Immunization Activity for additional information.     Screening Questionnaire for Adult Immunization    Are you sick today?   No   Do you have allergies to medications, food, a vaccine component or latex?   No   Have you ever had a serious reaction after receiving a vaccination?   No   Do you have a long-term health problem with heart, lung, kidney, or metabolic disease (e.g., diabetes), asthma, a blood disorder, no spleen, complement component deficiency, a cochlear implant, or a spinal fluid leak?  Are you on long-term aspirin therapy?   No   Do you have cancer, leukemia, HIV/AIDS, or any other immune system problem?   No   Do you have a parent, brother, or sister with an immune system problem?   No   In the past 3 months, have you taken medications that affect  your immune system, such as prednisone, other steroids, or anticancer drugs; drugs for the treatment of rheumatoid arthritis, Crohn s disease, or psoriasis; or have you had radiation treatments?   No   Have you had a seizure, or a brain or other nervous system problem?   No   During the past year, have you received a transfusion of blood or blood    products, or been given immune (gamma) globulin or antiviral drug?   No   For women: Are you pregnant or is there a chance you could become       pregnant during the next month?   yes   Have you received any vaccinations in the past 4 weeks?   No     Immunization questionnaire answers were all negative.        Per orders of Dr. Casanova, injection of Tdap given by Mirlande Beaulieu CMA. Patient instructed to remain in clinic for 15 minutes afterwards, and to report any adverse reaction to me immediately.       Screening performed by Mirlande Beaulieu CMA on 8/25/2020 at 10:04 AM.

## 2020-08-26 LAB
BACTERIA SPEC CULT: NO GROWTH
Lab: NORMAL
SPECIMEN SOURCE: NORMAL

## 2020-09-08 ENCOUNTER — PRENATAL OFFICE VISIT (OUTPATIENT)
Dept: OBGYN | Facility: CLINIC | Age: 30
End: 2020-09-08
Payer: COMMERCIAL

## 2020-09-08 VITALS — DIASTOLIC BLOOD PRESSURE: 62 MMHG | BODY MASS INDEX: 33.08 KG/M2 | SYSTOLIC BLOOD PRESSURE: 102 MMHG | WEIGHT: 224 LBS

## 2020-09-08 DIAGNOSIS — O09.93 SUPERVISION OF HIGH RISK PREGNANCY IN THIRD TRIMESTER: Primary | ICD-10-CM

## 2020-09-08 DIAGNOSIS — Z23 NEED FOR PROPHYLACTIC VACCINATION AND INOCULATION AGAINST INFLUENZA: ICD-10-CM

## 2020-09-08 DIAGNOSIS — O34.219 PREVIOUS CESAREAN DELIVERY, ANTEPARTUM: ICD-10-CM

## 2020-09-08 DIAGNOSIS — Z3A.30 30 WEEKS GESTATION OF PREGNANCY: ICD-10-CM

## 2020-09-08 PROCEDURE — 99207 ZZC PRENATAL VISIT: CPT | Performed by: OBSTETRICS & GYNECOLOGY

## 2020-09-08 PROCEDURE — 90471 IMMUNIZATION ADMIN: CPT | Performed by: OBSTETRICS & GYNECOLOGY

## 2020-09-08 PROCEDURE — 90686 IIV4 VACC NO PRSV 0.5 ML IM: CPT | Performed by: OBSTETRICS & GYNECOLOGY

## 2020-09-08 NOTE — PROGRESS NOTES
Doing well today.  +FM  Occ right round ligament pain/cramping; occ BH contractions; no vb/spotting/lof  No bowel/bladder issues  Changed PNV to iron containing  Some sleep issues --taking nightly unisom  Flu shot today  RTC 2wks --will check growth at 34wks due to circumvellate placenta

## 2020-09-10 ENCOUNTER — VIRTUAL VISIT (OUTPATIENT)
Dept: FAMILY MEDICINE | Facility: CLINIC | Age: 30
End: 2020-09-10
Payer: COMMERCIAL

## 2020-09-10 DIAGNOSIS — F41.1 GAD (GENERALIZED ANXIETY DISORDER): ICD-10-CM

## 2020-09-10 DIAGNOSIS — F33.1 MODERATE EPISODE OF RECURRENT MAJOR DEPRESSIVE DISORDER (H): ICD-10-CM

## 2020-09-10 PROCEDURE — 99213 OFFICE O/P EST LOW 20 MIN: CPT | Mod: 95 | Performed by: NURSE PRACTITIONER

## 2020-09-10 RX ORDER — DULOXETIN HYDROCHLORIDE 60 MG/1
60 CAPSULE, DELAYED RELEASE ORAL DAILY
Qty: 90 CAPSULE | Refills: 1 | Status: SHIPPED | OUTPATIENT
Start: 2020-09-10 | End: 2021-03-18

## 2020-09-10 NOTE — PROGRESS NOTES
"Dayana Danielle is a 30 year old female who is being evaluated via a billable video visit.      The patient has been notified of following:     \"This video visit will be conducted via a call between you and your physician/provider. We have found that certain health care needs can be provided without the need for an in-person physical exam.  This service lets us provide the care you need with a video conversation.  If a prescription is necessary we can send it directly to your pharmacy.  If lab work is needed we can place an order for that and you can then stop by our lab to have the test done at a later time.    Video visits are billed at different rates depending on your insurance coverage.  Please reach out to your insurance provider with any questions.    If during the course of the call the physician/provider feels a video visit is not appropriate, you will not be charged for this service.\"    Patient has given verbal consent for Video visit? Yes  How would you like to obtain your AVS? MyChart  If you are dropped from the video visit, the video invite should be resent to: Text to cell phone: [phone]  Will anyone else be joining your video visit? No    Subjective     Dayana Danielle is a 30 year old female who presents today via video visit for the following health issues:    HPI    Depression and Anxiety Follow-Up    How are you doing with your depression since your last visit? No change    How are you doing with your anxiety since your last visit?  No change    Are you having other symptoms that might be associated with depression or anxiety? No    Have you had a significant life event? OTHER: pregnancy and moving to a [facility]     Do you have any concerns with your use of alcohol or other drugs? No    Social History     Tobacco Use     Smoking status: Former Smoker     Packs/day: 0.50     Types: Cigarettes     Start date: 5/7/2003     Last attempt to quit: 10/2017 "     Years since quittin.9     Smokeless tobacco: Former User     Quit date: 10/2017     Tobacco comment: Used a vapor for the month of October.   Substance Use Topics     Alcohol use: Not Currently     Alcohol/week: 14.0 standard drinks     Types: 14 Cans of beer per week     Comment: couple beers a night     Drug use: No     Frequency: 1.0 times per week     Comment: Socially smoked marijuana, long time ago     PHQ 2019   PHQ-9 Total Score 15 10 8   Q9: Thoughts of better off dead/self-harm past 2 weeks Several days Not at all Not at all   F/U: Thoughts of suicide or self-harm Yes - -   F/U: Safety concerns No - -     JACINTO-7 SCORE 2019   Total Score 17 (severe anxiety) 14 (moderate anxiety) -   Total Score 17 14 13     Last PHQ-9 2020   1.  Little interest or pleasure in doing things 1   2.  Feeling down, depressed, or hopeless 1   3.  Trouble falling or staying asleep, or sleeping too much 1   4.  Feeling tired or having little energy 1   5.  Poor appetite or overeating 2   6.  Feeling bad about yourself 1   7.  Trouble concentrating 1   8.  Moving slowly or restless 0   Q9: Thoughts of better off dead/self-harm past 2 weeks 0   PHQ-9 Total Score 8   Difficulty at work, home, or with people Somewhat difficult   In the past two weeks have you had thoughts of suicide or self harm? -   Do you have concerns about your personal safety or the safety of others? -       Suicide Assessment Five-step Evaluation and Treatment (SAFE-T)      Video Start Time: 9 am        Review of Systems   Constitutional, HEENT, cardiovascular, pulmonary, gi and gu systems are negative, except as otherwise noted.      Objective           Vitals:  No vitals were obtained today due to virtual visit.    Physical Exam     GENERAL: Healthy, alert and no distress  EYES: Eyes grossly normal to inspection.  No discharge or erythema, or obvious scleral/conjunctival abnormalities.  RESP: No audible  "wheeze, cough, or visible cyanosis.  No visible retractions or increased work of breathing.    SKIN: Visible skin clear. No significant rash, abnormal pigmentation or lesions.  NEURO: Cranial nerves grossly intact.  Mentation and speech appropriate for age.  PSYCH: Mentation appears normal, affect normal/bright, judgement and insight intact, normal speech and appearance well-groomed.      No results found for this or any previous visit (from the past 24 hour(s)).        Assessment & Plan   Problem List Items Addressed This Visit     Moderate episode of recurrent major depressive disorder (H)    Relevant Medications    DULoxetine (CYMBALTA) 60 MG capsule    JACINTO (generalized anxiety disorder)    Relevant Medications    DULoxetine (CYMBALTA) 60 MG capsule             BMI:   Estimated body mass index is 33.08 kg/m  as calculated from the following:    Height as of 8/16/20: 1.753 m (5' 9\").    Weight as of 9/8/20: 101.6 kg (224 lb).             See Patient Instructions  No follow-ups on file.    CHEY Hernandez CNP  [facility]      Video-Visit Details    Type of service:  Video Visit    Video End Time:9:12 AM    Originating Location (pt. Location): Home    Distant Location (provider location):  [facility]     Platform used for Video Visit: Sweta        "

## 2020-09-22 ENCOUNTER — PRENATAL OFFICE VISIT (OUTPATIENT)
Dept: OBGYN | Facility: CLINIC | Age: 30
End: 2020-09-22
Payer: COMMERCIAL

## 2020-09-22 VITALS — WEIGHT: 229 LBS | DIASTOLIC BLOOD PRESSURE: 70 MMHG | SYSTOLIC BLOOD PRESSURE: 124 MMHG | BODY MASS INDEX: 33.82 KG/M2

## 2020-09-22 DIAGNOSIS — Z3A.32 32 WEEKS GESTATION OF PREGNANCY: Primary | ICD-10-CM

## 2020-09-22 DIAGNOSIS — O09.93 SUPERVISION OF HIGH RISK PREGNANCY IN THIRD TRIMESTER: ICD-10-CM

## 2020-09-22 DIAGNOSIS — O34.219 PREVIOUS CESAREAN DELIVERY, ANTEPARTUM: ICD-10-CM

## 2020-09-22 PROCEDURE — 99207 ZZC PRENATAL VISIT: CPT | Performed by: OBSTETRICS & GYNECOLOGY

## 2020-09-22 NOTE — PROGRESS NOTES
Doing well today.  +FM  No ctx/cramping/vb/lof  Overall feeling well  More GERD --will start omeprazole as previousy discussed  Considering IUD for PP contraception --not yet ready for permanent but family likely complete after this kiddo  RTC 2wks

## 2020-10-06 ENCOUNTER — PRENATAL OFFICE VISIT (OUTPATIENT)
Dept: OBGYN | Facility: CLINIC | Age: 30
End: 2020-10-06
Payer: COMMERCIAL

## 2020-10-06 ENCOUNTER — ANCILLARY PROCEDURE (OUTPATIENT)
Dept: ULTRASOUND IMAGING | Facility: CLINIC | Age: 30
End: 2020-10-06
Payer: COMMERCIAL

## 2020-10-06 VITALS — DIASTOLIC BLOOD PRESSURE: 76 MMHG | BODY MASS INDEX: 34.7 KG/M2 | SYSTOLIC BLOOD PRESSURE: 122 MMHG | WEIGHT: 235 LBS

## 2020-10-06 DIAGNOSIS — O34.219 PREVIOUS CESAREAN DELIVERY, ANTEPARTUM: ICD-10-CM

## 2020-10-06 DIAGNOSIS — O43.93: ICD-10-CM

## 2020-10-06 DIAGNOSIS — O09.93 SUPERVISION OF HIGH RISK PREGNANCY IN THIRD TRIMESTER: ICD-10-CM

## 2020-10-06 DIAGNOSIS — Z3A.34 34 WEEKS GESTATION OF PREGNANCY: Primary | ICD-10-CM

## 2020-10-06 PROCEDURE — 99207 PR PRENATAL VISIT: CPT | Performed by: OBSTETRICS & GYNECOLOGY

## 2020-10-06 PROCEDURE — 76816 OB US FOLLOW-UP PER FETUS: CPT

## 2020-10-06 NOTE — PROGRESS NOTES
Doing well today.  +FM  Occ BH ctx with activity; no vb/spotting/lof  GERD better with prilosec  A bit more anxious with pandemic and upcoming delivery  Growth us today for S>D; vtx, EFW 2987g/6-9# (83%ile), BUDDY 22.8cm  Reassurance given  RTC 2wks

## 2020-10-20 ENCOUNTER — PRENATAL OFFICE VISIT (OUTPATIENT)
Dept: OBGYN | Facility: CLINIC | Age: 30
End: 2020-10-20
Payer: COMMERCIAL

## 2020-10-20 VITALS — BODY MASS INDEX: 35.62 KG/M2 | WEIGHT: 241.2 LBS | SYSTOLIC BLOOD PRESSURE: 106 MMHG | DIASTOLIC BLOOD PRESSURE: 70 MMHG

## 2020-10-20 DIAGNOSIS — O09.93 SUPERVISION OF HIGH RISK PREGNANCY IN THIRD TRIMESTER: Primary | ICD-10-CM

## 2020-10-20 PROCEDURE — 99207 PR PRENATAL VISIT: CPT | Performed by: OBSTETRICS & GYNECOLOGY

## 2020-10-20 PROCEDURE — 87653 STREP B DNA AMP PROBE: CPT | Performed by: OBSTETRICS & GYNECOLOGY

## 2020-10-21 LAB
GP B STREP DNA SPEC QL NAA+PROBE: NEGATIVE
SPECIMEN SOURCE: NORMAL

## 2020-10-26 ENCOUNTER — MYC MEDICAL ADVICE (OUTPATIENT)
Dept: OBGYN | Facility: CLINIC | Age: 30
End: 2020-10-26

## 2020-10-27 ENCOUNTER — PRENATAL OFFICE VISIT (OUTPATIENT)
Dept: OBGYN | Facility: CLINIC | Age: 30
End: 2020-10-27
Payer: COMMERCIAL

## 2020-10-27 VITALS — DIASTOLIC BLOOD PRESSURE: 72 MMHG | BODY MASS INDEX: 36.03 KG/M2 | SYSTOLIC BLOOD PRESSURE: 114 MMHG | WEIGHT: 244 LBS

## 2020-10-27 DIAGNOSIS — Z3A.37 37 WEEKS GESTATION OF PREGNANCY: Primary | ICD-10-CM

## 2020-10-27 DIAGNOSIS — O09.93 SUPERVISION OF HIGH RISK PREGNANCY IN THIRD TRIMESTER: ICD-10-CM

## 2020-10-27 DIAGNOSIS — O34.219 PREVIOUS CESAREAN DELIVERY, ANTEPARTUM: ICD-10-CM

## 2020-10-27 PROCEDURE — 99207 PR PRENATAL VISIT: CPT | Performed by: OBSTETRICS & GYNECOLOGY

## 2020-10-27 RX ORDER — HYDROXYZINE PAMOATE 50 MG/1
50 CAPSULE ORAL 3 TIMES DAILY PRN
Qty: 20 CAPSULE | Refills: 0 | Status: SHIPPED | OUTPATIENT
Start: 2020-10-27 | End: 2021-03-18

## 2020-10-27 NOTE — PROGRESS NOTES
Not doing well today.  Struggling with discomfort, sleep issues, anxiety, etc  Tearful and upset  Having chest pressure while lying down, shortness of breath, heartburn, nausea, etc  +FM  Occ ctx; no vb/spotting/lof  Labor precautions reviewed  Frustrated with her  --could be working from home but isn't; otherwise being as careful as they can  RTC 1wk --will need ERAS packet and will discuss COVID testing

## 2020-11-03 ENCOUNTER — PRENATAL OFFICE VISIT (OUTPATIENT)
Dept: OBGYN | Facility: CLINIC | Age: 30
End: 2020-11-03
Payer: COMMERCIAL

## 2020-11-03 VITALS — SYSTOLIC BLOOD PRESSURE: 116 MMHG | DIASTOLIC BLOOD PRESSURE: 70 MMHG | WEIGHT: 246 LBS | BODY MASS INDEX: 36.33 KG/M2

## 2020-11-03 DIAGNOSIS — Z3A.38 38 WEEKS GESTATION OF PREGNANCY: Primary | ICD-10-CM

## 2020-11-03 DIAGNOSIS — O34.219 PREVIOUS CESAREAN DELIVERY, ANTEPARTUM: ICD-10-CM

## 2020-11-03 DIAGNOSIS — F41.1 GAD (GENERALIZED ANXIETY DISORDER): ICD-10-CM

## 2020-11-03 DIAGNOSIS — O09.93 SUPERVISION OF HIGH RISK PREGNANCY IN THIRD TRIMESTER: ICD-10-CM

## 2020-11-03 PROCEDURE — 99207 PR PRENATAL VISIT: CPT | Performed by: OBSTETRICS & GYNECOLOGY

## 2020-11-03 NOTE — PROGRESS NOTES
"Doing well today.  +FM  +contractions \"all day every day\" but not close enough together to call and not interested in cervix check today  No vb/spotting  Feels her anxiety is well controlled; has not been in contact with therapist recently --I have asked Antonio to reach out for visit in first 2 weeks PP  Discussed c/s in detail including procedure, recovery, etc.  ERAS packet given.  Has COVID testing on Monday  Labor precautions reviewed  "

## 2020-11-06 NOTE — H&P
[facility]    History and Physical  Obstetrics and Gynecology     Date of Admission:  (Not on file)    Assessment & Plan   Dayana Danielle is a 30 year old female who presents for Repeat  section.     ASSESSMENT:   IUP @ 38w5d  Prior  section due to severe anxiety and maternal request.  Desires repeat.      PLAN:   Scheduled for  section.  No questions.      Nia Casanova    History of Present Illness   Dayana Danielle is a 30 year old female  38w5d  Estimated Date of Delivery: Nov 15, 2020 is calculated from Patient's last menstrual period was 2020. [facility].  Pregnancy overall uncomplicated.  Noted to have circumvellate placenta at anatomy ultrasound.  Growth normal at 34 weeks.  On cymbalta for anxiety and depression.    PRENATAL COURSE  Prenatal course was complicated by prior  section, anxiety and depression.       Recent Labs   Lab Test 20  1349   ABO A   RH Pos   AS Neg     Rhogam not indicated   Recent Labs   Lab Test 10/20/20  0930 20  1348   HEPBANG  --  Nonreactive   HIAGAB  --  Nonreactive   GBS Negative  --    RUQIGG  --  12         Prior to Admission Medications   Cannot display prior to admission medications because the patient has not been admitted in this contact.     Allergies   Allergies   Allergen Reactions     Seasonal Allergies          Immunization History   Immunization History   Administered Date(s) Administered     HPV Quadrivalent 08/15/2007, 10/19/2007, 2008     HepA-ped 2 Dose 2007     HepB, Unspecified 2000, 2001, 2001     Influenza Vaccine IM > 6 months Valent IIV4 2018, 10/03/2018, 2020, 2020     MMR 2000     Meningococcal (Menactra ) 2007     TDAP Vaccine (Adacel) 2018, 2020     Td (Adult), Adsorbed 2001     Tetanus 2001       Past Medical  History:   Diagnosis Date     ADHD     States that it's not an ongoing diagnosis, the person who did her psych eval for it did not think she had it. Was on Adderall in the past and stopped that in 2017.     Anemia     10 years ago     Anxiety     Started the Citalopram early 2017 by her PCP.      Depressive disorder     Started the Citalopram early 2017 by her PCP.      History of miscarriage 2019     Ovarian cyst     Right sided     Smoking     Stopped 2017.       Past Surgical History:   Procedure Laterality Date      SECTION N/A 2018    Procedure:  SECTION;  PRIMARY  SECTION ;  Surgeon: Nia Casanova MD;  Location:  L+D     DILATION AND CURETTAGE SUCTION N/A 2019    Procedure: DILATION AND CURETTAGE SUCTION;  Surgeon: Nia Casanova MD;  Location:  OR       Clinic vitals from today:  /70     Abdomen: gravid, single vertex fetus, non-tender, EFW 8 lbs 8oz    Constitutional: healthy, alert, active and no distress   Extremities: NT, no edema  Neurologic: Awake, alert, oriented x3  Neuropsychiatric: General: normal, calm and normal eye contact  Heart: Regular rate and rhythm  Lungs: clear to ausculation bilaterally    Nia Casanova MD

## 2020-11-09 DIAGNOSIS — Z11.59 ENCOUNTER FOR SCREENING FOR OTHER VIRAL DISEASES: ICD-10-CM

## 2020-11-09 PROCEDURE — U0003 INFECTIOUS AGENT DETECTION BY NUCLEIC ACID (DNA OR RNA); SEVERE ACUTE RESPIRATORY SYNDROME CORONAVIRUS 2 (SARS-COV-2) (CORONAVIRUS DISEASE [COVID-19]), AMPLIFIED PROBE TECHNIQUE, MAKING USE OF HIGH THROUGHPUT TECHNOLOGIES AS DESCRIBED BY CMS-2020-01-R: HCPCS | Performed by: OBSTETRICS & GYNECOLOGY

## 2020-11-10 LAB
SARS-COV-2 RNA SPEC QL NAA+PROBE: NOT DETECTED
SPECIMEN SOURCE: NORMAL

## 2020-11-11 ENCOUNTER — ANESTHESIA EVENT (OUTPATIENT)
Dept: OBGYN | Facility: CLINIC | Age: 30
End: 2020-11-11
Payer: COMMERCIAL

## 2020-11-12 ENCOUNTER — HOSPITAL ENCOUNTER (INPATIENT)
Facility: CLINIC | Age: 30
LOS: 3 days | Discharge: HOME | End: 2020-11-15
Attending: OBSTETRICS & GYNECOLOGY | Admitting: OBSTETRICS & GYNECOLOGY
Payer: COMMERCIAL

## 2020-11-12 ENCOUNTER — ANESTHESIA (OUTPATIENT)
Dept: OBGYN | Facility: CLINIC | Age: 30
End: 2020-11-12
Payer: COMMERCIAL

## 2020-11-12 DIAGNOSIS — Z98.891 STATUS POST CESAREAN DELIVERY: Primary | ICD-10-CM

## 2020-11-12 DIAGNOSIS — O34.219 PREVIOUS CESAREAN DELIVERY, ANTEPARTUM: ICD-10-CM

## 2020-11-12 LAB
ABO + RH BLD: NORMAL
ABO + RH BLD: NORMAL
BLD GP AB SCN SERPL QL: NORMAL
BLOOD BANK CMNT PATIENT-IMP: NORMAL
ERYTHROCYTE [DISTWIDTH] IN BLOOD BY AUTOMATED COUNT: 16.9 % (ref 10–15)
HCT VFR BLD AUTO: 36.6 % (ref 35–47)
HGB BLD-MCNC: 11.8 G/DL (ref 11.7–15.7)
MCH RBC QN AUTO: 28.4 PG (ref 26.5–33)
MCHC RBC AUTO-ENTMCNC: 32.2 G/DL (ref 31.5–36.5)
MCV RBC AUTO: 88 FL (ref 78–100)
PLATELET # BLD AUTO: 234 10E9/L (ref 150–450)
RBC # BLD AUTO: 4.15 10E12/L (ref 3.8–5.2)
SPECIMEN EXP DATE BLD: NORMAL
T PALLIDUM AB SER QL: NONREACTIVE
WBC # BLD AUTO: 11.6 10E9/L (ref 4–11)

## 2020-11-12 PROCEDURE — 360N000021 HC SURGERY LEVEL 3 EA 15 ADDTL MIN: Performed by: OBSTETRICS & GYNECOLOGY

## 2020-11-12 PROCEDURE — 86780 TREPONEMA PALLIDUM: CPT | Performed by: OBSTETRICS & GYNECOLOGY

## 2020-11-12 PROCEDURE — 250N000011 HC RX IP 250 OP 636: Performed by: NURSE ANESTHETIST, CERTIFIED REGISTERED

## 2020-11-12 PROCEDURE — 250N000011 HC RX IP 250 OP 636: Performed by: OBSTETRICS & GYNECOLOGY

## 2020-11-12 PROCEDURE — 761N000001 HC RECOVERY PHASE 1 LEVEL 1 FIRST HR: Performed by: OBSTETRICS & GYNECOLOGY

## 2020-11-12 PROCEDURE — 250N000009 HC RX 250: Performed by: NURSE ANESTHETIST, CERTIFIED REGISTERED

## 2020-11-12 PROCEDURE — 36415 COLL VENOUS BLD VENIPUNCTURE: CPT | Performed by: OBSTETRICS & GYNECOLOGY

## 2020-11-12 PROCEDURE — 59514 CESAREAN DELIVERY ONLY: CPT | Mod: 80 | Performed by: OBSTETRICS & GYNECOLOGY

## 2020-11-12 PROCEDURE — 258N000003 HC RX IP 258 OP 636: Performed by: OBSTETRICS & GYNECOLOGY

## 2020-11-12 PROCEDURE — 88307 TISSUE EXAM BY PATHOLOGIST: CPT | Mod: TC | Performed by: OBSTETRICS & GYNECOLOGY

## 2020-11-12 PROCEDURE — 761N000002 HC RECOVERY PHASE 1 LEVEL 1 EA ADDTL HR: Performed by: OBSTETRICS & GYNECOLOGY

## 2020-11-12 PROCEDURE — 250N000013 HC RX MED GY IP 250 OP 250 PS 637

## 2020-11-12 PROCEDURE — 370N000002 HC ANESTHESIA TECHNICAL FEE, EACH ADDTL 15 MIN: Performed by: OBSTETRICS & GYNECOLOGY

## 2020-11-12 PROCEDURE — 258N000003 HC RX IP 258 OP 636: Performed by: NURSE ANESTHETIST, CERTIFIED REGISTERED

## 2020-11-12 PROCEDURE — 86850 RBC ANTIBODY SCREEN: CPT | Performed by: OBSTETRICS & GYNECOLOGY

## 2020-11-12 PROCEDURE — 59510 CESAREAN DELIVERY: CPT | Performed by: OBSTETRICS & GYNECOLOGY

## 2020-11-12 PROCEDURE — 250N000009 HC RX 250: Performed by: OBSTETRICS & GYNECOLOGY

## 2020-11-12 PROCEDURE — 86900 BLOOD TYPING SEROLOGIC ABO: CPT | Performed by: OBSTETRICS & GYNECOLOGY

## 2020-11-12 PROCEDURE — 272N000001 HC OR GENERAL SUPPLY STERILE: Performed by: OBSTETRICS & GYNECOLOGY

## 2020-11-12 PROCEDURE — 250N000013 HC RX MED GY IP 250 OP 250 PS 637: Performed by: OBSTETRICS & GYNECOLOGY

## 2020-11-12 PROCEDURE — 120N000012 HC R&B POSTPARTUM

## 2020-11-12 PROCEDURE — 370N000001 HC ANESTHESIA TECHNICAL FEE, 1ST 30 MIN: Performed by: OBSTETRICS & GYNECOLOGY

## 2020-11-12 PROCEDURE — 85027 COMPLETE CBC AUTOMATED: CPT | Performed by: OBSTETRICS & GYNECOLOGY

## 2020-11-12 PROCEDURE — 88307 TISSUE EXAM BY PATHOLOGIST: CPT | Mod: 26 | Performed by: PATHOLOGY

## 2020-11-12 PROCEDURE — 250N000009 HC RX 250: Performed by: ANESTHESIOLOGY

## 2020-11-12 PROCEDURE — 86901 BLOOD TYPING SEROLOGIC RH(D): CPT | Performed by: OBSTETRICS & GYNECOLOGY

## 2020-11-12 PROCEDURE — 360N000020 HC SURGERY LEVEL 3 1ST 30 MIN: Performed by: OBSTETRICS & GYNECOLOGY

## 2020-11-12 RX ORDER — OXYTOCIN/0.9 % SODIUM CHLORIDE 30/500 ML
340 PLASTIC BAG, INJECTION (ML) INTRAVENOUS CONTINUOUS PRN
Status: DISCONTINUED | OUTPATIENT
Start: 2020-11-12 | End: 2020-11-15 | Stop reason: HOSPADM

## 2020-11-12 RX ORDER — SODIUM CHLORIDE, SODIUM LACTATE, POTASSIUM CHLORIDE, CALCIUM CHLORIDE 600; 310; 30; 20 MG/100ML; MG/100ML; MG/100ML; MG/100ML
INJECTION, SOLUTION INTRAVENOUS CONTINUOUS
Status: CANCELLED | OUTPATIENT
Start: 2020-11-12

## 2020-11-12 RX ORDER — AMOXICILLIN 250 MG
2 CAPSULE ORAL 2 TIMES DAILY
Status: DISCONTINUED | OUTPATIENT
Start: 2020-11-12 | End: 2020-11-15 | Stop reason: HOSPADM

## 2020-11-12 RX ORDER — NALOXONE HYDROCHLORIDE 0.4 MG/ML
.1-.4 INJECTION, SOLUTION INTRAMUSCULAR; INTRAVENOUS; SUBCUTANEOUS
Status: DISCONTINUED | OUTPATIENT
Start: 2020-11-12 | End: 2020-11-12

## 2020-11-12 RX ORDER — KETOROLAC TROMETHAMINE 30 MG/ML
30 INJECTION, SOLUTION INTRAMUSCULAR; INTRAVENOUS EVERY 6 HOURS
Status: COMPLETED | OUTPATIENT
Start: 2020-11-12 | End: 2020-11-13

## 2020-11-12 RX ORDER — SIMETHICONE 80 MG
80 TABLET,CHEWABLE ORAL 4 TIMES DAILY PRN
Status: DISCONTINUED | OUTPATIENT
Start: 2020-11-12 | End: 2020-11-15 | Stop reason: HOSPADM

## 2020-11-12 RX ORDER — MORPHINE SULFATE 1 MG/ML
100 INJECTION, SOLUTION EPIDURAL; INTRATHECAL; INTRAVENOUS ONCE
Status: DISCONTINUED | OUTPATIENT
Start: 2020-11-12 | End: 2020-11-12

## 2020-11-12 RX ORDER — IBUPROFEN 400 MG/1
800 TABLET, FILM COATED ORAL EVERY 6 HOURS
Status: DISCONTINUED | OUTPATIENT
Start: 2020-11-13 | End: 2020-11-15 | Stop reason: HOSPADM

## 2020-11-12 RX ORDER — OXYCODONE HYDROCHLORIDE 5 MG/1
5 TABLET ORAL EVERY 4 HOURS PRN
Status: DISCONTINUED | OUTPATIENT
Start: 2020-11-12 | End: 2020-11-13

## 2020-11-12 RX ORDER — HYDROMORPHONE HYDROCHLORIDE 1 MG/ML
.3-.5 INJECTION, SOLUTION INTRAMUSCULAR; INTRAVENOUS; SUBCUTANEOUS EVERY 5 MIN PRN
Status: CANCELLED | OUTPATIENT
Start: 2020-11-12

## 2020-11-12 RX ORDER — BISACODYL 10 MG
10 SUPPOSITORY, RECTAL RECTAL DAILY PRN
Status: DISCONTINUED | OUTPATIENT
Start: 2020-11-14 | End: 2020-11-15 | Stop reason: HOSPADM

## 2020-11-12 RX ORDER — CEFAZOLIN SODIUM 1 G/3ML
1 INJECTION, POWDER, FOR SOLUTION INTRAMUSCULAR; INTRAVENOUS SEE ADMIN INSTRUCTIONS
Status: DISCONTINUED | OUTPATIENT
Start: 2020-11-12 | End: 2020-11-12

## 2020-11-12 RX ORDER — CEFAZOLIN SODIUM 2 G/100ML
2 INJECTION, SOLUTION INTRAVENOUS
Status: COMPLETED | OUTPATIENT
Start: 2020-11-12 | End: 2020-11-12

## 2020-11-12 RX ORDER — AMOXICILLIN 250 MG
1 CAPSULE ORAL 2 TIMES DAILY
Status: DISCONTINUED | OUTPATIENT
Start: 2020-11-12 | End: 2020-11-15 | Stop reason: HOSPADM

## 2020-11-12 RX ORDER — ONDANSETRON 2 MG/ML
4 INJECTION INTRAMUSCULAR; INTRAVENOUS EVERY 6 HOURS PRN
Status: DISCONTINUED | OUTPATIENT
Start: 2020-11-12 | End: 2020-11-15 | Stop reason: HOSPADM

## 2020-11-12 RX ORDER — ONDANSETRON 4 MG/1
4 TABLET, ORALLY DISINTEGRATING ORAL EVERY 6 HOURS PRN
Status: DISCONTINUED | OUTPATIENT
Start: 2020-11-12 | End: 2020-11-12

## 2020-11-12 RX ORDER — DEXTROSE, SODIUM CHLORIDE, SODIUM LACTATE, POTASSIUM CHLORIDE, AND CALCIUM CHLORIDE 5; .6; .31; .03; .02 G/100ML; G/100ML; G/100ML; G/100ML; G/100ML
INJECTION, SOLUTION INTRAVENOUS CONTINUOUS
Status: DISCONTINUED | OUTPATIENT
Start: 2020-11-12 | End: 2020-11-15 | Stop reason: HOSPADM

## 2020-11-12 RX ORDER — NALOXONE HYDROCHLORIDE 0.4 MG/ML
.1-.4 INJECTION, SOLUTION INTRAMUSCULAR; INTRAVENOUS; SUBCUTANEOUS
Status: CANCELLED | OUTPATIENT
Start: 2020-11-12 | End: 2020-11-13

## 2020-11-12 RX ORDER — LIDOCAINE 40 MG/G
CREAM TOPICAL
Status: DISCONTINUED | OUTPATIENT
Start: 2020-11-12 | End: 2020-11-15 | Stop reason: HOSPADM

## 2020-11-12 RX ORDER — CITRIC ACID/SODIUM CITRATE 334-500MG
SOLUTION, ORAL ORAL
Status: COMPLETED
Start: 2020-11-12 | End: 2020-11-12

## 2020-11-12 RX ORDER — ONDANSETRON 2 MG/ML
INJECTION INTRAMUSCULAR; INTRAVENOUS PRN
Status: DISCONTINUED | OUTPATIENT
Start: 2020-11-12 | End: 2020-11-12

## 2020-11-12 RX ORDER — FENTANYL CITRATE 50 UG/ML
25-50 INJECTION, SOLUTION INTRAMUSCULAR; INTRAVENOUS
Status: CANCELLED | OUTPATIENT
Start: 2020-11-12

## 2020-11-12 RX ORDER — OXYTOCIN 10 [USP'U]/ML
10 INJECTION, SOLUTION INTRAMUSCULAR; INTRAVENOUS
Status: DISCONTINUED | OUTPATIENT
Start: 2020-11-12 | End: 2020-11-15 | Stop reason: HOSPADM

## 2020-11-12 RX ORDER — LIDOCAINE 40 MG/G
CREAM TOPICAL
Status: DISCONTINUED | OUTPATIENT
Start: 2020-11-12 | End: 2020-11-12

## 2020-11-12 RX ORDER — ACETAMINOPHEN 325 MG/1
TABLET ORAL
Status: COMPLETED
Start: 2020-11-12 | End: 2020-11-12

## 2020-11-12 RX ORDER — NALBUPHINE HYDROCHLORIDE 10 MG/ML
2.5-5 INJECTION, SOLUTION INTRAMUSCULAR; INTRAVENOUS; SUBCUTANEOUS EVERY 6 HOURS PRN
Status: DISCONTINUED | OUTPATIENT
Start: 2020-11-12 | End: 2020-11-12

## 2020-11-12 RX ORDER — BUPIVACAINE HYDROCHLORIDE 5 MG/ML
INJECTION, SOLUTION EPIDURAL; INTRACAUDAL
Status: COMPLETED | OUTPATIENT
Start: 2020-11-12 | End: 2020-11-12

## 2020-11-12 RX ORDER — ONDANSETRON 4 MG/1
4 TABLET, ORALLY DISINTEGRATING ORAL EVERY 30 MIN PRN
Status: CANCELLED | OUTPATIENT
Start: 2020-11-12

## 2020-11-12 RX ORDER — ONDANSETRON 2 MG/ML
4 INJECTION INTRAMUSCULAR; INTRAVENOUS EVERY 6 HOURS PRN
Status: DISCONTINUED | OUTPATIENT
Start: 2020-11-12 | End: 2020-11-12

## 2020-11-12 RX ORDER — OXYTOCIN/0.9 % SODIUM CHLORIDE 30/500 ML
PLASTIC BAG, INJECTION (ML) INTRAVENOUS CONTINUOUS PRN
Status: DISCONTINUED | OUTPATIENT
Start: 2020-11-12 | End: 2020-11-12

## 2020-11-12 RX ORDER — TRANEXAMIC ACID 10 MG/ML
1 INJECTION, SOLUTION INTRAVENOUS EVERY 30 MIN PRN
Status: DISCONTINUED | OUTPATIENT
Start: 2020-11-12 | End: 2020-11-15 | Stop reason: HOSPADM

## 2020-11-12 RX ORDER — CITRIC ACID/SODIUM CITRATE 334-500MG
30 SOLUTION, ORAL ORAL
Status: COMPLETED | OUTPATIENT
Start: 2020-11-12 | End: 2020-11-12

## 2020-11-12 RX ORDER — NALOXONE HYDROCHLORIDE 0.4 MG/ML
.1-.4 INJECTION, SOLUTION INTRAMUSCULAR; INTRAVENOUS; SUBCUTANEOUS
Status: DISCONTINUED | OUTPATIENT
Start: 2020-11-12 | End: 2020-11-15 | Stop reason: HOSPADM

## 2020-11-12 RX ORDER — ACETAMINOPHEN 325 MG/1
975 TABLET ORAL ONCE
Status: COMPLETED | OUTPATIENT
Start: 2020-11-12 | End: 2020-11-12

## 2020-11-12 RX ORDER — OXYTOCIN/0.9 % SODIUM CHLORIDE 30/500 ML
100 PLASTIC BAG, INJECTION (ML) INTRAVENOUS CONTINUOUS
Status: DISCONTINUED | OUTPATIENT
Start: 2020-11-12 | End: 2020-11-15 | Stop reason: HOSPADM

## 2020-11-12 RX ORDER — ACETAMINOPHEN 325 MG/1
975 TABLET ORAL EVERY 6 HOURS
Status: DISCONTINUED | OUTPATIENT
Start: 2020-11-12 | End: 2020-11-15 | Stop reason: HOSPADM

## 2020-11-12 RX ORDER — HYDROCORTISONE 2.5 %
CREAM (GRAM) TOPICAL 3 TIMES DAILY PRN
Status: DISCONTINUED | OUTPATIENT
Start: 2020-11-12 | End: 2020-11-15 | Stop reason: HOSPADM

## 2020-11-12 RX ORDER — MORPHINE SULFATE 1 MG/ML
INJECTION, SOLUTION EPIDURAL; INTRATHECAL; INTRAVENOUS PRN
Status: DISCONTINUED | OUTPATIENT
Start: 2020-11-12 | End: 2020-11-12

## 2020-11-12 RX ORDER — SODIUM CHLORIDE, SODIUM LACTATE, POTASSIUM CHLORIDE, CALCIUM CHLORIDE 600; 310; 30; 20 MG/100ML; MG/100ML; MG/100ML; MG/100ML
INJECTION, SOLUTION INTRAVENOUS CONTINUOUS
Status: DISCONTINUED | OUTPATIENT
Start: 2020-11-12 | End: 2020-11-12

## 2020-11-12 RX ORDER — BUPIVACAINE HYDROCHLORIDE 7.5 MG/ML
INJECTION, SOLUTION INTRASPINAL PRN
Status: DISCONTINUED | OUTPATIENT
Start: 2020-11-12 | End: 2020-11-12

## 2020-11-12 RX ORDER — ONDANSETRON 2 MG/ML
4 INJECTION INTRAMUSCULAR; INTRAVENOUS EVERY 30 MIN PRN
Status: CANCELLED | OUTPATIENT
Start: 2020-11-12

## 2020-11-12 RX ORDER — CEFAZOLIN SODIUM 2 G/100ML
INJECTION, SOLUTION INTRAVENOUS
Status: DISCONTINUED
Start: 2020-11-12 | End: 2020-11-12 | Stop reason: HOSPADM

## 2020-11-12 RX ORDER — DULOXETIN HYDROCHLORIDE 60 MG/1
60 CAPSULE, DELAYED RELEASE ORAL DAILY
Status: DISCONTINUED | OUTPATIENT
Start: 2020-11-12 | End: 2020-11-15 | Stop reason: HOSPADM

## 2020-11-12 RX ORDER — MODIFIED LANOLIN
OINTMENT (GRAM) TOPICAL
Status: DISCONTINUED | OUTPATIENT
Start: 2020-11-12 | End: 2020-11-15 | Stop reason: HOSPADM

## 2020-11-12 RX ADMIN — SODIUM CITRATE AND CITRIC ACID MONOHYDRATE 30 ML: 500; 334 SOLUTION ORAL at 06:53

## 2020-11-12 RX ADMIN — DOCUSATE SODIUM 50 MG AND SENNOSIDES 8.6 MG 1 TABLET: 8.6; 5 TABLET, FILM COATED ORAL at 21:10

## 2020-11-12 RX ADMIN — PHENYLEPHRINE HYDROCHLORIDE 0.5 MCG/KG/MIN: 10 INJECTION INTRAVENOUS at 07:16

## 2020-11-12 RX ADMIN — ACETAMINOPHEN 975 MG: 325 TABLET, FILM COATED ORAL at 18:15

## 2020-11-12 RX ADMIN — OXYCODONE HYDROCHLORIDE 5 MG: 5 TABLET ORAL at 13:36

## 2020-11-12 RX ADMIN — ACETAMINOPHEN 975 MG: 325 TABLET ORAL at 05:45

## 2020-11-12 RX ADMIN — ONDANSETRON 4 MG: 2 INJECTION INTRAMUSCULAR; INTRAVENOUS at 07:41

## 2020-11-12 RX ADMIN — Medication 100 ML/HR: at 11:48

## 2020-11-12 RX ADMIN — BUPIVACAINE HYDROCHLORIDE IN DEXTROSE 12 MG: 7.5 INJECTION, SOLUTION SUBARACHNOID at 07:16

## 2020-11-12 RX ADMIN — KETOROLAC TROMETHAMINE 30 MG: 30 INJECTION, SOLUTION INTRAMUSCULAR at 15:16

## 2020-11-12 RX ADMIN — BUPIVACAINE HYDROCHLORIDE 1 ML: 5 INJECTION, SOLUTION EPIDURAL; INTRACAUDAL at 07:20

## 2020-11-12 RX ADMIN — KETOROLAC TROMETHAMINE 30 MG: 30 INJECTION, SOLUTION INTRAMUSCULAR at 21:11

## 2020-11-12 RX ADMIN — SODIUM CHLORIDE, POTASSIUM CHLORIDE, SODIUM LACTATE AND CALCIUM CHLORIDE: 600; 310; 30; 20 INJECTION, SOLUTION INTRAVENOUS at 05:31

## 2020-11-12 RX ADMIN — MORPHINE SULFATE 0.1 MG: 1 INJECTION, SOLUTION EPIDURAL; INTRATHECAL; INTRAVENOUS at 07:16

## 2020-11-12 RX ADMIN — CEFAZOLIN SODIUM 2 G: 2 INJECTION, SOLUTION INTRAVENOUS at 07:23

## 2020-11-12 RX ADMIN — ACETAMINOPHEN 975 MG: 325 TABLET, FILM COATED ORAL at 11:47

## 2020-11-12 RX ADMIN — SODIUM CHLORIDE, SODIUM LACTATE, POTASSIUM CHLORIDE, CALCIUM CHLORIDE AND DEXTROSE MONOHYDRATE: 5; 600; 310; 30; 20 INJECTION, SOLUTION INTRAVENOUS at 17:10

## 2020-11-12 RX ADMIN — Medication 340 ML/HR: at 07:38

## 2020-11-12 RX ADMIN — SODIUM CHLORIDE, POTASSIUM CHLORIDE, SODIUM LACTATE AND CALCIUM CHLORIDE: 600; 310; 30; 20 INJECTION, SOLUTION INTRAVENOUS at 07:46

## 2020-11-12 RX ADMIN — Medication 30 ML: at 06:53

## 2020-11-12 RX ADMIN — ACETAMINOPHEN 975 MG: 325 TABLET, FILM COATED ORAL at 05:45

## 2020-11-12 ASSESSMENT — LIFESTYLE VARIABLES: TOBACCO_USE: 1

## 2020-11-12 NOTE — PLAN OF CARE
4 para 1 at 39 4/7 weeks gestation here for repeat  section.  External monitors applied, assessment and admission completed.  Pt prepared in the usual fashion.   present and supportive.

## 2020-11-12 NOTE — ANESTHESIA PREPROCEDURE EVALUATION
Procedure: Procedure(s):  REPEAT  SECTION  Preop diagnosis: Previous  delivery, antepartum [O34.219]    Allergies   Allergen Reactions     Seasonal Allergies      Past Medical History:   Diagnosis Date     ADHD     States that it's not an ongoing diagnosis, the person who did her psych eval for it did not think she had it. Was on Adderall in the past and stopped that in 2017.     Anemia     10 years ago     Anxiety     Started the Citalopram early 2017 by her PCP.      Depressive disorder     Started the Citalopram early 2017 by her PCP.      History of miscarriage 2019     Ovarian cyst     Right sided     Smoking     Stopped 2017.     Past Surgical History:   Procedure Laterality Date      SECTION N/A 2018    Procedure:  SECTION;  PRIMARY  SECTION ;  Surgeon: Nia Casanova MD;  Location:  L+D     DILATION AND CURETTAGE SUCTION N/A 2019    Procedure: DILATION AND CURETTAGE SUCTION;  Surgeon: Nia Casanova MD;  Location:  OR     Social History     Tobacco Use     Smoking status: Former Smoker     Packs/day: 0.50     Types: Cigarettes     Start date: 2003     Quit date: 10/2017     Years since quitting: 3.1     Smokeless tobacco: Never Used     Tobacco comment: Used a vapor for the month of October.   Substance Use Topics     Alcohol use: Not Currently     Prior to Admission medications    Medication Sig Start Date End Date Taking? Authorizing Provider   acetaminophen (TYLENOL) 325 MG tablet Take 2 tablets (650 mg) by mouth every 4 hours as needed for mild pain  Patient not taking: Reported on 10/27/2020 4/4/19   Nia Casanova MD   diphenhydrAMINE (BENADRYL) 25 MG capsule     Reported, Patient   doxylamine (UNISOM) 25 MG TABS tablet Take by mouth At Bedtime    Reported, Patient   DULoxetine (CYMBALTA) 60 MG capsule Take 1 capsule (60 mg) by mouth daily 9/10/20   Cheri Georges APRN CNP   hydrOXYzine  (VISTARIL) 50 MG capsule Take 1 capsule (50 mg) by mouth 3 times daily as needed for itching 10/27/20   Nia Casanova MD   Omeprazole (PRILOSEC PO)     Reported, Patient   Prenat w/o R-ET-Ozuzvek-FA-DHA (PNV-DHA PO)     Reported, Patient     Current Facility-Administered Medications Ordered in Epic   Medication Dose Route Frequency Last Rate Last Dose     ceFAZolin (ANCEF) 1 g vial to attach to  ml bag for ADULT or 50 ml bag for PEDS  1 g Intravenous See Admin Instructions         ceFAZolin (ANCEF) intermittent infusion 2 g in 100 mL dextrose PRE-MIX  2 g Intravenous Pre-Op/Pre-procedure x 1 dose         lactated ringers infusion   Intravenous Continuous 200 mL/hr at 11/12/20 0531       lidocaine (LMX4) cream   Topical Q1H PRN         lidocaine 1 % 1 mL  1 mL Other Q1H PRN         sodium chloride (PF) 0.9% PF flush 3 mL  3 mL Intravenous Q1H PRN         sodium chloride (PF) 0.9% PF flush 3 mL  3 mL Intravenous Q8H         sodium citrate-citric acid (BICITRA) solution 30 mL  30 mL Oral Pre-Op/Pre-procedure x 1 dose         No current [facility]-ordered outpatient medications on file.       lactated ringers 200 mL/hr at 11/12/20 0531     Wt Readings from Last 1 Encounters:   11/12/20 111.6 kg (246 lb)     Temp Readings from Last 1 Encounters:   11/12/20 36.9  C (98.4  F) (Temporal)     BP Readings from Last 6 Encounters:   11/12/20 119/80   11/03/20 116/70   10/27/20 114/72   10/20/20 106/70   10/06/20 122/76   09/22/20 124/70     Pulse Readings from Last 4 Encounters:   08/16/20 75   04/08/20 78   06/20/19 92   04/08/19 101     Resp Readings from Last 1 Encounters:   11/12/20 16     SpO2 Readings from Last 1 Encounters:   06/20/19 98%     Recent Labs   Lab Test 09/03/17  1613 08/15/15  0538    142   POTASSIUM 3.9 3.7   CHLORIDE 105 108   CO2 22 25   ANIONGAP 12 9   * 89   BUN 12 8   CR 0.57 0.68   MIGUEL ÁNGEL 8.7 8.8     No results for input(s): AST, ALT, ALKPHOS, BILITOTAL, LIPASE in the last 42929  hours.  Recent Labs   Lab Test 20  0540 20  0851   WBC 11.6* 11.0   HGB 11.8 10.4*    272     Recent Labs   Lab Test 20  0540   ABO A   RH Pos     No results for input(s): INR, PTT in the last 31867 hours.   No results for input(s): TROPI in the last 66712 hours.  No results for input(s): PH, PCO2, PO2, HCO3 in the last 19810 hours.  Recent Labs   Lab Test 08/15/15  0641   HCG Negative     No results found for this or any previous visit (from the past 744 hour(s)).    RECENT LABS:   ECG:   ECHO:   Anesthesia Pre-Procedure Evaluation    Patient: Dayana Danielle   MRN: [id number] : 1990          Preoperative Diagnosis: Previous  delivery, antepartum [O34.219]    Procedure(s):  REPEAT  SECTION    Past Medical History:   Diagnosis Date     ADHD     States that it's not an ongoing diagnosis, the person who did her psych eval for it did not think she had it. Was on Adderall in the past and stopped that in 2017.     Anemia     10 years ago     Anxiety     Started the Citalopram early 2017 by her PCP.      Depressive disorder     Started the Citalopram early 2017 by her PCP.      History of miscarriage 2019     Ovarian cyst     Right sided     Smoking     Stopped 2017.     Past Surgical History:   Procedure Laterality Date      SECTION N/A 2018    Procedure:  SECTION;  PRIMARY  SECTION ;  Surgeon: Nia Casanova MD;  Location:  L+D     DILATION AND CURETTAGE SUCTION N/A 2019    Procedure: DILATION AND CURETTAGE SUCTION;  Surgeon: Nia Casanova MD;  Location:  OR       Anesthesia Evaluation     . Pt has had prior anesthetic.     No history of anesthetic complications          ROS/MED HX    ENT/Pulmonary:     (+)tobacco use, Past use , . .    Neurologic:       Cardiovascular:        (-) hypertension   METS/Exercise Tolerance:     Hematologic:         Musculoskeletal:         GI/Hepatic:   "   (+) GERD Asymptomatic on medication,       Renal/Genitourinary:         Endo:         Psychiatric:     (+) psychiatric history depression      Infectious Disease:         Malignancy:         Other:                          Physical Exam  Normal systems: cardiovascular, pulmonary and dental    Airway   Mallampati: I  Neck ROM: full    Dental     Cardiovascular       Pulmonary             Lab Results   Component Value Date    WBC 11.6 (H) 11/12/2020    HGB 11.8 11/12/2020    HCT 36.6 11/12/2020     11/12/2020     09/03/2017    POTASSIUM 3.9 09/03/2017    CHLORIDE 105 09/03/2017    CO2 22 09/03/2017    BUN 12 09/03/2017    CR 0.57 09/03/2017     (H) 09/03/2017    MIGUEL ÁNGEL 8.7 09/03/2017    HCG Negative 08/15/2015       Preop Vitals  BP Readings from Last 3 Encounters:   11/12/20 119/80   11/03/20 116/70   10/27/20 114/72    Pulse Readings from Last 3 Encounters:   08/16/20 75   04/08/20 78   06/20/19 92      Resp Readings from Last 3 Encounters:   11/12/20 16   08/16/20 16   06/20/19 14    SpO2 Readings from Last 3 Encounters:   06/20/19 98%   04/08/19 94%   04/04/19 99%      Temp Readings from Last 1 Encounters:   11/12/20 36.9  C (98.4  F) (Temporal)    Ht Readings from Last 1 Encounters:   08/16/20 1.753 m (5' 9\")      Wt Readings from Last 1 Encounters:   11/12/20 111.6 kg (246 lb)    Estimated body mass index is 36.33 kg/m  as calculated from the following:    Height as of 8/16/20: 1.753 m (5' 9\").    Weight as of this encounter: 111.6 kg (246 lb).       Anesthesia Plan      History & Physical Review  History and physical reviewed and following examination; no interval change.    ASA Status:  2 .    NPO Status:  > 8 hours    Plan for Spinal   PONV prophylaxis:  Ondansetron (or other 5HT-3)         Postoperative Care  Postoperative pain management:  Neuraxial analgesia and IV analgesics.      Consents  Anesthetic plan, risks, benefits and alternatives discussed with:  Patient and Spouse..  "                Franc Reddy MD

## 2020-11-12 NOTE — LACTATION NOTE
Initial Lactation visit with Antonio, significant other Flavio & baby girl Nancy. Antonio reports feeding went well right after birth, but then baby had a low glucose level, needed to supplement with donor milk and needed gel x2. Last two glucose levels were improved. At time of visit, baby had latched and fed for a short period, but became sleepy. Antonio planning to pump. Reviewed pump settings, initiate mode for Medela [employer]. Encouraged to pump after every feeding until baby's feedings are consistently good and no longer needs supplementation.     Antonio shared her first baby never latched, and she ended up feeding with SNS at breast, and pumping after feedings for the first month, then changed to pump/bottle with a great milk supply. She is really hoping to avoid all of that this time around. Celebrated success of this baby latching initially, discussed ways we may encourage her to continue to be successful at breast for short term if she needs further supplementation. Also discussed typical  feeding patterns, likelihood of cluster feeding developing in second 24 hours and beyond.    Recommend unlimited, frequent breast feedings: At least 8 - 12 times every 24 hours. Antonio to pump with any poor feeds, and supplement with EBM/donor milk as needed to maintain stable glucose levels. Recommended rooming in. Instructed in hand expression. Avoid pacifiers and supplementation with formula unless medically indicated. Explained benefits of holding baby skin on skin to help promote better breastfeeding outcomes. Antonio has a pump for home use but is planning to get a new breast pump prior to discharge home. Antonio & Flavio appreciative of visit and support. Will revisit as needed.    Luci Hernandez, RN-C, IBCLC, MNN, PHN, BSN

## 2020-11-12 NOTE — ANESTHESIA CARE TRANSFER NOTE
Patient: Dayana Danielle    Procedure(s):  REPEAT  SECTION    Diagnosis: Previous  delivery, antepartum [O34.219]  Diagnosis Additional Information: No value filed.    Anesthesia Type:   Spinal     Note:  Airway :Room Air  Patient transferred to:Labor and Delivery  Comments: Transferred to OB PACU recovery, spontaneous respirations on room air with oxygen saturations maintained greater than 98%. SpO2, NiBP, and EKG monitors and alarms on and functioning, report on patient's clinical status given to OB recovery RN, RN questions answered, patient in hospital bed with siderails up Oxytocin 30 units in 500mL infusion connected to IV infusion pump in recovery bay and programmed to 100 mL/hr at handoff of care.  Handoff Report: Identifed the Patient, Identified the Reponsible Provider, Reviewed the pertinent medical history, Discussed the surgical course, Reviewed Intra-OP anesthesia mangement and issues during anesthesia, Set expectations for post-procedure period and Allowed opportunity for questions and acknowledgement of understanding      Vitals: (Last set prior to Anesthesia Care Transfer)    CRNA VITALS  2020 0802 - 2020 0838      2020             Resp Rate (set):  10        spo2 99  106/62        Electronically Signed By: CHEY Mora CRNA  2020  8:38 AM

## 2020-11-12 NOTE — PLAN OF CARE
Pt and spouse oriented to plan of care and safety measures upon arrival. Vitals stable and assessments WDL. Pain controlled. Adequate urine output. Tolerating general diet. Breastfeeding going well.

## 2020-11-12 NOTE — ANESTHESIA PROCEDURE NOTES
Procedure note : intrathecal      Staff -   Performed By: anesthesiologist  Pre-Procedure    Location: OB, OR      Pre-Anesthestic Checklist: patient identified, IV checked, site marked, risks and benefits discussed, informed consent, monitors and equipment checked, pre-op evaluation, at physician/surgeon's request and post-op pain management    Timeout  Correct Patient: Yes   Correct Procedure: Yes   Correct Site: Yes   Correct Laterality: Yes   Correct Position: Yes   Site Marked: Yes   .   Procedure Documentation  ASA 2  .    Procedure: intrathecal, .   Patient Position:sitting Insertion Site:L2-3  (midline approach)     Patient Prep/Sterile Barriers; mask, sterile gloves, povidone-iodine 7.5% surgical scrub, patient draped.  .  Needle:  Spinal Needle (gauge): 24  Spinal/LP Needle Length (inches): 3.5 Introducer used .        Assessment/Narrative  Paresthesias: No.  .  .  clear CSF fluid removed . Comments:  No complications      Medications Administered  Bupivacaine (MARCAINE) preservative free injection 0.5%, 1 mL

## 2020-11-12 NOTE — OP NOTE
Procedure Date: 2020      PREOPERATIVE DIAGNOSES:   1.  39+4 weeks' gestation.   2.  Previous  section; declines trial of labor.      POSTOPERATIVE DIAGNOSES:   1.  39+4 weeks' gestation.   2.  Previous  section; declines trial of labor.      PROCEDURE:  Repeat low transverse  section.      SURGEON:  Nia Casanova MD.      ASSISTANT:  Mendy Vazquez MD.      Dr. Vazquez's assistance was needed for retraction and visualization.      ANESTHESIA:  Spinal.      COMPLICATIONS:  None.      ESTIMATED BLOOD LOSS:  280 mL.      FINDINGS:  A female infant in the right occiput transverse position with a weight of 9 pounds 7 ounces and Apgars of 8 and 9.  Normal-appearing uterus, tubes and bilateral ovaries.      INDICATIONS:  Dayana is a 30-year-old G4, P1-0-2-1 who was brought to Labor and Delivery at 39+4 weeks' gestation for scheduled repeat  section.  Dayana had an elective primary  section due to anxiety regarding vaginal delivery and opted for repeat  section with this pregnancy.  Risks, benefits and alternatives were discussed in detail throughout her pregnancy.      DESCRIPTION OF PROCEDURE:  Dayana was taken to the operating room where spinal anesthesia was administered without difficulty.  She was prepared and draped in normal sterile fashion in dorsal supine position with left lateral tilt.  A timeout was performed to identify correct patient and procedure.  Pfannenstiel skin incision was made using the scalpel over the previous  section scar and this was carried down to the underlying layer of fascia using the Bovie.  Fascia was incised in the midline and this incision was extended laterally using the Reyes scissor.  Superior aspect of the fascial incision was then grasped with the Kocher clamps x 2, elevated, and the rectus muscles dissected.  A fair amount of scar tissue was noted on the superior margin.  Inferior border was similarly elevated and  dissected down to the pubic symphysis.  Rectus muscles were then  in the midline and the peritoneum was identified, tented, and entered bluntly.  This incision was extended with gentle traction.  The bladder blade was then placed and the vesicouterine peritoneum was identified, tented and bladder flap created.  Bladder blade was replaced and the lower uterine segment was incised in a transverse fashion.  Large amount of clear fluid was encountered.  Infant was delivered from right occiput transverse position without difficulty.  Nuchal cord x 1 was easily reduced.  Remainder of infant was delivered and placed on the maternal abdomen.  Terminal meconium was noted.  Cord clamping was delayed by 1 minute, and infant was handed off to awaiting nurses.  Placenta was delivered manually and intact.  The uterus was then exteriorized and cleared of all clot and debris.  Uterine incision was reapproximated using an 0 Vicryl in a running locked fashion.  A second imbricating layer was placed with excellent reapproximation.  One small area of bleeding was noted from the suture line and this was reinforced using a 3-0 Vicryl in a figure-of-eight stitch.  The uterus was then replaced into the abdominal cavity and gutters were cleared of all clot and debris.  Peritoneum was reapproximated using a 3-0 Vicryl.  The fascia was reapproximated using an 0 looped PDS in a running fashion.  Irrigation was performed prior to skin closure.  Three interrupted subcutaneous stitches were placed of 3-0 plain gut and skin was closed using a 4-0 Monocryl in a subcuticular stitch.  Bandages were placed and Vanita was taken to the recovery room in stable condition.  Sponge, lap and needle counts were all correct.         VINAYAK SEALS MD             D: 2020   T: 2020   MT: CODY      Name:     VANITA AGUILA   MRN:      [id number]        Account:        [id number]   :      1990           Procedure Date:  11/12/2020      Document: [id number]       cc: Nia Casanova MD

## 2020-11-12 NOTE — ANESTHESIA POSTPROCEDURE EVALUATION
Patient: Dayana Danielle    Procedure(s):  REPEAT  SECTION    Diagnosis:Previous  delivery, antepartum [O34.219]  Diagnosis Additional Information: No value filed.    Anesthesia Type:  Spinal    Note:  Anesthesia Post Evaluation    Patient location during evaluation: PACU  Patient participation: Able to fully participate in evaluation  Level of consciousness: awake and alert  Pain management: adequate  Airway patency: patent  Cardiovascular status: acceptable  Respiratory status: acceptable  Hydration status: acceptable  PONV: none     Anesthetic complications: None          Last vitals:  Vitals:    20 1000 20 1015 20 1041   BP: 123/71 109/70 122/73   Pulse: 67 80 62   Resp: 15 14 16   Temp: 36.6  C (97.8  F)  37  C (98.6  F)   SpO2: 100% 99% 100%         Electronically Signed By: Franc Reddy MD  2020  10:43 AM

## 2020-11-12 NOTE — PLAN OF CARE
Data: Dayana Danielle transferred to [facility] via bed at 1030. Baby transferred via parent's arms.  Action: Receiving unit notified of transfer: Yes. Patient and family notified of room change. Report on mom and baby given to Louis MILLER at 1030. Belongings sent to receiving unit. Accompanied by Registered Nurse. Oriented patient to surroundings. Call light within reach. ID bands double-checked with receiving RN.  Response: Patient tolerated transfer and is stable.

## 2020-11-13 LAB
COPATH REPORT: NORMAL
HGB BLD-MCNC: 10.6 G/DL (ref 11.7–15.7)

## 2020-11-13 PROCEDURE — 36415 COLL VENOUS BLD VENIPUNCTURE: CPT | Performed by: OBSTETRICS & GYNECOLOGY

## 2020-11-13 PROCEDURE — 250N000011 HC RX IP 250 OP 636: Performed by: OBSTETRICS & GYNECOLOGY

## 2020-11-13 PROCEDURE — 250N000013 HC RX MED GY IP 250 OP 250 PS 637: Performed by: OBSTETRICS & GYNECOLOGY

## 2020-11-13 PROCEDURE — 85018 HEMOGLOBIN: CPT | Performed by: OBSTETRICS & GYNECOLOGY

## 2020-11-13 PROCEDURE — 120N000012 HC R&B POSTPARTUM

## 2020-11-13 RX ORDER — HYDROXYZINE HYDROCHLORIDE 25 MG/1
25 TABLET, FILM COATED ORAL EVERY 4 HOURS PRN
Status: DISCONTINUED | OUTPATIENT
Start: 2020-11-13 | End: 2020-11-15 | Stop reason: HOSPADM

## 2020-11-13 RX ORDER — HYDROCODONE BITARTRATE AND ACETAMINOPHEN 5; 325 MG/1; MG/1
1-2 TABLET ORAL EVERY 4 HOURS PRN
Status: DISCONTINUED | OUTPATIENT
Start: 2020-11-13 | End: 2020-11-15 | Stop reason: HOSPADM

## 2020-11-13 RX ORDER — OXYCODONE HYDROCHLORIDE 5 MG/1
5-10 TABLET ORAL EVERY 4 HOURS PRN
Status: DISCONTINUED | OUTPATIENT
Start: 2020-11-13 | End: 2020-11-15 | Stop reason: HOSPADM

## 2020-11-13 RX ADMIN — OXYCODONE HYDROCHLORIDE 10 MG: 5 TABLET ORAL at 19:25

## 2020-11-13 RX ADMIN — ACETAMINOPHEN 975 MG: 325 TABLET, FILM COATED ORAL at 00:17

## 2020-11-13 RX ADMIN — DOCUSATE SODIUM 50 MG AND SENNOSIDES 8.6 MG 1 TABLET: 8.6; 5 TABLET, FILM COATED ORAL at 07:50

## 2020-11-13 RX ADMIN — HYDROCODONE BITARTRATE AND ACETAMINOPHEN 1 TABLET: 5; 325 TABLET ORAL at 23:39

## 2020-11-13 RX ADMIN — IBUPROFEN 800 MG: 400 TABLET ORAL at 08:45

## 2020-11-13 RX ADMIN — HYDROXYZINE HYDROCHLORIDE 25 MG: 25 TABLET, FILM COATED ORAL at 23:29

## 2020-11-13 RX ADMIN — DOCUSATE SODIUM 50 MG AND SENNOSIDES 8.6 MG 1 TABLET: 8.6; 5 TABLET, FILM COATED ORAL at 20:34

## 2020-11-13 RX ADMIN — IBUPROFEN 800 MG: 400 TABLET ORAL at 14:19

## 2020-11-13 RX ADMIN — IBUPROFEN 800 MG: 400 TABLET ORAL at 19:24

## 2020-11-13 RX ADMIN — OXYCODONE HYDROCHLORIDE 10 MG: 5 TABLET ORAL at 15:15

## 2020-11-13 RX ADMIN — DULOXETINE HYDROCHLORIDE 60 MG: 60 CAPSULE, DELAYED RELEASE ORAL at 07:50

## 2020-11-13 RX ADMIN — OXYCODONE HYDROCHLORIDE 5 MG: 5 TABLET ORAL at 11:44

## 2020-11-13 RX ADMIN — ACETAMINOPHEN 975 MG: 325 TABLET, FILM COATED ORAL at 06:38

## 2020-11-13 RX ADMIN — KETOROLAC TROMETHAMINE 30 MG: 30 INJECTION, SOLUTION INTRAMUSCULAR at 03:13

## 2020-11-13 RX ADMIN — ACETAMINOPHEN 975 MG: 325 TABLET, FILM COATED ORAL at 18:11

## 2020-11-13 RX ADMIN — ACETAMINOPHEN 975 MG: 325 TABLET, FILM COATED ORAL at 11:43

## 2020-11-13 NOTE — PROVIDER NOTIFICATION
Spoke with Dr. Casanova regarding pt pain. Order received to increase oxycodone to 5-10mg every 4 hours PRN.

## 2020-11-13 NOTE — PROGRESS NOTES
[facility]    Obstetrics Post-Op / Progress Note    Assessment & Plan   Assessment:  -1 Day Post-Op  Procedure(s):  REPEAT  SECTION    Doing well.  No immediate surgical complications identified.  No excessive bleeding  Pain well-controlled.    Plan:  Ambulation encouraged  Hemoglobin in AM  Lactation consultation  Pain control measures as needed  Reportable signs and symptoms dicussed with the patient  Anticipate discharge in 1-2 days    Nia Casanova     Interval History   Doing well.  Pain is well-controlled --has only used toradol and tylenol thus far.  No fevers.  No history of wound drainage, warmth or significant erythema.  Good appetite.  Denies chest pain, shortness of breath, nausea or vomiting.  Ambulatory without issues.  Breastfeeding well.  No issues overnight.      Medications     dextrose 5% lactated ringers Stopped (20 0045)     - MEDICATION INSTRUCTIONS -       NO Rho (D) immune globulin (RhoGam) needed - mother Rh POSITIVE       - MEDICATION INSTRUCTIONS -       oxytocin in 0.9% NaCl 100 mL/hr (20 1148)     oxytocin in 0.9% NaCl         acetaminophen  975 mg Oral Q6H     DULoxetine  60 mg Oral Daily     ibuprofen  800 mg Oral Q6H     senna-docusate  1 tablet Oral BID    Or     senna-docusate  2 tablet Oral BID     sodium chloride (PF)  3 mL Intracatheter Q8H       Physical Exam   Temp: 97.8  F (36.6  C) Temp src: Oral BP: 101/60 Pulse: 65   Resp: 16 SpO2: 99 % O2 Device: None (Room air)    Vitals:    20 0500   Weight: 111.6 kg (246 lb)     Vital Signs with Ranges  Temp:  [97.6  F (36.4  C)-98.6  F (37  C)] 97.8  F (36.6  C)  Pulse:  [62-93] 65  Resp:  [10-27] 16  BP: (101-126)/(48-93) 101/60  SpO2:  [97 %-100 %] 99 %  I/O last 3 completed shifts:  In: 4341 [P.O.:1700; I.V.:2641]  Out: 2680 [Urine:2400; Blood:280]    Uterine fundus is firm, non-tender and at the level of the umbilicus  Incision C/D/I  Extremities Non-tender    Data   Recent  Labs   Lab Test 11/12/20  0540   ABO A   RH Pos   AS Neg     Recent Labs   Lab Test 11/12/20  0540 08/25/20  0851   HGB 11.8 10.4*     Recent Labs   Lab Test 04/08/20  1348   RUQIGG 12

## 2020-11-13 NOTE — PLAN OF CARE
Vitals stable. Up ad ba well. Voiding without difficulty. Pain controlled with PO meds. Showered. Dressing removed. Saline lock removed. Breastfeeding going well. Pumping after feedings. Depression screen and birth certificate completed. Breast pump given for home.

## 2020-11-13 NOTE — PLAN OF CARE
Pt's VSS, up ambulating to BR, nam to be removed this AM, adequate urinary output. Pain managed with toradol and tylenol.  Fundus firm, scant lochia present. Incision site CDI, dressing intact. No drainage noted. Pt passing flatus. Pt breastfeeding infant well, spouse at bedside for support. Both bonding well with infant.

## 2020-11-13 NOTE — PLAN OF CARE
Vitals signs are stable and assessments WDL. Good Pain controlled. Adequate urine output. Tolerating general diet. Breastfeeding going well. She was assisted out of bed around 1930.  Mary-care and catheter care given.  She walked in the room without difficulty.  Good intake and out put.  Will continue to monitor.

## 2020-11-14 PROCEDURE — 120N000012 HC R&B POSTPARTUM

## 2020-11-14 PROCEDURE — 250N000013 HC RX MED GY IP 250 OP 250 PS 637: Performed by: OBSTETRICS & GYNECOLOGY

## 2020-11-14 RX ORDER — HYDROXYZINE HYDROCHLORIDE 25 MG/1
25 TABLET, FILM COATED ORAL EVERY 4 HOURS PRN
Qty: 30 TABLET | Refills: 0 | Status: SHIPPED | OUTPATIENT
Start: 2020-11-14 | End: 2021-03-18

## 2020-11-14 RX ORDER — IBUPROFEN 800 MG/1
800 TABLET, FILM COATED ORAL EVERY 6 HOURS
Qty: 40 TABLET | Refills: 0 | Status: SHIPPED | OUTPATIENT
Start: 2020-11-14 | End: 2024-02-12

## 2020-11-14 RX ORDER — HYDROCODONE BITARTRATE AND ACETAMINOPHEN 5; 325 MG/1; MG/1
1-2 TABLET ORAL EVERY 4 HOURS PRN
Qty: 30 TABLET | Refills: 0 | Status: SHIPPED | OUTPATIENT
Start: 2020-11-14 | End: 2021-03-18

## 2020-11-14 RX ORDER — AMOXICILLIN 250 MG
1-2 CAPSULE ORAL 2 TIMES DAILY
Qty: 60 TABLET | Refills: 0 | Status: SHIPPED | OUTPATIENT
Start: 2020-11-14 | End: 2021-03-18

## 2020-11-14 RX ADMIN — IBUPROFEN 800 MG: 400 TABLET ORAL at 07:58

## 2020-11-14 RX ADMIN — HYDROCODONE BITARTRATE AND ACETAMINOPHEN 2 TABLET: 5; 325 TABLET ORAL at 20:14

## 2020-11-14 RX ADMIN — DOCUSATE SODIUM 50 MG AND SENNOSIDES 8.6 MG 1 TABLET: 8.6; 5 TABLET, FILM COATED ORAL at 07:59

## 2020-11-14 RX ADMIN — IBUPROFEN 800 MG: 400 TABLET ORAL at 02:01

## 2020-11-14 RX ADMIN — HYDROCODONE BITARTRATE AND ACETAMINOPHEN 2 TABLET: 5; 325 TABLET ORAL at 15:58

## 2020-11-14 RX ADMIN — HYDROCODONE BITARTRATE AND ACETAMINOPHEN 2 TABLET: 5; 325 TABLET ORAL at 07:58

## 2020-11-14 RX ADMIN — DOCUSATE SODIUM 50 MG AND SENNOSIDES 8.6 MG 1 TABLET: 8.6; 5 TABLET, FILM COATED ORAL at 20:14

## 2020-11-14 RX ADMIN — DULOXETINE HYDROCHLORIDE 60 MG: 60 CAPSULE, DELAYED RELEASE ORAL at 07:58

## 2020-11-14 RX ADMIN — OXYCODONE HYDROCHLORIDE 10 MG: 5 TABLET ORAL at 18:20

## 2020-11-14 RX ADMIN — HYDROCODONE BITARTRATE AND ACETAMINOPHEN 2 TABLET: 5; 325 TABLET ORAL at 04:04

## 2020-11-14 RX ADMIN — IBUPROFEN 800 MG: 400 TABLET ORAL at 13:52

## 2020-11-14 RX ADMIN — HYDROCODONE BITARTRATE AND ACETAMINOPHEN 2 TABLET: 5; 325 TABLET ORAL at 11:59

## 2020-11-14 RX ADMIN — HYDROCODONE BITARTRATE AND ACETAMINOPHEN 2 TABLET: 5; 325 TABLET ORAL at 23:54

## 2020-11-14 RX ADMIN — IBUPROFEN 800 MG: 400 TABLET ORAL at 20:14

## 2020-11-14 NOTE — PLAN OF CARE
Patient has stable vitals.  Fundus firm at U/2 .   Scant rubra flow.  Incision site clean, dry and intact with steri strips.  Having increased incisional discomfort this afternoon.  Medicated with oxycodone 2 tabs, tylenol and ibuprofen when available.  Using ice pack prn as well to incision. Patient says its helping some.   Up independently in room with steady gait.  Breast feeding baby girl independently and supplementing donor and pumping after feeds.

## 2020-11-14 NOTE — LACTATION NOTE
"Check in visit with Antonio, FOB, and baby girl. Primary RN asked LC to visit because Antonio has very sore nipples and infant is now constantly chopping at the breast. At time of visit, Antonio has infant latched on L breast in football hold and Antonio is crying due to nipple pain. LC suggests we unlatch infant and check Antonio's nipple shape. Nipple shape is round but nipples are very reddened. LC suggests we add more pillows for infant support and assists with re-latching. Infant opens her mouth very wide and LC helps to bring infant to breast swiftly. As soon as infant latches, Antonio holds her breath and tears begin to fall. LC encourages Antonio to take a deep breath and try to relax and see if the discomfort dissipates within 10 seconds or so. . . Antonio says it isn't getting better. We unlatch infant again. Antonio is very frustrated, tearful, sharing \"I just want to feed my baby!\" We tried latching again with a nipple shield to see if that barrier helps with Antonio's pain. Even with the shield, Antonio was in tears.    ROSSY has infant suck on her finger and infant intermittently has a nutritive suck pattern followed by a chopping, loose jaw, suck pattern. LC can understand why Antonio's nipples are sore with infant's suck pattern. LC suggests offering infant supplementation again (infant was initially supplemented with DBM d/t LGA and stabilizing blood sugars but stopped this afternoon) to help satiate her. Antonio is frustrated that I am offering different advice from what was offered earlier. ROSSY suggests the \"picture being presented\" right now is different than what was presented earlier.  LC asks Antonio what she would like to do. Antonio would like to rest her nipples, not even pump right now, and supplement infant with 20 ml of DBM and then try breast feeding again next time.    LC provided support and encouragement. LC offered other ideas like applying ice to the nipples prior to infant latching, using hydrogels in between feedings, and " continuing to use the nipple shield. Suggested hand expression for extra stimulation in leiu of this pumping session. [place] hand expression video link written on white board.    Suggests to call for assistance with next feeding if needed.    Jazmine Hollis RN  Lactation Educator

## 2020-11-14 NOTE — PROGRESS NOTES
S: Pt doing well. Infant is being  and supplemented at first due to low sugars on baby. Never nursed first time but baby latching well this time but milk isn't in yet so pumping a bit as well.  Pain is controlled with current analgesics.  Medication(s) being used: acetaminophen, ibuprofen (OTC) and narcotic analgesics including norco/vistaril b/c oxycodone 10mg wasn't working yesterday. Now just dull incisional ache but not the stabbing pain in the right corner of incision like yest evening. No BM and has baseline constipation. + flatus and no nausea.    O: /62   Pulse 65   Temp 97.9  F (36.6  C) (Oral)   Resp 16   Wt 111.6 kg (246 lb)   LMP 02/09/2020   SpO2 99%   Breastfeeding Unknown   BMI 36.33 kg/m          ABD:  Uterine fundus is firm, non-tender and at the level of the umbilicus       INC: clean/dry/intact                Hemoglobin   Date Value Ref Range Status   11/13/2020 10.6 (L) 11.7 - 15.7 g/dL Final            Lab Results   Component Value Date    RH Pos 11/12/2020       A: Post-op Day #2 s/p C/Section    P: Continue Post Op Cares  Anticipate d/c home tomorrow  rx sent in and reviewed restrictions and d/c instructions for tomorrow

## 2020-11-14 NOTE — PLAN OF CARE
Pt stable with normal vital signs. Fundus is firm @ U and she has a scant rubra flow. Breastfeeding and pumping. Seen by lactation today and was encouraged to exclusively breastfeed instead of supplementing with donor milk with every feed. Breastfeeding is going well. Pt voiding without difficulty. Incision is clean and dry with intact steri strips. Pain adequately controlled with hydrocodone and ibuprofen. Pt also taking atarax at night time. Will continue to monitor.

## 2020-11-14 NOTE — PLAN OF CARE
Patient having increased incisional discomfort, more so on right lateral side of abdomen.  New orders received per Dr. Cleveland for Norco 1 to 2 tablets every 4 hours prn and Vistaril 25 mg every 4 to 6 hours prn.

## 2020-11-14 NOTE — LACTATION NOTE
Routine visit. Antonio states breastfeeding has been going fair so far. She's working on breastfeeding infant, supplementing donor milk via sns and pumping. She states infant continues to be sleepy for feedings but takes supplement well. She initially was supplementing d/t infant's low glucose levels. Infant's glucose levels have now been stable for more than 24 hours. Discussed that if infant is latching and feeding at least 8x/day there is no medical need to supplement. Assisted Antonio to place infant skin to skin and latch baby Nancy. Nancy able to latch well to both breasts in football hold. She suckled well with encouragement. Recommended she continue with skin to skin and latching infant every 2-3 hours and only supplement and pump if Nancy has a poor feeding. Encouraged Antonio to continue calling staff for latch checks and assist with feedings as needed. Antonio appreciative of my visit. Will revisit as needed.

## 2020-11-14 NOTE — PLAN OF CARE
VSS.  Working on breastfeeding and supplementing with donor milk.  Pumping.  Norco, Atarax and Ibuprofen controlling pain.  FF @ midline.  Abdominal incision with steri-strips.  Voiding adequately.  Will continue to monitor.

## 2020-11-15 VITALS
WEIGHT: 246 LBS | RESPIRATION RATE: 16 BRPM | BODY MASS INDEX: 36.33 KG/M2 | OXYGEN SATURATION: 99 % | TEMPERATURE: 98 F | SYSTOLIC BLOOD PRESSURE: 109 MMHG | DIASTOLIC BLOOD PRESSURE: 61 MMHG | HEART RATE: 66 BPM

## 2020-11-15 PROCEDURE — 250N000013 HC RX MED GY IP 250 OP 250 PS 637: Performed by: OBSTETRICS & GYNECOLOGY

## 2020-11-15 RX ADMIN — HYDROCODONE BITARTRATE AND ACETAMINOPHEN 2 TABLET: 5; 325 TABLET ORAL at 08:05

## 2020-11-15 RX ADMIN — DOCUSATE SODIUM 50 MG AND SENNOSIDES 8.6 MG 2 TABLET: 8.6; 5 TABLET, FILM COATED ORAL at 08:05

## 2020-11-15 RX ADMIN — IBUPROFEN 800 MG: 400 TABLET ORAL at 08:05

## 2020-11-15 RX ADMIN — HYDROCODONE BITARTRATE AND ACETAMINOPHEN 2 TABLET: 5; 325 TABLET ORAL at 12:07

## 2020-11-15 RX ADMIN — HYDROCODONE BITARTRATE AND ACETAMINOPHEN 2 TABLET: 5; 325 TABLET ORAL at 04:03

## 2020-11-15 RX ADMIN — DULOXETINE HYDROCHLORIDE 60 MG: 60 CAPSULE, DELAYED RELEASE ORAL at 08:04

## 2020-11-15 RX ADMIN — IBUPROFEN 800 MG: 400 TABLET ORAL at 01:56

## 2020-11-15 NOTE — PROGRESS NOTES
S: Pt doing well. Infant is being . Had some struggles with latching and now doing better. Doing some pumping and supplementing but starting to feel some milk coming in and baby is staying latched on better.   Pain is controlled with current analgesics.  Medication(s) being used: acetaminophen, ibuprofen (OTC) and narcotic analgesics including norco.    O: /61   Pulse 66   Temp 98  F (36.7  C) (Oral)   Resp 16   Wt 111.6 kg (246 lb)   LMP 02/09/2020   SpO2 99%   Breastfeeding Unknown   BMI 36.33 kg/m          ABD:  Uterine fundus is firm, non-tender and at the level of the umbilicus       INC: clean/dry/intact                Hemoglobin   Date Value Ref Range Status   11/13/2020 10.6 (L) 11.7 - 15.7 g/dL Final            Lab Results   Component Value Date    RH Pos 11/12/2020       A: Post-op Day #3 s/p C/Section    P: Continue Post Op Cares  discharge home and f/u in 6 weeks with Dr. Casanova

## 2020-11-15 NOTE — PLAN OF CARE
VSS.  Working on breastfeeding with a nipple shield and supplementing with donor milk.  Pumping.  Using Lanolin and Hydrogels for cracked and sore nipples.  Norco and Ibuprofen controlling pain.  FF @ midline.  Abdominal incision with steri-strips.  Voiding adequately.  Will continue to monitor.

## 2020-11-15 NOTE — LACTATION NOTE
"Discharge visit with Antonio. As LC enters the room, Antonio says \"I'm good with breastfeeding, I don't need anything.\" LC congratulates family and leaves the room.    Jazmine Hollis, RN  Lactation Educator  "

## 2020-11-15 NOTE — PLAN OF CARE
Patient has stable vitals.  Incisional discomfort improving from earlier in shift.  Using Kpad prn and taking Norco and ibuprofen when available.  Oxycodone given at 1800 with no effect on pain level.  Breast feeding improved as well at 2000 feeding.  Breast fed with use of shield on both sides for approximately 15 minutes each. Using hydrogels to cracked and reddened nipples.  Lactation saw again this evening.    Pumping after feeds and supplementing ebm to baby.  Incision site clean, dry and intact with steri strips.

## 2020-11-15 NOTE — PLAN OF CARE
Vss. Up ad ba.Independent with cares.Breast feeding and supplementing infant independently.Fundus firm, Rubra scant.Discharge today.

## 2020-11-21 NOTE — DISCHARGE SUMMARY
Admit Date:     2020   Discharge Date:     11/15/2020      HOSPITAL COURSE:  Yumiko is a 30-year-old  who was admitted to the postpartum service following scheduled repeat  section.  Yumiko delivered a healthy female infant with a weight of 9 pounds 7 ounces and Apgars of 8 and 9 via uncomplicated repeat  section.  Please refer to operative dictation regarding details of this procedure.  Yumiko's postoperative hospital course was overall uncomplicated.  Pain was well controlled using a combination of oral as well as IV medications.  Frias catheter was discontinued on postoperative day #1, and she was able to void without issue or problem.  Yumiko was allowed to advance her diet with return of bowel function and was tolerating regular diet prior to discharge home.  Yumiko showed no sign or symptoms of infection throughout postoperative hospital course and remained hemodynamically stable with a postoperative hemoglobin of 10.6 grams per deciliter.  Yumiko was discharged to home on postoperative day #3 after meeting all requirements for discharge.  She verbalized understanding of the discharge instructions and was given a list of contact numbers should any issues or problems arise.  Yumiko will follow up with me for routine postpartum visit in 6 weeks.         VINAYAK SEALS MD             D: 2020   T: 2020   MT: RUBIN      Name:     VANITA AGUILA   MRN:      [id number]        Account:        [id number]   :      1990           Admit Date:     2020                                  Discharge Date: 11/15/2020      Document: [id number]

## 2020-12-21 PROBLEM — O34.219 PREVIOUS CESAREAN SECTION COMPLICATING PREGNANCY: Status: RESOLVED | Noted: 2020-04-08 | Resolved: 2020-12-21

## 2020-12-21 PROBLEM — O34.219 PREVIOUS CESAREAN DELIVERY, ANTEPARTUM: Status: RESOLVED | Noted: 2020-06-02 | Resolved: 2020-12-21

## 2020-12-21 NOTE — PROGRESS NOTES
"  SUBJECTIVE:  Dayana Danielle,  is here for a postpartum visit.  She had a  Section  on 2020 delivering a healthy baby girl, named Nancy weighing 9 lbs 6 .6oz at term.      HPI:  Here today for postpartum visit --doing great!  Baby Nancy is doing great --good eater and good sleeper.  Both breast and bottle feeding.  Doing ~25/75% of each.  Usually going 3-4hrs between nighttime feedings.  Antonio is doing well.  Good recovery.  Eating/drinking well.  No bowel/bladder issues.  Stopped bleeding last week.  Has not resumed SA.  Would like IUD but would like to return after the new year.   Family all had COVID in early December --sister brought it home!  Moods good --denies depression or anxiety today  Has returned to parttime work from home      Last PHQ-9 score on record=   PHQ-9 SCORE 2020   PHQ-9 Total Score MyChart -   PHQ-9 Total Score 2     JACINTO-7 SCORE 2020   Total Score 14 (moderate anxiety) - -   Total Score 14 13 4       Pap: (  Lab Results   Component Value Date    PAP NIL 10/16/2018       Delivery complications:  No  Breast feeding:  Yes,   Bladder problems:  No  Bowel problems/hemorrhoids:  No  Episiotomy/laceration/incision healed? Yes:   Vaginal flow:  None  Chical:  No  Contraception: none  Emotional adjustment:  doing well  Back to work:  Working from home     12 point review of systems negative other than symptoms noted below or in the HPI.    OBJECTIVE:  Vitals: /66   Pulse 62   Ht 1.753 m (5' 9\")   Wt 97.5 kg (215 lb)   LMP 2020   BMI 31.75 kg/m    BMI= Body mass index is 31.75 kg/m .  General - pleasant female in no acute distress.  Breast -  deferred  Abdomen - Incision well-healed  Pelvic - DEFERRED AT PATIENT'S REQUEST; WILL PERFORM AT TIME OF IUD PLACEMENT    ASSESSMENT:    ICD-10-CM    1. Encounter for postpartum visit  Z39.2    2. Status post  delivery  Z98.891        PLAN:  May resume normal activities " without restrictions.  Pap smear was not done today.    Full counseling was provided, and all questions were answered.   Return to clinic in one year for an annual visit.     Patient Instructions   Follow up with your primary care provider for your other medical problems.  Continue self breast exam.  Increase physical activity and exercise.  PHQ-9/JACINTO-7 scores were discussed.  Usual safety and preventative measures counseling done.  BMI >25  Weight loss encouraged.  Last pap smear (2018) was normal.  No pap was obtained this year but will be due next year.  This was discussed with the patient and she agrees with the plan.  Antonio will return for IUD placement after the new year.  Discussed placement and recommended premedication with ibuprofen prior to appointment.  Will use condoms for contraception prior to that IUD placement appointment.    Nia Casanova MD

## 2020-12-22 ENCOUNTER — PRENATAL OFFICE VISIT (OUTPATIENT)
Dept: OBGYN | Facility: CLINIC | Age: 30
End: 2020-12-22
Payer: COMMERCIAL

## 2020-12-22 VITALS
DIASTOLIC BLOOD PRESSURE: 66 MMHG | HEIGHT: 69 IN | BODY MASS INDEX: 31.84 KG/M2 | SYSTOLIC BLOOD PRESSURE: 118 MMHG | WEIGHT: 215 LBS | HEART RATE: 62 BPM

## 2020-12-22 DIAGNOSIS — Z98.891 STATUS POST CESAREAN DELIVERY: ICD-10-CM

## 2020-12-22 PROBLEM — O09.90 SUPERVISION OF HIGH-RISK PREGNANCY: Status: RESOLVED | Noted: 2020-04-08 | Resolved: 2020-12-22

## 2020-12-22 PROCEDURE — 99207 PR POST PARTUM EXAM: CPT | Performed by: OBSTETRICS & GYNECOLOGY

## 2020-12-22 SDOH — HEALTH STABILITY: MENTAL HEALTH: HOW OFTEN DO YOU HAVE A DRINK CONTAINING ALCOHOL?: 2-3 TIMES A WEEK

## 2020-12-22 SDOH — HEALTH STABILITY: MENTAL HEALTH: HOW MANY STANDARD DRINKS CONTAINING ALCOHOL DO YOU HAVE ON A TYPICAL DAY?: 1 OR 2

## 2020-12-22 SDOH — HEALTH STABILITY: MENTAL HEALTH: HOW OFTEN DO YOU HAVE 6 OR MORE DRINKS ON ONE OCCASION?: MONTHLY

## 2020-12-22 ASSESSMENT — ANXIETY QUESTIONNAIRES
GAD7 TOTAL SCORE: 4
2. NOT BEING ABLE TO STOP OR CONTROL WORRYING: NOT AT ALL
5. BEING SO RESTLESS THAT IT IS HARD TO SIT STILL: NOT AT ALL
7. FEELING AFRAID AS IF SOMETHING AWFUL MIGHT HAPPEN: SEVERAL DAYS
IF YOU CHECKED OFF ANY PROBLEMS ON THIS QUESTIONNAIRE, HOW DIFFICULT HAVE THESE PROBLEMS MADE IT FOR YOU TO DO YOUR WORK, TAKE CARE OF THINGS AT HOME, OR GET ALONG WITH OTHER PEOPLE: SOMEWHAT DIFFICULT
6. BECOMING EASILY ANNOYED OR IRRITABLE: NOT AT ALL
3. WORRYING TOO MUCH ABOUT DIFFERENT THINGS: SEVERAL DAYS
1. FEELING NERVOUS, ANXIOUS, OR ON EDGE: SEVERAL DAYS

## 2020-12-22 ASSESSMENT — MIFFLIN-ST. JEOR: SCORE: 1754.61

## 2020-12-22 ASSESSMENT — PATIENT HEALTH QUESTIONNAIRE - PHQ9
SUM OF ALL RESPONSES TO PHQ QUESTIONS 1-9: 2
5. POOR APPETITE OR OVEREATING: SEVERAL DAYS

## 2020-12-22 NOTE — PATIENT INSTRUCTIONS
Follow up with your primary care provider for your other medical problems.  Continue self breast exam.  Increase physical activity and exercise.  PHQ-9/JACINTO-7 scores were discussed.  Usual safety and preventative measures counseling done.  BMI >25  Weight loss encouraged.  Last pap smear (2018) was normal.  No pap was obtained this year but will be due next year.  This was discussed with the patient and she agrees with the plan.  Antonio will return for IUD placement after the new year.  Discussed placement and recommended premedication with ibuprofen prior to appointment.  Will use condoms for contraception prior to that IUD placement appointment.

## 2020-12-23 ASSESSMENT — ANXIETY QUESTIONNAIRES: GAD7 TOTAL SCORE: 4

## 2021-01-07 ENCOUNTER — MYC REFILL (OUTPATIENT)
Dept: OBGYN | Facility: CLINIC | Age: 31
End: 2021-01-07

## 2021-01-07 DIAGNOSIS — B00.2 ORAL HERPES: ICD-10-CM

## 2021-01-07 RX ORDER — VALACYCLOVIR HYDROCHLORIDE 1 G/1
2000 TABLET, FILM COATED ORAL 2 TIMES DAILY
Qty: 4 TABLET | Refills: 4 | OUTPATIENT
Start: 2021-01-07

## 2021-01-07 NOTE — TELEPHONE ENCOUNTER
"Requested Prescriptions   Pending Prescriptions Disp Refills     valACYclovir (VALTREX) 1000 mg tablet 4 tablet 4     Sig: Take 2 tablets (2,000 mg) by mouth 2 times daily       Antivirals for Herpes Protocol Failed - 1/7/2021  1:25 PM        Failed - Normal serum creatinine on file in past 12 months     Recent Labs   Lab Test 09/03/17  1613   CR 0.57       Ok to refill medication if creatinine is low          Passed - Patient is age 12 or older        Passed - Recent (12 mo) or future (30 days) visit within the authorizing provider's specialty     Patient has had an office visit with the authorizing provider or a provider within the authorizing providers department within the previous 12 mos or has a future within next 30 days. See \"Patient Info\" tab in inbasket, or \"Choose Columns\" in Meds & Orders section of the refill encounter.              Passed - Medication is active on med list           Should have refills available  Tawana Roberts RN on 1/7/2021 at 1:27 PM    "

## 2021-03-18 ENCOUNTER — OFFICE VISIT (OUTPATIENT)
Dept: FAMILY MEDICINE | Facility: CLINIC | Age: 31
End: 2021-03-18
Payer: COMMERCIAL

## 2021-03-18 VITALS
SYSTOLIC BLOOD PRESSURE: 130 MMHG | DIASTOLIC BLOOD PRESSURE: 86 MMHG | HEART RATE: 98 BPM | WEIGHT: 202 LBS | BODY MASS INDEX: 29.92 KG/M2 | TEMPERATURE: 96.6 F | HEIGHT: 69 IN | OXYGEN SATURATION: 97 %

## 2021-03-18 DIAGNOSIS — F90.2 ATTENTION DEFICIT HYPERACTIVITY DISORDER (ADHD), COMBINED TYPE: ICD-10-CM

## 2021-03-18 DIAGNOSIS — F33.1 MODERATE EPISODE OF RECURRENT MAJOR DEPRESSIVE DISORDER (H): ICD-10-CM

## 2021-03-18 DIAGNOSIS — F41.1 GAD (GENERALIZED ANXIETY DISORDER): Primary | ICD-10-CM

## 2021-03-18 DIAGNOSIS — Z98.891 STATUS POST CESAREAN DELIVERY: ICD-10-CM

## 2021-03-18 PROCEDURE — 99213 OFFICE O/P EST LOW 20 MIN: CPT | Performed by: NURSE PRACTITIONER

## 2021-03-18 RX ORDER — DULOXETIN HYDROCHLORIDE 60 MG/1
60 CAPSULE, DELAYED RELEASE ORAL DAILY
Qty: 90 CAPSULE | Refills: 3 | Status: SHIPPED | OUTPATIENT
Start: 2021-03-18 | End: 2022-06-02

## 2021-03-18 RX ORDER — HYDROXYZINE HYDROCHLORIDE 25 MG/1
25 TABLET, FILM COATED ORAL EVERY 4 HOURS PRN
Qty: 90 TABLET | Refills: 3 | Status: SHIPPED | OUTPATIENT
Start: 2021-03-18 | End: 2022-03-14

## 2021-03-18 ASSESSMENT — MIFFLIN-ST. JEOR: SCORE: 1687.77

## 2021-03-18 NOTE — PROGRESS NOTES
"    Assessment & Plan   Problem List Items Addressed This Visit     Status post  delivery    Relevant Medications    hydrOXYzine (ATARAX) 25 MG tablet    Moderate episode of recurrent major depressive disorder (H)    Relevant Medications    hydrOXYzine (ATARAX) 25 MG tablet    DULoxetine (CYMBALTA) 60 MG capsule    JACINTO (generalized anxiety disorder) - Primary    Relevant Medications    hydrOXYzine (ATARAX) 25 MG tablet    DULoxetine (CYMBALTA) 60 MG capsule    ADHD          Plan to restart medications for ADHD when Antonio is done breastfeedng in 2 months; anxiety and depression currently stable  BMI:   Estimated body mass index is 30.26 kg/m  as calculated from the following:    Height as of this encounter: 1.74 m (5' 8.5\").    Weight as of this encounter: 91.6 kg (202 lb).       See Patient Instructions    No follow-ups on file.    CHEY Hernandez CNP  [facility]    Yvon Alvarez is a 30 year old who presents for the following health issues  HPI     Medication Followup of Duloxetine, hydroxyzine     Taking Medication as prescribed: yes    Side Effects:  Sweating still     Medication Helping Symptoms:  yes     Review of Systems   Constitutional, HEENT, cardiovascular, pulmonary, gi and gu systems are negative, except as otherwise noted.      Objective    /86   Pulse 98   Temp 96.6  F (35.9  C) (Temporal)   Ht 1.74 m (5' 8.5\")   Wt 91.6 kg (202 lb)   SpO2 97%   BMI 30.26 kg/m    Body mass index is 30.26 kg/m .  Physical Exam   GENERAL: healthy, alert and no distress  NECK: no adenopathy, no asymmetry, masses, or scars and thyroid normal to palpation  RESP: lungs clear to auscultation - no rales, rhonchi or wheezes  CV: regular rate and rhythm, normal S1 S2, no S3 or S4, no murmur, click or rub, no peripheral edema and peripheral pulses strong  ABDOMEN: soft, nontender, no hepatosplenomegaly, no masses and bowel sounds normal  PSYCH: mentation appears normal, " affect normal/bright    Admission on 2020, Discharged on 11/15/2020   Component Date Value Ref Range Status     ABO 2020 A   Final     RH(D) 2020 Pos   Final     Antibody Screen 2020 Neg   Final     Test Valid Only At 2020 [facility]      Final     Specimen Expires 2020 11/15/2020   Final     Treponema Antibodies 2020 Nonreactive  NR^Nonreactive Final    Comment: Methodology Change: Test performed on the Diagonal View Liaison XL by Treponema   pallidum Total Antibodies Assay as of 3.17.2020.       WBC 2020 11.6* 4.0 - 11.0 10e9/L Final     RBC Count 2020 4.15  3.8 - 5.2 10e12/L Final     Hemoglobin 2020 11.8  11.7 - 15.7 g/dL Final     Hematocrit 2020 36.6  35.0 - 47.0 % Final     MCV 2020 88  78 - 100 fl Final     MCH 2020 28.4  26.5 - 33.0 pg Final     MCHC 2020 32.2  31.5 - 36.5 g/dL Final     RDW 2020 16.9* 10.0 - 15.0 % Final     Platelet Count 2020 234  150 - 450 10e9/L Final     Copath Report 2020    Final                    Value:Patient Name: VANITA AGUILA  MR#: [id number]  Specimen #: [id number]  Collected: 2020  Received: 2020  Reported: 2020 16:49  Ordering Phy(s): VINAYAK SEALS    * Amended *    For improved result formatting, select 'View Enhanced Report Format' under   Linked Documents section.    SPECIMEN(S):  Placenta    FINAL DIAGNOSIS:  Placenta, third trimester, 39 weeks + 4 days, repeat  section  - 558 g (50th-75th percentile for gestational age)  - Amnionic membranes with meconium staining and no circumvallate insertion  - Terminal villi appropriate for gestational age  - Three-vessel umbilical cord with no abnormality    COMMENT:  The clinical history of circumvallate insertion by ultrasound is noted.   The placenta was examined grossly and  the membranes insert marginally.    This case was amended to correct a typographical error. The  "last line of   the initial report read \"three vessel  umbilical cord with circumvallate insertion\", which was corrected                           to   \"three vessel umbilical cord with no  abnormality\".    Electronically signed out by:    Phylicia Jones M.D.  Report originally signed out by: Phylicia Jones M.D.  Previous sign out date: 11/13/2020 15:41    CLINICAL HISTORY:  30 year old, 39 weeks +4 days, circumvallate insertion by ultrasound    GROSS:  The specimen is received in formalin with the patient's name and properly   identified as \"placenta \".  The  specimen consists of:  GENERAL  Condition: Partially fixed  CORD  Length: 22.4 cm in length by 1.4 cm in diameter  Number of vessels: 3  Appearance: Myxoid white  Presence of true knot(s) or false knot(s): No  Insertion: Eccentrically , 5.5 cm from the nearest disc edge  Other features: None  MEMBRANES  Condition: Disrupted  Color: Meconium stained  Transparency: Focally thickened and opaque  Insertion: Marginal with marginal insertion  Other: Amniotic layer stripped from fetal plate  DISK  Trimmed weight: 558 g  Shape: Disc  Dimensions: 20.9 x 18.3 cm  Thickness (min/ma                          x): Min 1.3 cm, max 2.0 cm  Fetal Surface:  normal arborization  Maternal surface: Complete with loosely adherent clot  Findings upon sectioning: Beefy red, spongy and grossly unremarkable cut   surface  Summary of cassettes:  1 - membrane roll,  two sections of umbilical cord  2 - 3 -central full thickness placenta parenchyma (Dictated by: Brice Lee 11/12/2020 12:48 PM)    MICROSCOPIC:  The microscopic findings support the diagnosis.    The technical component of this testing was completed at the [facility], with the professional component performed   at the [facility], [address] [place] [address]  [phone] ([phone])    CPT Codes:  A: 52951-RX6    COLLECTION " SITE:  Client: [facility]  Location: [facility] (S)       Hemoglobin 11/13/2020 10.6* 11.7 - 15.7 g/dL Final

## 2021-05-20 ENCOUNTER — VIRTUAL VISIT (OUTPATIENT)
Dept: FAMILY MEDICINE | Facility: CLINIC | Age: 31
End: 2021-05-20
Payer: COMMERCIAL

## 2021-05-20 DIAGNOSIS — F32.1 MODERATE MAJOR DEPRESSION (H): Primary | ICD-10-CM

## 2021-05-20 DIAGNOSIS — N76.0 BACTERIAL VAGINOSIS: ICD-10-CM

## 2021-05-20 DIAGNOSIS — F90.2 ATTENTION DEFICIT HYPERACTIVITY DISORDER (ADHD), COMBINED TYPE: ICD-10-CM

## 2021-05-20 DIAGNOSIS — B96.89 BACTERIAL VAGINOSIS: ICD-10-CM

## 2021-05-20 PROCEDURE — 99214 OFFICE O/P EST MOD 30 MIN: CPT | Mod: 95 | Performed by: NURSE PRACTITIONER

## 2021-05-20 RX ORDER — METRONIDAZOLE 7.5 MG/G
1 GEL VAGINAL 2 TIMES DAILY
Qty: 50 G | Refills: 0 | Status: SHIPPED | OUTPATIENT
Start: 2021-05-20 | End: 2021-05-25

## 2021-05-20 RX ORDER — DEXTROAMPHETAMINE SACCHARATE, AMPHETAMINE ASPARTATE, DEXTROAMPHETAMINE SULFATE AND AMPHETAMINE SULFATE 2.5; 2.5; 2.5; 2.5 MG/1; MG/1; MG/1; MG/1
10 TABLET ORAL 2 TIMES DAILY
Qty: 60 TABLET | Refills: 0 | Status: SHIPPED | OUTPATIENT
Start: 2021-05-20 | End: 2023-02-14

## 2021-05-20 ASSESSMENT — PATIENT HEALTH QUESTIONNAIRE - PHQ9
5. POOR APPETITE OR OVEREATING: SEVERAL DAYS
SUM OF ALL RESPONSES TO PHQ QUESTIONS 1-9: 10

## 2021-05-20 ASSESSMENT — ANXIETY QUESTIONNAIRES
1. FEELING NERVOUS, ANXIOUS, OR ON EDGE: SEVERAL DAYS
IF YOU CHECKED OFF ANY PROBLEMS ON THIS QUESTIONNAIRE, HOW DIFFICULT HAVE THESE PROBLEMS MADE IT FOR YOU TO DO YOUR WORK, TAKE CARE OF THINGS AT HOME, OR GET ALONG WITH OTHER PEOPLE: SOMEWHAT DIFFICULT
3. WORRYING TOO MUCH ABOUT DIFFERENT THINGS: SEVERAL DAYS
6. BECOMING EASILY ANNOYED OR IRRITABLE: MORE THAN HALF THE DAYS
2. NOT BEING ABLE TO STOP OR CONTROL WORRYING: SEVERAL DAYS
7. FEELING AFRAID AS IF SOMETHING AWFUL MIGHT HAPPEN: SEVERAL DAYS
5. BEING SO RESTLESS THAT IT IS HARD TO SIT STILL: SEVERAL DAYS
GAD7 TOTAL SCORE: 8

## 2021-05-20 NOTE — PROGRESS NOTES
Antonio is a 31 year old who is being evaluated via a billable video visit.      How would you like to obtain your AVS? MyChart  If the video visit is dropped, the invitation should be resent by: Text to cell phone: [phone]   Will anyone else be joining your video visit? No    Video Start Time: 3 pm    Assessment & Plan     Moderate major depression (H)    - MENTAL HEALTH REFERRAL  - Adult; Assessments and Testing; ADHD; G: [facility] [phone]; We will contact you to schedule the appointment or please call with any questions    Attention deficit hyperactivity disorder (ADHD), combined type  -start medication; refer for testing to confirm  - amphetamine-dextroamphetamine (ADDERALL) 10 MG tablet; Take 1 tablet (10 mg) by mouth 2 times daily    Bacterial vaginosis    - metroNIDAZOLE (METROGEL) 0.75 % vaginal gel; Place 1 applicator (5 g) vaginally 2 times daily for 5 days      35 minutes spent on the date of the encounter doing chart review, interpretation of tests, patient visit and documentation        See Patient Instructions    No follow-ups on file.    CHEY Hernandez [facility]    Yvon Alvarez is a 31 year old who presents for the following health issues     HPI     Depression and Anxiety Follow-Up    How are you doing with your depression since your last visit? No change    How are you doing with your anxiety since your last visit?  No change    Are you having other symptoms that might be associated with depression or anxiety? No    Have you had a significant life event? No     Do you have any concerns with your use of alcohol or other drugs? No     Antonio was originally diagnosed with ADHD by her PCP, Yobany Steele in 2020. She has a history of distractibility and poor attention in school, frequent late and missed assignments; trouble keeping track of personal item at home; poor organization. She was treated with Adderall XR 25 mg and IR 5-10 mg  from 6947-5919, and was able to improve performance at work and organization at home. 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 She initially stopped medication in 2016 due to becoming pregnant, and miscarried this pregnancy, which caused an episode of severe grief and depression. She decided to stay off medication from 2017-present due to pursuing pregnancy and breastfeeding. She has now completed breastfeeding. Current symptoms are trouble with organization at home;  [id number] with laundry and household responsibilities. trouble getting children organized for activities; and trouble with work duties; she notes that she has often worked more than her allotted hours due to trouble getting everything done in time.     Social History     Tobacco Use     Smoking status: Former Smoker     Packs/day: 0.50     Types: Cigarettes     Start date: 5/7/2003     Quit date: 10/2017     Years since quitting: 3.6     Smokeless tobacco: Never Used     Tobacco comment: Used a vapor for the month of October.   Substance Use Topics     Alcohol use: Yes     Frequency: 2-3 times a week     Drinks per session: 1 or 2     Binge frequency: Monthly     Drug use: No     Frequency: 1.0 times per week     Comment: Socially smoked marijuana, long time ago     PHQ 4/8/2020 12/22/2020 5/20/2021   PHQ-9 Total Score 8 2 10   Q9: Thoughts of better off dead/self-harm past 2 weeks Not at all Not at all Not at all   F/U: Thoughts of suicide or self-harm - - -   F/U: Safety concerns - - -     JACINTO-7 SCORE 4/8/2020 12/22/2020 5/20/2021   Total Score - - -   Total Score 13 4 8     Last PHQ-9 5/20/2021   1.  Little interest or pleasure in doing things 2   2.  Feeling down, depressed, or hopeless 1   3.  Trouble falling or staying asleep, or sleeping too much 1   4.  Feeling tired or having  little energy 1   5.  Poor appetite or overeating 1   6.  Feeling bad about yourself 1   7.  Trouble concentrating 3   8.  Moving slowly or restless 0   Q9: Thoughts of better off dead/self-harm past 2 weeks 0   PHQ-9 Total Score 10   Difficulty at work, home, or with people Somewhat difficult   In the past two weeks have you had thoughts of suicide or self harm? -   Do you have concerns about your personal safety or the safety of others? -     JACINTO-7  5/20/2021   1. Feeling nervous, anxious, or on edge 1   2. Not being able to stop or control worrying 1   3. Worrying too much about different things 1   4. Trouble relaxing 1   5. Being so restless that it is hard to sit still 1   6. Becoming easily annoyed or irritable 2   7. Feeling afraid, as if something awful might happen 1   JACINTO-7 Total Score 8   If you checked any problems, how difficult have they made it for you to do your work, take care of things at home, or get along with other people? Somewhat difficult       Suicide Assessment Five-step Evaluation and Treatment (SAFE-T)      How many servings of fruits and vegetables do you eat daily?  2-3    On average, how many sweetened beverages do you drink each day (Examples: soda, juice, sweet tea, etc.  Do NOT count diet or artificially sweetened beverages)?   0    How many days per week do you exercise enough to make your heart beat faster? 4    How many minutes a day do you exercise enough to make your heart beat faster? 20 - 29    How many days per week do you miss taking your medication? 0    Vaginal Symptoms  Onset/Duration: several days  Description:  Vaginal Discharge: creamy   Itching (Pruritis): no  Burning sensation:  no  Odor: YES  Accompanying Signs & Symptoms:  Urinary symptoms: no  Abdominal pain: no  Fever: no  History:   Sexually active: YES  New Partner: no  Possibility of Pregnancy:  no  Recent antibiotic use: no  Previous vaginitis issues: YES- BV  Precipitating or alleviating factors:  None  Therapies tried and outcome: none        Review of Systems   Constitutional, HEENT, cardiovascular, pulmonary, gi and gu systems are negative, except as otherwise noted.      Objective           Vitals:  No vitals were obtained today due to virtual visit.    Physical Exam   GENERAL: Healthy, alert and no distress  EYES: Eyes grossly normal to inspection.  No discharge or erythema, or obvious scleral/conjunctival abnormalities.  RESP: No audible wheeze, cough, or visible cyanosis.  No visible retractions or increased work of breathing.    SKIN: Visible skin clear. No significant rash, abnormal pigmentation or lesions.  NEURO: Cranial nerves grossly intact.  Mentation and speech appropriate for age.  PSYCH: Mentation appears normal, affect normal/bright, judgement and insight intact, normal speech and appearance well-groomed.    Admission on 11/12/2020, Discharged on 11/15/2020   Component Date Value Ref Range Status     ABO 11/12/2020 A   Final     RH(D) 11/12/2020 Pos   Final     Antibody Screen 11/12/2020 Neg   Final     Test Valid Only At 11/12/2020 [facility]      Final     Specimen Expires 11/12/2020 11/15/2020   Final     Treponema Antibodies 11/12/2020 Nonreactive  NR^Nonreactive Final    Comment: Methodology Change: Test performed on the [employer] Liaison XL by Treponema   pallidum Total Antibodies Assay as of 3.17.2020.       WBC 11/12/2020 11.6* 4.0 - 11.0 10e9/L Final     RBC Count 11/12/2020 4.15  3.8 - 5.2 10e12/L Final     Hemoglobin 11/12/2020 11.8  11.7 - 15.7 g/dL Final     Hematocrit 11/12/2020 36.6  35.0 - 47.0 % Final     MCV 11/12/2020 88  78 - 100 fl Final     MCH 11/12/2020 28.4  26.5 - 33.0 pg Final     MCHC 11/12/2020 32.2  31.5 - 36.5 g/dL Final     RDW 11/12/2020 16.9* 10.0 - 15.0 % Final     Platelet Count 11/12/2020 234  150 - 450 10e9/L Final     Copath Report 11/12/2020    Final                    Value:Patient Name: VANITA AGUILA JAYESHJERILYN  MR#:  "[id number]  Specimen #: [id number]  Collected: 2020  Received: 2020  Reported: 2020 16:49  Ordering Phy(s): VINAYAK SEALS    * Amended *    For improved result formatting, select 'View Enhanced Report Format' under   Linked Documents section.    SPECIMEN(S):  Placenta    FINAL DIAGNOSIS:  Placenta, third trimester, 39 weeks + 4 days, repeat  section  - 558 g (50th-75th percentile for gestational age)  - Amnionic membranes with meconium staining and no circumvallate insertion  - Terminal villi appropriate for gestational age  - Three-vessel umbilical cord with no abnormality    COMMENT:  The clinical history of circumvallate insertion by ultrasound is noted.   The placenta was examined grossly and  the membranes insert marginally.    This case was amended to correct a typographical error. The last line of   the initial report read \"three vessel  umbilical cord with circumvallate insertion\", which was corrected                           to   \"three vessel umbilical cord with no  abnormality\".    Electronically signed out by:    Phylicia Jones M.D.  Report originally signed out by: Phylicia Jones M.D.  Previous sign out date: 2020 15:41    CLINICAL HISTORY:  30 year old, 39 weeks +4 days, circumvallate insertion by ultrasound    GROSS:  The specimen is received in formalin with the patient's name and properly   identified as \"placenta \".  The  specimen consists of:  GENERAL  Condition: Partially fixed  CORD  Length: 22.4 cm in length by 1.4 cm in diameter  Number of vessels: 3  Appearance: Myxoid white  Presence of true knot(s) or false knot(s): No  Insertion: Eccentrically , 5.5 cm from the nearest disc edge  Other features: None  MEMBRANES  Condition: Disrupted  Color: Meconium stained  Transparency: Focally thickened and opaque  Insertion: Marginal with marginal insertion  Other: Amniotic layer stripped from fetal plate  DISK  Trimmed weight: 558 g  Shape: Disc  Dimensions: 20.9 x " 18.3 cm  Thickness (min/ma                          x): Min 1.3 cm, max 2.0 cm  Fetal Surface:  normal arborization  Maternal surface: Complete with loosely adherent clot  Findings upon sectioning: Beefy red, spongy and grossly unremarkable cut   surface  Summary of cassettes:  1 - membrane roll,  two sections of umbilical cord  2 - 3 -central full thickness placenta parenchyma (Dictated by: Brice Lee 11/12/2020 12:48 PM)    MICROSCOPIC:  The microscopic findings support the diagnosis.    The technical component of this testing was completed at the [facility], with the professional component performed   at the [facility]  Laboratory, [address] [place] [address]  [phone] ([phone])    CPT Codes:  A: 47421-QM4    COLLECTION SITE:  Client: [facility]  Location: [facility] (S)       Hemoglobin 11/13/2020 10.6* 11.7 - 15.7 g/dL Final               Video-Visit Details    Type of service:  Video Visit     3:30 pm    Originating Location (pt. Location): Home    Distant Location (provider location):  [facility]     Platform used for Video Visit: ZeusWell

## 2021-05-21 ASSESSMENT — ANXIETY QUESTIONNAIRES: GAD7 TOTAL SCORE: 8

## 2021-07-07 ENCOUNTER — OFFICE VISIT (OUTPATIENT)
Dept: FAMILY MEDICINE | Facility: CLINIC | Age: 31
End: 2021-07-07
Payer: COMMERCIAL

## 2021-07-07 VITALS
WEIGHT: 207.5 LBS | TEMPERATURE: 95.8 F | OXYGEN SATURATION: 100 % | SYSTOLIC BLOOD PRESSURE: 122 MMHG | BODY MASS INDEX: 31.09 KG/M2 | DIASTOLIC BLOOD PRESSURE: 72 MMHG | HEART RATE: 83 BPM

## 2021-07-07 DIAGNOSIS — F90.9 ATTENTION DEFICIT HYPERACTIVITY DISORDER (ADHD), UNSPECIFIED ADHD TYPE: Primary | ICD-10-CM

## 2021-07-07 DIAGNOSIS — F41.1 GAD (GENERALIZED ANXIETY DISORDER): ICD-10-CM

## 2021-07-07 DIAGNOSIS — F90.9 ATTENTION DEFICIT HYPERACTIVITY DISORDER (ADHD), UNSPECIFIED ADHD TYPE: ICD-10-CM

## 2021-07-07 DIAGNOSIS — F90.2 ATTENTION DEFICIT HYPERACTIVITY DISORDER (ADHD), COMBINED TYPE: ICD-10-CM

## 2021-07-07 LAB
AMPHETAMINES UR QL: NOT DETECTED NG/ML
BARBITURATES UR QL SCN: NOT DETECTED NG/ML
BENZODIAZ UR QL SCN: NOT DETECTED NG/ML
BUPRENORPHINE UR QL: NOT DETECTED NG/ML
CANNABINOIDS UR QL: NOT DETECTED NG/ML
COCAINE UR QL SCN: NOT DETECTED NG/ML
D-METHAMPHET UR QL: NOT DETECTED NG/ML
METHADONE UR QL SCN: NOT DETECTED NG/ML
OPIATES UR QL SCN: NOT DETECTED NG/ML
OXYCODONE UR QL SCN: NOT DETECTED NG/ML
PCP UR QL SCN: NOT DETECTED NG/ML
PROPOXYPH UR QL: NOT DETECTED NG/ML
TRICYCLICS UR QL SCN: NOT DETECTED NG/ML

## 2021-07-07 PROCEDURE — 99214 OFFICE O/P EST MOD 30 MIN: CPT | Performed by: NURSE PRACTITIONER

## 2021-07-07 PROCEDURE — 80306 DRUG TEST PRSMV INSTRMNT: CPT | Performed by: NURSE PRACTITIONER

## 2021-07-07 RX ORDER — DEXTROAMPHETAMINE SACCHARATE, AMPHETAMINE ASPARTATE, DEXTROAMPHETAMINE SULFATE AND AMPHETAMINE SULFATE 2.5; 2.5; 2.5; 2.5 MG/1; MG/1; MG/1; MG/1
10 TABLET ORAL 2 TIMES DAILY
Qty: 60 TABLET | Refills: 0 | Status: SHIPPED | OUTPATIENT
Start: 2021-07-07 | End: 2021-08-06

## 2021-07-07 RX ORDER — DEXTROAMPHETAMINE SACCHARATE, AMPHETAMINE ASPARTATE, DEXTROAMPHETAMINE SULFATE AND AMPHETAMINE SULFATE 2.5; 2.5; 2.5; 2.5 MG/1; MG/1; MG/1; MG/1
10 TABLET ORAL 2 TIMES DAILY
Qty: 60 TABLET | Refills: 0 | Status: SHIPPED | OUTPATIENT
Start: 2021-08-07 | End: 2021-09-06

## 2021-07-07 RX ORDER — DEXTROAMPHETAMINE SACCHARATE, AMPHETAMINE ASPARTATE, DEXTROAMPHETAMINE SULFATE AND AMPHETAMINE SULFATE 2.5; 2.5; 2.5; 2.5 MG/1; MG/1; MG/1; MG/1
10 TABLET ORAL 2 TIMES DAILY
Qty: 60 TABLET | Refills: 0 | Status: SHIPPED | OUTPATIENT
Start: 2021-09-07 | End: 2021-10-07

## 2021-07-07 RX ORDER — DEXTROAMPHETAMINE SACCHARATE, AMPHETAMINE ASPARTATE, DEXTROAMPHETAMINE SULFATE AND AMPHETAMINE SULFATE 2.5; 2.5; 2.5; 2.5 MG/1; MG/1; MG/1; MG/1
10 TABLET ORAL 2 TIMES DAILY
Qty: 60 TABLET | Refills: 0 | Status: CANCELLED | OUTPATIENT
Start: 2021-07-07

## 2021-07-07 NOTE — PROGRESS NOTES
Assessment & Plan   Problem List Items Addressed This Visit     JACINTO (generalized anxiety disorder)    Relevant Orders    Gene[employer]ight Psychotropic    ADHD - Primary    Relevant Medications    amphetamine-dextroamphetamine (ADDERALL) 10 MG tablet    amphetamine-dextroamphetamine (ADDERALL) 10 MG tablet (Start on 8/7/2021)    amphetamine-dextroamphetamine (ADDERALL) 10 MG tablet (Start on 9/7/2021)    Other Relevant Orders    Drug Abuse Screen Panel 13, Urine (Care Map) (Completed)         -will try Genesight testing to help determine next steps for anxiety therapy- may try Buspar depending on results as she has had negative side effects with both selective serotonin reuptake inhibitor and SNRI  30 minutes spent on the date of the encounter doing chart review, history and exam, documentation and further activities per the note       See Patient Instructions- 1 month    No follow-ups on file.    CHEY Hernandez CNP  [facility] is a 31 year old who presents for the following health issues     HPI       Medication Followup of Adderall    Taking Medication as prescribed: yes    Side Effects:  None    Medication Helping Symptoms:  Yes    Still sleeping well at night; able to eat well. Has noticed side effects of sweating a lot at night with Cymbalta, which has been bothering her a lot. NOtices low sex drive. Thinks it has been helpful for anxiety; interested in switching to another medication. Has tried Celexa (not very effective) as well as Zoloft.       Review of Systems   Constitutional, HEENT, cardiovascular, pulmonary, gi and gu systems are negative, except as otherwise noted.      Objective    /72   Pulse 83   Temp 95.8  F (35.4  C) (Temporal)   Wt 94.1 kg (207 lb 8 oz)   SpO2 100%   BMI 31.09 kg/m    Body mass index is 31.09 kg/m .  Physical Exam   GENERAL: healthy, alert and no distress  NECK: no adenopathy, no asymmetry, masses, or scars and thyroid  normal to palpation  RESP: lungs clear to auscultation - no rales, rhonchi or wheezes  CV: regular rate and rhythm, normal S1 S2, no S3 or S4, no murmur, click or rub, no peripheral edema and peripheral pulses strong  MS: no gross musculoskeletal defects noted, no edema  NEURO: Normal strength and tone, mentation intact and speech normal    Admission on 2020, Discharged on 11/15/2020   Component Date Value Ref Range Status     ABO 2020 A   Final     RH(D) 2020 Pos   Final     Antibody Screen 2020 Neg   Final     Test Valid Only At 2020 [facility]      Final     Specimen Expires 2020 11/15/2020   Final     Treponema Antibodies 2020 Nonreactive  NR^Nonreactive Final    Comment: Methodology Change: Test performed on the [employer] XL by Treponema   pallidum Total Antibodies Assay as of 3.17.2020.       WBC 2020 11.6* 4.0 - 11.0 10e9/L Final     RBC Count 2020 4.15  3.8 - 5.2 10e12/L Final     Hemoglobin 2020 11.8  11.7 - 15.7 g/dL Final     Hematocrit 2020 36.6  35.0 - 47.0 % Final     MCV 2020 88  78 - 100 fl Final     MCH 2020 28.4  26.5 - 33.0 pg Final     MCHC 2020 32.2  31.5 - 36.5 g/dL Final     RDW 2020 16.9* 10.0 - 15.0 % Final     Platelet Count 2020 234  150 - 450 10e9/L Final     Copath Report 2020    Final                    Value:Patient Name: VANITA AGUILA  MR#: [id number]  Specimen #: [id number]  Collected: 2020  Received: 2020  Reported: 2020 16:49  Ordering Phy(s): VINAYAK SEALS    * Amended *    For improved result formatting, select 'View Enhanced Report Format' under   Linked Documents section.    SPECIMEN(S):  Placenta    FINAL DIAGNOSIS:  Placenta, third trimester, 39 weeks + 4 days, repeat  section  - 558 g (50th-75th percentile for gestational age)  - Amnionic membranes with meconium staining and no circumvallate insertion  -  "Terminal villi appropriate for gestational age  - Three-vessel umbilical cord with no abnormality    COMMENT:  The clinical history of circumvallate insertion by ultrasound is noted.   The placenta was examined grossly and  the membranes insert marginally.    This case was amended to correct a typographical error. The last line of   the initial report read \"three vessel  umbilical cord with circumvallate insertion\", which was corrected                           to   \"three vessel umbilical cord with no  abnormality\".    Electronically signed out by:    Phylicia Jones M.D.  Report originally signed out by: Phylicia Jones M.D.  Previous sign out date: 11/13/2020 15:41    CLINICAL HISTORY:  30 year old, 39 weeks +4 days, circumvallate insertion by ultrasound    GROSS:  The specimen is received in formalin with the patient's name and properly   identified as \"placenta \".  The  specimen consists of:  GENERAL  Condition: Partially fixed  CORD  Length: 22.4 cm in length by 1.4 cm in diameter  Number of vessels: 3  Appearance: Myxoid white  Presence of true knot(s) or false knot(s): No  Insertion: Eccentrically , 5.5 cm from the nearest disc edge  Other features: None  MEMBRANES  Condition: Disrupted  Color: Meconium stained  Transparency: Focally thickened and opaque  Insertion: Marginal with marginal insertion  Other: Amniotic layer stripped from fetal plate  DISK  Trimmed weight: 558 g  Shape: Disc  Dimensions: 20.9 x 18.3 cm  Thickness (min/ma                          x): Min 1.3 cm, max 2.0 cm  Fetal Surface:  normal arborization  Maternal surface: Complete with loosely adherent clot  Findings upon sectioning: Beefy red, spongy and grossly unremarkable cut   surface  Summary of cassettes:  1 - membrane roll,  two sections of umbilical cord  2 - 3 -central full thickness placenta parenchyma (Dictated by: Brice Lee 11/12/2020 12:48 PM)    MICROSCOPIC:  The microscopic findings support the diagnosis.    The " technical component of this testing was completed at the [facility], with the professional component performed   at the [facility]  Laboratory, [facility] [place]  [phone] ([phone])    CPT Codes:  A: 13978-DU4    COLLECTION SITE:  Client: [facility]  Location: [facility] (S)       Hemoglobin 11/13/2020 10.6* 11.7 - 15.7 g/dL Final

## 2021-07-07 NOTE — LETTER
Opioid / Opioid Plus Controlled Substance Agreement    This is an agreement between you and your provider about the safe and appropriate use of controlled substance/opioids prescribed by your care team. Controlled substances are medicines that can cause physical and mental dependence (abuse).    There are strict laws about having and using these medicines. We here at [facility] are committing to working with you in your efforts to get better. To support you in this work, we ll help you schedule regular office appointments for medicine refills. If we must cancel or change your appointment for any reason, we ll make sure you have enough medicine to last until your next appointment.     As a Provider, I will:    Listen carefully to your concerns and treat you with respect.     Recommend a treatment plan that I believe is in your best interest. This plan may involve therapies other than opioid pain medication.     Talk with you often about the possible benefits, and the risk of harm of any medicine that we prescribe for you.     Provide a plan on how to taper (discontinue or go off) using this medicine if the decision is made to stop its use.    As a Patient, I understand that opioid(s):     Are a controlled substance prescribed by my care team to help me function or work and manage my condition(s).     Are strong medicines and can cause serious side effects such as:    Drowsiness, which can seriously affect my driving ability    A lower breathing rate, enough to cause death    Harm to my thinking ability     Depression     Abuse of and addiction to this medicine    Need to be taken exactly as prescribed. Combining opioids with certain medicines or chemicals (such as illegal drugs, sedatives, sleeping pills, and benzodiazepines) can be dangerous or even fatal. If I stop opioids suddenly, I may have severe withdrawal symptoms.    Do not work for all types of pain nor for all patients. If they re not helpful, I may  be asked to stop them.        The risks, benefits and side effects of these medicine(s) were explained to me. I agree that:  1. I will take part in other treatments as advised by my care team. This may be psychiatry or counseling, physical therapy, behavioral therapy, group treatment or a referral to a specialist.     2. I will keep all my appointments. I understand that this is part of the monitoring of opioids. My care team may require an office visit for EVERY opioid/controlled substance refill. If I miss appointments or don t follow instructions, my care team may stop my medicine.    3. I will take my medicines as prescribed. I will not change the dose or schedule unless my care team tells me to. There will be no refills if I run out early.     4. I may be asked to come to the clinic and complete a urine drug test or complete a pill count at any time. If I don t give a urine sample or participate in a pill count, the care team may stop my medicine.    5. I will only receive prescriptions from this clinic for chronic pain. If I am treated by another provider for acute pain issues, I will tell them that I am taking opioid pain medication for chronic pain and that I have a treatment agreement with this provider. I will inform my [facility] care team within one business day if I am given a prescription for any pain medication by another healthcare provider. My [facility] care team can contact other providers and pharmacists about my use of any medicines.    6. It is up to me to make sure that I don t run out of my medicines on weekends or holidays. If my care team is willing to refill my opioid prescription without a visit, I must request refills only during office hours. Refills may take up to 3 business days to process. I will use one pharmacy to fill all my opioid and other controlled substance prescriptions. I will notify the clinic about any changes to my insurance or medication  availability.    7. I am responsible for my prescriptions. If the medicine/prescription is lost, stolen or destroyed, it will not be replaced. I also agree not to share controlled substance medicines with anyone.    8. I am aware I should not use any illegal or recreational drugs. I agree not to drink alcohol unless my care team says I can.       9. If I enroll in the [place] [facility] program, I will tell my care team prior to my next refill.     10. I will tell my care team right away if I become pregnant, have a new medical problem treated outside of my regular clinic, or have a change in my medications.    11. I understand that this medicine can affect my thinking, judgment and reaction time. Alcohol and drugs affect the brain and body, which can affect the safety of my driving. Being under the influence of alcohol or drugs can affect my decision-making, behaviors, personal safety, and the safety of others. Driving while impaired (DWI) can occur if a person is driving, operating, or in physical control of a car, motorcycle, boat, snowmobile, ATV, motorbike, off-road vehicle, or any other motor vehicle ([place] Statute 169A.20). I understand the risk if I choose to drive or operate any vehicle or machinery.    I understand that if I do not follow any of the conditions above, my prescriptions or treatment may be stopped or changed.          Opioids  What You Need to Know    What are opioids?   Opioids are pain medicines that must be prescribed by a doctor. They are also known as narcotics.     Examples are:   1. morphine (MS Contin, Kristyn)  2. oxycodone (Oxycontin)  3. oxycodone and acetaminophen (Percocet)  4. hydrocodone and acetaminophen (Vicodin, Norco)   5. fentanyl patch (Duragesic)   6. hydromorphone (Dilaudid)   7. methadone  8. codeine (Tylenol #3)     What do opioids do well?   Opioids are best for severe short-term pain such as after a surgery or injury. They may work well for cancer pain. They may  help some people with long-lasting (chronic) pain.     What do opioids NOT do well?   Opioids never get rid of pain entirely, and they don t work well for most patients with chronic pain. Opioids don t reduce swelling, one of the causes of pain.                                    Other ways to manage chronic pain and improve function include:       Treat the health problem that may be causing pain    Anti-inflammation medicines, which reduce swelling and tenderness, such as ibuprofen (Advil, Motrin) or naproxen (Aleve)    Acetaminophen (Tylenol)    Antidepressants and anti-seizure medicines, especially for nerve pain    Topical treatments such as patches or creams    Injections or nerve blocks    Chiropractic or osteopathic treatment    Acupuncture, massage, deep breathing, meditation, visual imagery, aromatherapy    Use heat or ice at the pain site    Physical therapy     Exercise    Stop smoking    Take part in therapy       Risks and side effects     Talk to your doctor before you start or decide to keep taking opioids. Possible side effects include:      Lowering your breathing rate enough to cause death    Overdose, including death, especially if taking higher than prescribed doses    Worse depression symptoms; less pleasure in things you usually enjoy    Feeling tired or sluggish    Slower thoughts or cloudy thinking    Being more sensitive to pain over time; pain is harder to control    Trouble sleeping or restless sleep    Changes in hormone levels (for example, less testosterone)    Changes in sex drive or ability to have sex    Constipation    Unsafe driving    Itching and sweating    Dizziness    Nausea, throwing up and dry mouth    What else should I know about opioids?    Opioids may lead to dependence, tolerance, or addiction.      Dependence means that if you stop or reduce the medicine too quickly, you will have withdrawal symptoms. These include loose poop (diarrhea), jitters, flu-like symptoms,  nervousness and tremors. Dependence is not the same as addiction.                       Tolerance means needing higher doses over time to get the same effect. This may increase the chance of serious side effects.      Addiction is when people improperly use a substance that harms their body, their mind or their relations with others. Use of opiates can cause a relapse of addiction if you have a history of drug or alcohol abuse.      People who have used opioids for a long time may have a lower quality of life, worse depression, higher levels of pain and more visits to doctors.    You can overdose on opioids. Take these steps to lower your risk of overdose:    1. Recognize the signs:  Signs of overdose include decrease or loss of consciousness (blackout), slowed breathing, trouble waking up and blue lips. If someone is worried about overdose, they should call 911.    2. Talk to your doctor about Narcan (naloxone).   If you are at risk for overdose, you may be given a prescription for Narcan. This medicine very quickly reverses the effects of opioids.   If you overdose, a friend or family member can give you Narcan while waiting for the ambulance. They need to know the signs of overdose and how to give Narcan.     3. Don't use alcohol or street drugs.   Taking them with opioids can cause death.    4. Do not take any of these medicines unless your doctor says it s OK. Taking these with opioids can cause death:    Benzodiazepines, such as lorazepam (Ativan), alprazolam (Xanax) or diazepam (Valium)    Muscle relaxers, such as cyclobenzaprine (Flexeril)    Sleeping pills like zolpidem (Ambien)     Other opioids      How to keep you and other people safe while taking opioids:    1. Never share your opioids with others.  Opioid medicines are regulated by the Drug Enforcement Agency (MARIO). Selling or sharing medications is a criminal act.    2. Be sure to store opioids in a secure place, locked up if possible. Young children  can easily swallow them and overdose.    3. When you are traveling with your medicines, keep them in the original bottles. If you use a pill box, be sure you also carry a copy of your medicine list from your clinic or pharmacy.    4. Safe disposal of opioids    Most pharmacies have places to get rid of medicine, called disposal kiosks. Medicine disposal options are also available in every [facility]. Search your county and  medication disposal  to find more options. You can find more details at:  [email or web address]     I agree that my provider, clinic care team, and pharmacy may work with any city, state or federal law enforcement agency that investigates the misuse, sale, or other diversion of my controlled medicine. I will allow my provider to discuss my care with, or share a copy of, this agreement with any other treating provider, pharmacy or emergency room where I receive care.    I have read this agreement and have asked questions about anything I did not understand.    _______________________________________________________  Patient Signature - Dayana Danielle _____________________                   Date     _______________________________________________________  Provider Signature - CHEY Hernandez CNP   _____________________                   Date     _______________________________________________________  Witness Signature (required if provider not present while patient signing)   _____________________                   Date

## 2021-09-15 ENCOUNTER — VIRTUAL VISIT (OUTPATIENT)
Dept: PSYCHOLOGY | Facility: CLINIC | Age: 31
End: 2021-09-15
Attending: NURSE PRACTITIONER
Payer: COMMERCIAL

## 2021-09-15 DIAGNOSIS — F90.2 ATTENTION DEFICIT HYPERACTIVITY DISORDER (ADHD), COMBINED TYPE: Primary | ICD-10-CM

## 2021-09-15 DIAGNOSIS — F33.1 MODERATE EPISODE OF RECURRENT MAJOR DEPRESSIVE DISORDER (H): ICD-10-CM

## 2021-09-15 PROCEDURE — 90834 PSYTX W PT 45 MINUTES: CPT | Mod: 95 | Performed by: PSYCHOLOGIST

## 2021-09-15 ASSESSMENT — ANXIETY QUESTIONNAIRES
7. FEELING AFRAID AS IF SOMETHING AWFUL MIGHT HAPPEN: MORE THAN HALF THE DAYS
6. BECOMING EASILY ANNOYED OR IRRITABLE: SEVERAL DAYS
GAD7 TOTAL SCORE: 11
2. NOT BEING ABLE TO STOP OR CONTROL WORRYING: MORE THAN HALF THE DAYS
1. FEELING NERVOUS, ANXIOUS, OR ON EDGE: MORE THAN HALF THE DAYS
7. FEELING AFRAID AS IF SOMETHING AWFUL MIGHT HAPPEN: MORE THAN HALF THE DAYS
4. TROUBLE RELAXING: MORE THAN HALF THE DAYS
GAD7 TOTAL SCORE: 11
8. IF YOU CHECKED OFF ANY PROBLEMS, HOW DIFFICULT HAVE THESE MADE IT FOR YOU TO DO YOUR WORK, TAKE CARE OF THINGS AT HOME, OR GET ALONG WITH OTHER PEOPLE?: SOMEWHAT DIFFICULT
GAD7 TOTAL SCORE: 11
5. BEING SO RESTLESS THAT IT IS HARD TO SIT STILL: SEVERAL DAYS
3. WORRYING TOO MUCH ABOUT DIFFERENT THINGS: SEVERAL DAYS

## 2021-09-15 ASSESSMENT — PATIENT HEALTH QUESTIONNAIRE - PHQ9
SUM OF ALL RESPONSES TO PHQ QUESTIONS 1-9: 12
SUM OF ALL RESPONSES TO PHQ QUESTIONS 1-9: 12
10. IF YOU CHECKED OFF ANY PROBLEMS, HOW DIFFICULT HAVE THESE PROBLEMS MADE IT FOR YOU TO DO YOUR WORK, TAKE CARE OF THINGS AT HOME, OR GET ALONG WITH OTHER PEOPLE: SOMEWHAT DIFFICULT

## 2021-09-16 ASSESSMENT — PATIENT HEALTH QUESTIONNAIRE - PHQ9: SUM OF ALL RESPONSES TO PHQ QUESTIONS 1-9: 12

## 2021-09-16 ASSESSMENT — ANXIETY QUESTIONNAIRES: GAD7 TOTAL SCORE: 11

## 2021-09-22 ENCOUNTER — FCC EXTENDED DOCUMENTATION (OUTPATIENT)
Dept: PSYCHOLOGY | Facility: CLINIC | Age: 31
End: 2021-09-22

## 2021-09-22 ENCOUNTER — VIRTUAL VISIT (OUTPATIENT)
Dept: PSYCHOLOGY | Facility: CLINIC | Age: 31
End: 2021-09-22
Attending: NURSE PRACTITIONER
Payer: COMMERCIAL

## 2021-09-22 DIAGNOSIS — F90.2 ATTENTION DEFICIT HYPERACTIVITY DISORDER (ADHD), COMBINED TYPE: Primary | ICD-10-CM

## 2021-09-22 DIAGNOSIS — F33.1 MODERATE EPISODE OF RECURRENT MAJOR DEPRESSIVE DISORDER (H): ICD-10-CM

## 2021-09-22 PROCEDURE — 90791 PSYCH DIAGNOSTIC EVALUATION: CPT | Mod: 95 | Performed by: PSYCHOLOGIST

## 2021-09-22 NOTE — PROGRESS NOTES
Adult Intake Structured Interview  Disclaimer: This note consists of symbols derived from keyboarding, dictation and/or voice recognition software. As a result, there may be errors in the script that have gone undetected. Please consider this when interpreting information found in this chart.    CLIENT'S NAME: Dayana Danielle  MRN:   [id number]  :   1990  ACCT. NUMBER: [id number]  DATE OF SERVICE: 21      Identifying Information:  Client is a 31 year old, ,  female. Client was referred for a diagnostic assessment by self.  The purpose of this evaluation is to: provide treatment recommendations and clarify diagnosis.  Client is currently  employed part time and reports @HIS@ is able to function appropriately at work.. Client attended the session alone.       Client's Statement of Presenting Concern:  Client reported seeking services at this time for diagnostic assessment and recommendations for treatment.  Client's presenting concerns include: Previously diagnosed in college and saw psychiatry for 18 months.  General practitioner did the testing at that time.  Client went off medications when she got pregnant.  PCP wants to recheck her diagnosis but has been prescribing Adderall for the last 3 months.  Concerns of ADHD arose as a child.  Her stepmother was a teacher and found her running around forgetting stuff messy locker.  Other teachers assume because stepmom was a teacher it was being addressed and it was not.  Bachelor's degree from [employer] in social work minor in psychology.  Started on academic probation and got tutors and extra classes.  Started seeing a therapist during college due to difficulties.  Medications helped improve her GPA.  She was a C student in high school.  Particular problems in math.  Could never do homework of any kind.  Medications helped improve this.  She  found that if her stepmother helped her turn study guides into songs it helped her remember things better.  Currently her office is filled with boxes of paperwork bills she has not paid.  She finds it difficult to put her clean laundry away.  Finds it difficult to do shopping because she keeps forgetting things.  Very sensitive to criticism lots of potential talks in school.  Always described as a social butterfly.  Her  notes that she interrupts conversations all the time.  Finds learning new materials difficult needs to write out checklists for work.  Finds her self constantly having to triple check herself.  Finds that she functions well at work now as an  and functions okay if she is on medications.  She has to do things in a specific order.  Note she has piles of stuff all around the house.  Client stated that her symptoms have resulted in the following functional impairments: academic performance, childcare / parenting, educational activities, money management, organization and relationship(s).      History of Presenting Concern:  Client reported that she has completed a previous ADHD diagnostic assessment at the age of 17.  Client has received a previous diagnosis of ADHD.  Client reported that medication has been prescribed to address these problems.  Patient reported that medication was helpful and did cause unpleasant side effects. Heightened anxiety and increased already difficult sleep. Client reported that these problem(s) began in childhood. Client has attempted to resolve these concerns in the past through medications and therapy. Client reported that other professional(s) are not involved in providing support / services.       Social History:  Client reported she grew up in [place], [place]. Client was the second born of 2 children. Parents  when she was 2. Mom remarried twice.Unstable, parents worked hard for consistency. Custody was varied as parents tried  to balance time. Client lived out of her car once she started driving.     Client described her current relationships with family of origin as supportive with frequent communication.  Close wit mom.      Client reported a history of 1 committed relationships or marriages. Client has been  for 5 years. Client reported having 2 children.  Client identified some stable and meaningful social connections. Client reported that she has not been involved with the legal system.      Client's highest education level was some college and college graduate BSW with minor in psych.   Client did not serve in the .   There are no ethnic, cultural or Catholic factors that may be relevant for therapy. Client identified her preferred language to be English. Client reported she does not need the assistance of an  or other support involved in treatment. Modifications will not be used to assist communication in treatment.     Client reports family history includes No Known Problems in her brother, father, maternal grandfather, maternal grandmother, mother, paternal grandmother, sister, and another family member.      Mental Health History:  Client reported anxiety in both parents sister with anxiety, depression and borderline personality. Maternal aunt ADHD as well as all cousins on that side.Client previously received the following mental health diagnosis: ADHD.  Client has received the following mental health services in the past: counseling and medication(s) from physician / PCP. Hospitalizations: None.  Previous / current commitments: None. Client is not currently receiving any mental health services.      Chemical Health History:  Client has not received chemical dependency treatment in the past. Client is not currently receiving any chemical dependency treatment. Client reports no problems as a result of their drinking / drug use.  Client reported that both parents are likely functional alcoholics. PGm   in drunk driving accident.    Client Reports:  Client Reports roughly 2 drinks at the end of the day  Client vapes 3% nicotine, former smoker up till she was pregnant with her most recent child.  Client denies using cannabis. Uses Delta 8 gummies.for sleep  Client Has severe anxiety if she drinks anything more than tea.  Client denies using street drugs.  Client denies the non-medical use of prescription or over the counter drugs.    CAGE: None of the patient's responses to the CAGE screening were positive / Negative CAGE score   Based on the negative Cage-Aid score and clinical interview there  are not indications of drug or alcohol abuse.    Discussed the general effects of drugs and alcohol on health and well-being. Therapist gave client printed information about the effects of chemical use on her health and well being.    ADHD Symptom History  During the elementary, middle, and high school years, patient recalls academic strengths in the area of physical education, athletics, music and earth science. Client reported experiencing academic problems in reading, writing, math and science. HS GPA was 2.9. Entered college on aced emic probation. Client did not identify any learning problems. Client did receive tutoring services during the school years. Client did not receive special education services. Client reported significant behavior and discipline problems including: disruptive classroom behavior. Not sitting in her seat. Moving around too much. Can't sit with feet on floor. Constantly re-situating herself. Client did attend post secondary school.  Client described her childhood family environment as having mild to moderate level of chaos.  Client reported no difficulty with childhood peer relationships. As a child, client reported that she failed to complete assigned chores in the home environment, had problems getting ready for school in the morning, had problems with organization and keeping track of  items, misplaced or lost things, forgot school work or other items between home and school, needed frequent reminders by parents to be motivated or to complete work, displayed argumentative or oppositional behaviors, had problems managing temper with frequent emotional outbursts and had difficulty managing personal hygiene.   Client reported that  is currently employed. Client reported that the current job is a good fit for her skills and personality.  Client reported that she been frequently late for work, frequently made mistakes with poor attention to detail, often felt bored, often been late in completing projects, disorganized behavior, distractible behavior and problems learning new materials .The client's work history includes: Retail.  The longest period of employment has been 9 years.  Client has not been terminated from a place of employment.    As a child, client reported having sleep disturbance, including: frequent dreams / nightmares, insomnia, restless legs syndrome and sleep walking .  Client reported currently experiencing sleep disturbance, including: daytime drowsiness / fatigue, frequent dreams / nightmares, insomnia, restless legs syndrome and sleep walking.  Client reported sleeping approximately 6 hours per night.  Client reported that she has not completed a sleep study.  Client reported having an inconsistent diet.  There are not significant nutritional concerns.  Client reported no current exercise routine.    Risk Taking Behaviors:  Client reported no history of risk taking behaviors      Motor Vehicle Operation:  Client has received a 's license.  Client has received moving violations, including: accidents due to inattention and 1-2 speeding tickets.  Client reported the following driving habits: gets lost easily, often exceeds the speed limit / speeds and runs through stop signs.  According to client, other people are comfortable riding as a passenger when she is driving.       Significant Losses / Trauma / Abuse / Neglect Issues:  Parents divorce and unstable custody. School stress as a kid.    Issues of possible neglect are not present.      Medical Issues:  Client has had a physical exam to rule out medical causes for current symptoms.  Date of last physical exam was within the past year. Client was encouraged to follow up with PCP if symptoms were to develop.  The client has a [facility], who is named Cheri Georges..  Client reports not having a psychiatrist.  Client reported no current medical concerns.  The client denies the presence of chronic or episodic pain.    Patient reports current meds as:   No outpatient medications have been marked as taking for the [facility] Erick Allen       Client Allergies:  Allergies   Allergen Reactions     Seasonal Allergies      no known allergies to medications    Medical History:  Past Medical History:   Diagnosis Date     ADHD     States that it's not an ongoing diagnosis, the person who did her psych eval for it did not think she had it. Was on Adderall in the past and stopped that in June 2017.     Anemia     10 years ago     Anxiety     Started the Citalopram early December 2017 by her PCP.      Depressive disorder     Started the Citalopram early December 2017 by her PCP.      History of miscarriage 04/2019     Ovarian cyst     Right sided     Smoking     Stopped October 2017.       Medication Adherence:  Client reports taking prescribed medications as prescribed.    Client was provided recommendation to follow-up with prescribing physician.        Mental Status Assessment:  Appearance:   Appropriate   Eye Contact:   Good   Psychomotor Behavior: Restless   Attitude:   Cooperative   Orientation:   All  Speech   Rate / Production: Normal/ Responsive   Volume:  Normal   Mood:    Normal  Affect:    Appropriate   Thought Content:  Clear   Thought Form:  Coherent    Insight:    Fair       Review of Symptoms:  Depression: Sleep Interest Energy Concentration Appetite Worthless  Geri:  Distractibility Impulsiveness  Psychosis: No symptoms  Anxiety: Worries Nervousness  Panic:  No symptoms  Post Traumatic Stress Disorder: No symptoms  Obsessive Compulsive Disorder: No symptoms  Eating Disorder: No symptoms  Oppositional Defiant Disorder: No symptoms  ADD / ADHD: Attention Listening Task Completion Organization Distractiblity Forgetful Interrupts Intrudes  Conduct Disorder: No symptoms  Reckless Behavior: Excessive Spending Impulsive Decision Making        Safety Issues and Plan for Safety and Risk Management:  Client denies a history of suicidal ideation, suicide attempts, self-injurious behavior, homicidal ideation, homicidal behavior and and other safety concerns    Client denies current fears or concerns for personal safety.  Client denies current or recent suicidal ideation or behaviors.  Client denies current or recent homicidal ideation or behaviors.  Client denies current or recent self injurious behavior or ideation.  Client denies other safety concerns.  Client reports there are no firearms in the house.  Recommended that patient call 911 or go to the local ED should there be a change in any of these risk factors.      Patient's Strengths and Limitations:  Client identified the following strengths or resources that will help her succeed in counseling: friends / good social support and family support. Client identified the following supports: family and friends. Things that may interfere with the clients success in counseling include:none identified.      Diagnostic Criteria:  A) A persistent pattern of inattention and/or hyperactivity-impulsivity that interferes with functioning or development, as characterized by (1) Inattention and/or (2) Hyperactivity and Impulsivity  (1) Inattention: 6 or more of the following symptoms have persisted for at least 6 months to a  degree that is inconsistent with developmental level and that negatively impacts directly on social and academic/occupational activities:  - Often fails to give close attention to details or makes careless mistakes in schoolwork, at work, or during other activities  - Often has difficulty sustaining attention in tasks or play activities  - Often does not seem to listen when spoken to directly  - Often does not follow through on instructions and fails to finish schoolwork, chores, or duties in the workplace  - Often has difficulty organizing tasks and activities  - Often avoids, dislikes, or is reluctant to engage in tasks that require sustained mental effort  - Often loses things necessary for tasks or activities  - Is often easily distractedby extraneous stimuli  - Is often forgetful in daily activities  (2) Hyeractivity and Impulsivity: 6 or more of the following symptoms have persisted for at least 6 months to a degree that is inconsistent with developmental level and that negatively impacts directly on social and academic/occupational activities:  - Often fidgets with or taps hands or feet or squirms in seat  - Often leaves seat in situations when remaining seated is expected  - Often talks excessively  - Often blurts out an answer before a question has been completed  - Often has difficulty waiting his or her turn  - Often interrupts or intrudes on others  B) Several inattentive or hyperactive-impulsive symptoms were present prior to age 12 years  C) Several inattentive or hyperactive-impulsive symptoms are present in two or more settings  D) There is clear evidence that the symptoms interfere with, or reduce the quality of, social academic, or occupational functioning  E) The Symptoms do not occur exclusively during the course of schizophrenia or another psychotic disorder and are not better explained by another mental disorder      Functional Status:  Client's symptoms are causing reduced functional status in  the following areas: work, social activities and relationships      DSM5 Diagnoses: (Sustained by DSM5 Criteria Listed Above)  Diagnoses: Attention-Deficit/Hyperactivity Disorder  314.01 (F90.2) Combined presentation;   Psychosocial & Contextual Factors: Associated depression and anxiety    Attendance Agreement:  Client has signed Attendance Agreement:Yes      Preliminary Plan:  The client reports no currently identified Hinduism, ethnic or cultural issues relevant to therapy.     services are not indicated.    Modifications to assist communication are not indicated.    The concerns identified by the client will be addressed in therapy.    Collaboration / coordination with other professionals is not indicated at this time.    Referral to another professional/service is not indicated at this time..    A Release of Information is not needed at this time.    Client was given self and collaborative rating scales to be completed prior to the next appointment.  Depression and anxiety rating scales were completed.  A second appointment was scheduled at this time.       Report to child / adult protection services was NA.    Patient will have open access to their mental health medical record.    Erick Allen  September 22, 2021

## 2021-10-04 ENCOUNTER — HEALTH MAINTENANCE LETTER (OUTPATIENT)
Age: 31
End: 2021-10-04

## 2021-10-10 ENCOUNTER — MYC REFILL (OUTPATIENT)
Dept: OBGYN | Facility: CLINIC | Age: 31
End: 2021-10-10

## 2021-10-10 DIAGNOSIS — B00.2 ORAL HERPES: ICD-10-CM

## 2021-10-11 RX ORDER — VALACYCLOVIR HYDROCHLORIDE 1 G/1
2000 TABLET, FILM COATED ORAL 2 TIMES DAILY
Qty: 4 TABLET | Refills: 4 | Status: SHIPPED | OUTPATIENT
Start: 2021-10-11 | End: 2022-06-24

## 2021-10-11 NOTE — PROGRESS NOTES
Progress Note - Initial Visit  Disclaimer: Voice recognition software was used to generate this note. As a result, wrong word or 'sound-a-like' substitutions may have occurred due to the inherent limitations of voice recognition software. There may be errors in the script that have gone undetected. Please consider this when interpreting information found in this chart.     Client Name:  Dayana Danielle Date: 9/15/2021         Service Type: Individual     Visit Start Time: 10:00 AM  visit End Time: 10:45 AM    Visit #: 1    Attendees: Client attended alone    Service Modality:  Video Visit:      Provider verified identity through the following two step process.  Patient provided:  Patient     Telemedicine Visit: The patient's condition can be safely assessed and treated via synchronous audio and visual telemedicine encounter.      Reason for Telemedicine Visit: Services only offered telehealth    Originating Site (Patient Location): Patient's home    Distant Site (Provider Location): Provider Remote Setting- Home Office    Consent:  The patient/guardian has verbally consented to: the potential risks and benefits of telemedicine (video visit) versus in person care; bill my insurance or make self-payment for services provided; and responsibility for payment of non-covered services.     Patient would like the video invitation sent by:  My Chart    Mode of Communication:  Video Conference via well    As the provider I attest to compliance with applicable laws and regulations related to telemedicine.       DATA:   Interactive Complexity: No   Crisis: No     Presenting Concerns/  Current Stressors:   Client presents for session 1 of ADHD assessment.  Previously diagnosed in college and saw psychiatry for 18 months.  General practitioner did the testing at that time.  Client went off medications when she got pregnant.  PCP wants to recheck her diagnosis but has been prescribing Adderall for  the last 3 months.  Concerns of ADHD arose as a child.  Her stepmother was a teacher and found her running around forgetting stuff messy locker.  Other teachers assume because an was a teacher it was being addressed and it was not.  Bachelor's degree from [employer] in social work minor in psychology.  Started on academic probation and got tutors and extra classes.  Started seeing a therapist during college due to difficulties.  Medications helped improve her GPA.  She was a C student in high school.  Particular problems in math.  Could never do homework of any kind.  Medications helped improve this.  She found that if her stepmother helped her turn study guides into songs it helped her remember things better.  Currently her office is filled with boxes of paperwork bills she has not paid.  She finds it difficult to put her clean laundry away.  Finds it difficult to do shopping because she keeps forgetting things.  Very sensitive to criticism lots of potential talks in school.  Always described as a social butterfly.  Her  notes that she interrupts conversations all the time.  Finds learning new materials difficult needs to write out checklists for work.  Finds her self constantly having to triple check herself.  Finds that she functions well at work now as an  and functions okay if she is on medications.  She has to do things in a specific order.  Note she has piles of stuff all around the house.      ASSESSMENT:  Mental Status Assessment:  Appearance:   Appropriate   Eye Contact:   Good   Psychomotor Behavior: Restless   Attitude:   Cooperative  Interested  Orientation:   All  Speech   Rate / Production: Normal/ Responsive   Volume:  Normal   Mood:    Normal  Affect:    Appropriate   Thought Content:  Clear   Thought Form:  Coherent   Insight:    Fair       Safety Issues and Plan for Safety and Risk Management:   [place] Suicide Severity Rating Scale (Lifetime/Recent)No flowsheet data  found.  Patient denies current fears or concerns for personal safety.  Patient denies current or recent suicidal ideation or behaviors.  Patient denies current or recent homicidal ideation or behaviors.  Patient denies current or recent self injurious behavior or ideation.  Patient denies other safety concerns.  Recommended that patient call 911 or go to the local ED should there be a change in any of these risk factors.  Patient reports there are no firearms in the house.     Diagnostic Criteria:  A) A persistent pattern of inattention and/or hyperactivity-impulsivity that interferes with functioning or development, as characterized by (1) Inattention and/or (2) Hyperactivity and Impulsivity  (1) Inattention: 6 or more of the following symptoms have persisted for at least 6 months to a degree that is inconsistent with developmental level and that negatively impacts directly on social and academic/occupational activities:  - Often fails to give close attention to details or makes careless mistakes in schoolwork, at work, or during other activities  - Often has difficulty sustaining attention in tasks or play activities  - Often does not seem to listen when spoken to directly  - Often does not follow through on instructions and fails to finish schoolwork, chores, or duties in the workplace  - Often has difficulty organizing tasks and activities  - Often avoids, dislikes, or is reluctant to engage in tasks that require sustained mental effort  - Often loses things necessary for tasks or activities  - Is often easily distractedby extraneous stimuli  - Is often forgetful in daily activities  (2) Hyeractivity and Impulsivity: 6 or more of the following symptoms have persisted for at least 6 months to a degree that is inconsistent with developmental level and that negatively impacts directly on social and academic/occupational activities:  - Often fidgets with or taps hands or feet or squirms in seat  - Often leaves seat  in situations when remaining seated is expected  - Often unable to play or engage in leisure activities quietly  - Often talks excessively  - Often blurts out an answer before a question has been completed  - Often has difficulty waiting his or her turn  - Often interrupts or intrudes on others  B) Several inattentive or hyperactive-impulsive symptoms were present prior to age 12 years  C) Several inattentive or hyperactive-impulsive symptoms are present in two or more settings  D) There is clear evidence that the symptoms interfere with, or reduce the quality of, social academic, or occupational functioning  E) The Symptoms do not occur exclusively during the course of schizophrenia or another psychotic disorder and are not better explained by another mental disorder      DSM5 Diagnoses: (Sustained by DSM5 Criteria Listed Above)  Diagnoses: Attention-Deficit/Hyperactivity Disorder  314.01 (F90.2) Combined presentation  Psychosocial & Contextual Factors: Work and relationship stress  WHODAS 2.0 (12 item):   WHODAS 2.0 Total Score 1/31/2019 9/15/2021   Total Score 39 46   Total Score Manpreet 39 46     Intervention:   Educated on treatment planning and started identifying goals and interventions for treatment plan  Collateral Reports Completed:  Not Applicable      PLAN: (Homework, other):  1. Provider will continue Diagnostic Assessment.  Patient was given the following to do until next session: Complete Flavio scales.        Erick Allen  October 11, 2021      Answers for HPI/ROS submitted by the patient on 9/15/2021  If you checked off any problems, how difficult have these problems made it for you to do your work, take care of things at home, or get along with other people?: Somewhat difficult  PHQ9 TOTAL SCORE: 12  JACINTO 7 TOTAL SCORE: 11

## 2021-10-11 NOTE — TELEPHONE ENCOUNTER
"Requested Prescriptions   Pending Prescriptions Disp Refills     valACYclovir (VALTREX) 1000 mg tablet 4 tablet 4     Sig: Take 2 tablets (2,000 mg) by mouth 2 times daily       Antivirals for Herpes Protocol Failed - 10/10/2021  8:35 AM        Failed - Normal serum creatinine on file in past 12 months     Recent Labs   Lab Test 09/03/17  1613   CR 0.57       Ok to refill medication if creatinine is low          Passed - Patient is age 12 or older        Passed - Recent (12 mo) or future (30 days) visit within the authorizing provider's specialty     Patient has had an office visit with the authorizing provider or a provider within the authorizing providers department within the previous 12 mos or has a future within next 30 days. See \"Patient Info\" tab in inbasket, or \"Choose Columns\" in Meds & Orders section of the refill encounter.              Passed - Medication is active on med list           Last Written Prescription Date:  11/18/2020  Last Fill Quantity: 4,  # refills: 4   Last office visit: 12/22/2020 with prescribing provider:  Dr Casanova   Future Office Visit:      Prescription approved per [facility] Refill Protocol.  Nguyen Hodges RN on 10/11/2021 at 3:22 PM        "

## 2021-10-12 DIAGNOSIS — F90.9 ATTENTION DEFICIT HYPERACTIVITY DISORDER (ADHD), UNSPECIFIED ADHD TYPE: Primary | ICD-10-CM

## 2021-10-12 RX ORDER — DEXTROAMPHETAMINE SACCHARATE, AMPHETAMINE ASPARTATE, DEXTROAMPHETAMINE SULFATE AND AMPHETAMINE SULFATE 2.5; 2.5; 2.5; 2.5 MG/1; MG/1; MG/1; MG/1
10 TABLET ORAL 2 TIMES DAILY
Qty: 60 TABLET | Refills: 0 | Status: SHIPPED | OUTPATIENT
Start: 2021-12-13 | End: 2022-01-13

## 2021-10-12 RX ORDER — DEXTROAMPHETAMINE SACCHARATE, AMPHETAMINE ASPARTATE, DEXTROAMPHETAMINE SULFATE AND AMPHETAMINE SULFATE 2.5; 2.5; 2.5; 2.5 MG/1; MG/1; MG/1; MG/1
10 TABLET ORAL 2 TIMES DAILY
Qty: 60 TABLET | Refills: 0 | Status: SHIPPED | OUTPATIENT
Start: 2021-11-12 | End: 2021-12-12

## 2021-10-12 RX ORDER — DEXTROAMPHETAMINE SACCHARATE, AMPHETAMINE ASPARTATE, DEXTROAMPHETAMINE SULFATE AND AMPHETAMINE SULFATE 2.5; 2.5; 2.5; 2.5 MG/1; MG/1; MG/1; MG/1
10 TABLET ORAL 2 TIMES DAILY
Qty: 60 TABLET | Refills: 0 | Status: SHIPPED | OUTPATIENT
Start: 2021-10-12 | End: 2021-11-11

## 2021-10-18 NOTE — PROGRESS NOTES
Progress Note - Initial Visit  Disclaimer: Voice recognition software was used to generate this note. As a result, wrong word or 'sound-a-like' substitutions may have occurred due to the inherent limitations of voice recognition software. There may be errors in the script that have gone undetected. Please consider this when interpreting information found in this chart.     Client Name:  Dayana Danielle Date: 2021         Service Type: Individual     Visit Start Time: 11:00 AM  visit End Time: 11:45 AM    Visit #: 1    Attendees: Client attended alone    Service Modality:  Video Visit:      Provider verified identity through the following two step process.  Patient provided:  Patient     Telemedicine Visit: The patient's condition can be safely assessed and treated via synchronous audio and visual telemedicine encounter.      Reason for Telemedicine Visit: Services only offered telehealth    Originating Site (Patient Location): Patient's home    Distant Site (Provider Location): Provider Remote Setting- Home Office    Consent:  The patient/guardian has verbally consented to: the potential risks and benefits of telemedicine (video visit) versus in person care; bill my insurance or make self-payment for services provided; and responsibility for payment of non-covered services.     Patient would like the video invitation sent by:  My Chart    Mode of Communication:  Video Conference via [employer]    As the provider I attest to compliance with applicable laws and regulations related to telemedicine.       DATA:   Interactive Complexity: No   Crisis: No     Presenting Concerns/  Current Stressors:  Completed ADHD diagnostic assessment see [facility] Extended documentation for report.    ASSESSMENT:  Mental Status Assessment:  Appearance:   Appropriate   Eye Contact:   Good   Psychomotor Behavior: Restless   Attitude:   Cooperative  Interested  Orientation:   All  Speech   Rate /  Production: Normal/ Responsive   Volume:  Normal   Mood:    Normal  Affect:    Appropriate   Thought Content:  Clear   Thought Form:  Coherent   Insight:    Fair       Safety Issues and Plan for Safety and Risk Management:   [place] Suicide Severity Rating Scale (Lifetime/Recent)No flowsheet data found.  Patient denies current fears or concerns for personal safety.  Patient denies current or recent suicidal ideation or behaviors.  Patient denies current or recent homicidal ideation or behaviors.  Patient denies current or recent self injurious behavior or ideation.  Patient denies other safety concerns.  Recommended that patient call 911 or go to the local ED should there be a change in any of these risk factors.  Patient reports there are no firearms in the house.     Diagnostic Criteria:  A) A persistent pattern of inattention and/or hyperactivity-impulsivity that interferes with functioning or development, as characterized by (1) Inattention and/or (2) Hyperactivity and Impulsivity  (1) Inattention: 6 or more of the following symptoms have persisted for at least 6 months to a degree that is inconsistent with developmental level and that negatively impacts directly on social and academic/occupational activities:  - Often fails to give close attention to details or makes careless mistakes in schoolwork, at work, or during other activities  - Often has difficulty sustaining attention in tasks or play activities  - Often does not seem to listen when spoken to directly  - Often does not follow through on instructions and fails to finish schoolwork, chores, or duties in the workplace  - Often has difficulty organizing tasks and activities  - Often avoids, dislikes, or is reluctant to engage in tasks that require sustained mental effort  - Often loses things necessary for tasks or activities  - Is often easily distractedby extraneous stimuli  - Is often forgetful in daily activities  (2) Hyeractivity and Impulsivity: 6  or more of the following symptoms have persisted for at least 6 months to a degree that is inconsistent with developmental level and that negatively impacts directly on social and academic/occupational activities:  - Often fidgets with or taps hands or feet or squirms in seat  - Often leaves seat in situations when remaining seated is expected  - Often unable to play or engage in leisure activities quietly  - Often talks excessively  - Often blurts out an answer before a question has been completed  - Often has difficulty waiting his or her turn  - Often interrupts or intrudes on others  B) Several inattentive or hyperactive-impulsive symptoms were present prior to age 12 years  C) Several inattentive or hyperactive-impulsive symptoms are present in two or more settings  D) There is clear evidence that the symptoms interfere with, or reduce the quality of, social academic, or occupational functioning  E) The Symptoms do not occur exclusively during the course of schizophrenia or another psychotic disorder and are not better explained by another mental disorder      DSM5 Diagnoses: (Sustained by DSM5 Criteria Listed Above)  Diagnoses: Attention-Deficit/Hyperactivity Disorder  314.01 (F90.2) Combined presentation  Psychosocial & Contextual Factors: Work and relationship stress  WHODAS 2.0 (12 item):   WHODAS 2.0 Total Score 1/31/2019 9/15/2021   Total Score 39 46   Total Score MyChart 39 46     Intervention:   Educated on treatment planning and started identifying goals and interventions for treatment plan  Collateral Reports Completed:  Not Applicable      PLAN: (Homework, other):  1. Provider will continue Diagnostic Assessment.  Patient was given the following to do until next session: Complete Flavio scales.        Erick Allen  October 11, 2021      Answers for HPI/ROS submitted by the patient on 9/15/2021  If you checked off any problems, how difficult have these problems made it for you to do your work,  take care of things at home, or get along with other people?: Somewhat difficult  PHQ9 TOTAL SCORE: 12  JACINTO 7 TOTAL SCORE: 11

## 2021-10-25 ENCOUNTER — VIRTUAL VISIT (OUTPATIENT)
Dept: PSYCHOLOGY | Facility: CLINIC | Age: 31
End: 2021-10-25
Payer: COMMERCIAL

## 2021-10-25 DIAGNOSIS — F90.2 ATTENTION DEFICIT HYPERACTIVITY DISORDER (ADHD), COMBINED TYPE: Primary | ICD-10-CM

## 2021-10-25 PROCEDURE — 90834 PSYTX W PT 45 MINUTES: CPT | Mod: 95 | Performed by: PSYCHOLOGIST

## 2021-10-25 PROCEDURE — 96131 PSYCL TST EVAL PHYS/QHP EA: CPT | Mod: 95 | Performed by: PSYCHOLOGIST

## 2021-10-25 PROCEDURE — 96130 PSYCL TST EVAL PHYS/QHP 1ST: CPT | Mod: 95 | Performed by: PSYCHOLOGIST

## 2021-10-27 NOTE — PROGRESS NOTES
Progress Note  Disclaimer: Voice recognition software was used to generate this note. As a result, wrong word or 'sound-a-like' substitutions may have occurred due to the inherent limitations of voice recognition software. There may be errors in the script that have gone undetected. Please consider this when interpreting information found in this chart.       Client Name: Dayana Danielle   Date: 4/25/2021      Service Type: Individual      Session Start Time: 3:00 PM  session End Time: 3:45 PM     Session Length: 45    Session #: 3    Attendees: Client attended alone    Service Modality:  Video Visit:      Provider verified identity through the following two step process.  Patient provided:  Patient is known previously to provider    Telemedicine Visit: The patient's condition can be safely assessed and treated via synchronous audio and visual telemedicine encounter.      Reason for Telemedicine Visit: Services only offered telehealth    Originating Site (Patient Location): Patient's home    Distant Site (Provider Location): Provider Remote Setting- Home Office    Consent:  The patient/guardian has verbally consented to: the potential risks and benefits of telemedicine (video visit) versus in person care; bill my insurance or make self-payment for services provided; and responsibility for payment of non-covered services.     Patient would like the video invitation sent by:  My Chart    Mode of Communication:  Video Conference via "[employer]    As the provider I attest to compliance with applicable laws and regulations related to telemedicine.         DATA  Interactive Complexity: No  Crisis: No            DATA   ADHD Assessment feedback session. Reviewed results of testing. See [facility] extended documentation for results. Explained diagnosis and treatment options. Recommended medication evaluation be scheduled with PCP. Also provided and reviewed ADHD patient handout. Pt will  schedule follow-up with me after seeing PCP.     Progress Since Last Session (Related to Symptoms / Goals / Homework):   Symptoms: No change Stable    Homework: Achieved / completed to satisfaction      Episode of Care Goals: Satisfactory progress - ACTION (Actively working towards change); Intervened by reinforcing change plan / affirming steps taken     Current / Ongoing Stressors and Concerns:   Difficulty with attention and concentration     Treatment Objective(s) Addressed in This Session:   Reviewed results of testing, provided ADHD Psychoeducation tips and resources, encouraged client to make appointment for medication evaluation.       Intervention:   psychoeducation regarding ADHD        ASSESSMENT: Current Emotional / Mental Status (status of significant symptoms):   Risk status (Self / Other harm or suicidal ideation)   Client denies current fears or concerns for personal safety.   Client denies current or recent suicidal ideation or behaviors.   Client denies current or recent homicidal ideation or behaviors.   Client denies current or recent self injurious behavior or ideation.   Client denies other safety concerns.   Recommended that patient call 911 or go to the local ED should there be a change in any of these risk factors.     Appearance:   Appropriate    Eye Contact:   Good    Psychomotor Behavior: Normal    Attitude:   Cooperative    Orientation:   All   Speech    Rate / Production: Normal     Volume:  Normal    Mood:    Normal   Affect:    Appropriate    Thought Content:  Clear    Thought Form:  Coherent  Logical    Insight:    Good      Medication Review:   Changes to psychiatric medications, see updated Medication List in EPIC.      Medication Compliance:   Yes     Changes in Health Issues:   None reported     Chemical Use Review:   Substance Use: Chemical use reviewed, no active concerns identified      Tobacco Use: No current tobacco use.       Collateral Reports Completed:   Not  Applicable    PLAN: (Client Tasks / Therapist Tasks / Other)  See [facility] extended documentation for diagnostic testing report.  Client to contact PCP for medication evaluation.        Erick Allen    Psychological Testing   Billing/Services Summary       Initial interview/Record Review 9/15/2021 16443    Intake 9/22/2021 [id number]    Interactive feedback Session 10/25/2021 19165    Testing Evaluation Services Base: 24313  (1st 60 mins) Add-on: 75263  (each addtl 60 mins)   Test Scoring and Interpretation  (Start/Stop), (Date, Day #) 60 minutes   Integration/Report Generation   (Start/Stop), (Date, Day #) 60 minutes   Clinical Decision Making/Battery Modification   (Start/Stop), (Date, Day #) 0 minutes   Post-Service Work   (Start/Stop), (Date, Day #) 0 minutes   Total Time: 120 minutes (2 hours, 00 minutes)   Total Units:              Diagnosis(es): (ICD-10)  F 90.2

## 2022-01-05 ENCOUNTER — VIRTUAL VISIT (OUTPATIENT)
Dept: PSYCHOLOGY | Facility: CLINIC | Age: 32
End: 2022-01-05
Payer: COMMERCIAL

## 2022-01-05 DIAGNOSIS — F90.2 ATTENTION DEFICIT HYPERACTIVITY DISORDER (ADHD), COMBINED TYPE: Primary | ICD-10-CM

## 2022-01-05 PROCEDURE — 90834 PSYTX W PT 45 MINUTES: CPT | Mod: 95 | Performed by: PSYCHOLOGIST

## 2022-01-05 ASSESSMENT — ANXIETY QUESTIONNAIRES
GAD7 TOTAL SCORE: 13
5. BEING SO RESTLESS THAT IT IS HARD TO SIT STILL: SEVERAL DAYS
1. FEELING NERVOUS, ANXIOUS, OR ON EDGE: MORE THAN HALF THE DAYS
4. TROUBLE RELAXING: MORE THAN HALF THE DAYS
7. FEELING AFRAID AS IF SOMETHING AWFUL MIGHT HAPPEN: NEARLY EVERY DAY
7. FEELING AFRAID AS IF SOMETHING AWFUL MIGHT HAPPEN: NEARLY EVERY DAY
6. BECOMING EASILY ANNOYED OR IRRITABLE: SEVERAL DAYS
2. NOT BEING ABLE TO STOP OR CONTROL WORRYING: MORE THAN HALF THE DAYS
3. WORRYING TOO MUCH ABOUT DIFFERENT THINGS: MORE THAN HALF THE DAYS

## 2022-01-05 ASSESSMENT — PATIENT HEALTH QUESTIONNAIRE - PHQ9
SUM OF ALL RESPONSES TO PHQ QUESTIONS 1-9: 8
10. IF YOU CHECKED OFF ANY PROBLEMS, HOW DIFFICULT HAVE THESE PROBLEMS MADE IT FOR YOU TO DO YOUR WORK, TAKE CARE OF THINGS AT HOME, OR GET ALONG WITH OTHER PEOPLE: SOMEWHAT DIFFICULT
SUM OF ALL RESPONSES TO PHQ QUESTIONS 1-9: 8

## 2022-01-05 NOTE — PROGRESS NOTES
Progress Note  Disclaimer: Voice recognition software was used to generate this note. As a result, wrong word or 'sound-a-like' substitutions may have occurred due to the inherent limitations of voice recognition software. There may be errors in the script that have gone undetected. Please consider this when interpreting information found in this chart.       Client Name: Dayana Danielle   Date: 1/5/22      Service Type: Individual      Session Start Time: 11:00AM  session End Time: 11:45AM     Session Length: 45    Session #: 4    Attendees: Client attended alone    Service Modality:  Video Visit:      Provider verified identity through the following two step process.  Patient provided:  Patient is known previously to provider    Telemedicine Visit: The patient's condition can be safely assessed and treated via synchronous audio and visual telemedicine encounter.      Reason for Telemedicine Visit: Services only offered telehealth    Originating Site (Patient Location): Patient's home    Distant Site (Provider Location): Provider Remote Setting- Home Office    Consent:  The patient/guardian has verbally consented to: the potential risks and benefits of telemedicine (video visit) versus in person care; bill my insurance or make self-payment for services provided; and responsibility for payment of non-covered services.     Patient would like the video invitation sent by:  My Chart    Mode of Communication:  Video Conference via [employer]    As the provider I attest to compliance with applicable laws and regulations related to telemedicine.         DATA  Interactive Complexity: No  Crisis: No            DATA   Able to finish reading a book now. Good response to adderall.  Feels more anxious as she is putting more pressure on  Herself because she can now. Trying to break things into smaller chunks and take more frequent breaks. Doing better at physical hygeine. Not really eating  well or getting much exercise.    is helping her with organizing daily tasks.          Progress Since Last Session (Related to Symptoms / Goals / Homework):   Symptoms: No change Stable    Homework: Achieved / completed to satisfaction      Episode of Care Goals: Satisfactory progress - ACTION (Actively working towards change); Intervened by reinforcing change plan / affirming steps taken     Current / Ongoing Stressors and Concerns:   Difficulty with attention and concentration     Treatment Objective(s) Addressed in This Session:   Reviewed diet, exercise and other non-pharmacological interventions for ADHD.       Intervention:   psychoeducation regarding ADHD        ASSESSMENT: Current Emotional / Mental Status (status of significant symptoms):   Risk status (Self / Other harm or suicidal ideation)   Client denies current fears or concerns for personal safety.   Client denies current or recent suicidal ideation or behaviors.   Client denies current or recent homicidal ideation or behaviors.   Client denies current or recent self injurious behavior or ideation.   Client denies other safety concerns.   Recommended that patient call 911 or go to the local ED should there be a change in any of these risk factors.     Appearance:   Appropriate    Eye Contact:   Good    Psychomotor Behavior: Normal    Attitude:   Cooperative    Orientation:   All   Speech    Rate / Production: Normal     Volume:  Normal    Mood:    Normal   Affect:    Appropriate    Thought Content:  Clear    Thought Form:  Coherent  Logical    Insight:    Good      Medication Review:   Changes to psychiatric medications, see updated Medication List in EPIC.      Medication Compliance:   Yes     Changes in Health Issues:   None reported     Chemical Use Review:   Substance Use: Chemical use reviewed, no active concerns identified      Tobacco Use: No current tobacco use.       Collateral Reports Completed:   Not Applicable    PLAN: (Client Tasks  / Therapist Tasks / Other)  Recommended supplements and resources.        Erick Allen    Treatment plan 1/5/22  Diagnosis: F90.2 Attention deficit hyperactivity disorder combined type; associated depression and anxiety  No collaboration needed   Estimated number of sessions 15      ADHD Treatment plan:  1. Education- the Biopsychosocial model of ADHD  a. Client will be able to describe in general terms what ADHD is and is not  b. Client will be able to describe how ADHD has affected their life in at least two different areas, such as school or work and Home/relationships  c. Clients parents/guardians or significant others will be provided information on what ADHD is and the ways it can affect relationships (see ADHD reading list) and be encouraged to be a part of clients treatment team.  2. Medication  a. If appropriate, Client will participate in medication evaluation for ADHD symptoms and follow medication recommendations.   3. Behavior change  a. Using materials form ADHD reading list or others recommended by the therapist, patient will identify and implement at least three behavior changes, (e.g. filing, use of a day planner, putting keys in the same place every day) which will improve organization.  4. Comorbid conditions   a. Client and therapist will asses for comorbid conditions (e.g. anxiety, depression, substance use). and add additional items to treatment plan as needed  5. Self-care  a. Client will identify 3 things they can do just for themselves  b. Client will take time for quiet, reflection, meditation 3 times per week  c. Client will Learn to set boundaries when appropriate  d. Client will Identify 2 individuals they can call on for support, distraction  1. Behavioral Activation  a. Client will Identify two forms of exercise/activity and engage in them 3 times per week  b. Client will Identify 2 things that make them laugh, and engage in these 3 times per week.  c. Client will Identify 2  Creative activities or hobbies  and engage in them 2 times per week  d. Client will identify music, movies, books that make them feel good and use them 3 times per week  2. Assessment of progress  a. Client will engage in assessment of their progress on a regular basis  Client has agreed to above program.  Erick Allen, PhD, LP 1/5/2022  Answers for HPI/ROS submitted by the patient on 1/5/2022  If you checked off any problems, how difficult have these problems made it for you to do your work, take care of things at home, or get along with other people?: Somewhat difficult  PHQ9 TOTAL SCORE: 8  JACINTO 7 TOTAL SCORE: 13

## 2022-01-06 ASSESSMENT — PATIENT HEALTH QUESTIONNAIRE - PHQ9: SUM OF ALL RESPONSES TO PHQ QUESTIONS 1-9: 8

## 2022-01-06 ASSESSMENT — ANXIETY QUESTIONNAIRES: GAD7 TOTAL SCORE: 13

## 2022-01-13 ENCOUNTER — MYC REFILL (OUTPATIENT)
Dept: FAMILY MEDICINE | Facility: CLINIC | Age: 32
End: 2022-01-13
Payer: COMMERCIAL

## 2022-01-13 DIAGNOSIS — F90.9 ATTENTION DEFICIT HYPERACTIVITY DISORDER (ADHD), UNSPECIFIED ADHD TYPE: ICD-10-CM

## 2022-01-13 NOTE — TELEPHONE ENCOUNTER
Requested Prescriptions   Pending Prescriptions Disp Refills     amphetamine-dextroamphetamine (ADDERALL) 10 MG tablet 60 tablet 0     Sig: Take 1 tablet (10 mg) by mouth 2 times daily       There is no refill protocol information for this order        Routing refill request to provider for review/approval because:  Drug not on the [facility] refill protocol     Micki Gray RN  [facility]

## 2022-01-18 RX ORDER — DEXTROAMPHETAMINE SACCHARATE, AMPHETAMINE ASPARTATE, DEXTROAMPHETAMINE SULFATE AND AMPHETAMINE SULFATE 2.5; 2.5; 2.5; 2.5 MG/1; MG/1; MG/1; MG/1
10 TABLET ORAL 2 TIMES DAILY
Qty: 60 TABLET | Refills: 0 | Status: SHIPPED | OUTPATIENT
Start: 2022-01-18 | End: 2023-02-14

## 2022-01-23 ENCOUNTER — HEALTH MAINTENANCE LETTER (OUTPATIENT)
Age: 32
End: 2022-01-23

## 2022-01-24 ENCOUNTER — VIRTUAL VISIT (OUTPATIENT)
Dept: FAMILY MEDICINE | Facility: CLINIC | Age: 32
End: 2022-01-24
Payer: COMMERCIAL

## 2022-01-24 DIAGNOSIS — F33.1 MODERATE EPISODE OF RECURRENT MAJOR DEPRESSIVE DISORDER (H): Primary | ICD-10-CM

## 2022-01-24 DIAGNOSIS — F90.9 ATTENTION DEFICIT HYPERACTIVITY DISORDER (ADHD), UNSPECIFIED ADHD TYPE: ICD-10-CM

## 2022-01-24 DIAGNOSIS — N62 HYPERTROPHY OF BREAST: ICD-10-CM

## 2022-01-24 PROCEDURE — 99214 OFFICE O/P EST MOD 30 MIN: CPT | Mod: 95 | Performed by: NURSE PRACTITIONER

## 2022-01-24 RX ORDER — DEXTROAMPHETAMINE SACCHARATE, AMPHETAMINE ASPARTATE, DEXTROAMPHETAMINE SULFATE AND AMPHETAMINE SULFATE 2.5; 2.5; 2.5; 2.5 MG/1; MG/1; MG/1; MG/1
10 TABLET ORAL 2 TIMES DAILY
Qty: 60 TABLET | Refills: 0 | Status: SHIPPED | OUTPATIENT
Start: 2022-01-24 | End: 2022-02-23

## 2022-01-24 RX ORDER — DEXTROAMPHETAMINE SACCHARATE, AMPHETAMINE ASPARTATE, DEXTROAMPHETAMINE SULFATE AND AMPHETAMINE SULFATE 2.5; 2.5; 2.5; 2.5 MG/1; MG/1; MG/1; MG/1
10 TABLET ORAL 2 TIMES DAILY
Qty: 60 TABLET | Refills: 0 | Status: SHIPPED | OUTPATIENT
Start: 2022-03-27 | End: 2022-04-26

## 2022-01-24 RX ORDER — DEXTROAMPHETAMINE SACCHARATE, AMPHETAMINE ASPARTATE, DEXTROAMPHETAMINE SULFATE AND AMPHETAMINE SULFATE 2.5; 2.5; 2.5; 2.5 MG/1; MG/1; MG/1; MG/1
10 TABLET ORAL 2 TIMES DAILY
Qty: 60 TABLET | Refills: 0 | Status: SHIPPED | OUTPATIENT
Start: 2022-02-24 | End: 2022-03-26

## 2022-01-24 NOTE — PROGRESS NOTES
"Antonio is a 31 year old who is being evaluated via a billable video visit.        Video Start Time:     Assessment & Plan     Hypertrophy of breast    - Breast Provider Referral; Future    Attention deficit hyperactivity disorder (ADHD), unspecified ADHD type  -stable  - amphetamine-dextroamphetamine (ADDERALL) 10 MG tablet; Take 1 tablet (10 mg) by mouth 2 times daily  - amphetamine-dextroamphetamine (ADDERALL) 10 MG tablet; Take 1 tablet (10 mg) by mouth 2 times daily  - amphetamine-dextroamphetamine (ADDERALL) 10 MG tablet; Take 1 tablet (10 mg) by mouth 2 times daily    Moderate episode of recurrent major depressive disorder (H)  -Not well controlled. We will taper off Cymbalta in the coming weeks to 30 mg daily for the next two weeks, then 30 mg every other day for two weeks. She will come in on 2/11/2022 for Genesight testing; follow up in about 1 month to review results.        45 minutes spent on the date of the encounter doing chart review, history and exam, documentation and further activities per the note       BMI:   Estimated body mass index is 31.09 kg/m  as calculated from the following:    Height as of 3/18/21: 1.74 m (5' 8.5\").    Weight as of 7/7/21: 94.1 kg (207 lb 8 oz).       See Patient Instructions    No follow-ups on file.    CHEY Hernandez CNP  [facility]    Yvon Alvarez is a 31 year old who presents for the following health issues     HPI   Mood has been lower in the past several months; diminished motivation, feeling worn-out. Work has been stable; she is currently working two days per week and has been able to manage responsibilities. She is home alone with two daughters the other days per week, and it has been difficult as daughters are too young to get vaccinated, and they have avoided leaving the house.  Has noticed a lot of sweating on Cymbalta; Has noticed a lowered sex drive as well; Has been somewhat helpful with mood but not enough to overcome " side effects.   Adderall has been working well for when she needs it. She often forgets to take it but she feels comfortable with current dose for her ADHD symptoms.    Ongoing issues with hypertrophy of breasts, Breasts grew larger with second pregnancy. Notices constant aching in upper back and tension headaches. Arms often go numb at night due to weight of breasts. MOther and sister have noth gotten reductions and been happy with the results.         Review of Systems   Constitutional, HEENT, cardiovascular, pulmonary, gi and gu systems are negative, except as otherwise noted.      Objective           Vitals:  No vitals were obtained today due to virtual visit.    Physical Exam   GENERAL: Healthy, alert and no distress  EYES: Eyes grossly normal to inspection.  No discharge or erythema, or obvious scleral/conjunctival abnormalities.  RESP: No audible wheeze, cough, or visible cyanosis.  No visible retractions or increased work of breathing.    SKIN: Visible skin clear. No significant rash, abnormal pigmentation or lesions.  NEURO: Cranial nerves grossly intact.  Mentation and speech appropriate for age.  PSYCH: Mentation appears normal, affect normal/bright, judgement and insight intact, normal speech and appearance well-groomed.    Orders Only on 07/07/2021   Component Date Value Ref Range Status     Cannabinoids (55-[id number]-4-carboxy-9-T* 07/07/2021 Not Detected  NDET^Not Detected ng/mL Final    Cutoff for a negative cannabinoid is 50 ng/mL or less.     Phencyclidine (Phencyclidine) 07/07/2021 Not Detected  NDET^Not Detected ng/mL Final    Cutoff for a negative PCP is 25 ng/mL or less.     Cocaine (Benzoylecgonine) 07/07/2021 Not Detected  NDET^Not Detected ng/mL Final    Cutoff for a negative cocaine is 150 ng/ml or less.     Methamphetamine (d-Methamphetamine) 07/07/2021 Not Detected  NDET^Not Detected ng/mL Final    Cutoff for a negative methamphetamine is 500 ng/ml or less.     Opiates (Morphine) 07/07/2021 Not  Detected  NDET^Not Detected ng/mL Final    Cutoff for a negative opiate is 100 ng/ml or less.     Amphetamine (d-Amphetamine) 07/07/2021 Not Detected  NDET^Not Detected ng/mL Final    Cutoff for a negative amphetamine is 500 ng/mL or less.     Benzodiazepines (Nordiazepam) 07/07/2021 Not Detected  NDET^Not Detected ng/mL Final    Cutoff for a negative benzodiazepine is 150 ng/ml or less.     Tricyclic Antidepressants (Desipra* 07/07/2021 Not Detected  NDET^Not Detected ng/mL Final    Cutoff for a negative tricyclic antidepressant is 300 ng/ml or less.     Methadone (Methadone) 07/07/2021 Not Detected  NDET^Not Detected ng/mL Final    Cutoff for a negative methadone is 200 ng/ml or less.     Barbiturates (Butalbital) 07/07/2021 Not Detected  NDET^Not Detected ng/mL Final    Cutoff for a negative barbituate is 200 ng/ml or less.     Oxycodone (Oxycodone) 07/07/2021 Not Detected  NDET^Not Detected ng/mL Final    Cutoff for a negative Oxycodone is 100 ng/mL or less.     Propoxyphene (Norpropoxyphene) 07/07/2021 Not Detected  NDET^Not Detected ng/mL Final    Cutoff for a negative propoxyphene is 300 ng/ml or less     Buprenorphine (Buprenorphine) 07/07/2021 Not Detected  NDET^Not Detected ng/mL Final    Cutoff for a negative buprenorphine is 10 ng/ml or less               Video-Visit Details    Type of service:  Video Visit    Video End Time:10:29 AM    Originating Location (pt. Location): Home    Distant Location (provider location):  [facility]     Platform used for Video Visit: [employer]

## 2022-01-25 ENCOUNTER — MYC MEDICAL ADVICE (OUTPATIENT)
Dept: FAMILY MEDICINE | Facility: CLINIC | Age: 32
End: 2022-01-25
Payer: COMMERCIAL

## 2022-01-25 DIAGNOSIS — N62 HYPERTROPHY OF BREAST: Primary | ICD-10-CM

## 2022-01-25 NOTE — TELEPHONE ENCOUNTER
Cheri    See pt Mychart message    Courtney Gipson, RN   [facility]

## 2022-01-26 ENCOUNTER — OFFICE VISIT (OUTPATIENT)
Dept: PLASTIC SURGERY | Facility: AMBULATORY SURGERY CENTER | Age: 32
End: 2022-01-26
Attending: NURSE PRACTITIONER
Payer: COMMERCIAL

## 2022-01-26 VITALS — BODY MASS INDEX: 32.39 KG/M2 | HEIGHT: 68 IN | WEIGHT: 213.7 LBS

## 2022-01-26 DIAGNOSIS — N62 HYPERTROPHY OF BREAST: ICD-10-CM

## 2022-01-26 PROCEDURE — 99203 OFFICE O/P NEW LOW 30 MIN: CPT | Performed by: PLASTIC SURGERY

## 2022-01-26 ASSESSMENT — MIFFLIN-ST. JEOR: SCORE: 1732.71

## 2022-01-26 NOTE — LETTER
1/26/2022         RE: Dayana Danielle  [address]  [place] [address]        Dear Colleague,    Thank you for referring your patient, Dayana Danielle, to the [facility]. Please see a copy of my visit note below.    Chief complaint:   Neck pain, upper back pain, symptomatic bilateral macromastia.    History of present illness:  This is a 31 year old lady who presents with history of bilateral macromastia, associated with neck pain, upper back pain, bilateral shoulder grooving and intertrigo.  She also complains of occipital migraine.  She uses deodorant and dry tissue for the intertrigo.  Patient states that currently she is wearing a 40 E/F bra size. Patient states that she is having difficulty to exercise secondary to the heaviness of her breasts. Also, she is having trouble sleeping due to the heaviness of her breasts upon her chest.  Patient is referred to me for evaluation to see if she is a candidate for reduction mammoplasty.    Past medical history:  Past Medical History:   Diagnosis Date     ADHD     States that it's not an ongoing diagnosis, the person who did her psych eval for it did not think she had it. Was on Adderall in the past and stopped that in June 2017.     Anemia     10 years ago     Anxiety     Started the Citalopram early December 2017 by her PCP.      Depressive disorder     Started the Citalopram early December 2017 by her PCP.      History of miscarriage 04/2019     Ovarian cyst     Right sided     Smoking     Stopped October 2017.       Past surgical history:  C-sections x2, dilatation and curettage x1.    Allergies:  No known drug allergies    Medications:    Current Outpatient Medications:      amphetamine-dextroamphetamine (ADDERALL) 10 MG tablet, Take 1 tablet (10 mg) by mouth 2 times daily, Disp: 60 tablet, Rfl: 0     [START ON 2/24/2022] amphetamine-dextroamphetamine (ADDERALL) 10 MG tablet, Take 1 tablet (10 mg) by  mouth 2 times daily, Disp: 60 tablet, Rfl: 0     [START ON 3/27/2022] amphetamine-dextroamphetamine (ADDERALL) 10 MG tablet, Take 1 tablet (10 mg) by mouth 2 times daily, Disp: 60 tablet, Rfl: 0     diphenhydrAMINE (BENADRYL) 25 MG capsule, , Disp: , Rfl:      doxylamine (UNISOM) 25 MG TABS tablet, Take by mouth At Bedtime, Disp: , Rfl:      DULoxetine (CYMBALTA) 60 MG capsule, Take 1 capsule (60 mg) by mouth daily, Disp: 90 capsule, Rfl: 3     hydrOXYzine (ATARAX) 25 MG tablet, Take 1 tablet (25 mg) by mouth every 4 hours as needed (every 4 to 6 hours), Disp: 90 tablet, Rfl: 3     ibuprofen (ADVIL/MOTRIN) 800 MG tablet, Take 1 tablet (800 mg) by mouth every 6 hours, Disp: 40 tablet, Rfl: 0     Omeprazole (PRILOSEC PO), , Disp: , Rfl:      valACYclovir (VALTREX) 1000 mg tablet, Take 2 tablets (2,000 mg) by mouth 2 times daily, Disp: 4 tablet, Rfl: 4     amphetamine-dextroamphetamine (ADDERALL) 10 MG tablet, Take 1 tablet (10 mg) by mouth 2 times daily (Patient not taking: Reported on 1/26/2022), Disp: 60 tablet, Rfl: 0     amphetamine-dextroamphetamine (ADDERALL) 10 MG tablet, Take 1 tablet (10 mg) by mouth 2 times daily (Patient not taking: Reported on 1/26/2022), Disp: 60 tablet, Rfl: 0    Family history:  Denies family history of breast cancer, ovarian cancer.    Social History:  Patient does vape for the last 5 years.  She drinks alcohol occasionally.    Review of systems:  General ROS: No complaints or constitutional symptoms  Skin: No complaints or symptoms   Hematologic/Lymphatic: No symptoms or complaints  Psychiatric: No symptoms or complaints  Endocrine: No excessive fatigue, no hypermetabolic symptoms reported  Respiratory ROS: No cough, shortness of breath, or wheezing  Cardiovascular ROS: No chest pain or dyspnea on exertion  Breast ROS: Denies nipple inversion, denies nipple discharge, denies palpable breast masses, denies peau d'orange  Gastrointestinal ROS: No abdominal pain, nausea, diarrhea, or  constipation  Musculoskeletal ROS: Complains of neck pain and upper back pain  Neurological ROS: Complains of occipital headaches.    Physical exam:  There were no vitals taken for this visit.  General: Alert, cooperative, appears stated age   Skin: Skin color, texture, turgor normal, no rashes or lesions   Lymphatic: No obvious adenopathy, no swelling   Eyes: No scleral icterus, pupils equal  HENT: No traumatic injury to the head or face, no gross abnormalities  Lungs: Normal respiratory effort, breath sounds equal bilaterally  Heart: Regular rate and rhythm  Breasts: Bilateral grade 2 ptosis, bilateral macromastia, no nipple inversion, no peau d'orange.  No palpable breast masses, no nipple discharge.  Right breast sternal notch to nipple distance is 34 cm, nipple to inframammary fold is 15 cm, breast diameter is 20 cm.  On the left breast, sternal notch to nipple distance is 33 cm, nipple to inframammary fold distance is 14 cm and breast diameter is 21 cm.  No palpable axillary lymphadenopathies bilaterally.  Abdomen: Soft, non-distended and non-tender to palpation  Neurologic: Grossly intact                              Assessment:    31 years old lady with bilateral symptomatic macromastia.    Her weight is 96.9 kg and her height is 173 cm for total body surface area of 2.12.  According to the Schnur scale, at least 820 g of breast tissue should be removed per breast.      PLAN:     I believe that this patient is an excellent candidate for bilateral reduction mammoplasty.  She would benefit of a Wise pattern reduction with an inferior pedicle technique.    I have discussed with her the risks of surgery and they include but are not limited to scarring, infection, bleeding, asymmetry, temporary versus permanent altered sensation on both breast and both nipple areolar complexes, loss of nipple areolar complex requiring future tattooing, inability to breast-feed, need for further surgeries.  I stressed to her that  she needs to quit vaping at least 6 weeks prior to surgery because vaping increases wound healing complications and potential loss of nipple areolar complex secondary to the effect of nicotine on the bloodstream.  Patient told me that she will quit vaping prior to surgery.    Time spent with the patient 30 minutes.    Romero Rodriguez MD, FACS   Diplomate American Board of Plastic Surgery  Diplomate American Board of Surgery  [facility]  Division of Plastic & Reconstructive Surgery  Office: [phone]   1/26/2022 at 2:42 PM          Again, thank you for allowing me to participate in the care of your patient.        Sincerely,        Romero Rodriguez MD

## 2022-01-26 NOTE — Clinical Note
Hi all,    Peaches please submit for PA and Kimmy please schedule after approval.  Thanks so much,    MILLICENT

## 2022-01-26 NOTE — PROGRESS NOTES
Chief complaint:   Neck pain, upper back pain, symptomatic bilateral macromastia.    History of present illness:  This is a 31 year old lady who presents with history of bilateral macromastia, associated with neck pain, upper back pain, bilateral shoulder grooving and intertrigo.  She also complains of occipital migraine.  She uses deodorant and dry tissue for the intertrigo.  Patient states that currently she is wearing a 40 E/F bra size. Patient states that she is having difficulty to exercise secondary to the heaviness of her breasts. Also, she is having trouble sleeping due to the heaviness of her breasts upon her chest.  Patient is referred to me for evaluation to see if she is a candidate for reduction mammoplasty.    Past medical history:  Past Medical History:   Diagnosis Date     ADHD     States that it's not an ongoing diagnosis, the person who did her psych eval for it did not think she had it. Was on Adderall in the past and stopped that in June 2017.     Anemia     10 years ago     Anxiety     Started the Citalopram early December 2017 by her PCP.      Depressive disorder     Started the Citalopram early December 2017 by her PCP.      History of miscarriage 04/2019     Ovarian cyst     Right sided     Smoking     Stopped October 2017.       Past surgical history:  C-sections x2, dilatation and curettage x1.    Allergies:  No known drug allergies    Medications:    Current Outpatient Medications:      amphetamine-dextroamphetamine (ADDERALL) 10 MG tablet, Take 1 tablet (10 mg) by mouth 2 times daily, Disp: 60 tablet, Rfl: 0     [START ON 2/24/2022] amphetamine-dextroamphetamine (ADDERALL) 10 MG tablet, Take 1 tablet (10 mg) by mouth 2 times daily, Disp: 60 tablet, Rfl: 0     [START ON 3/27/2022] amphetamine-dextroamphetamine (ADDERALL) 10 MG tablet, Take 1 tablet (10 mg) by mouth 2 times daily, Disp: 60 tablet, Rfl: 0     diphenhydrAMINE (BENADRYL) 25 MG capsule, , Disp: , Rfl:      doxylamine (UNISOM)  25 MG TABS tablet, Take by mouth At Bedtime, Disp: , Rfl:      DULoxetine (CYMBALTA) 60 MG capsule, Take 1 capsule (60 mg) by mouth daily, Disp: 90 capsule, Rfl: 3     hydrOXYzine (ATARAX) 25 MG tablet, Take 1 tablet (25 mg) by mouth every 4 hours as needed (every 4 to 6 hours), Disp: 90 tablet, Rfl: 3     ibuprofen (ADVIL/MOTRIN) 800 MG tablet, Take 1 tablet (800 mg) by mouth every 6 hours, Disp: 40 tablet, Rfl: 0     Omeprazole (PRILOSEC PO), , Disp: , Rfl:      valACYclovir (VALTREX) 1000 mg tablet, Take 2 tablets (2,000 mg) by mouth 2 times daily, Disp: 4 tablet, Rfl: 4     amphetamine-dextroamphetamine (ADDERALL) 10 MG tablet, Take 1 tablet (10 mg) by mouth 2 times daily (Patient not taking: Reported on 1/26/2022), Disp: 60 tablet, Rfl: 0     amphetamine-dextroamphetamine (ADDERALL) 10 MG tablet, Take 1 tablet (10 mg) by mouth 2 times daily (Patient not taking: Reported on 1/26/2022), Disp: 60 tablet, Rfl: 0    Family history:  Denies family history of breast cancer, ovarian cancer.    Social History:  Patient does vape for the last 5 years.  She drinks alcohol occasionally.    Review of systems:  General ROS: No complaints or constitutional symptoms  Skin: No complaints or symptoms   Hematologic/Lymphatic: No symptoms or complaints  Psychiatric: No symptoms or complaints  Endocrine: No excessive fatigue, no hypermetabolic symptoms reported  Respiratory ROS: No cough, shortness of breath, or wheezing  Cardiovascular ROS: No chest pain or dyspnea on exertion  Breast ROS: Denies nipple inversion, denies nipple discharge, denies palpable breast masses, denies peau d'orange  Gastrointestinal ROS: No abdominal pain, nausea, diarrhea, or constipation  Musculoskeletal ROS: Complains of neck pain and upper back pain  Neurological ROS: Complains of occipital headaches.    Physical exam:  There were no vitals taken for this visit.  General: Alert, cooperative, appears stated age   Skin: Skin color, texture, turgor  normal, no rashes or lesions   Lymphatic: No obvious adenopathy, no swelling   Eyes: No scleral icterus, pupils equal  HENT: No traumatic injury to the head or face, no gross abnormalities  Lungs: Normal respiratory effort, breath sounds equal bilaterally  Heart: Regular rate and rhythm  Breasts: Bilateral grade 2 ptosis, bilateral macromastia, no nipple inversion, no peau d'orange.  No palpable breast masses, no nipple discharge.  Right breast sternal notch to nipple distance is 34 cm, nipple to inframammary fold is 15 cm, breast diameter is 20 cm.  On the left breast, sternal notch to nipple distance is 33 cm, nipple to inframammary fold distance is 14 cm and breast diameter is 21 cm.  No palpable axillary lymphadenopathies bilaterally.  Abdomen: Soft, non-distended and non-tender to palpation  Neurologic: Grossly intact                              Assessment:    31 years old lady with bilateral symptomatic macromastia.    Her weight is 96.9 kg and her height is 173 cm for total body surface area of 2.12.  According to the Schnur scale, at least 820 g of breast tissue should be removed per breast.      PLAN:     I believe that this patient is an excellent candidate for bilateral reduction mammoplasty.  She would benefit of a Wise pattern reduction with an inferior pedicle technique.    I have discussed with her the risks of surgery and they include but are not limited to scarring, infection, bleeding, asymmetry, temporary versus permanent altered sensation on both breast and both nipple areolar complexes, loss of nipple areolar complex requiring future tattooing, inability to breast-feed, need for further surgeries.  I stressed to her that she needs to quit vaping at least 6 weeks prior to surgery because vaping increases wound healing complications and potential loss of nipple areolar complex secondary to the effect of nicotine on the bloodstream.  Patient told me that she will quit vaping prior to  surgery.    Time spent with the patient 30 minutes.    Romero Rodriguez MD, FACS   Diplomate American Board of Plastic Surgery  Diplomate American Board of Surgery  [facility]  Division of Plastic & Reconstructive Surgery  Office: [phone]   1/26/2022 at 2:42 PM

## 2022-01-27 ENCOUNTER — TELEPHONE (OUTPATIENT)
Dept: SURGERY | Facility: CLINIC | Age: 32
End: 2022-01-27
Payer: COMMERCIAL

## 2022-02-02 NOTE — TELEPHONE ENCOUNTER
I received a phone call from a rep from [facility] one requesting a letter from pt primary to approve surgery. I sent the pt a [employer] message explaining the letter request need. I will send in all information needed, once received.

## 2022-02-07 ENCOUNTER — TELEPHONE (OUTPATIENT)
Dept: FAMILY MEDICINE | Facility: CLINIC | Age: 32
End: 2022-02-07
Payer: COMMERCIAL

## 2022-02-08 ENCOUNTER — MEDICAL CORRESPONDENCE (OUTPATIENT)
Dept: HEALTH INFORMATION MANAGEMENT | Facility: CLINIC | Age: 32
End: 2022-02-08
Payer: COMMERCIAL

## 2022-02-10 ENCOUNTER — TELEPHONE (OUTPATIENT)
Dept: SURGERY | Facility: CLINIC | Age: 32
End: 2022-02-10
Payer: COMMERCIAL

## 2022-02-10 NOTE — TELEPHONE ENCOUNTER
PA Approved.  Insurance [employer]  Auth #: [id number]  DOS: 1/27/2022 - 1/26/2023  CPT Code: 77786  Dx Code(s): N62

## 2022-02-11 ENCOUNTER — ALLIED HEALTH/NURSE VISIT (OUTPATIENT)
Dept: FAMILY MEDICINE | Facility: CLINIC | Age: 32
End: 2022-02-11
Payer: COMMERCIAL

## 2022-02-11 DIAGNOSIS — F32.1 MODERATE MAJOR DEPRESSION (H): Primary | ICD-10-CM

## 2022-02-11 PROCEDURE — 99207 PR NO CHARGE NURSE ONLY: CPT

## 2022-02-11 NOTE — PROGRESS NOTES
Genesite test completed with Pt, stamped with provider signiture.   Order was placed back at 07/07/21 for test, but needed to redo the test and send in.     Reception will call [employer] for .

## 2022-02-28 ENCOUNTER — OFFICE VISIT (OUTPATIENT)
Dept: FAMILY MEDICINE | Facility: CLINIC | Age: 32
End: 2022-02-28
Payer: COMMERCIAL

## 2022-02-28 VITALS
OXYGEN SATURATION: 100 % | BODY MASS INDEX: 32.19 KG/M2 | WEIGHT: 213.6 LBS | DIASTOLIC BLOOD PRESSURE: 74 MMHG | HEART RATE: 79 BPM | SYSTOLIC BLOOD PRESSURE: 110 MMHG | RESPIRATION RATE: 16 BRPM

## 2022-02-28 DIAGNOSIS — H60.502 ACUTE OTITIS EXTERNA OF LEFT EAR, UNSPECIFIED TYPE: Primary | ICD-10-CM

## 2022-02-28 DIAGNOSIS — H66.002 NON-RECURRENT ACUTE SUPPURATIVE OTITIS MEDIA OF LEFT EAR WITHOUT SPONTANEOUS RUPTURE OF TYMPANIC MEMBRANE: ICD-10-CM

## 2022-02-28 DIAGNOSIS — H61.22 IMPACTED CERUMEN OF LEFT EAR: ICD-10-CM

## 2022-02-28 PROCEDURE — 99214 OFFICE O/P EST MOD 30 MIN: CPT | Performed by: PREVENTIVE MEDICINE

## 2022-02-28 RX ORDER — NEOMYCIN SULFATE, POLYMYXIN B SULFATE, HYDROCORTISONE 3.5; 10000; 1 MG/ML; [USP'U]/ML; MG/ML
3 SOLUTION/ DROPS AURICULAR (OTIC) 3 TIMES DAILY
Qty: 4 ML | Refills: 0 | Status: SHIPPED | OUTPATIENT
Start: 2022-02-28 | End: 2022-03-07

## 2022-03-04 NOTE — PROGRESS NOTES
Assessment & Plan     1. Acute otitis externa of left ear, unspecified type  - neomycin-polymyxin-hydrocortisone (CORTISPORIN) 3.5-63882-2 otic solution; Place 3 drops Into the left ear 3 times daily for 7 days  Dispense: 4 mL; Refill: 0    2. Non-recurrent acute suppurative otitis media of left ear without spontaneous rupture of tympanic membrane  - amoxicillin-clavulanate (AUGMENTIN) 875-125 MG tablet; Take 1 tablet by mouth 2 times daily for 7 days  Dispense: 14 tablet; Refill: 0    3. Cerumen impaction    Augmentin for 7 days  Cortisporin otic for 7 days  Follow up if not improving over the next 10-14 days       33 minutes spent on the date of the encounter doing chart review, patient visit and documentation         No follow-ups on file.    Jared Delgado MD  [facility]    Subjective     Dayana Danielle is a 31 year old year old female who presents to clinic today for the following health issues:    Patient presents with:  Ear Problem: ear fullness/ pain/ tenderness in L ear     This is a 30 yo female who has had left ear fullness and pain for 3 days.  No fever, congestion, ear drainage.    Patient Active Problem List   Diagnosis     Ovarian mass     ADHD     JACINTO (generalized anxiety disorder)     Moderate episode of recurrent major depressive disorder (H)     Major depressive disorder, single episode, moderate (H)     History of miscarriage     Status post  delivery       Current Outpatient Medications   Medication     amoxicillin-clavulanate (AUGMENTIN) 875-125 MG tablet     amphetamine-dextroamphetamine (ADDERALL) 10 MG tablet     [START ON 3/27/2022] amphetamine-dextroamphetamine (ADDERALL) 10 MG tablet     diphenhydrAMINE (BENADRYL) 25 MG capsule     doxylamine (UNISOM) 25 MG TABS tablet     DULoxetine (CYMBALTA) 60 MG capsule     hydrOXYzine (ATARAX) 25 MG tablet     ibuprofen (ADVIL/MOTRIN) 800 MG tablet     neomycin-polymyxin-hydrocortisone  (CORTISPORIN) 3.5-34530-8 otic solution     Omeprazole (PRILOSEC PO)     valACYclovir (VALTREX) 1000 mg tablet     amphetamine-dextroamphetamine (ADDERALL) 10 MG tablet     amphetamine-dextroamphetamine (ADDERALL) 10 MG tablet     No current facility-administered medications for this visit.       Past Medical History:   Diagnosis Date     ADHD     States that it's not an ongoing diagnosis, the person who did her psych eval for it did not think she had it. Was on Adderall in the past and stopped that in June 2017.     Anemia     10 years ago     Anxiety     Started the Citalopram early December 2017 by her PCP.      Depressive disorder     Started the Citalopram early December 2017 by her PCP.      History of miscarriage 04/2019     Ovarian cyst     Right sided     Smoking     Stopped October 2017.       Social History   reports that she quit smoking about 4 years ago. Her smoking use included cigarettes. She started smoking about 18 years ago. She smoked 0.50 packs per day. She has never used smokeless tobacco. She reports current alcohol use. She reports that she does not use drugs.    Family History   Problem Relation Age of Onset     No Known Problems Mother      No Known Problems Father      No Known Problems Sister      No Known Problems Brother      No Known Problems Maternal Grandmother      No Known Problems Maternal Grandfather      No Known Problems Paternal Grandmother      No Known Problems Other        Review of Systems  Constitutional, HEENT, cardiovascular, pulmonary, GI, , musculoskeletal, neuro, skin, endocrine and psych systems are negative, except as otherwise noted.      Objective    /74 (BP Location: Right arm, Patient Position: Sitting, Cuff Size: Adult Regular)   Pulse 79   Resp 16   Wt 96.9 kg (213 lb 9.6 oz)   SpO2 100%   BMI 32.19 kg/m    Physical Exam   GENERAL: healthy, alert and no distress  EYES: Eyes grossly normal to inspection, PERRL and conjunctivae and sclerae  normal  HENT: l ear canal with cerumen and, once clear, erythematous.  L tm erythematous and bulging.  R ear canal and r TM normal, nose and mouth without ulcers or lesions  NECK: no adenopathy, no asymmetry, masses, or scars and thyroid normal to palpation  RESP: lungs clear to auscultation - no rales, rhonchi or wheezes  CV: regular rate and rhythm, normal S1 S2, no S3 or S4, no murmur, click or rub, no peripheral edema and peripheral pulses strong  ABDOMEN: soft, nontender, no hepatosplenomegaly, no masses and bowel sounds normal  MS: no gross musculoskeletal defects noted, no edema  SKIN: no suspicious lesions or rashes  NEURO: Normal strength and tone, mentation intact and speech normal  PSYCH: mentation appears normal, affect normal/bright

## 2022-03-09 PROBLEM — N62 HYPERTROPHY OF BREAST: Status: ACTIVE | Noted: 2022-03-09

## 2022-03-14 DIAGNOSIS — Z98.891 STATUS POST CESAREAN DELIVERY: ICD-10-CM

## 2022-03-14 RX ORDER — HYDROXYZINE HYDROCHLORIDE 25 MG/1
TABLET, FILM COATED ORAL
Qty: 90 TABLET | Refills: 3 | Status: SHIPPED | OUTPATIENT
Start: 2022-03-14 | End: 2022-10-19

## 2022-03-14 NOTE — TELEPHONE ENCOUNTER
"Requested Prescriptions   Signed Prescriptions Disp Refills    hydrOXYzine (ATARAX) 25 MG tablet 90 tablet 3     Sig: TAKE ONE TABLET BY MOUTH EVERY FOUR TO SIX HOURS AS NEEDED       Antihistamines Protocol Passed - 3/14/2022  9:35 AM        Passed - Recent (12 mo) or future (30 days) visit within the authorizing provider's specialty     Patient has had an office visit with the authorizing provider or a provider within the authorizing providers department within the previous 12 mos or has a future within next 30 days. See \"Patient Info\" tab in inbasket, or \"Choose Columns\" in Meds & Orders section of the refill encounter.              Passed - Patient is age 3 or older     Apply age and/or weight-based dosing for peds patients age 3 and older.    Forward request to provider for patients under the age of 3.          Passed - Medication is active on med list           Micki Gray RN  [facility]     "

## 2022-04-20 NOTE — TELEPHONE ENCOUNTER
Cheri,    Pharmacy calling. Is it ok to substitute the pyrodoxine  lozenge for an oral tab. Lozenges are not available. I have queued up the new rx if you are ok to sign for the tablets.    Harleen Acuña, RN, BSN            LM advising patient as directed below  Bilobed Flap Text: The defect edges were debeveled with a #15 scalpel blade.  Given the location of the defect and the proximity to free margins a bilobe flap was deemed most appropriate.  Using a sterile surgical marker, an appropriate bilobe flap drawn around the defect.    The area thus outlined was incised deep to adipose tissue with a #15 scalpel blade.  The skin margins were undermined to an appropriate distance in all directions utilizing iris scissors.

## 2022-05-27 DIAGNOSIS — Z11.59 ENCOUNTER FOR SCREENING FOR OTHER VIRAL DISEASES: Primary | ICD-10-CM

## 2022-06-01 ENCOUNTER — MYC MEDICAL ADVICE (OUTPATIENT)
Dept: FAMILY MEDICINE | Facility: CLINIC | Age: 32
End: 2022-06-01
Payer: COMMERCIAL

## 2022-06-01 DIAGNOSIS — F41.1 GAD (GENERALIZED ANXIETY DISORDER): Primary | ICD-10-CM

## 2022-06-01 RX ORDER — PROPRANOLOL HYDROCHLORIDE 10 MG/1
10 TABLET ORAL 3 TIMES DAILY PRN
Qty: 90 TABLET | Refills: 0 | Status: SHIPPED | OUTPATIENT
Start: 2022-06-01 | End: 2023-02-14

## 2022-06-03 ENCOUNTER — LAB (OUTPATIENT)
Dept: LAB | Facility: CLINIC | Age: 32
End: 2022-06-03
Payer: COMMERCIAL

## 2022-06-03 ENCOUNTER — ANESTHESIA EVENT (OUTPATIENT)
Dept: SURGERY | Facility: AMBULATORY SURGERY CENTER | Age: 32
End: 2022-06-03
Payer: COMMERCIAL

## 2022-06-03 DIAGNOSIS — Z11.59 ENCOUNTER FOR SCREENING FOR OTHER VIRAL DISEASES: ICD-10-CM

## 2022-06-03 LAB — SARS-COV-2 RNA RESP QL NAA+PROBE: NEGATIVE

## 2022-06-03 PROCEDURE — U0003 INFECTIOUS AGENT DETECTION BY NUCLEIC ACID (DNA OR RNA); SEVERE ACUTE RESPIRATORY SYNDROME CORONAVIRUS 2 (SARS-COV-2) (CORONAVIRUS DISEASE [COVID-19]), AMPLIFIED PROBE TECHNIQUE, MAKING USE OF HIGH THROUGHPUT TECHNOLOGIES AS DESCRIBED BY CMS-2020-01-R: HCPCS

## 2022-06-03 PROCEDURE — U0005 INFEC AGEN DETEC AMPLI PROBE: HCPCS

## 2022-06-03 RX ORDER — MAGNESIUM SULFATE HEPTAHYDRATE 40 MG/ML
4 INJECTION, SOLUTION INTRAVENOUS ONCE
Status: DISCONTINUED | OUTPATIENT
Start: 2022-06-03 | End: 2022-06-07 | Stop reason: HOSPADM

## 2022-06-06 ENCOUNTER — TRANSFERRED RECORDS (OUTPATIENT)
Dept: HEALTH INFORMATION MANAGEMENT | Facility: CLINIC | Age: 32
End: 2022-06-06

## 2022-06-06 ENCOUNTER — ANESTHESIA (OUTPATIENT)
Dept: SURGERY | Facility: AMBULATORY SURGERY CENTER | Age: 32
End: 2022-06-06
Payer: COMMERCIAL

## 2022-06-06 ENCOUNTER — HOSPITAL ENCOUNTER (OUTPATIENT)
Facility: AMBULATORY SURGERY CENTER | Age: 32
Discharge: HOME | End: 2022-06-06
Attending: PLASTIC SURGERY | Admitting: PLASTIC SURGERY
Payer: COMMERCIAL

## 2022-06-06 VITALS
HEART RATE: 70 BPM | SYSTOLIC BLOOD PRESSURE: 123 MMHG | RESPIRATION RATE: 16 BRPM | DIASTOLIC BLOOD PRESSURE: 65 MMHG | HEIGHT: 69 IN | TEMPERATURE: 97 F | WEIGHT: 210 LBS | BODY MASS INDEX: 31.1 KG/M2 | OXYGEN SATURATION: 98 %

## 2022-06-06 DIAGNOSIS — N62 HYPERTROPHY OF BREAST: Primary | ICD-10-CM

## 2022-06-06 DIAGNOSIS — N62 HYPERTROPHY OF BREAST: ICD-10-CM

## 2022-06-06 PROCEDURE — 19318 BREAST REDUCTION: CPT | Mod: 50 | Performed by: PLASTIC SURGERY

## 2022-06-06 RX ORDER — MAGNESIUM SULFATE HEPTAHYDRATE 40 MG/ML
2 INJECTION, SOLUTION INTRAVENOUS ONCE
Status: COMPLETED | OUTPATIENT
Start: 2022-06-06 | End: 2022-06-06

## 2022-06-06 RX ORDER — DEXAMETHASONE SODIUM PHOSPHATE 4 MG/ML
INJECTION, SOLUTION INTRA-ARTICULAR; INTRALESIONAL; INTRAMUSCULAR; INTRAVENOUS; SOFT TISSUE PRN
Status: DISCONTINUED | OUTPATIENT
Start: 2022-06-06 | End: 2022-06-06

## 2022-06-06 RX ORDER — PROPOFOL 10 MG/ML
INJECTION, EMULSION INTRAVENOUS PRN
Status: DISCONTINUED | OUTPATIENT
Start: 2022-06-06 | End: 2022-06-06

## 2022-06-06 RX ORDER — HYDROCODONE BITARTRATE AND ACETAMINOPHEN 5; 325 MG/1; MG/1
1-2 TABLET ORAL EVERY 4 HOURS PRN
Qty: 25 TABLET | Refills: 0 | Status: SHIPPED | OUTPATIENT
Start: 2022-06-06 | End: 2022-06-08

## 2022-06-06 RX ORDER — LIDOCAINE 40 MG/G
CREAM TOPICAL
Status: DISCONTINUED | OUTPATIENT
Start: 2022-06-06 | End: 2022-06-07 | Stop reason: HOSPADM

## 2022-06-06 RX ORDER — KETOROLAC TROMETHAMINE 15 MG/ML
15 INJECTION, SOLUTION INTRAMUSCULAR; INTRAVENOUS ONCE
Status: COMPLETED | OUTPATIENT
Start: 2022-06-06 | End: 2022-06-06

## 2022-06-06 RX ORDER — CEFAZOLIN SODIUM 2 G/100ML
2 INJECTION, SOLUTION INTRAVENOUS
Status: COMPLETED | OUTPATIENT
Start: 2022-06-06 | End: 2022-06-06

## 2022-06-06 RX ORDER — SODIUM CHLORIDE, SODIUM LACTATE, POTASSIUM CHLORIDE, CALCIUM CHLORIDE 600; 310; 30; 20 MG/100ML; MG/100ML; MG/100ML; MG/100ML
INJECTION, SOLUTION INTRAVENOUS CONTINUOUS
Status: DISCONTINUED | OUTPATIENT
Start: 2022-06-06 | End: 2022-06-07 | Stop reason: HOSPADM

## 2022-06-06 RX ORDER — OXYCODONE HYDROCHLORIDE 5 MG/1
5 TABLET ORAL EVERY 4 HOURS PRN
Status: DISCONTINUED | OUTPATIENT
Start: 2022-06-06 | End: 2022-06-07 | Stop reason: HOSPADM

## 2022-06-06 RX ORDER — AMOXICILLIN 250 MG
1-2 CAPSULE ORAL 2 TIMES DAILY
Qty: 30 TABLET | Refills: 0 | Status: SHIPPED | OUTPATIENT
Start: 2022-06-06 | End: 2022-11-22

## 2022-06-06 RX ORDER — ONDANSETRON 4 MG/1
4 TABLET, ORALLY DISINTEGRATING ORAL EVERY 8 HOURS PRN
Qty: 9 TABLET | Refills: 0 | Status: SHIPPED | OUTPATIENT
Start: 2022-06-06 | End: 2022-06-24

## 2022-06-06 RX ORDER — KETAMINE HYDROCHLORIDE 10 MG/ML
10 INJECTION, SOLUTION INTRAMUSCULAR; INTRAVENOUS
Status: DISCONTINUED | OUTPATIENT
Start: 2022-06-06 | End: 2022-06-07 | Stop reason: HOSPADM

## 2022-06-06 RX ORDER — HYDROMORPHONE HCL IN WATER/PF 6 MG/30 ML
0.2 PATIENT CONTROLLED ANALGESIA SYRINGE INTRAVENOUS EVERY 5 MIN PRN
Status: DISCONTINUED | OUTPATIENT
Start: 2022-06-06 | End: 2022-06-07 | Stop reason: HOSPADM

## 2022-06-06 RX ORDER — PROPOFOL 10 MG/ML
INJECTION, EMULSION INTRAVENOUS CONTINUOUS PRN
Status: DISCONTINUED | OUTPATIENT
Start: 2022-06-06 | End: 2022-06-06

## 2022-06-06 RX ORDER — ONDANSETRON 4 MG/1
4 TABLET, ORALLY DISINTEGRATING ORAL EVERY 30 MIN PRN
Status: DISCONTINUED | OUTPATIENT
Start: 2022-06-06 | End: 2022-06-07 | Stop reason: HOSPADM

## 2022-06-06 RX ORDER — MEPERIDINE HYDROCHLORIDE 25 MG/ML
12.5 INJECTION INTRAMUSCULAR; INTRAVENOUS; SUBCUTANEOUS
Status: DISCONTINUED | OUTPATIENT
Start: 2022-06-06 | End: 2022-06-07 | Stop reason: HOSPADM

## 2022-06-06 RX ORDER — ACETAMINOPHEN 325 MG/1
975 TABLET ORAL ONCE
Status: COMPLETED | OUTPATIENT
Start: 2022-06-06 | End: 2022-06-06

## 2022-06-06 RX ORDER — FENTANYL CITRATE 0.05 MG/ML
25 INJECTION, SOLUTION INTRAMUSCULAR; INTRAVENOUS EVERY 5 MIN PRN
Status: DISCONTINUED | OUTPATIENT
Start: 2022-06-06 | End: 2022-06-07 | Stop reason: HOSPADM

## 2022-06-06 RX ORDER — HYDROMORPHONE HCL IN WATER/PF 6 MG/30 ML
0.4 PATIENT CONTROLLED ANALGESIA SYRINGE INTRAVENOUS ONCE
Status: COMPLETED | OUTPATIENT
Start: 2022-06-06 | End: 2022-06-06

## 2022-06-06 RX ORDER — FENTANYL CITRATE 0.05 MG/ML
25 INJECTION, SOLUTION INTRAMUSCULAR; INTRAVENOUS
Status: DISCONTINUED | OUTPATIENT
Start: 2022-06-06 | End: 2022-06-07 | Stop reason: HOSPADM

## 2022-06-06 RX ORDER — LIDOCAINE HYDROCHLORIDE 20 MG/ML
INJECTION, SOLUTION INFILTRATION; PERINEURAL PRN
Status: DISCONTINUED | OUTPATIENT
Start: 2022-06-06 | End: 2022-06-06

## 2022-06-06 RX ORDER — FENTANYL CITRATE 50 UG/ML
INJECTION, SOLUTION INTRAMUSCULAR; INTRAVENOUS PRN
Status: DISCONTINUED | OUTPATIENT
Start: 2022-06-06 | End: 2022-06-06

## 2022-06-06 RX ORDER — CEPHALEXIN 500 MG/1
500 CAPSULE ORAL 3 TIMES DAILY
Qty: 21 CAPSULE | Refills: 0 | Status: SHIPPED | OUTPATIENT
Start: 2022-06-06 | End: 2022-06-13

## 2022-06-06 RX ORDER — GLYCOPYRROLATE 0.2 MG/ML
INJECTION, SOLUTION INTRAMUSCULAR; INTRAVENOUS PRN
Status: DISCONTINUED | OUTPATIENT
Start: 2022-06-06 | End: 2022-06-06

## 2022-06-06 RX ORDER — ONDANSETRON 2 MG/ML
4 INJECTION INTRAMUSCULAR; INTRAVENOUS EVERY 30 MIN PRN
Status: DISCONTINUED | OUTPATIENT
Start: 2022-06-06 | End: 2022-06-07 | Stop reason: HOSPADM

## 2022-06-06 RX ORDER — MAGNESIUM HYDROXIDE 1200 MG/15ML
LIQUID ORAL PRN
Status: DISCONTINUED | OUTPATIENT
Start: 2022-06-06 | End: 2022-06-06 | Stop reason: HOSPADM

## 2022-06-06 RX ORDER — ONDANSETRON 2 MG/ML
INJECTION INTRAMUSCULAR; INTRAVENOUS PRN
Status: DISCONTINUED | OUTPATIENT
Start: 2022-06-06 | End: 2022-06-06

## 2022-06-06 RX ORDER — KETAMINE HYDROCHLORIDE 10 MG/ML
INJECTION INTRAMUSCULAR; INTRAVENOUS PRN
Status: DISCONTINUED | OUTPATIENT
Start: 2022-06-06 | End: 2022-06-06

## 2022-06-06 RX ORDER — NEOSTIGMINE METHYLSULFATE 1 MG/ML
VIAL (ML) INJECTION PRN
Status: DISCONTINUED | OUTPATIENT
Start: 2022-06-06 | End: 2022-06-06

## 2022-06-06 RX ADMIN — KETAMINE HYDROCHLORIDE 10 MG: 10 INJECTION INTRAMUSCULAR; INTRAVENOUS at 14:58

## 2022-06-06 RX ADMIN — SODIUM CHLORIDE, SODIUM LACTATE, POTASSIUM CHLORIDE, CALCIUM CHLORIDE: 600; 310; 30; 20 INJECTION, SOLUTION INTRAVENOUS at 06:46

## 2022-06-06 RX ADMIN — ONDANSETRON 4 MG: 2 INJECTION INTRAMUSCULAR; INTRAVENOUS at 15:36

## 2022-06-06 RX ADMIN — OXYCODONE HYDROCHLORIDE 5 MG: 5 TABLET ORAL at 15:26

## 2022-06-06 RX ADMIN — ACETAMINOPHEN 1000 MG: 325 TABLET ORAL at 14:58

## 2022-06-06 RX ADMIN — Medication 5 MG: at 12:27

## 2022-06-06 RX ADMIN — Medication 10 MG: at 11:19

## 2022-06-06 RX ADMIN — MAGNESIUM SULFATE HEPTAHYDRATE 2 G: 40 INJECTION, SOLUTION INTRAVENOUS at 15:11

## 2022-06-06 RX ADMIN — FENTANYL CITRATE 50 MCG: 50 INJECTION, SOLUTION INTRAMUSCULAR; INTRAVENOUS at 07:57

## 2022-06-06 RX ADMIN — Medication 20 MG: at 08:36

## 2022-06-06 RX ADMIN — FENTANYL CITRATE 25 MCG: 0.05 INJECTION, SOLUTION INTRAMUSCULAR; INTRAVENOUS at 12:51

## 2022-06-06 RX ADMIN — ACETAMINOPHEN 975 MG: 325 TABLET ORAL at 06:46

## 2022-06-06 RX ADMIN — GLYCOPYRROLATE 1 MG: 0.2 INJECTION, SOLUTION INTRAMUSCULAR; INTRAVENOUS at 12:27

## 2022-06-06 RX ADMIN — KETOROLAC TROMETHAMINE 15 MG: 15 INJECTION, SOLUTION INTRAMUSCULAR; INTRAVENOUS at 15:11

## 2022-06-06 RX ADMIN — KETAMINE HYDROCHLORIDE 10 MG: 10 INJECTION INTRAMUSCULAR; INTRAVENOUS at 15:23

## 2022-06-06 RX ADMIN — LIDOCAINE HYDROCHLORIDE 4 ML: 20 INJECTION, SOLUTION INFILTRATION; PERINEURAL at 07:15

## 2022-06-06 RX ADMIN — FENTANYL CITRATE 25 MCG: 50 INJECTION, SOLUTION INTRAMUSCULAR; INTRAVENOUS at 11:38

## 2022-06-06 RX ADMIN — PROPOFOL 20 MG: 10 INJECTION, EMULSION INTRAVENOUS at 09:03

## 2022-06-06 RX ADMIN — Medication 0.2 MG: at 13:44

## 2022-06-06 RX ADMIN — CEFAZOLIN SODIUM 2 G: 2 INJECTION, SOLUTION INTRAVENOUS at 07:11

## 2022-06-06 RX ADMIN — Medication 0.2 MG: at 13:37

## 2022-06-06 RX ADMIN — Medication 50 MG: at 07:15

## 2022-06-06 RX ADMIN — ONDANSETRON 4 MG: 2 INJECTION INTRAMUSCULAR; INTRAVENOUS at 11:11

## 2022-06-06 RX ADMIN — PROPOFOL 20 MG: 10 INJECTION, EMULSION INTRAVENOUS at 12:10

## 2022-06-06 RX ADMIN — FENTANYL CITRATE 50 MCG: 50 INJECTION, SOLUTION INTRAMUSCULAR; INTRAVENOUS at 07:46

## 2022-06-06 RX ADMIN — FENTANYL CITRATE 25 MCG: 0.05 INJECTION, SOLUTION INTRAMUSCULAR; INTRAVENOUS at 12:56

## 2022-06-06 RX ADMIN — SODIUM CHLORIDE, SODIUM LACTATE, POTASSIUM CHLORIDE, CALCIUM CHLORIDE: 600; 310; 30; 20 INJECTION, SOLUTION INTRAVENOUS at 10:03

## 2022-06-06 RX ADMIN — Medication 20 MG: at 08:56

## 2022-06-06 RX ADMIN — Medication 20 MG: at 07:46

## 2022-06-06 RX ADMIN — PROPOFOL 175 MCG/KG/MIN: 10 INJECTION, EMULSION INTRAVENOUS at 07:15

## 2022-06-06 RX ADMIN — PROPOFOL 200 MG: 10 INJECTION, EMULSION INTRAVENOUS at 07:15

## 2022-06-06 RX ADMIN — CEFAZOLIN SODIUM 2 G: 2 INJECTION, SOLUTION INTRAVENOUS at 11:10

## 2022-06-06 RX ADMIN — FENTANYL CITRATE 25 MCG: 50 INJECTION, SOLUTION INTRAMUSCULAR; INTRAVENOUS at 09:46

## 2022-06-06 RX ADMIN — Medication 30 MG: at 09:31

## 2022-06-06 RX ADMIN — Medication 0.4 MG: at 13:58

## 2022-06-06 RX ADMIN — FENTANYL CITRATE 25 MCG: 0.05 INJECTION, SOLUTION INTRAMUSCULAR; INTRAVENOUS at 13:17

## 2022-06-06 RX ADMIN — FENTANYL CITRATE 25 MCG: 50 INJECTION, SOLUTION INTRAMUSCULAR; INTRAVENOUS at 09:43

## 2022-06-06 RX ADMIN — DEXAMETHASONE SODIUM PHOSPHATE 4 MG: 4 INJECTION, SOLUTION INTRA-ARTICULAR; INTRALESIONAL; INTRAMUSCULAR; INTRAVENOUS; SOFT TISSUE at 07:15

## 2022-06-06 RX ADMIN — Medication 0.2 MG: at 13:52

## 2022-06-06 RX ADMIN — FENTANYL CITRATE 50 MCG: 50 INJECTION, SOLUTION INTRAMUSCULAR; INTRAVENOUS at 07:15

## 2022-06-06 RX ADMIN — FENTANYL CITRATE 25 MCG: 0.05 INJECTION, SOLUTION INTRAMUSCULAR; INTRAVENOUS at 13:29

## 2022-06-06 RX ADMIN — Medication 20 MG: at 10:19

## 2022-06-06 RX ADMIN — Medication 10 MG: at 08:50

## 2022-06-06 RX ADMIN — FENTANYL CITRATE 25 MCG: 50 INJECTION, SOLUTION INTRAMUSCULAR; INTRAVENOUS at 12:01

## 2022-06-06 RX ADMIN — PROPOFOL 30 MG: 10 INJECTION, EMULSION INTRAVENOUS at 09:39

## 2022-06-06 ASSESSMENT — LIFESTYLE VARIABLES: TOBACCO_USE: 1

## 2022-06-06 NOTE — ANESTHESIA CARE TRANSFER NOTE
Patient: Dayana Danielle    Procedure: Procedure(s):  MAMMOPLASTY, REDUCTION       Diagnosis: Hypertrophy of breast [N62]  Diagnosis Additional Information: No value filed.    Anesthesia Type:   General     Note:    Oropharynx: oropharynx clear of all foreign objects  Level of Consciousness: awake  Oxygen Supplementation: face mask  Level of Supplemental Oxygen (L/min / FiO2): 8  Independent Airway: airway patency satisfactory and stable  Dentition: dentition unchanged  Vital Signs Stable: post-procedure vital signs reviewed and stable  Report to RN Given: handoff report given  Patient transferred to: PACU    Handoff Report: Identifed the Patient, Identified the Reponsible Provider, Reviewed the pertinent medical history, Discussed the surgical course, Reviewed Intra-OP anesthesia mangement and issues during anesthesia, Set expectations for post-procedure period and Allowed opportunity for questions and acknowledgement of understanding      Vitals:  Vitals Value Taken Time   /81 06/06/22 1242   Temp 96.9  F (36.1  C) 06/06/22 1242   Pulse 79 06/06/22 1245   Resp 14 06/06/22 1242   SpO2 91 % 06/06/22 1245   Vitals shown include unvalidated device data.    Electronically Signed By: CHEY Grimm CRNA  June 6, 2022  12:49 PM

## 2022-06-06 NOTE — ANESTHESIA PREPROCEDURE EVALUATION
Anesthesia Pre-Procedure Evaluation    Patient: Dayana Danielle   MRN: [id number] : 1990        Procedure : Procedure(s):  MAMMOPLASTY, REDUCTION          Past Medical History:   Diagnosis Date     ADHD     States that it's not an ongoing diagnosis, the person who did her psych eval for it did not think she had it. Was on Adderall in the past and stopped that in 2017.     Anemia     10 years ago     Anxiety     Started the Citalopram early 2017 by her PCP.      Depressive disorder     Started the Citalopram early 2017 by her PCP.      History of miscarriage 2019     Ovarian cyst     Right sided     Smoking     Stopped 2017.      Past Surgical History:   Procedure Laterality Date      SECTION N/A 2018    Procedure:  SECTION;  PRIMARY  SECTION ;  Surgeon: Nia Casanova MD;  Location:  L+D      SECTION N/A 2020    Procedure: REPEAT  SECTION;  Surgeon: Nia Casanova MD;  Location:  L[facility]D     DILATION AND CURETTAGE SUCTION N/A 2019    Procedure: DILATION AND CURETTAGE SUCTION;  Surgeon: Nia Casanova MD;  Location:  OR      Allergies   Allergen Reactions     Seasonal Allergies       Social History     Tobacco Use     Smoking status: Former Smoker     Packs/day: 0.50     Types: Cigarettes     Start date: 2003     Quit date: 10/2017     Years since quittin.6     Smokeless tobacco: Never Used     Tobacco comment: Used a vapor for the month of October.   Substance Use Topics     Alcohol use: Yes     Comment: 2x/week      Wt Readings from Last 1 Encounters:   22 95.3 kg (210 lb)        Anesthesia Evaluation   Pt has had prior anesthetic. Type: Regional.    No history of anesthetic complications       ROS/MED HX  ENT/Pulmonary:  - neg pulmonary ROS   (+) tobacco use, Past use,  (-) sleep apnea   Neurologic: Comment: ADHD        Cardiovascular:  - neg cardiovascular ROS  (-) hypertension,  CAD and dyslipidemia   METS/Exercise Tolerance:     Hematologic:  - neg hematologic  ROS     Musculoskeletal:  - neg musculoskeletal ROS     GI/Hepatic:  - neg GI/hepatic ROS  (-) GERD and esophageal disease   Renal/Genitourinary:  - neg Renal ROS     Endo:  - neg endo ROS     Psychiatric/Substance Use:     (+) psychiatric history depression and anxiety     Infectious Disease:  - neg infectious disease ROS     Malignancy:       Other:            Physical Exam    Airway  airway exam normal      Mallampati: I    Neck ROM: full   Mouth opening: > 3 cm    Respiratory Devices and Support         Dental  no notable dental history         Cardiovascular   cardiovascular exam normal       Rhythm and rate: regular and normal     Pulmonary   pulmonary exam normal        breath sounds clear to auscultation           OUTSIDE LABS:  CBC:   Lab Results   Component Value Date    WBC 11.6 (H) 11/12/2020    WBC 11.0 08/25/2020    HGB 10.6 (L) 11/13/2020    HGB 11.8 11/12/2020    HCT 36.6 11/12/2020    HCT 32.7 (L) 08/25/2020     11/12/2020     08/25/2020     BMP:   Lab Results   Component Value Date     09/03/2017     08/15/2015    POTASSIUM 3.9 09/03/2017    POTASSIUM 3.7 08/15/2015    CHLORIDE 105 09/03/2017    CHLORIDE 108 08/15/2015    CO2 22 09/03/2017    CO2 25 08/15/2015    BUN 12 09/03/2017    BUN 8 08/15/2015    CR 0.57 09/03/2017    CR 0.68 08/15/2015     (H) 09/03/2017    GLC 89 08/15/2015     COAGS: No results found for: PTT, INR, FIBR  POC:   Lab Results   Component Value Date    HCG Negative 08/15/2015     HEPATIC: No results found for: ALBUMIN, PROTTOTAL, ALT, AST, GGT, ALKPHOS, BILITOTAL, BILIDIRECT, JOHNSON  OTHER:   Lab Results   Component Value Date    MIGUEL ÁNGEL 8.7 09/03/2017       Anesthesia Plan    ASA Status:  2   NPO Status:  NPO Appropriate    Anesthesia Type: General.     - Airway: LMA   Induction: Intravenous.   Maintenance: TIVA.        Consents    Anesthesia Plan(s) and  associated risks, benefits, and realistic alternatives discussed. Questions answered and patient/representative(s) expressed understanding.    - Discussed:     - Discussed with:  Patient      - Extended Intubation/Ventilatory Support Discussed: No.      - Patient is DNR/DNI Status: No    Use of blood products discussed: No .     Postoperative Care    Pain management: IV analgesics, Oral pain medications.   PONV prophylaxis: Ondansetron (or other 5HT-3), Dexamethasone or Solumedrol     Comments:    Other Comments:     TIVA    LMA vs ETT            Darcie Mcgowan MD

## 2022-06-06 NOTE — H&P
Chief complaint:  Symptomatic bilateral macromastia    History of present illness:  This is a 32 year old lady who presents with symptomatic bilateral macromastia and she is scheduled for bilateral reduction mammoplasty.    Past medical history:  Past Medical History:   Diagnosis Date     ADHD     States that it's not an ongoing diagnosis, the person who did her psych eval for it did not think she had it. Was on Adderall in the past and stopped that in June 2017.     Anemia     10 years ago     Anxiety     Started the Citalopram early December 2017 by her PCP.      Depressive disorder     Started the Citalopram early December 2017 by her PCP.      History of miscarriage 04/2019     Ovarian cyst     Right sided     Smoking     Stopped October 2017.       Past surgical history:  C-sections x2, dilatation and curettage x1.    Allergies:  No known drug allergies    Medications:    Current Outpatient Medications:      amphetamine-dextroamphetamine (ADDERALL) 10 MG tablet, Take 1 tablet (10 mg) by mouth 2 times daily, Disp: 60 tablet, Rfl: 0     diphenhydrAMINE (BENADRYL) 25 MG capsule, , Disp: , Rfl:      doxylamine (UNISOM) 25 MG TABS tablet, Take by mouth At Bedtime, Disp: , Rfl:      hydrOXYzine (ATARAX) 25 MG tablet, TAKE ONE TABLET BY MOUTH EVERY FOUR TO SIX HOURS AS NEEDED, Disp: 90 tablet, Rfl: 3     ibuprofen (ADVIL/MOTRIN) 800 MG tablet, Take 1 tablet (800 mg) by mouth every 6 hours, Disp: 40 tablet, Rfl: 0     Omeprazole (PRILOSEC PO), , Disp: , Rfl:      propranolol (INDERAL) 10 MG tablet, Take 1 tablet (10 mg) by mouth 3 times daily as needed (anxiety), Disp: 90 tablet, Rfl: 0     valACYclovir (VALTREX) 1000 mg tablet, Take 2 tablets (2,000 mg) by mouth 2 times daily, Disp: 4 tablet, Rfl: 4     amphetamine-dextroamphetamine (ADDERALL) 10 MG tablet, Take 1 tablet (10 mg) by mouth 2 times daily (Patient not taking: Reported on 1/26/2022), Disp: 60 tablet, Rfl: 0    Current Facility-Administered Medications:      " acetaminophen (TYLENOL) tablet 975 mg, 975 mg, Oral, Once, Medardo Cueva MD     ceFAZolin (ANCEF) intermittent infusion 2 g in 100 mL dextrose PRE-MIX, 2 g, Intravenous, Pre-Op/Pre-procedure x 1 dose, Romero Rodriguez MD     EPINEPHrine (ADRENALIN) 1 mg in lactated ringers 1,000 mL irrigation, , Irrigation, Once, Romero Rodriguez MD     lactated ringers infusion, , Intravenous, Continuous, Medardo Cueva MD     lidocaine (LMX4) kit, , Topical, Q1H PRN, Medardo Cueva MD     lidocaine 1 % 0.1-1 mL, 0.1-1 mL, Other, Q1H PRN, Medardo Cueva MD     magnesium sulfate 4 g in 100 mL sterile water (premade), 4 g, Intravenous, Once, Medardo Cueva MD     sodium chloride (PF) 0.9% PF flush 3 mL, 3 mL, Intracatheter, Q8H, Medardo Cueva MD     sodium chloride (PF) 0.9% PF flush 3 mL, 3 mL, Intracatheter, q1 min prn, Medardo Cueva MD    Family history:  Denies family history of breast cancer, ovarian cancer.    Social History:  Patient does vape for the last 5 years.  She drinks alcohol occasionally.    Review of systems:  General ROS: No complaints or constitutional symptoms  Skin: No complaints or symptoms   Hematologic/Lymphatic: No symptoms or complaints  Psychiatric: No symptoms or complaints  Endocrine: No excessive fatigue, no hypermetabolic symptoms reported  Respiratory ROS: No cough, shortness of breath, or wheezing  Cardiovascular ROS: No chest pain or dyspnea on exertion  Breast ROS: Denies nipple inversion, denies nipple discharge, denies palpable breast masses, denies peau d'orange  Gastrointestinal ROS: No abdominal pain, nausea, diarrhea, or constipation  Musculoskeletal ROS: Complains of neck pain and upper back pain  Neurological ROS: Complains of occipital headaches.    Physical exam:  /68   Pulse 64   Temp 97.8  F (36.6  C) (Temporal)   Resp 16   Ht 1.753 m (5' 9\")   Wt 95.3 kg (210 lb)   LMP 05/11/2022 (Approximate)   SpO2 98%   Breastfeeding No   BMI 31.01 kg/m  "   General: Alert, cooperative, appears stated age   Skin: Skin color, texture, turgor normal, no rashes or lesions   Lymphatic: No obvious adenopathy, no swelling   Eyes: No scleral icterus, pupils equal  HENT: No traumatic injury to the head or face, no gross abnormalities  Lungs: Normal respiratory effort, breath sounds equal bilaterally  Heart: Regular rate and rhythm  Breasts: Bilateral grade 2 ptosis, bilateral macromastia, no nipple inversion, no peau d'orange.  No palpable breast masses, no nipple discharge.  Right breast sternal notch to nipple distance is 34 cm, nipple to inframammary fold is 15 cm, breast diameter is 20 cm.  On the left breast, sternal notch to nipple distance is 33 cm, nipple to inframammary fold distance is 14 cm and breast diameter is 21 cm.  No palpable axillary lymphadenopathies bilaterally.  Abdomen: Soft, non-distended and non-tender to palpation  Neurologic: Grossly intact                      ASSESSMENT:    This is a 32 year old lady with bilateral symptomatic macromastia.       PLAN:   Bilateral reduction mammoplasty, utilizing Wise pattern inferior pedicle.    I have discussed with her the risks of surgery and they include but are not limited to scarring, infection, bleeding, asymmetry, temporary versus permanent altered sensation on both breast and both nipple areolar complexes, loss of nipple areolar complex requiring future tattooing, inability to breast-feed, need for further surgeries.  Patient is a slightly higher risk of ischemic complications due to history of vaping.    Time spent with the patient 20 minutes.      Romero Rodriguez MD, FACS   Diplomate American Board of Plastic Surgery  Diplomate American Board of Surgery  [facility]  Division of Plastic & Reconstructive Surgery  Office: [phone]   6/6/2022 at 6:42 AM

## 2022-06-06 NOTE — DISCHARGE INSTRUCTIONS
Discharge Instructions: After Your Surgery    You ve just had surgery. During surgery, you were given medicine called anesthesia to keep you relaxed and free of pain. After surgery, you may have some pain or nausea. This is common. Here are some tips for feeling better and getting well after surgery.    Going home    Your healthcare provider will show you how to take care of yourself when you go home. He or she will also answer your questions. Have an adult family member or friend drive you home. For the first 24 hours after your surgery:  Don't drive or use heavy equipment.  Don't make important decisions or sign legal papers.  Don't drink alcohol.  Have an adult stay with you for 24 hours. He or she can watch for problems and help keep you safe.  Be sure to go to all follow-up visits with your healthcare provider. And rest after your surgery for as long as your healthcare provider tells you to.    Coping with pain    If you have pain after surgery, pain medicine will help you feel better. Take it as told, before pain becomes severe. Also, ask your healthcare provider or pharmacist about other ways to control pain. This might be with heat, ice, or relaxation. And follow any other instructions your surgeon or nurse gives you.    Tips for taking pain medicine    To get the best relief possible, remember these points:  Pain medicines can upset your stomach. Taking them with a little food may help.  Most pain relievers taken by mouth need at least 20 to 30 minutes to start to work.  Don't wait till your pain becomes severe before you take your medicine. Try to time your medicine so that you can take it before starting an activity. This might be before you get dressed, go for a walk, or sit down for dinner.  Constipation is a common side effect of pain medicines. It's ok to take medicines such as laxatives or stool softeners to help ease constipation. Also ask if you should skip any foods. Drinking lots of fluids and  eating foods such as fruits and vegetables that are high in fiber can also help.  Drinking alcohol and taking pain medicine can cause dizziness and slow your breathing. It can even be deadly. Don't drink alcohol while taking pain medicine.  Pain medicine can make you react more slowly to things. Don't drive or run machinery while taking pain medicine.  Your healthcare provider may tell you to take acetaminophen to help ease your pain. Ask him or her how much you are supposed to take each day. Acetaminophen or other pain relievers may interact with your prescription medicines or other over-the-counter (OTC) medicines. Some prescription medicines have acetaminophen and other ingredients. Using both prescription and OTC acetaminophen for pain can cause you to overdose. Read the labels on your OTC medicines with care. This will help you to clearly know the list of ingredients, how much to take, and any warnings. It may also help you not take too much acetaminophen. If you have questions or don't understand the information, ask your pharmacist or healthcare provider to explain it to you before you take the OTC medicine.    Managing nausea    Some people have an upset stomach after surgery. This is often because of anesthesia, pain, or pain medicine, or the stress of surgery. These tips will help you handle nausea and eat healthy foods as you get better. If you were on a special food plan before surgery, ask your healthcare provider if you should follow it while you get better. These tips may help:    Don't push yourself to eat. Your body will tell you when to eat and how much.  Start off with clear liquids and soup. They are easier to digest.  Next try semi-solid foods, such as mashed potatoes, applesauce, and gelatin, as you feel ready.  Slowly move to solid foods. Don t eat fatty, rich, or spicy foods at first.  Don't force yourself to have 3 large meals a day. Instead eat smaller amounts more often.  Take pain  medicines with a small amount of solid food, such as crackers or toast, to prevent nausea.    When to call your healthcare provider    Call your healthcare provider if:  You still have intolerable pain an hour after taking medicine. The medicine may not be strong enough.  You feel too sleepy, dizzy, or groggy. The medicine may be too strong.  You have side effects such as nausea or vomiting, or skin changes such as rash, itching, or hives. Your healthcare provider may suggest other medicines to control side effects.  Rash, itching, or hives may mean you have an allergic reaction. Report this right away. If you have trouble breathing or facial swelling, call 911 right away.    If you have obstructive sleep apnea    You were given anesthesia medicine during surgery to keep you comfortable and free of pain. After surgery, you may have more apnea spells because of this medicine and other medicines you were given. The spells may last longer than usual.   At home:  Keep using the continuous positive airway pressure (CPAP) device when you sleep. Unless your healthcare provider tells you not to, use it when you sleep, day or night. CPAP is a common device used to treat obstructive sleep apnea.  Talk with your provider before taking any pain medicine, muscle relaxants, or sedatives. Your provider will tell you about the possible dangers of taking these medicines.      Ibuprofen (Motrin, Advil): Next Dose: 9:11 pm. Take 600 mg every 6 hours for mild to moderate pain.    Do not exceed 4,000 mg of acetaminophen during a 24 hour period  or 1000 mg per dose. Keep in mind that acetaminophen can also be found in many over-the-counter cold medications as well as narcotics that may be given for pain.       Medication plan:    Take 600 mg ibuprofen at 9:11 pm. Take this every 6 hours    Take hydrocodone tylenol 1-2 tabs at 7:26 pm. Take every 4 hours for severe pain.    Take senna docusate as prescribed. Start tonight.     Take  cephalexin starting tonight. Take until this is gone.     Take ondansetron every 8 hours as needed for nausea.

## 2022-06-06 NOTE — ANESTHESIA POSTPROCEDURE EVALUATION
Patient: Dayana Danielle    Procedure: Procedure(s):  MAMMOPLASTY, REDUCTION       Anesthesia Type:  General    Note:  Disposition: Outpatient   Postop Pain Control: Uneventful            Sign Out: Well controlled pain   PONV: No   Neuro/Psych: Uneventful            Sign Out: Acceptable/Baseline neuro status   Airway/Respiratory: Uneventful            Sign Out: Acceptable/Baseline resp. status   CV/Hemodynamics: Uneventful            Sign Out: Acceptable CV status; No obvious hypovolemia; No obvious fluid overload   Other NRE: NONE   DID A NON-ROUTINE EVENT OCCUR? No    Event details/Postop Comments:  Really struggled with post-op pain. In PACU we administered tylenol, ketorolac, dilaudid, ice, 2gm Mg2+, versed, ketamine, and oxycodone. She visibly looks much better, is now smiling, occasionally laughs. Still intermittently tearful.  She rates her pain the same but she appears MUCH more comfortable.            Last vitals:  Vitals Value Taken Time   /70 06/06/22 1315   Temp 96.9  F (36.1  C) 06/06/22 1242   Pulse 50 06/06/22 1318   Resp 16 06/06/22 1315   SpO2 97 % 06/06/22 1318   Vitals shown include unvalidated device data.    Electronically Signed By: Darcie Mcgowan MD  June 6, 2022  1:22 PM

## 2022-06-06 NOTE — PROGRESS NOTES
Pt reports great pain relief after removal of bra. Ace wrap applied in place of bra per verbal order read back by Dr. Rodriguez.    Kelly Torres RN on 6/6/2022 at 4:44 PM

## 2022-06-06 NOTE — ANESTHESIA PROCEDURE NOTES
Airway       Patient location during procedure: OR       Procedure Start/Stop Times: 6/6/2022 7:17 AM  Staff -        Anesthesiologist:  Darcie Mcgowan MD       CRNA: Teresa Diamond APRN CRNA       Performed By: CRNAIndications and Patient Condition       Indications for airway management: rubi-procedural       Induction type:intravenous       Mask difficulty assessment: 1 - vent by mask    Final Airway Details       Final airway type: endotracheal airway       Successful airway: ETT - single  Endotracheal Airway Details        ETT size (mm): 7.0       Cuffed: yes       Successful intubation technique: direct laryngoscopy       DL Blade Type: Gonzalez 2       Adjucts: tooth guard and stylet       Position: Center       Measured from: lips       Secured at (cm): 22       Bite block used: None    Post intubation assessment        Placement verified by: capnometry, equal breath sounds and chest rise        Number of attempts at approach: 1       Secured with: silk tape       Ease of procedure: easy       Dentition: Intact and Unchanged    Medication(s) Administered   Medication Administration Time: 6/6/2022 7:17 AM

## 2022-06-07 NOTE — OP NOTE
June 7, 2022      Preoperative diagnosis: Symptomatic bilateral macromastia.    Postoperative diagnosis: Same.    Procedure: Bilateral reduction mammoplasties.    Surgeon: Romero Rodriguez MD.    Assistant: Yi Chaudhary CSA (there was no plastic surgery resident available to assist).    Anesthesia: General.    IV fluid: 1600 ml.    Tumescent solution: 900 mL (from 1000 mL of lactated Olin mixed with 1 mg of epinephrine). 450 mL was infiltrated on each breast, for a total of 900 mL.    EBL: 25 ml.    U/O: 1000 ml.    Findings: Bilateral macromastia.    Specimens: Right breast parenchyma, 840 grams.  Left breast parenchyma, 680 grams.    Drains: # 15 FR Joseph drain per breast.  2 drains total.    Disposition: Patient tolerated procedure well.  She was extubated and transferred to recovery room awake and in stable condition.    Indications:    Ms. Danielle is a 32 year old lady who presented with symptomatic bilateral macromastia characterized by neck pain, upper back pain, bilateral shoulder grooving, occasional intertrigo as well as difficulty to asleep and to exercise secondary to the heaviness of her breasts.  She has failed nonmedical treatment with physical therapy and non steroidal anti-inflammatory medications.  Patient has been been deemed an appropriate surgical candidate for bilateral reduction mammoplasties with a Wise pattern technique and inferior pedicle bilaterally.    Risks has been explained to the patient and they include but are not limited to scarring, keloid formation, infection, wound healing problems, hematoma, temporary versus permanent altered sensation on the skin of the breasts as well as on the nipple areolar complexes, necrosis of the nipple areolar complex (NAC) with possibility of future tattooing of the NAC, hypopigmentation on the NAC, breast asymmetries, NAC asymmetries, inability to breast-feed, need for further surgeries.  Patient has acknowledged all these risks, has signed  informed consent and has agreed to proceed.    Procedure:    Patient was identified in the preoperative holding area and preoperative IV antibiotics were given to her. I then proceeded to draw the Wise pattern bilaterally for her bilateral reduction mammoplasties.     First, I localized the acromioclavicular junction on each side of her shoulders as well as the sternal notch.  Then I measured the distance between each acromioclavicular junction to the sternal notch.  The center of this distance was marked and the breast meridian was drawn on the anterior surface of each breast.  Then I proceeded to localize  Pitanguy's point on the anterior surface of each breast and then marked 2 cm superior to this point. From this point I draw a  10 cm line medially and laterally on each breast encompassing the NAC.  These 2 lines will join in the future in order to create the vertical limb of the superior flap of the Wise pattern.  From the inferior end of each of these  10 cm lines, I draw a transverse line both medially and laterally on each breast until they met the medial and the lateral ends of the inframammary fold (IMF).     This was the drawing of the Greene pattern:                     Patient was then brought to the operating room and was placed supine in the operating room table. After general anesthesia was obtained the chest and both breasts were prepped and draped in the sterile surgical fashion.    We then proceeded to design and demarcate the inferior pedicle on both breasts.  First I localized the breast meridian on the IMF. From this point I measured 4 cm laterally and 4 cm medially to the breast meridian and this created a 8 cm width for the inferior pedicle that extended superiorly along the breast and encompassing the NAC.    At this time I made a stab incision with scalpel #15 on the anterior aspect, lower third, of each inferior pedicle and proceeded to infiltrate the breast base with tumescent solution, 450  ml per breast in order to minimize bleeding and to facilitate breast parenchyma dissection.    Utilizing a 42 mm cookie cutter placed on the center of the NAC, I proceeded to make hash marks, utilizing a #15 blade, on the impression left by the cookie-cutter on the NAC.  Utilizing a #10 blade we proceeded to make hash marks along the Wise pattern markings, bilateral.    We then proceeded to de-epithelialized the inferior pedicles bilaterally.  Then, the superior flap of the Wise pattern on each breast was elevated with electrocautery.  The dissection was carried out until we reached the clavicle bilaterally.  Each flap was elevated from the fascia of the pectoralis major muscle underneath.  The fascia was left undisturbed.  We did resect subcutaneous fat and breast parenchyma from each of this superior flap in order to provide 3 cm thickness on each flap.    At this time we proceeded to resect the skin and breast parenchyma that was medial and lateral to the inferior pedicle.    After the above-mentioned skin and breast parenchymal resections, on the right breast we removed 840 grams, and on the left breast we remove 680 grams.    Then we proceeded to provide irrigation with antibiotic solution and we found that hemostasis was excellent on both breasts.  A #15 Sudanese Joseph drain was applied to each breast, and the drains were secured to the skin with 2-0 silk stitch.    We also applied a 2-0 silk reference stitch at the 12 o'clock position on each NAC for future reference and to avoid any twisting of the pedicle during the future inset of the NAC on the superior flap.    At this time the lateral and medial opening of the superior flap of the Wise pattern were temporarily closed with staples as a vertical limb on each breast.  The superior flap was also approximated to the IMF with temporary staples as well.  The inferior pedicle containing the NAC was covered by this superior flap on each breast.    We then  proceeded to sit the patient up in order to compare symmetry on both breasts and we were satisfied with the symmetry achieved.    At this time we proceeded to measured 8 cm from each of the IMF and along the vertical limb of the superior flap.  Utilizing now a 38 mm cookie cutter we marked the new position of the NAC on each breast.    The patient was then placed back on the supine position.  The opening of the new position for the NAC was made with scalpel #15 followed by electrocautery, being careful not to injure the NAC that was laying underneath.    The NAC was brought out with Allis clamp through this new opening and we kept proper orientation of the NAC and pedicle because of the previously placed reference silk stitch at the 12 o'clock position on the NAC.  Each NAC was inset with 3-0 Vicryl buried interrupted stitches followed by 5-0 Vicryl running subcuticular stitches around each areola.    The vertical limb of the superior flap was then closed in layers with 3-0 Vicryl buried interrupted stitches followed by 4-0 Vicryl running subcuticular stitches.  Each IMF was also closed in layers, first with 2-0 Vicryl buried interrupted stitches followed by 2-0 Stratafix running subcuticular stitches.    Prineo Dermabond was applied along the incisions and the Steri-Strips around each NAC.    At the end of the case capillary refill was less than 2 seconds on each NAC.    Instrument count was reported by nursing personnel as correct.  Patient tolerated procedure well and she was extubated and transferred to recovery room awake and in stable condition.      Romero Rodriguez MD , FACS   Diplomate American Board of Plastic Surgery  Diplomate American Board of Surgery  Adj. Assistant Professor of Surgery  Division of Plastic & Reconstructive Surgery   [facility]  Office: [phone]   6/7/2022 at 11:57 AM

## 2022-06-07 NOTE — BRIEF OP NOTE
[facility]    Brief Operative Note    Pre-operative diagnosis: Hypertrophy of breast [N62]  Post-operative diagnosis Same as pre-operative diagnosis    Procedure: Procedure(s):  MAMMOPLASTY, REDUCTION  Surgeon: Surgeon(s) and Role:     * Romero Rodriguez MD - Primary  Anesthesia: General   Estimated Blood Loss: 25 mL from 6/6/2022  7:05 AM to 6/6/2022 12:40 PM      Drains: Tre-Verma  Specimens:   ID Type Source Tests Collected by Time Destination   1 :  Tissue Breast, Right SURGICAL PATHOLOGY EXAM Romero Rodriguez MD 6/6/2022  9:15 AM    2 :  Tissue Breast, Left SURGICAL PATHOLOGY EXAM Romero Rodriguez MD 6/6/2022 10:16 AM      Findings:   Macromastia.  Complications: None.  Implants: * No implants in log *    Romero Rodriguez MD , FACS   Diplomate American Board of Plastic Surgery  Diplomate American Board of Surgery  Adj. Assistant Professor of Surgery  Division of Plastic & Reconstructive Surgery   [facility]  Office: [phone]   6/6/2022 at 9:09 PM

## 2022-06-08 ENCOUNTER — VIRTUAL VISIT (OUTPATIENT)
Dept: FAMILY MEDICINE | Facility: CLINIC | Age: 32
End: 2022-06-08
Payer: COMMERCIAL

## 2022-06-08 DIAGNOSIS — Z53.9 ERRONEOUS ENCOUNTER--DISREGARD: Primary | ICD-10-CM

## 2022-06-09 ENCOUNTER — OFFICE VISIT (OUTPATIENT)
Dept: PLASTIC SURGERY | Facility: AMBULATORY SURGERY CENTER | Age: 32
End: 2022-06-09
Payer: COMMERCIAL

## 2022-06-09 VITALS — SYSTOLIC BLOOD PRESSURE: 126 MMHG | DIASTOLIC BLOOD PRESSURE: 70 MMHG

## 2022-06-09 DIAGNOSIS — Z98.890 STATUS POST REDUCTION MAMMOPLASTY: Primary | ICD-10-CM

## 2022-06-09 PROCEDURE — 99024 POSTOP FOLLOW-UP VISIT: CPT | Performed by: PLASTIC SURGERY

## 2022-06-09 NOTE — LETTER
6/9/2022         RE: Dayana Danielle  [address] [place] [address]        Dear Colleague,    Thank you for referring your patient, Dayana Danielle, to the [facility]. Please see a copy of my visit note below.    Antonio is a 32 years old lady status post bilateral breast reduction.  She is postoperative day #3.    Patient is feeling well although with still some pain.    At the physical exam, there is no evidence of hematoma or seroma.  There is no evidence of wound dehiscence or surgical site infection.  Bilateral nipple areolar complexes are viable with capillary refill less than 2 seconds.  Bilateral Joseph drain has been removed.  Patient presented with bilateral excellent shape on both breasts.    Due to pain control, I will prescribe 12 pills of Norco and I will see her again in 2 weeks    Patient is happy with result and so am I.    Romero Rodriguez MD , FACS   Diplomate American Board of Plastic Surgery  Diplomate American Board of Surgery  Adj. Assistant Professor of Surgery  Division of Plastic & Reconstructive Surgery   [facility]  Office: [phone]   6/9/2022 at 2:37 PM         Again, thank you for allowing me to participate in the care of your patient.        Sincerely,        Romero Rodriguez MD

## 2022-06-09 NOTE — PROGRESS NOTES
Antonio is a 32 years old lady status post bilateral breast reduction.  She is postoperative day #3.    Patient is feeling well although with still some pain.    At the physical exam, there is no evidence of hematoma or seroma.  There is no evidence of wound dehiscence or surgical site infection.  Bilateral nipple areolar complexes are viable with capillary refill less than 2 seconds.  Bilateral Joseph drain has been removed.  Patient presented with bilateral excellent shape on both breasts.    Due to pain control, I will prescribe 12 pills of Norco and I will see her again in 2 weeks    Patient is happy with result and so am I.    Romero Rodriguez MD , FACS   Diplomate American Board of Plastic Surgery  Diplomate American Board of Surgery  Adj. Assistant Professor of Surgery  Division of Plastic & Reconstructive Surgery   [facility]  Office: [phone]   6/9/2022 at 2:37 PM

## 2022-06-24 ENCOUNTER — OFFICE VISIT (OUTPATIENT)
Dept: PLASTIC SURGERY | Facility: AMBULATORY SURGERY CENTER | Age: 32
End: 2022-06-24
Payer: COMMERCIAL

## 2022-06-24 DIAGNOSIS — Z98.890 STATUS POST BILATERAL BREAST REDUCTION: Primary | ICD-10-CM

## 2022-06-24 PROCEDURE — 99024 POSTOP FOLLOW-UP VISIT: CPT | Performed by: PLASTIC SURGERY

## 2022-06-24 NOTE — PROGRESS NOTES
Antonio is a 32 years old lady status post bilateral breast reduction.  She is 18 days out from surgery.  She is feeling well.    At the physical exam, patient presents with good breast symmetry, incisions are healing well with no evidence of hypertrophic scarring or keloid.  Sensation in bilateral nipple areolar complexes remains intact.    Plan: Continue with sports bra, start applying cocoa butter lotion along the scars on return to see me in 6 weeks.  Patient is happy with results.    Romero Rodriguez MD , FACS   Diplomate American Board of Plastic Surgery  Diplomate American Board of Surgery  Adj. Assistant Professor of Surgery  Division of Plastic & Reconstructive Surgery   [facility]  Office: [phone]   6/24/2022 at 1:09 PM

## 2022-06-24 NOTE — LETTER
6/24/2022         RE: Dayana Danielle  [address] [place] [address]        Dear Colleague,    Thank you for referring your patient, Dayana Danielle, to the [facility]. Please see a copy of my visit note below.    Antonio is a 32 years old lady status post bilateral breast reduction.  She is 18 days out from surgery.  She is feeling well.    At the physical exam, patient presents with good breast symmetry, incisions are healing well with no evidence of hypertrophic scarring or keloid.  Sensation in bilateral nipple areolar complexes remains intact.    Plan: Continue with sports bra, start applying cocoa butter lotion along the scars on return to see me in 6 weeks.  Patient is happy with results.    Romero Rodriguez MD , FACS   Diplomate American Board of Plastic Surgery  Diplomate American Board of Surgery  Adj. Assistant Professor of Surgery  Division of Plastic & Reconstructive Surgery   [facility]  Office: [phone]   6/24/2022 at 1:09 PM             Again, thank you for allowing me to participate in the care of your patient.        Sincerely,        Romero Rodriguez MD

## 2022-08-05 ENCOUNTER — OFFICE VISIT (OUTPATIENT)
Dept: PLASTIC SURGERY | Facility: AMBULATORY SURGERY CENTER | Age: 32
End: 2022-08-05
Payer: COMMERCIAL

## 2022-08-05 VITALS — SYSTOLIC BLOOD PRESSURE: 110 MMHG | DIASTOLIC BLOOD PRESSURE: 70 MMHG

## 2022-08-05 DIAGNOSIS — Z98.890 STATUS POST BILATERAL BREAST REDUCTION: Primary | ICD-10-CM

## 2022-08-05 PROCEDURE — 99024 POSTOP FOLLOW-UP VISIT: CPT | Performed by: PLASTIC SURGERY

## 2022-08-05 NOTE — LETTER
8/5/2022         RE: Dayana Danielle  [address] [place] [address]        Dear Colleague,    Thank you for referring your patient, Dayana Danielle, to the [facility] Please see a copy of my visit note below.    Antonio is a 32 years old lady status post bilateral breast reduction.  She is 2 months out from surgery.  She is doing excellent.  No complaints.    At the physical exam, patient present with excellent breast shape bilaterally.  Well-healed scars.  No evidence of hypertrophic scarring or keloids.  Good symmetry and intact sensation.                        Plan: Return to normal activities.  Follow-up with me as needed.  She is very pleased with results and so am I.    Romero Rodriguez MD , FACS   Diplomate American Board of Plastic Surgery  Diplomate American Board of Surgery  Adj. Assistant Professor of Surgery  Division of Plastic & Reconstructive Surgery   [facility]  Office: [phone]   8/5/2022 at 9:11 AM         Again, thank you for allowing me to participate in the care of your patient.        Sincerely,        Romero Rodriguez MD

## 2022-08-05 NOTE — PROGRESS NOTES
Antonio is a 32 years old lady status post bilateral breast reduction.  She is 2 months out from surgery.  She is doing excellent.  No complaints.    At the physical exam, patient present with excellent breast shape bilaterally.  Well-healed scars.  No evidence of hypertrophic scarring or keloids.  Good symmetry and intact sensation.                        Plan: Return to normal activities.  Follow-up with me as needed.  She is very pleased with results and so am I.    Romero Rodriguez MD , FACS   Diplomate American Board of Plastic Surgery  Diplomate American Board of Surgery  Adj. Assistant Professor of Surgery  Division of Plastic & Reconstructive Surgery   [facility]  Office: [phone]   8/5/2022 at 9:11 AM

## 2022-08-17 DIAGNOSIS — F41.1 GAD (GENERALIZED ANXIETY DISORDER): Primary | ICD-10-CM

## 2022-08-17 RX ORDER — ALPRAZOLAM 0.25 MG
0.25 TABLET ORAL 3 TIMES DAILY PRN
Qty: 15 TABLET | Refills: 0 | Status: SHIPPED | OUTPATIENT
Start: 2022-08-17 | End: 2022-09-12

## 2022-09-11 ENCOUNTER — HEALTH MAINTENANCE LETTER (OUTPATIENT)
Age: 32
End: 2022-09-11

## 2022-09-12 ENCOUNTER — TELEPHONE (OUTPATIENT)
Dept: FAMILY MEDICINE | Facility: CLINIC | Age: 32
End: 2022-09-12

## 2022-09-12 DIAGNOSIS — F41.1 GAD (GENERALIZED ANXIETY DISORDER): ICD-10-CM

## 2022-09-12 DIAGNOSIS — F90.9 ATTENTION DEFICIT HYPERACTIVITY DISORDER (ADHD), UNSPECIFIED ADHD TYPE: Primary | ICD-10-CM

## 2022-09-12 RX ORDER — DEXTROAMPHETAMINE SACCHARATE, AMPHETAMINE ASPARTATE, DEXTROAMPHETAMINE SULFATE AND AMPHETAMINE SULFATE 2.5; 2.5; 2.5; 2.5 MG/1; MG/1; MG/1; MG/1
TABLET ORAL
Qty: 60 TABLET | Refills: 0 | Status: SHIPPED | OUTPATIENT
Start: 2022-09-12 | End: 2022-12-17

## 2022-09-12 RX ORDER — ALPRAZOLAM 0.25 MG
0.25 TABLET ORAL 3 TIMES DAILY PRN
Qty: 15 TABLET | Refills: 0 | Status: SHIPPED | OUTPATIENT
Start: 2022-09-12 | End: 2022-10-19

## 2022-09-12 NOTE — TELEPHONE ENCOUNTER
Reason for Call:  Other call back    Detailed comments: PT IS IN NEED OF MORE MEDS ORDERS ARE OLD AND NEED NEW MEDS PT IS GOING ON VACATION AND NEEDS THEM BEFORE     Phone Number Patient can be reached at: Cell number on file:    Telephone Information:   Mobile [phone]       Best Time: ANYTIME    Can we leave a detailed message on this number? YES    Call taken on 9/12/2022 at 1:43 PM by Mann Wilder

## 2022-10-19 DIAGNOSIS — Z98.891 STATUS POST CESAREAN DELIVERY: ICD-10-CM

## 2022-10-19 DIAGNOSIS — F41.1 GAD (GENERALIZED ANXIETY DISORDER): ICD-10-CM

## 2022-10-19 RX ORDER — ALPRAZOLAM 0.25 MG
TABLET ORAL
Qty: 15 TABLET | Refills: 0 | Status: SHIPPED | OUTPATIENT
Start: 2022-10-19 | End: 2022-11-21

## 2022-10-19 RX ORDER — HYDROXYZINE HYDROCHLORIDE 25 MG/1
TABLET, FILM COATED ORAL
Qty: 90 TABLET | Refills: 3 | Status: SHIPPED | OUTPATIENT
Start: 2022-10-19 | End: 2022-12-19

## 2022-10-19 NOTE — TELEPHONE ENCOUNTER
"Requested Prescriptions   Pending Prescriptions Disp Refills     ALPRAZolam (XANAX) 0.25 MG tablet [Pharmacy Med Name: ALPRAZOLAM 0.25MG TABS] 15 tablet 0     Sig: TAKE ONE TABLET BY MOUTH THREE TIMES A DAY AS NEEDED FOR ANXIETY       There is no refill protocol information for this order        hydrOXYzine (ATARAX) 25 MG tablet [Pharmacy Med Name: hydrOXYzine HCL 25MG TAB] 90 tablet 3     Sig: TAKE ONE TABLET BY MOUTH EVERY FOUR TO SIX HOURS AS NEEDED       Antihistamines Protocol Passed - 10/19/2022  8:45 AM        Passed - Recent (12 mo) or future (30 days) visit within the authorizing provider's specialty     Patient has had an office visit with the authorizing provider or a provider within the authorizing providers department within the previous 12 mos or has a future within next 30 days. See \"Patient Info\" tab in inbasket, or \"Choose Columns\" in Meds & Orders section of the refill encounter.              Passed - Patient is age 3 or older     Apply age and/or weight-based dosing for peds patients age 3 and older.    Forward request to provider for patients under the age of 3.          Passed - Medication is active on med list             "

## 2022-10-21 ENCOUNTER — E-VISIT (OUTPATIENT)
Dept: FAMILY MEDICINE | Facility: CLINIC | Age: 32
End: 2022-10-21
Payer: COMMERCIAL

## 2022-10-21 DIAGNOSIS — B00.9 HERPES SIMPLEX VIRUS INFECTION: Primary | ICD-10-CM

## 2022-10-21 PROCEDURE — 99421 OL DIG E/M SVC 5-10 MIN: CPT | Performed by: FAMILY MEDICINE

## 2022-10-21 RX ORDER — VALACYCLOVIR HYDROCHLORIDE 1 G/1
2000 TABLET, FILM COATED ORAL 2 TIMES DAILY
Qty: 4 TABLET | Refills: 3 | Status: SHIPPED | OUTPATIENT
Start: 2022-10-21 | End: 2022-10-27

## 2022-10-21 NOTE — TELEPHONE ENCOUNTER
Encounter Diagnosis   Name Primary?     Herpes simplex virus infection Yes      Orders Placed This Encounter     valACYclovir (VALTREX) 1000 mg tablet     Sig: Take 2 tablets (2,000 mg) by mouth 2 times daily for 1 day     Dispense:  4 tablet     Refill:  3     Provider E-Visit time total (minutes): 7

## 2022-10-27 ENCOUNTER — MYC REFILL (OUTPATIENT)
Dept: FAMILY MEDICINE | Facility: CLINIC | Age: 32
End: 2022-10-27

## 2022-10-27 ENCOUNTER — E-VISIT (OUTPATIENT)
Dept: URGENT CARE | Facility: CLINIC | Age: 32
End: 2022-10-27
Payer: COMMERCIAL

## 2022-10-27 DIAGNOSIS — B00.9 HERPES SIMPLEX VIRUS INFECTION: ICD-10-CM

## 2022-10-27 DIAGNOSIS — J01.90 ACUTE SINUSITIS, RECURRENCE NOT SPECIFIED, UNSPECIFIED LOCATION: Primary | ICD-10-CM

## 2022-10-27 PROCEDURE — 99421 OL DIG E/M SVC 5-10 MIN: CPT | Performed by: NURSE PRACTITIONER

## 2022-10-27 RX ORDER — VALACYCLOVIR HYDROCHLORIDE 1 G/1
2000 TABLET, FILM COATED ORAL 2 TIMES DAILY
Qty: 4 TABLET | Refills: 3 | Status: SHIPPED | OUTPATIENT
Start: 2022-10-27 | End: 2023-12-04

## 2022-10-27 NOTE — PATIENT INSTRUCTIONS
Sinusitis (Antibiotic Treatment)    The sinuses are air-filled spaces within the bones of the face. They connect to the inside of the nose. Sinusitis is an inflammation of the tissue that lines the sinuses. Sinusitis can occur during a cold. It can also happen due to allergies to pollens and other particles in the air. Sinusitis can cause symptoms of sinus congestion and a feeling of fullness. A sinus infection causes fever, headache, and facial pain. There is often green or yellow fluid draining from the nose or into the back of the throat (post-nasal drip). You have been given antibiotics to treat this condition.   Home care  Take the full course of antibiotics as instructed. Don't stop taking them, even when you feel better.  Drink plenty of water, hot tea, and other liquids as directed by the healthcare provider. This may help thin nasal mucus. It also may help your sinuses drain fluids.  Heat may help soothe painful areas of your face. Use a towel soaked in hot water. Or,  the shower and direct the warm spray onto your face. Using a vaporizer along with a menthol rub at night may also help soothe symptoms.   An expectorant with guaifenesin may help thin nasal mucus and help your sinuses drain fluids. Talk with your provider or pharmacists before taking an over-the-counter (OTC) medicine if you have any questions about it or its side effects..  You can use an OTC decongestant, unless a similar medicine was prescribed to you. Nasal sprays work the fastest. Use one that contains phenylephrine or oxymetazoline. First blow your nose gently. Then use the spray. Don't use these medicines more often than directed on the label. If you do, your symptoms may get worse. You may also take pills that contain pseudoephedrine. Don t use products that combine multiple medicines. This is because side effects may be increased. Read labels. You can also ask the pharmacist for help. (People with high blood pressure  should not use decongestants. They can raise blood pressure.) Talk with your provider or pharmacist if you have any questions about the medicine..  OTC antihistamines may help if allergies contributed to your sinusitis. Talk with your provider or pharmacist if you have any questions about the medicine..  Don't use nasal rinses or irrigation during an acute sinus infection, unless your healthcare provider tells you to. Rinsing may spread the infection to other areas in your sinuses.  Use acetaminophen or ibuprofen to control pain, unless another pain medicine was prescribed to you. If you have chronic liver or kidney disease or ever had a stomach ulcer, talk with your healthcare provider before using these medicines. Never give aspirin to anyone under age 18 who is ill with a fever. It may cause severe liver damage.  Don't smoke. This can make symptoms worse.    Follow-up care  Follow up with your healthcare provider, or as advised.   When to seek medical advice  Call your healthcare provider if any of these occur:   Facial pain or headache that gets worse  Stiff neck  Unusual drowsiness or confusion  Swelling of your forehead or eyelids  Symptoms don't go away in 10 days  Vision problems, such as blurred or double vision  Fever of 100.4 F (38 C) or higher, or as directed by your healthcare provider  Call 911  Call 911 if any of these occur:   Seizure  Trouble breathing  Feeling dizzy or faint  Fingernails, skin or lips look blue, purple , or gray  Prevention  Here are steps you can take to help prevent an infection:   Keep good hand washing habits.  Don t have close contact with people who have sore throats, colds, or other upper respiratory infections.  Don t smoke, and stay away from secondhand smoke.  Stay up to date with of your vaccines.  [employer] last reviewed this educational content on 12/1/2019 2000-2021 [employer]. All rights reserved. This information is not intended as a substitute for  professional medical care. Always follow your healthcare professional's instructions.        Dear Dayana Danielle    After reviewing your responses, I've been able to diagnose you with?a sinus infection caused by bacteria.?     Based on your responses and diagnosis, I have prescribed Augmentin to treat your symptoms. I have sent this to your pharmacy.?   You shouldn't need ear drops oral antibiotics would clear ear infection and sinus infection.     It is also important to stay well hydrated, get lots of rest and take over-the-counter decongestants,?tylenol?or ibuprofen if you?are able to?take those medications per your primary care provider to help relieve discomfort.?     It is important that you take?all of?your prescribed medication even if your symptoms are improving after a few doses.? Taking?all of?your medicine helps prevent the symptoms from returning.?     If your symptoms worsen, you develop severe headache, vomiting, high fever (>102), or are not improving in 7 days, please contact your primary care provider for an appointment or visit any of our convenient Walk-in Care or Urgent Care Centers to be seen which can be found on our website?here.?     Thanks again for choosing?us?as your health care partner,?   ?  CHEY Mills CNP?

## 2022-10-27 NOTE — TELEPHONE ENCOUNTER
"Requested Prescriptions   Pending Prescriptions Disp Refills     valACYclovir (VALTREX) 1000 mg tablet 4 tablet 3     Sig: Take 2 tablets (2,000 mg) by mouth 2 times daily       Antivirals for Herpes Protocol Failed - 10/27/2022 10:30 AM        Failed - Normal serum creatinine on file in past 12 months     Recent Labs   Lab Test 09/03/17  1613   CR 0.57       Ok to refill medication if creatinine is low          Passed - Patient is age 12 or older        Passed - Recent (12 mo) or future (30 days) visit within the authorizing provider's specialty     Patient has had an office visit with the authorizing provider or a provider within the authorizing providers department within the previous 12 mos or has a future within next 30 days. See \"Patient Info\" tab in inbasket, or \"Choose Columns\" in Meds & Orders section of the refill encounter.              Passed - Medication is active on med list             Routing refill request to provider for review/approval because:  Labs not current    Natalia Penn RN  [facility]        "

## 2022-11-02 ENCOUNTER — E-VISIT (OUTPATIENT)
Dept: FAMILY MEDICINE | Facility: CLINIC | Age: 32
End: 2022-11-02
Payer: COMMERCIAL

## 2022-11-02 DIAGNOSIS — B37.31 YEAST INFECTION OF THE VAGINA: Primary | ICD-10-CM

## 2022-11-02 PROCEDURE — 99421 OL DIG E/M SVC 5-10 MIN: CPT | Performed by: NURSE PRACTITIONER

## 2022-11-02 RX ORDER — FLUCONAZOLE 150 MG/1
150 TABLET ORAL ONCE
Qty: 1 TABLET | Refills: 1 | Status: SHIPPED | OUTPATIENT
Start: 2022-11-02 | End: 2022-11-06

## 2022-11-02 NOTE — PATIENT INSTRUCTIONS
Thank you for choosing us for your care. I have placed an order for a prescription so that you can start treatment. View your full visit summary for details by clicking on the link below. Your pharmacist will able to address any questions you may have about the medication.     If you're not feeling better within 5-7 days, please schedule an appointment.  You can schedule an appointment right here in [place], or call [phone]  If the visit is for the same symptoms as your eVisit, we'll refund the cost of your eVisit if seen within seven days.    Thank you for choosing us for your care. Given your symptoms, I would like you to do a lab-only visit to determine what is causing them.  I have placed the orders.  Please schedule an appointment with the lab right here in [place], or call [phone].  I will let you know when the results are back and next steps to take.

## 2022-11-06 ENCOUNTER — MYC REFILL (OUTPATIENT)
Dept: FAMILY MEDICINE | Facility: CLINIC | Age: 32
End: 2022-11-06

## 2022-11-06 DIAGNOSIS — B37.31 YEAST INFECTION OF THE VAGINA: ICD-10-CM

## 2022-11-07 RX ORDER — FLUCONAZOLE 150 MG/1
150 TABLET ORAL ONCE
Qty: 1 TABLET | Refills: 1 | Status: SHIPPED | OUTPATIENT
Start: 2022-11-07 | End: 2022-11-07

## 2022-11-07 NOTE — TELEPHONE ENCOUNTER
"Requested Prescriptions   Pending Prescriptions Disp Refills     fluconazole (DIFLUCAN) 150 MG tablet 1 tablet 1     Sig: Take 1 tablet (150 mg) by mouth once for 1 dose       Antifungal Agents Failed - 11/6/2022  8:56 AM        Failed - Not Fluconazole or Terconazole      If oral Fluconazole or Terconazole, may refill if indicated in progress notes.           Passed - Recent (12 mo) or future (30 days) visit within the authorizing provider's specialty     Patient has had an office visit with the authorizing provider or a provider within the authorizing providers department within the previous 12 mos or has a future within next 30 days. See \"Patient Info\" tab in inbasket, or \"Choose Columns\" in Meds & Orders section of the refill encounter.              Passed - Medication is active on med list           Medication not on patients active medication list.   Routed to provider.    Shani Bertrand RN on 11/7/2022 at 9:21 AM    "

## 2022-11-09 ENCOUNTER — ALLIED HEALTH/NURSE VISIT (OUTPATIENT)
Dept: FAMILY MEDICINE | Facility: CLINIC | Age: 32
End: 2022-11-09
Payer: COMMERCIAL

## 2022-11-09 DIAGNOSIS — Z23 NEED FOR VACCINATION: Primary | ICD-10-CM

## 2022-11-09 PROCEDURE — 99207 PR NO CHARGE NURSE ONLY: CPT

## 2022-11-09 PROCEDURE — 91312 COVID-19,PF,PFIZER BOOSTER BIVALENT: CPT

## 2022-11-09 PROCEDURE — 90686 IIV4 VACC NO PRSV 0.5 ML IM: CPT

## 2022-11-09 PROCEDURE — 0124A COVID-19,PF,PFIZER BOOSTER BIVALENT: CPT

## 2022-11-09 PROCEDURE — 90471 IMMUNIZATION ADMIN: CPT

## 2022-11-09 NOTE — PROGRESS NOTES
Immunization History   Administered Date(s) Administered     COVID-19,PF,Pfizer (12+ Yrs) 02/22/2021, 03/15/2021     HPV Quadrivalent 08/15/2007, 10/19/2007, 06/18/2008     HepA-ped 2 Dose 08/17/2007     HepB, Unspecified 11/03/2000, 01/19/2001, 04/20/2001     Influenza Vaccine IM > 6 months Valent IIV4 (Alfuria,Fluzone) 02/07/2018, 10/03/2018, 04/08/2020, 09/08/2020     MMR 11/03/2000     Meningococcal (Menactra ) 08/17/2007     TDAP Vaccine (Adacel) 06/12/2018, 08/25/2020     Td (Adult), Adsorbed 01/19/2001     Tetanus 01/19/2001     Prior to immunization administration, verified patients identity using patient s name and date of birth. Please see Immunization Activity for additional information.     Screening Questionnaire for Adult Immunization    Are you sick today?   No   Do you have allergies to medications, food, a vaccine component or latex?   No   Have you ever had a serious reaction after receiving a vaccination?   No   Do you have a long-term health problem with heart, lung, kidney, or metabolic disease (e.g., diabetes), asthma, a blood disorder, no spleen, complement component deficiency, a cochlear implant, or a spinal fluid leak?  Are you on long-term aspirin therapy?   No   Do you have cancer, leukemia, HIV/AIDS, or any other immune system problem?   No   Do you have a parent, brother, or sister with an immune system problem?   No   In the past 3 months, have you taken medications that affect  your immune system, such as prednisone, other steroids, or anticancer drugs; drugs for the treatment of rheumatoid arthritis, Crohn s disease, or psoriasis; or have you had radiation treatments?   No   Have you had a seizure, or a brain or other nervous system problem?   No   During the past year, have you received a transfusion of blood or blood    products, or been given immune (gamma) globulin or antiviral drug?   No   For women: Are you pregnant or is there a chance you could become       pregnant during  the next month?   No   Have you received any vaccinations in the past 4 weeks?   No     Immunization questionnaire answers were all negative.        Per orders of Dr. Kan , injection of fLU SHOT AND bIVALENT Pfizer given by Renate Marcano CMA. Patient instructed to remain in clinic for 15 minutes afterwards, and to report any adverse reaction to me immediately.       Screening performed by Renate Marcano CMA on 11/9/2022 at 2:37 PM.

## 2022-11-21 ENCOUNTER — MYC REFILL (OUTPATIENT)
Dept: FAMILY MEDICINE | Facility: CLINIC | Age: 32
End: 2022-11-21

## 2022-11-21 DIAGNOSIS — F41.1 GAD (GENERALIZED ANXIETY DISORDER): ICD-10-CM

## 2022-11-22 RX ORDER — ALPRAZOLAM 0.25 MG
0.25 TABLET ORAL 3 TIMES DAILY PRN
Qty: 15 TABLET | Refills: 0 | Status: SHIPPED | OUTPATIENT
Start: 2022-11-22 | End: 2022-12-17

## 2022-11-22 NOTE — TELEPHONE ENCOUNTER
Requested Prescriptions   Pending Prescriptions Disp Refills     ALPRAZolam (XANAX) 0.25 MG tablet 15 tablet 0     Sig: Take 1 tablet (0.25 mg) by mouth 3 times daily as needed for anxiety       There is no refill protocol information for this order          Routing refill request to provider for review/approval because:  Drug not on the [facility] refill protocol         Natalia Penn RN  [facility]

## 2022-12-02 ENCOUNTER — MYC MEDICAL ADVICE (OUTPATIENT)
Dept: FAMILY MEDICINE | Facility: CLINIC | Age: 32
End: 2022-12-02

## 2022-12-02 DIAGNOSIS — M25.519 ACUTE SHOULDER PAIN, UNSPECIFIED LATERALITY: Primary | ICD-10-CM

## 2022-12-05 RX ORDER — CYCLOBENZAPRINE HCL 5 MG
5 TABLET ORAL 3 TIMES DAILY PRN
Qty: 30 TABLET | Refills: 0 | Status: SHIPPED | OUTPATIENT
Start: 2022-12-05 | End: 2023-02-14

## 2022-12-17 ENCOUNTER — MYC REFILL (OUTPATIENT)
Dept: FAMILY MEDICINE | Facility: CLINIC | Age: 32
End: 2022-12-17

## 2022-12-17 ENCOUNTER — MYC MEDICAL ADVICE (OUTPATIENT)
Dept: FAMILY MEDICINE | Facility: CLINIC | Age: 32
End: 2022-12-17

## 2022-12-17 DIAGNOSIS — Z98.891 STATUS POST CESAREAN DELIVERY: ICD-10-CM

## 2022-12-17 DIAGNOSIS — F41.1 GAD (GENERALIZED ANXIETY DISORDER): ICD-10-CM

## 2022-12-17 DIAGNOSIS — F90.9 ATTENTION DEFICIT HYPERACTIVITY DISORDER (ADHD), UNSPECIFIED ADHD TYPE: ICD-10-CM

## 2022-12-19 RX ORDER — DEXTROAMPHETAMINE SACCHARATE, AMPHETAMINE ASPARTATE, DEXTROAMPHETAMINE SULFATE AND AMPHETAMINE SULFATE 2.5; 2.5; 2.5; 2.5 MG/1; MG/1; MG/1; MG/1
10 TABLET ORAL 2 TIMES DAILY
Qty: 60 TABLET | Refills: 0 | Status: SHIPPED | OUTPATIENT
Start: 2022-12-19 | End: 2023-02-14

## 2022-12-19 RX ORDER — HYDROXYZINE HYDROCHLORIDE 25 MG/1
TABLET, FILM COATED ORAL
Qty: 90 TABLET | Refills: 0 | Status: SHIPPED | OUTPATIENT
Start: 2022-12-19 | End: 2023-10-20

## 2022-12-19 RX ORDER — ALPRAZOLAM 0.25 MG
0.25 TABLET ORAL 3 TIMES DAILY PRN
Qty: 15 TABLET | Refills: 0 | Status: SHIPPED | OUTPATIENT
Start: 2022-12-19 | End: 2023-01-22

## 2022-12-19 NOTE — TELEPHONE ENCOUNTER
Pt sent a My chart message requesting for a  Hydroxyzine refill.    Natalia Penn RN  [facility]

## 2022-12-19 NOTE — TELEPHONE ENCOUNTER
"Requested Prescriptions   Pending Prescriptions Disp Refills     hydrOXYzine (ATARAX) 25 MG tablet 90 tablet 3     Sig: TAKE ONE TABLET BY MOUTH EVERY FOUR TO SIX HOURS AS NEEDED  Strength: 25 mg       Antihistamines Protocol Passed - 12/19/2022  7:14 AM        Passed - Recent (12 mo) or future (30 days) visit within the authorizing provider's specialty     Patient has had an office visit with the authorizing provider or a provider within the authorizing providers department within the previous 12 mos or has a future within next 30 days. See \"Patient Info\" tab in inbasket, or \"Choose Columns\" in Meds & Orders section of the refill encounter.              Passed - Patient is age 3 or older     Apply age and/or weight-based dosing for peds patients age 3 and older.    Forward request to provider for patients under the age of 3.          Passed - Medication is active on med list         Prescription approved per [facility] Refill Protocol.    Natalia Penn RN  [facility]   "

## 2022-12-19 NOTE — TELEPHONE ENCOUNTER
Requested Prescriptions   Pending Prescriptions Disp Refills     amphetamine-dextroamphetamine (ADDERALL) 10 MG tablet 60 tablet 0     Sig: Take 1 tablet (10 mg) by mouth 2 times daily       There is no refill protocol information for this order        Routing refill request to provider for review/approval because:  Drug not on the [facility] refill protocol       Natalia Penn RN  [facility]

## 2022-12-19 NOTE — TELEPHONE ENCOUNTER
Requested Prescriptions   Pending Prescriptions Disp Refills     ALPRAZolam (XANAX) 0.25 MG tablet 15 tablet 0     Sig: Take 1 tablet (0.25 mg) by mouth 3 times daily as needed for anxiety       There is no refill protocol information for this order        Routing refill request to provider for review/approval because:  Drug not on the [facility] refill protocol       Natalia Penn RN  [facility]

## 2023-01-22 ENCOUNTER — MYC REFILL (OUTPATIENT)
Dept: FAMILY MEDICINE | Facility: CLINIC | Age: 33
End: 2023-01-22
Payer: COMMERCIAL

## 2023-01-22 DIAGNOSIS — F41.1 GAD (GENERALIZED ANXIETY DISORDER): ICD-10-CM

## 2023-01-23 ENCOUNTER — HOSPITAL ENCOUNTER (OUTPATIENT)
Dept: PHYSICAL THERAPY | Facility: REHABILITATION | Age: 33
Discharge: HOME | End: 2023-01-23
Attending: NURSE PRACTITIONER
Payer: COMMERCIAL

## 2023-01-23 DIAGNOSIS — M25.519 ACUTE SHOULDER PAIN, UNSPECIFIED LATERALITY: ICD-10-CM

## 2023-01-23 PROCEDURE — 97161 PT EVAL LOW COMPLEX 20 MIN: CPT | Mod: GP | Performed by: PHYSICAL THERAPIST

## 2023-01-23 PROCEDURE — 97110 THERAPEUTIC EXERCISES: CPT | Mod: GP | Performed by: PHYSICAL THERAPIST

## 2023-01-23 RX ORDER — ALPRAZOLAM 0.25 MG
0.25 TABLET ORAL 3 TIMES DAILY PRN
Qty: 15 TABLET | Refills: 0 | Status: SHIPPED | OUTPATIENT
Start: 2023-01-23 | End: 2023-02-14

## 2023-01-23 NOTE — TELEPHONE ENCOUNTER
Requested Prescriptions   Pending Prescriptions Disp Refills     ALPRAZolam (XANAX) 0.25 MG tablet 15 tablet 0     Sig: Take 1 tablet (0.25 mg) by mouth 3 times daily as needed for anxiety       There is no refill protocol information for this order        Routing refill request to provider for review/approval because:  Drug not on the [facility] refill protocol     Natalia Penn RN  [facility]
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
## 2023-02-14 ENCOUNTER — OFFICE VISIT (OUTPATIENT)
Dept: FAMILY MEDICINE | Facility: CLINIC | Age: 33
End: 2023-02-14
Payer: COMMERCIAL

## 2023-02-14 ENCOUNTER — MYC REFILL (OUTPATIENT)
Dept: FAMILY MEDICINE | Facility: CLINIC | Age: 33
End: 2023-02-14

## 2023-02-14 ENCOUNTER — TELEPHONE (OUTPATIENT)
Dept: FAMILY MEDICINE | Facility: CLINIC | Age: 33
End: 2023-02-14

## 2023-02-14 VITALS
RESPIRATION RATE: 20 BRPM | HEIGHT: 69 IN | SYSTOLIC BLOOD PRESSURE: 120 MMHG | HEART RATE: 85 BPM | DIASTOLIC BLOOD PRESSURE: 77 MMHG | TEMPERATURE: 98.5 F | OXYGEN SATURATION: 97 % | WEIGHT: 231.06 LBS | BODY MASS INDEX: 34.22 KG/M2

## 2023-02-14 DIAGNOSIS — F90.9 ATTENTION DEFICIT HYPERACTIVITY DISORDER (ADHD), UNSPECIFIED ADHD TYPE: ICD-10-CM

## 2023-02-14 DIAGNOSIS — F41.1 GAD (GENERALIZED ANXIETY DISORDER): ICD-10-CM

## 2023-02-14 DIAGNOSIS — J02.9 SORE THROAT: ICD-10-CM

## 2023-02-14 DIAGNOSIS — J06.9 VIRAL URI WITH COUGH: Primary | ICD-10-CM

## 2023-02-14 DIAGNOSIS — J06.9 VIRAL URI WITH COUGH: ICD-10-CM

## 2023-02-14 LAB
DEPRECATED S PYO AG THROAT QL EIA: NEGATIVE
GROUP A STREP BY PCR: NOT DETECTED

## 2023-02-14 PROCEDURE — 99214 OFFICE O/P EST MOD 30 MIN: CPT | Performed by: FAMILY MEDICINE

## 2023-02-14 PROCEDURE — 87651 STREP A DNA AMP PROBE: CPT | Performed by: FAMILY MEDICINE

## 2023-02-14 RX ORDER — CODEINE PHOSPHATE AND GUAIFENESIN 10; 100 MG/5ML; MG/5ML
1-2 SOLUTION ORAL EVERY 4 HOURS PRN
Qty: 75 ML | Refills: 0 | Status: SHIPPED | OUTPATIENT
Start: 2023-02-14 | End: 2023-02-14

## 2023-02-14 RX ORDER — ALPRAZOLAM 0.25 MG
0.25 TABLET ORAL 3 TIMES DAILY PRN
Qty: 15 TABLET | Refills: 0 | Status: SHIPPED | OUTPATIENT
Start: 2023-02-14 | End: 2023-03-08

## 2023-02-14 RX ORDER — BENZONATATE 200 MG/1
100 CAPSULE ORAL 3 TIMES DAILY PRN
Qty: 30 CAPSULE | Refills: 0 | Status: SHIPPED | OUTPATIENT
Start: 2023-02-14 | End: 2023-03-27

## 2023-02-14 RX ORDER — BENZONATATE 200 MG/1
100 CAPSULE ORAL 3 TIMES DAILY PRN
Qty: 30 CAPSULE | Refills: 0 | Status: SHIPPED | OUTPATIENT
Start: 2023-02-14 | End: 2023-02-14

## 2023-02-14 RX ORDER — METHYLPREDNISOLONE 4 MG
TABLET, DOSE PACK ORAL
Qty: 21 TABLET | Refills: 0 | Status: SHIPPED | OUTPATIENT
Start: 2023-02-14 | End: 2023-02-14

## 2023-02-14 RX ORDER — METHYLPREDNISOLONE 4 MG
TABLET, DOSE PACK ORAL
Qty: 21 TABLET | Refills: 0 | Status: SHIPPED | OUTPATIENT
Start: 2023-02-14 | End: 2023-03-27

## 2023-02-14 RX ORDER — CODEINE PHOSPHATE AND GUAIFENESIN 10; 100 MG/5ML; MG/5ML
1-2 SOLUTION ORAL EVERY 4 HOURS PRN
Qty: 75 ML | Refills: 0 | Status: SHIPPED | OUTPATIENT
Start: 2023-02-14 | End: 2023-03-27

## 2023-02-14 ASSESSMENT — ENCOUNTER SYMPTOMS
CONSTITUTIONAL NEGATIVE: 1
SORE THROAT: 1

## 2023-02-14 ASSESSMENT — PATIENT HEALTH QUESTIONNAIRE - PHQ9
10. IF YOU CHECKED OFF ANY PROBLEMS, HOW DIFFICULT HAVE THESE PROBLEMS MADE IT FOR YOU TO DO YOUR WORK, TAKE CARE OF THINGS AT HOME, OR GET ALONG WITH OTHER PEOPLE: SOMEWHAT DIFFICULT
SUM OF ALL RESPONSES TO PHQ QUESTIONS 1-9: 12
SUM OF ALL RESPONSES TO PHQ QUESTIONS 1-9: 12

## 2023-02-14 NOTE — PROGRESS NOTES
Problem List Items Addressed This Visit     RESOLVED: Sore throat    Relevant Orders    Streptococcus A Rapid Screen w/Reflex to PCR - Clinic Collect (Completed)    Group A Streptococcus PCR Throat Swab   Other Visit Diagnoses     Viral URI with cough    -  Primary: Negative strep.  The patient does have a history of strep infections although only couple since she has become an adult.  Centor score 2/4.  Family with similar symptoms last week.  I suspect this is a viral upper respiratory infection.  Will treat symptoms with codeine/guaifenesin (PDMP reviewed without evidence of misuse or diversion).  Tessalon Perles for cough.  Benefit of systemic steroids?  We discussed that if her symptoms or not improving she could add this today or tomorrow.  Follow-up if not improving.  We also discussed signs/symptoms for peritonsillar abscess.  This is not evident on today's exam.    Relevant Medications    guaiFENesin-codeine (ROBITUSSIN AC) 100-10 MG/5ML solution    benzonatate (TESSALON) 200 MG capsule    methylPREDNISolone (MEDROL DOSEPAK) 4 MG tablet therapy pack             Yvon Alvarez is a 32 year old who presents for the following health issues   Chief Complaint   Patient presents with     Pharyngitis     And nausea. Sx started Saturday 02/11/23. Hard time swallowing, neck pain, headache.      Pharyngitis     History of Present Illness       Reason for visit:  Severe sore throat  Symptom onset:  3-7 days ago  Symptoms include:  Severe sore throat, very painful swallowing, nausea, head aches, neck pain, cough, difficulty sleeping  Symptom intensity:  Severe  Symptom progression:  Worsening  Had these symptoms before:  Yes  Has tried/received treatment for these symptoms:  Yes  Previous treatment was successful:  Yes  Prior treatment description:  Meds for strep  What makes it worse:  Swallowing  What makes it better:  No    She eats 2-3 servings of fruits and vegetables daily.She consumes 1 sweetened  "beverage(s) daily.She exercises with enough effort to increase her heart rate 9 or less minutes per day.  She exercises with enough effort to increase her heart rate 3 or less days per week. She is missing 3 dose(s) of medications per week.  She is not taking prescribed medications regularly due to remembering to take.    Today's PHQ-9         PHQ-9 Total Score: 12    PHQ-9 Q9 Thoughts of better off dead/self-harm past 2 weeks :   Not at all    How difficult have these problems made it for you to do your work, take care of things at home, or get along with other people: Somewhat difficult       Review of Systems   Constitutional: Negative.    HENT: Positive for sore throat.    All other systems reviewed and are negative.           Objective    /77 (BP Location: Left arm, Patient Position: Sitting, Cuff Size: Adult Large)   Pulse 85   Temp 98.5  F (36.9  C) (Oral)   Resp 20   Ht 1.753 m (5' 9\")   Wt 104.8 kg (231 lb 1 oz)   LMP  (LMP Unknown)   SpO2 97%   BMI 34.12 kg/m    Body mass index is 34.12 kg/m .  Physical Exam  Nursing note reviewed.   Constitutional:       General: She is not in acute distress.     Appearance: Normal appearance. She is not ill-appearing.   HENT:      Head: Normocephalic and atraumatic.      Right Ear: Tympanic membrane normal.      Left Ear: Tympanic membrane normal.      Mouth/Throat:      Comments: Posterior oropharynx erythematous.  No tonsillar exudate.  No unilateral swelling.  No petechiae.  Eyes:      Extraocular Movements: Extraocular movements intact.      Conjunctiva/sclera: Conjunctivae normal.   Pulmonary:      Effort: Pulmonary effort is normal.   Neurological:      Mental Status: She is alert and oriented to person, place, and time.   Psychiatric:         Attention and Perception: Attention normal.         Mood and Affect: Mood normal.         Speech: Speech normal.         Thought Content: Thought content normal.                  This note has been dictated " using voice recognition software. Any grammatical or context distortions are unintentional and inherent to the software

## 2023-02-14 NOTE — TELEPHONE ENCOUNTER
Requested Prescriptions   Pending Prescriptions Disp Refills     amphetamine-dextroamphetamine (ADDERALL) 10 MG tablet 60 tablet 0     Sig: Take 1 tablet (10 mg) by mouth 2 times daily       There is no refill protocol information for this order        Routing refill request to provider for review/approval because:  Drug not on the [facility] refill protocol     Natalia Penn RN  [facility]

## 2023-02-14 NOTE — TELEPHONE ENCOUNTER
[facility] pharmacy calling for update on prescription request. Patient is tearful and waiting at the pharmacy for prescriptions to be resent.

## 2023-02-14 NOTE — TELEPHONE ENCOUNTER
Requested Prescriptions   Pending Prescriptions Disp Refills     ALPRAZolam (XANAX) 0.25 MG tablet 15 tablet 0     Sig: Take 1 tablet (0.25 mg) by mouth 3 times daily as needed for anxiety       There is no refill protocol information for this order        Routing refill request to provider for review/approval because:  Drug not on the [facility] refill protocol     Natalia Penn RN  [facility]
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
## 2023-02-16 RX ORDER — DEXTROAMPHETAMINE SACCHARATE, AMPHETAMINE ASPARTATE, DEXTROAMPHETAMINE SULFATE AND AMPHETAMINE SULFATE 2.5; 2.5; 2.5; 2.5 MG/1; MG/1; MG/1; MG/1
10 TABLET ORAL 2 TIMES DAILY
Qty: 60 TABLET | Refills: 0 | Status: SHIPPED | OUTPATIENT
Start: 2023-02-16 | End: 2024-02-12

## 2023-03-08 DIAGNOSIS — F41.1 GAD (GENERALIZED ANXIETY DISORDER): ICD-10-CM

## 2023-03-08 RX ORDER — ALPRAZOLAM 0.25 MG
0.25 TABLET ORAL 3 TIMES DAILY PRN
Qty: 15 TABLET | Refills: 0 | Status: SHIPPED | OUTPATIENT
Start: 2023-03-08 | End: 2023-03-27

## 2023-04-14 ENCOUNTER — MYC MEDICAL ADVICE (OUTPATIENT)
Dept: FAMILY MEDICINE | Facility: CLINIC | Age: 33
End: 2023-04-14
Payer: COMMERCIAL

## 2023-04-14 DIAGNOSIS — K40.90 NON-RECURRENT INGUINAL HERNIA WITHOUT OBSTRUCTION OR GANGRENE, UNSPECIFIED LATERALITY: Primary | ICD-10-CM

## 2023-04-14 NOTE — TELEPHONE ENCOUNTER
REFERRAL INFORMATION:    Referring Provider:  Dr. Arnulfo Collins    Referring Clinic:   PRIMARY CARE    Reason for Visit/Diagnosis: Non-recurrent inguinal hernia        FUTURE VISIT INFORMATION:    Appointment Date: 04-18-23    Appointment Time: @ 9:30am      NOTES RECORD STATUS  DETAILS   OFFICE NOTE from Referring Provider Internal  04-14-23 Dr. Arnulfo Collins   OFFICE NOTE from Other Specialists N/A    HOSPITAL DISCHARGE SUMMARY/ ED VISITS  N/A    OPERATIVE REPORT N/A    ENDOSCOPY (EGD)  N/A    PERTINENT LABS Internal C-diff: 09-11-18, 02-07-18, 09-03-17   PATHOLOGY REPORTS (RELATED) Internal 04-04-19   IMAGING (CT, MRI, US, XR)  N/A

## 2023-04-14 NOTE — TELEPHONE ENCOUNTER
"Per pt estefania, has hernia and is wondering if anything can be done to help with it \"popping out\" when doing certain activities. Pended surgery referral if you agree.    Thanks!  Be Ward RN   [facility]    "

## 2023-04-14 NOTE — TELEPHONE ENCOUNTER
I called her   needs surgery  Orders Placed This Encounter     Adult General Surg Referral     Standing Status:   Future     Standing Expiration Date:   4/14/2024     Referral Priority:   Routine: Next available opening     Referral Type:   Consultation     Requested Specialty:   Surgery     Number of Visits Requested:   1     To ER if unable to reduce it

## 2023-04-17 ENCOUNTER — PATIENT OUTREACH (OUTPATIENT)
Dept: SURGERY | Facility: CLINIC | Age: 33
End: 2023-04-17
Payer: COMMERCIAL

## 2023-04-18 ENCOUNTER — PRE VISIT (OUTPATIENT)
Dept: SURGERY | Facility: CLINIC | Age: 33
End: 2023-04-18

## 2023-04-18 ENCOUNTER — OFFICE VISIT (OUTPATIENT)
Dept: SURGERY | Facility: CLINIC | Age: 33
End: 2023-04-18
Attending: FAMILY MEDICINE
Payer: COMMERCIAL

## 2023-04-18 VITALS
OXYGEN SATURATION: 98 % | HEIGHT: 69 IN | BODY MASS INDEX: 35.12 KG/M2 | HEART RATE: 65 BPM | WEIGHT: 237.1 LBS | SYSTOLIC BLOOD PRESSURE: 127 MMHG | DIASTOLIC BLOOD PRESSURE: 75 MMHG

## 2023-04-18 DIAGNOSIS — K43.9 VENTRAL HERNIA WITHOUT OBSTRUCTION OR GANGRENE: Primary | ICD-10-CM

## 2023-04-18 PROCEDURE — 99203 OFFICE O/P NEW LOW 30 MIN: CPT | Performed by: SURGERY

## 2023-04-18 ASSESSMENT — ENCOUNTER SYMPTOMS
ABDOMINAL PAIN: 1
JAUNDICE: 0
DIARRHEA: 0
VOMITING: 0
HEARTBURN: 1
DECREASED LIBIDO: 0
RECTAL PAIN: 0
BOWEL INCONTINENCE: 0
BLOOD IN STOOL: 1
NAUSEA: 0
CONSTIPATION: 1
HOT FLASHES: 0
BLOATING: 1

## 2023-04-18 ASSESSMENT — PAIN SCALES - GENERAL: PAINLEVEL: NO PAIN (0)

## 2023-04-18 NOTE — LETTER
2023       RE: Dayana Danielle  [address]  [place] [address]     Dear Colleague,    Thank you for referring your patient, Dayana Danielle, to the [facility] GENERAL SURGERY [facility]INIC [place] at [facility]. Please see a copy of my visit note below.    Dayana Danielle is a 32 year old female with a 1 year history of a right abdominal mass associated with the following symptoms of lump and pain.      Onset did occur with activity and stretching.  Obstructive symptoms:  no  Urinary difficulties:  no  Chronic cough: no  Constipation:  no  Current level of activity:  Trying to increase over last two weeks, at home with two small children.    Past medical history, medications, allergies, family history, and social history were reviewed with the patient.    Past Medical History:   Diagnosis Date    ADHD     States that it's not an ongoing diagnosis, the person who did her psych eval for it did not think she had it. Was on Adderall in the past and stopped that in 2017.    Anemia     10 years ago    Anxiety     Started the Citalopram early 2017 by her PCP.     Depressive disorder     Started the Citalopram early 2017 by her PCP.     History of miscarriage 2019    Ovarian cyst     Right sided    Smoking     Stopped 2017.       Past Surgical History:   Procedure Laterality Date     SECTION N/A 2018    Procedure:  SECTION;  PRIMARY  SECTION ;  Surgeon: Nia Casanova MD;  Location:  L+D     SECTION N/A 2020    Procedure: REPEAT  SECTION;  Surgeon: Nia Casanova MD;  Location:  L+D    DILATION AND CURETTAGE SUCTION N/A 2019    Procedure: DILATION AND CURETTAGE SUCTION;  Surgeon: Nia Casanova MD;  Location:  OR    MAMMOPLASTY REDUCTION Bilateral 2022    Procedure: MAMMOPLASTY, REDUCTION;  Surgeon: Michael  "MD Romero;  Location: [facility] OR       ROS: 10 point review of systems negative except noted in HPI  PHYSICAL EXAM  General appearance- healthy, alert, tearful at one point in interview.  Skin- Skin color and turgor normal.  No obvious rashes.  Neck- Neck is supple without obvious adenopathy.  Lungs- Respiratory effort unlabored.  Gait- Normal.  BMI 35  Abdomen - soft non distended, non tender without obvious masses.on today's exam. Position patient notes where mass comes is at LLQ (spigelian site)    Impression: Probable spigelian hernia.  Recommend: CT to evaluate anatomy  If hernia, would be best to pursue weight loss before surgery. Goal to BMI 30 (30 lb loss).  Recommended weight management to be referred by PCP.    Plan CT abdomen/pelvis to evaluate for hernia. I will call with results.    \"Total time = 30 minutes, spent on the date of encounter doing chart review, history and physical, documentation, patient education, and any further activity as noted above.             Again, thank you for allowing me to participate in the care of your patient.      Sincerely,    Yannick Ellington MD      "

## 2023-04-18 NOTE — PROGRESS NOTES
Dayana Danielle is a 32 year old female with a 1 year history of a right abdominal mass associated with the following symptoms of lump and pain.      Onset did occur with activity and stretching.  Obstructive symptoms:  no  Urinary difficulties:  no  Chronic cough: no  Constipation:  no  Current level of activity:  Trying to increase over last two weeks, at home with two small children.    Past medical history, medications, allergies, family history, and social history were reviewed with the patient.    Past Medical History:   Diagnosis Date     ADHD     States that it's not an ongoing diagnosis, the person who did her psych eval for it did not think she had it. Was on Adderall in the past and stopped that in 2017.     Anemia     10 years ago     Anxiety     Started the Citalopram early 2017 by her PCP.      Depressive disorder     Started the Citalopram early 2017 by her PCP.      History of miscarriage 2019     Ovarian cyst     Right sided     Smoking     Stopped 2017.       Past Surgical History:   Procedure Laterality Date      SECTION N/A 2018    Procedure:  SECTION;  PRIMARY  SECTION ;  Surgeon: Nia Casanova MD;  Location:  L+D      SECTION N/A 2020    Procedure: REPEAT  SECTION;  Surgeon: Nia Casanova MD;  Location:  L+D     DILATION AND CURETTAGE SUCTION N/A 2019    Procedure: DILATION AND CURETTAGE SUCTION;  Surgeon: Nia Casanova MD;  Location:  OR     MAMMOPLASTY REDUCTION Bilateral 2022    Procedure: MAMMOPLASTY, REDUCTION;  Surgeon: Romero Rodriguez MD;  Location: [facility] OR       ROS: 10 point review of systems negative except noted in HPI  PHYSICAL EXAM  General appearance- healthy, alert, tearful at one point in interview.  Skin- Skin color and turgor normal.  No obvious rashes.  Neck- Neck is supple without obvious adenopathy.  Lungs- Respiratory effort  "unlabored.  Gait- Normal.  BMI 35  Abdomen - soft non distended, non tender without obvious masses.on today's exam. Position patient notes where mass comes is at LLQ (spigelian site)    Impression: Probable spigelian hernia.  Recommend: CT to evaluate anatomy  If hernia, would be best to pursue weight loss before surgery. Goal to BMI 30 (30 lb loss).  Recommended weight management to be referred by PCP.    Plan CT abdomen/pelvis to evaluate for hernia. I will call with results.    \"Total time = 30 minutes, spent on the date of encounter doing chart review, history and physical, documentation, patient education, and any further activity as noted above.         "

## 2023-04-18 NOTE — PATIENT INSTRUCTIONS
You met with Dr. Yannick Ellington.      Today's visit instructions:    Dr. Ellington would like you to undergo a CT scan and he will call you with results.  If a hernia is present, Dr. Ellington is recommending lux loss to a goal of a BMI of 30 (30 lb loss).     If you have questions please contact Sia RN or Nveaeh RN during regular clinic hours, Monday through Friday 7:30 AM - 4:00 PM, or you can contact us via [employer] at anytime.       If you have urgent needs after-hours, weekends, or holidays please call the hospital at [phone] and ask to speak with our on-call General Surgery Team.    Appointment [phone]  Nurse Advice (Sia or Nevaeh): [phone]   Surgery Scheduler (Thais): [phone]  Fax: [phone]

## 2023-04-18 NOTE — NURSING NOTE
"Chief Complaint   Patient presents with     New Patient     Inguinal hernia       Vitals:    04/18/23 0902   BP: 127/75   BP Location: Left arm   Patient Position: Sitting   Cuff Size: Adult Large   Pulse: 65   SpO2: 98%   Weight: 107.5 kg (237 lb 1.6 oz)   Height: 1.753 m (5' 9\")       Body mass index is 35.01 kg/m .                          Rashel Kaiser EMT    "

## 2023-04-30 ENCOUNTER — HEALTH MAINTENANCE LETTER (OUTPATIENT)
Age: 33
End: 2023-04-30

## 2023-05-03 ENCOUNTER — ANCILLARY PROCEDURE (OUTPATIENT)
Dept: CT IMAGING | Facility: CLINIC | Age: 33
End: 2023-05-03
Attending: SURGERY
Payer: COMMERCIAL

## 2023-05-03 DIAGNOSIS — K43.9 VENTRAL HERNIA WITHOUT OBSTRUCTION OR GANGRENE: ICD-10-CM

## 2023-05-03 PROCEDURE — 74177 CT ABD & PELVIS W/CONTRAST: CPT | Performed by: RADIOLOGY

## 2023-05-03 RX ORDER — IOPAMIDOL 755 MG/ML
122 INJECTION, SOLUTION INTRAVASCULAR ONCE
Status: COMPLETED | OUTPATIENT
Start: 2023-05-03 | End: 2023-05-03

## 2023-05-03 RX ADMIN — IOPAMIDOL 122 ML: 755 INJECTION, SOLUTION INTRAVASCULAR at 09:23

## 2023-05-04 ENCOUNTER — PATIENT OUTREACH (OUTPATIENT)
Dept: SURGERY | Facility: CLINIC | Age: 33
End: 2023-05-04
Payer: COMMERCIAL

## 2023-05-04 NOTE — PROGRESS NOTES
05/04/23    6:44 AM     Patient underwent CT scan at the request of Dr. Ellington and results are now available.  Dr. Ellington will review and contact the patient with results and recommendations.    05/04/23    1:48 PM     Dr. Ellington attempted to reach the patient with no answer. LM on VM to call the office.    05/05/23    11:20 AM     Spoke with the patient and reviewed CT scan results with her. No abnormalities/hernia identified; therefore, Dr. Ellington is not recommending any surgical procedures.  Patient should return to her PCP/referring provider to discuss symptoms.  All of her questions were answered.

## 2023-05-16 ENCOUNTER — MYC REFILL (OUTPATIENT)
Dept: FAMILY MEDICINE | Facility: CLINIC | Age: 33
End: 2023-05-16
Payer: COMMERCIAL

## 2023-05-16 DIAGNOSIS — F90.2 ATTENTION DEFICIT HYPERACTIVITY DISORDER (ADHD), COMBINED TYPE: ICD-10-CM

## 2023-05-16 RX ORDER — DEXTROAMPHETAMINE SACCHARATE, AMPHETAMINE ASPARTATE, DEXTROAMPHETAMINE SULFATE AND AMPHETAMINE SULFATE 2.5; 2.5; 2.5; 2.5 MG/1; MG/1; MG/1; MG/1
10 TABLET ORAL 2 TIMES DAILY
Qty: 60 TABLET | Refills: 0 | Status: SHIPPED | OUTPATIENT
Start: 2023-05-16 | End: 2023-06-19

## 2023-06-19 ENCOUNTER — MYC REFILL (OUTPATIENT)
Dept: FAMILY MEDICINE | Facility: CLINIC | Age: 33
End: 2023-06-19
Payer: COMMERCIAL

## 2023-06-19 DIAGNOSIS — F41.1 GAD (GENERALIZED ANXIETY DISORDER): ICD-10-CM

## 2023-06-19 DIAGNOSIS — F90.2 ATTENTION DEFICIT HYPERACTIVITY DISORDER (ADHD), COMBINED TYPE: ICD-10-CM

## 2023-06-20 RX ORDER — DEXTROAMPHETAMINE SACCHARATE, AMPHETAMINE ASPARTATE, DEXTROAMPHETAMINE SULFATE AND AMPHETAMINE SULFATE 2.5; 2.5; 2.5; 2.5 MG/1; MG/1; MG/1; MG/1
10 TABLET ORAL 2 TIMES DAILY
Qty: 60 TABLET | Refills: 0 | Status: SHIPPED | OUTPATIENT
Start: 2023-06-20 | End: 2023-08-01

## 2023-06-20 RX ORDER — ALPRAZOLAM 0.25 MG
0.25 TABLET ORAL 3 TIMES DAILY PRN
Qty: 45 TABLET | Refills: 0 | Status: SHIPPED | OUTPATIENT
Start: 2023-06-20 | End: 2023-09-05

## 2023-06-20 NOTE — TELEPHONE ENCOUNTER
Requested Prescriptions   Pending Prescriptions Disp Refills     ALPRAZolam (XANAX) 0.25 MG tablet 45 tablet 0     Sig: Take 1 tablet (0.25 mg) by mouth 3 times daily as needed for anxiety       There is no refill protocol information for this order        amphetamine-dextroamphetamine (ADDERALL) 10 MG tablet 60 tablet 0     Sig: Take 1 tablet (10 mg) by mouth 2 times daily       There is no refill protocol information for this order        Routing refill request to provider for review/approval because:  Drug not on the [facility] refill protocol     Natalia Penn RN  [facility]

## 2023-07-11 ENCOUNTER — OFFICE VISIT (OUTPATIENT)
Dept: FAMILY MEDICINE | Facility: CLINIC | Age: 33
End: 2023-07-11
Payer: COMMERCIAL

## 2023-07-11 VITALS
SYSTOLIC BLOOD PRESSURE: 119 MMHG | TEMPERATURE: 98.3 F | HEART RATE: 81 BPM | DIASTOLIC BLOOD PRESSURE: 81 MMHG | RESPIRATION RATE: 16 BRPM | WEIGHT: 219.1 LBS | BODY MASS INDEX: 32.36 KG/M2 | OXYGEN SATURATION: 98 %

## 2023-07-11 DIAGNOSIS — R05.1 ACUTE COUGH: Primary | ICD-10-CM

## 2023-07-11 PROCEDURE — 99213 OFFICE O/P EST LOW 20 MIN: CPT | Performed by: FAMILY MEDICINE

## 2023-07-11 RX ORDER — ALBUTEROL SULFATE 90 UG/1
1-2 AEROSOL, METERED RESPIRATORY (INHALATION) EVERY 4 HOURS PRN
Qty: 18 G | Refills: 0 | Status: SHIPPED | OUTPATIENT
Start: 2023-07-11

## 2023-07-11 RX ORDER — BENZONATATE 100 MG/1
100-200 CAPSULE ORAL 3 TIMES DAILY PRN
Qty: 50 CAPSULE | Refills: 0 | Status: SHIPPED | OUTPATIENT
Start: 2023-07-11 | End: 2023-12-11

## 2023-07-11 NOTE — PROGRESS NOTES
ICD-10-CM    1. Acute cough  R05.1 benzonatate (TESSALON) 100 MG capsule     albuterol (PROAIR HFA/PROVENTIL HFA/VENTOLIN HFA) 108 (90 Base) MCG/ACT inhaler      probably viral. Could be environmental/allergic. Discussed options . She may repeat covid testing. Use of OTC  meds. Discussed. Trial albuterol and tessalon. Pneumonia thought to be unlikely given normal vitals and no trouble breathing. Deferred cxr. follow up discussed.    -------------------------------  Dayana Danielle with presents with 5 days symptoms including sore throat, non productive cough. No fever nor trouble breaathing. Cough is frequent and painful now.     The patient has no history of asthma     Treatment measures tried include Tylenol/Ibuprofen and OTC Cough med.  Exposures--daughter with cough for 3 weeks  Recent travel--no    Tested neg for covid at home    Current Outpatient Medications   Medication Sig Dispense Refill     ALPRAZolam (XANAX) 0.25 MG tablet Take 1 tablet (0.25 mg) by mouth 3 times daily as needed for anxiety 45 tablet 0     amphetamine-dextroamphetamine (ADDERALL) 10 MG tablet Take 1 tablet (10 mg) by mouth 2 times daily 60 tablet 0     amphetamine-dextroamphetamine (ADDERALL) 10 MG tablet Take 1 tablet (10 mg) by mouth 2 times daily 60 tablet 0     doxylamine (UNISOM) 25 MG TABS tablet Take by mouth At Bedtime       hydrOXYzine (ATARAX) 25 MG tablet TAKE ONE TABLET BY MOUTH EVERY FOUR TO SIX HOURS AS NEEDED Strength: 25 mg 90 tablet 0     ibuprofen (ADVIL/MOTRIN) 800 MG tablet Take 1 tablet (800 mg) by mouth every 6 hours 40 tablet 0     Omeprazole (PRILOSEC PO)        valACYclovir (VALTREX) 1000 mg tablet Take 2 tablets (2,000 mg) by mouth 2 times daily 4 tablet 3       ROS otherwise negative for resp., ID,  HEENT symptoms.    Objective: /81   Pulse 81   Temp 98.3  F (36.8  C) (Oral)   Resp 16   Wt 99.4 kg (219 lb 1.6 oz)   SpO2 98%   BMI 32.36 kg/m    Exam:  GENERAL APPEARANCE: healthy, alert  and no distress  EYES: Eyes grossly normal to inspection  HENT: ear canals and TM's normal and nose and mouth without ulcers or lesions  NECK: no adenopathy, no asymmetry, masses, or scars and thyroid normal to palpation  RESP: lungs clear to auscultation - no rales, rhonchi or wheezes  CV: regular rates and rhythm, no murmur

## 2023-08-01 ENCOUNTER — MYC REFILL (OUTPATIENT)
Dept: FAMILY MEDICINE | Facility: CLINIC | Age: 33
End: 2023-08-01
Payer: COMMERCIAL

## 2023-08-01 DIAGNOSIS — F90.2 ATTENTION DEFICIT HYPERACTIVITY DISORDER (ADHD), COMBINED TYPE: ICD-10-CM

## 2023-08-01 RX ORDER — DEXTROAMPHETAMINE SACCHARATE, AMPHETAMINE ASPARTATE, DEXTROAMPHETAMINE SULFATE AND AMPHETAMINE SULFATE 2.5; 2.5; 2.5; 2.5 MG/1; MG/1; MG/1; MG/1
10 TABLET ORAL 2 TIMES DAILY
Qty: 60 TABLET | Refills: 0 | Status: SHIPPED | OUTPATIENT
Start: 2023-08-01 | End: 2023-09-05

## 2023-08-01 NOTE — TELEPHONE ENCOUNTER
Requested Prescriptions   Pending Prescriptions Disp Refills    amphetamine-dextroamphetamine (ADDERALL) 10 MG tablet 60 tablet 0     Sig: Take 1 tablet (10 mg) by mouth 2 times daily       There is no refill protocol information for this order         Routing refill request to provider for review/approval because:  Drug not on the [facility] refill protocol     Natalia Penn RN  [facility]

## 2023-09-05 ENCOUNTER — MYC REFILL (OUTPATIENT)
Dept: FAMILY MEDICINE | Facility: CLINIC | Age: 33
End: 2023-09-05
Payer: COMMERCIAL

## 2023-09-05 DIAGNOSIS — F90.2 ATTENTION DEFICIT HYPERACTIVITY DISORDER (ADHD), COMBINED TYPE: ICD-10-CM

## 2023-09-05 DIAGNOSIS — F41.1 GAD (GENERALIZED ANXIETY DISORDER): ICD-10-CM

## 2023-09-05 RX ORDER — ALPRAZOLAM 0.25 MG
0.25 TABLET ORAL 3 TIMES DAILY PRN
Qty: 45 TABLET | Refills: 0 | Status: SHIPPED | OUTPATIENT
Start: 2023-09-05 | End: 2023-10-20

## 2023-09-05 RX ORDER — DEXTROAMPHETAMINE SACCHARATE, AMPHETAMINE ASPARTATE, DEXTROAMPHETAMINE SULFATE AND AMPHETAMINE SULFATE 2.5; 2.5; 2.5; 2.5 MG/1; MG/1; MG/1; MG/1
10 TABLET ORAL 2 TIMES DAILY
Qty: 60 TABLET | Refills: 0 | Status: SHIPPED | OUTPATIENT
Start: 2023-09-05 | End: 2023-10-20

## 2023-09-05 NOTE — TELEPHONE ENCOUNTER
Requested Prescriptions   Pending Prescriptions Disp Refills    ALPRAZolam (XANAX) 0.25 MG tablet 45 tablet 0     Sig: Take 1 tablet (0.25 mg) by mouth 3 times daily as needed for anxiety       There is no refill protocol information for this order         Routing refill request to provider for review/approval because:  Drug not on the [facility] refill protocol     Natalia Penn RN  [facility]

## 2023-09-05 NOTE — TELEPHONE ENCOUNTER
Requested Prescriptions   Pending Prescriptions Disp Refills    amphetamine-dextroamphetamine (ADDERALL) 10 MG tablet 60 tablet 0     Sig: Take 1 tablet (10 mg) by mouth 2 times daily       There is no refill protocol information for this order         Routing refill request to provider for review/approval because:  Drug not on the [facility] refill protocol     Natalia Penn RN  [facility]

## 2023-10-20 ENCOUNTER — MYC REFILL (OUTPATIENT)
Dept: FAMILY MEDICINE | Facility: CLINIC | Age: 33
End: 2023-10-20
Payer: COMMERCIAL

## 2023-10-20 DIAGNOSIS — Z98.891 STATUS POST CESAREAN DELIVERY: ICD-10-CM

## 2023-10-20 DIAGNOSIS — F41.1 GAD (GENERALIZED ANXIETY DISORDER): ICD-10-CM

## 2023-10-20 DIAGNOSIS — F90.2 ATTENTION DEFICIT HYPERACTIVITY DISORDER (ADHD), COMBINED TYPE: ICD-10-CM

## 2023-10-23 RX ORDER — HYDROXYZINE HYDROCHLORIDE 25 MG/1
TABLET, FILM COATED ORAL
Qty: 90 TABLET | Refills: 0 | Status: SHIPPED | OUTPATIENT
Start: 2023-10-23 | End: 2023-12-04

## 2023-10-23 RX ORDER — DEXTROAMPHETAMINE SACCHARATE, AMPHETAMINE ASPARTATE, DEXTROAMPHETAMINE SULFATE AND AMPHETAMINE SULFATE 2.5; 2.5; 2.5; 2.5 MG/1; MG/1; MG/1; MG/1
10 TABLET ORAL 2 TIMES DAILY
Qty: 60 TABLET | Refills: 0 | Status: SHIPPED | OUTPATIENT
Start: 2023-10-23 | End: 2023-12-04

## 2023-10-23 RX ORDER — ALPRAZOLAM 0.25 MG
0.25 TABLET ORAL 3 TIMES DAILY PRN
Qty: 45 TABLET | Refills: 0 | Status: SHIPPED | OUTPATIENT
Start: 2023-10-23 | End: 2023-12-04

## 2023-12-04 ENCOUNTER — MYC REFILL (OUTPATIENT)
Dept: FAMILY MEDICINE | Facility: CLINIC | Age: 33
End: 2023-12-04
Payer: COMMERCIAL

## 2023-12-04 ENCOUNTER — MYC MEDICAL ADVICE (OUTPATIENT)
Dept: FAMILY MEDICINE | Facility: CLINIC | Age: 33
End: 2023-12-04
Payer: COMMERCIAL

## 2023-12-04 DIAGNOSIS — B00.9 HERPES SIMPLEX VIRUS INFECTION: ICD-10-CM

## 2023-12-04 DIAGNOSIS — F41.1 GAD (GENERALIZED ANXIETY DISORDER): ICD-10-CM

## 2023-12-04 DIAGNOSIS — Z98.891 STATUS POST CESAREAN DELIVERY: ICD-10-CM

## 2023-12-04 DIAGNOSIS — F90.2 ATTENTION DEFICIT HYPERACTIVITY DISORDER (ADHD), COMBINED TYPE: ICD-10-CM

## 2023-12-04 RX ORDER — ALPRAZOLAM 0.25 MG
0.25 TABLET ORAL 3 TIMES DAILY PRN
Qty: 45 TABLET | Refills: 0 | Status: SHIPPED | OUTPATIENT
Start: 2023-12-04 | End: 2024-02-27

## 2023-12-04 RX ORDER — HYDROXYZINE HYDROCHLORIDE 25 MG/1
TABLET, FILM COATED ORAL
Qty: 90 TABLET | Refills: 0 | Status: SHIPPED | OUTPATIENT
Start: 2023-12-04 | End: 2024-02-27

## 2023-12-04 RX ORDER — VALACYCLOVIR HYDROCHLORIDE 1 G/1
2000 TABLET, FILM COATED ORAL 2 TIMES DAILY
Qty: 4 TABLET | Refills: 3 | Status: SHIPPED | OUTPATIENT
Start: 2023-12-04 | End: 2024-01-09

## 2023-12-04 RX ORDER — DEXTROAMPHETAMINE SACCHARATE, AMPHETAMINE ASPARTATE, DEXTROAMPHETAMINE SULFATE AND AMPHETAMINE SULFATE 2.5; 2.5; 2.5; 2.5 MG/1; MG/1; MG/1; MG/1
10 TABLET ORAL 2 TIMES DAILY
Qty: 60 TABLET | Refills: 0 | Status: SHIPPED | OUTPATIENT
Start: 2023-12-04 | End: 2024-01-22

## 2023-12-11 ENCOUNTER — VIRTUAL VISIT (OUTPATIENT)
Dept: FAMILY MEDICINE | Facility: CLINIC | Age: 33
End: 2023-12-11
Payer: COMMERCIAL

## 2023-12-11 DIAGNOSIS — F90.2 ATTENTION DEFICIT HYPERACTIVITY DISORDER (ADHD), COMBINED TYPE: Primary | ICD-10-CM

## 2023-12-11 DIAGNOSIS — F33.1 MODERATE EPISODE OF RECURRENT MAJOR DEPRESSIVE DISORDER (H): ICD-10-CM

## 2023-12-11 DIAGNOSIS — F41.1 GAD (GENERALIZED ANXIETY DISORDER): ICD-10-CM

## 2023-12-11 PROCEDURE — 99214 OFFICE O/P EST MOD 30 MIN: CPT | Mod: VID | Performed by: NURSE PRACTITIONER

## 2023-12-11 ASSESSMENT — PATIENT HEALTH QUESTIONNAIRE - PHQ9
10. IF YOU CHECKED OFF ANY PROBLEMS, HOW DIFFICULT HAVE THESE PROBLEMS MADE IT FOR YOU TO DO YOUR WORK, TAKE CARE OF THINGS AT HOME, OR GET ALONG WITH OTHER PEOPLE: NOT DIFFICULT AT ALL
SUM OF ALL RESPONSES TO PHQ QUESTIONS 1-9: 7
SUM OF ALL RESPONSES TO PHQ QUESTIONS 1-9: 7

## 2023-12-11 NOTE — PROGRESS NOTES
Antonio is a 33 year old who is being evaluated via a billable video visit.      How would you like to obtain your AVS? MyChart  If the video visit is dropped, the invitation should be resent by: Text to cell phone: [phone]  Will anyone else be joining your video visit? No   Diagnosis Comments   1. Attention deficit hyperactivity disorder (ADHD), combined type  Stable; discussed option to increase dose or switch to Vyvanse in the future      2. JACINTO (generalized anxiety disorder)  Had increased anxiety symptoms in the past few months, now improving. Discussed I would like her to have the #45 of Xanax last 2-3 months.      3. Moderate episode of recurrent major depressive disorder (H)  stable                 Subjective   Antonio is a 33 year old, presenting for the following health issues:  No chief complaint on file.      HPI   Had some increased anxiety in the past few months due to family situation and concerns about her children. She has noticed that anxiety symptoms are improving as she has been getting referrals and things set up for her daughters. She was using Xanax regularly, and just requested refill, but actually has some Xanax left over from previous prescription.     She feels ADHD symptoms basically stable. Has been able to get work done appropriately. Had some challenges in the past few months as her job has asked her to come in at least twice per week, which she is getting used to. She feels that she has been able to manage home responsibilities as well. No side effects from medications. Has recently lost about 30 pounds due to increased exercise. Sleeping well.       Review of Systems   Constitutional, HEENT, cardiovascular, pulmonary, gi and gu systems are negative, except as otherwise noted.      Objective           Vitals:  No vitals were obtained today due to virtual visit.    Physical Exam   GENERAL: Healthy, alert and no distress  EYES: Eyes grossly normal to inspection.  No discharge or  erythema, or obvious scleral/conjunctival abnormalities.  RESP: No audible wheeze, cough, or visible cyanosis.  No visible retractions or increased work of breathing.    SKIN: Visible skin clear. No significant rash, abnormal pigmentation or lesions.  NEURO: Cranial nerves grossly intact.  Mentation and speech appropriate for age.  PSYCH: Mentation appears normal, affect normal/bright, judgement and insight intact, normal speech and appearance well-groomed.        Video-Visit Details    Type of service:  Video Visit     Originating Location (pt. Location): Home  Start: 10 am  End: 10:30 am  Distant Location (provider location):  On-site  Platform used for Video Visit: [employer]

## 2024-01-09 DIAGNOSIS — B00.9 HERPES SIMPLEX VIRUS INFECTION: ICD-10-CM

## 2024-01-09 RX ORDER — VALACYCLOVIR HYDROCHLORIDE 1 G/1
2000 TABLET, FILM COATED ORAL 2 TIMES DAILY
Qty: 4 TABLET | Refills: 3 | Status: SHIPPED | OUTPATIENT
Start: 2024-01-09 | End: 2024-01-22

## 2024-01-22 ENCOUNTER — MYC REFILL (OUTPATIENT)
Dept: FAMILY MEDICINE | Facility: CLINIC | Age: 34
End: 2024-01-22
Payer: COMMERCIAL

## 2024-01-22 DIAGNOSIS — B00.9 HERPES SIMPLEX VIRUS INFECTION: ICD-10-CM

## 2024-01-22 DIAGNOSIS — F90.2 ATTENTION DEFICIT HYPERACTIVITY DISORDER (ADHD), COMBINED TYPE: ICD-10-CM

## 2024-01-22 RX ORDER — DEXTROAMPHETAMINE SACCHARATE, AMPHETAMINE ASPARTATE, DEXTROAMPHETAMINE SULFATE AND AMPHETAMINE SULFATE 2.5; 2.5; 2.5; 2.5 MG/1; MG/1; MG/1; MG/1
10 TABLET ORAL 2 TIMES DAILY
Qty: 60 TABLET | Refills: 0 | Status: SHIPPED | OUTPATIENT
Start: 2024-01-22

## 2024-01-22 RX ORDER — VALACYCLOVIR HYDROCHLORIDE 1 G/1
2000 TABLET, FILM COATED ORAL 2 TIMES DAILY
Qty: 4 TABLET | Refills: 3 | Status: SHIPPED | OUTPATIENT
Start: 2024-01-22

## 2024-02-12 ENCOUNTER — OFFICE VISIT (OUTPATIENT)
Dept: FAMILY MEDICINE | Facility: CLINIC | Age: 34
End: 2024-02-12
Payer: COMMERCIAL

## 2024-02-12 VITALS
OXYGEN SATURATION: 100 % | HEART RATE: 70 BPM | HEIGHT: 68 IN | RESPIRATION RATE: 11 BRPM | WEIGHT: 206 LBS | DIASTOLIC BLOOD PRESSURE: 76 MMHG | BODY MASS INDEX: 31.22 KG/M2 | SYSTOLIC BLOOD PRESSURE: 117 MMHG | TEMPERATURE: 98.3 F

## 2024-02-12 DIAGNOSIS — F90.2 ATTENTION DEFICIT HYPERACTIVITY DISORDER (ADHD), COMBINED TYPE: ICD-10-CM

## 2024-02-12 DIAGNOSIS — Z12.4 CERVICAL CANCER SCREENING: Primary | ICD-10-CM

## 2024-02-12 DIAGNOSIS — L30.9 ECZEMA, UNSPECIFIED TYPE: ICD-10-CM

## 2024-02-12 DIAGNOSIS — G47.00 INSOMNIA, UNSPECIFIED TYPE: ICD-10-CM

## 2024-02-12 DIAGNOSIS — K29.70 GASTRITIS WITHOUT BLEEDING, UNSPECIFIED CHRONICITY, UNSPECIFIED GASTRITIS TYPE: ICD-10-CM

## 2024-02-12 PROCEDURE — 99395 PREV VISIT EST AGE 18-39: CPT | Mod: 25 | Performed by: NURSE PRACTITIONER

## 2024-02-12 PROCEDURE — 90686 IIV4 VACC NO PRSV 0.5 ML IM: CPT | Performed by: NURSE PRACTITIONER

## 2024-02-12 PROCEDURE — 90480 ADMN SARSCOV2 VAC 1/ONLY CMP: CPT | Performed by: NURSE PRACTITIONER

## 2024-02-12 PROCEDURE — 90471 IMMUNIZATION ADMIN: CPT | Performed by: NURSE PRACTITIONER

## 2024-02-12 PROCEDURE — G0145 SCR C/V CYTO,THINLAYER,RESCR: HCPCS | Performed by: NURSE PRACTITIONER

## 2024-02-12 PROCEDURE — 91320 SARSCV2 VAC 30MCG TRS-SUC IM: CPT | Performed by: NURSE PRACTITIONER

## 2024-02-12 PROCEDURE — 99214 OFFICE O/P EST MOD 30 MIN: CPT | Mod: 25 | Performed by: NURSE PRACTITIONER

## 2024-02-12 PROCEDURE — 87624 HPV HI-RISK TYP POOLED RSLT: CPT | Performed by: NURSE PRACTITIONER

## 2024-02-12 RX ORDER — QUETIAPINE FUMARATE 25 MG/1
12.5-25 TABLET, FILM COATED ORAL AT BEDTIME
Qty: 30 TABLET | Refills: 1 | Status: SHIPPED | OUTPATIENT
Start: 2024-02-12 | End: 2024-03-10

## 2024-02-12 RX ORDER — DEXTROAMPHETAMINE SACCHARATE, AMPHETAMINE ASPARTATE, DEXTROAMPHETAMINE SULFATE AND AMPHETAMINE SULFATE 2.5; 2.5; 2.5; 2.5 MG/1; MG/1; MG/1; MG/1
10 TABLET ORAL 2 TIMES DAILY
Qty: 60 TABLET | Refills: 0 | Status: SHIPPED | OUTPATIENT
Start: 2024-03-14 | End: 2024-04-13

## 2024-02-12 RX ORDER — DEXTROAMPHETAMINE SACCHARATE, AMPHETAMINE ASPARTATE, DEXTROAMPHETAMINE SULFATE AND AMPHETAMINE SULFATE 2.5; 2.5; 2.5; 2.5 MG/1; MG/1; MG/1; MG/1
10 TABLET ORAL 2 TIMES DAILY
Qty: 60 TABLET | Refills: 0 | Status: SHIPPED | OUTPATIENT
Start: 2024-02-12 | End: 2024-02-27

## 2024-02-12 RX ORDER — DEXTROAMPHETAMINE SACCHARATE, AMPHETAMINE ASPARTATE, DEXTROAMPHETAMINE SULFATE AND AMPHETAMINE SULFATE 2.5; 2.5; 2.5; 2.5 MG/1; MG/1; MG/1; MG/1
10 TABLET ORAL 2 TIMES DAILY
Qty: 60 TABLET | Refills: 0 | Status: SHIPPED | OUTPATIENT
Start: 2024-04-14 | End: 2024-05-14

## 2024-02-12 RX ORDER — TRIAMCINOLONE ACETONIDE 1 MG/G
CREAM TOPICAL 2 TIMES DAILY
Qty: 45 G | Refills: 1 | Status: SHIPPED | OUTPATIENT
Start: 2024-02-12

## 2024-02-12 SDOH — HEALTH STABILITY: PHYSICAL HEALTH: ON AVERAGE, HOW MANY MINUTES DO YOU ENGAGE IN EXERCISE AT THIS LEVEL?: 30 MIN

## 2024-02-12 SDOH — HEALTH STABILITY: PHYSICAL HEALTH: ON AVERAGE, HOW MANY DAYS PER WEEK DO YOU ENGAGE IN MODERATE TO STRENUOUS EXERCISE (LIKE A BRISK WALK)?: 4 DAYS

## 2024-02-12 ASSESSMENT — SOCIAL DETERMINANTS OF HEALTH (SDOH): HOW OFTEN DO YOU GET TOGETHER WITH FRIENDS OR RELATIVES?: ONCE A WEEK

## 2024-02-12 ASSESSMENT — PAIN SCALES - GENERAL: PAINLEVEL: MODERATE PAIN (4)

## 2024-02-12 NOTE — PROGRESS NOTES
"Preventive Care Visit  [facility] PRIMARY CARE  CHEY Hernandez CNP, Family Medicine  Feb 12, 2024    Assessment & Plan   Problem List Items Addressed This Visit          Digestive    Gastritis without bleeding, unspecified chronicity, unspecified gastritis type    Relevant Orders    UPPER GI ENDOSCOPY    Adult GI  Referral - Consult Only       Musculoskeletal and Integumentary    Eczema, unspecified type    Relevant Medications    triamcinolone (KENALOG) 0.1 % external cream       Behavioral    ADHD    Relevant Medications    amphetamine-dextroamphetamine (ADDERALL) 10 MG tablet    amphetamine-dextroamphetamine (ADDERALL) 10 MG tablet (Start on 3/14/2024)    amphetamine-dextroamphetamine (ADDERALL) 10 MG tablet (Start on 4/14/2024)     Other Visit Diagnoses       Cervical cancer screening    -  Primary    Relevant Orders    Pap Screen with HPV - recommended age 30 - 65 years    Insomnia, unspecified type        Relevant Medications    QUEtiapine (SEROQUEL) 25 MG tablet    Other Relevant Orders    Sleep Psychology  Referral              BMI  Estimated body mass index is 31.51 kg/m  as calculated from the following:    Height as of this encounter: 1.722 m (5' 7.8\").    Weight as of this encounter: 93.4 kg (206 lb).       Counseling  Appropriate preventive services were discussed with this patient, including applicable screening as appropriate for fall prevention, nutrition, physical activity, Tobacco-use cessation, weight loss and cognition.  Checklist reviewing preventive services available has been given to the patient.  Reviewed patient's diet, addressing concerns and/or questions.   The patient was instructed to see the dentist every 6 months.   The patient's PHQ-9 score is consistent with mild depression. She was provided with information regarding depression.         Yvon Alvarez is a 33 year old, presenting for the following:  Gyn Exam and Recheck " Medication        2/12/2024    12:37 PM   Additional Questions   Roomed by Rolanda MEZA        Health Care Directive  Patient does not have a Health Care Directive or Living Will    HPI    Ongoing Gastritis issue for the past year- has had a few episodes of extreme squeezing pain. Was using omeprazole regularly for several months, but worried about complications so has stopped. She continues to experience daily gastritis. Has not been using Ibuprofen regularly; does not drink alcohol regularly. She has not noticed dark stools; no vomiting, but has nausea. She has a history of allergic conditions (eczema and seasonal  allergic rhinitis).  Eczmea- has noticed flare this winter. Uses Vanicream and eucerin, Using cortisone OTC without relief.  Mood very stable; adderall continues to work well. She has long term issues with sleep; taking benadryl nightly. Has a very hard time getting to sleep at night for many ears.         2/12/2024   General Health   How would you rate your overall physical health? (!) FAIR   Feel stress (tense, anxious, or unable to sleep) Rather much   (!) STRESS CONCERN      2/12/2024   Nutrition   Three or more servings of calcium each day? Yes   Diet: Regular (no restrictions)   How many servings of fruit and vegetables per day? (!) 2-3   How many sweetened beverages each day? 0-1         2/12/2024   Exercise   Days per week of moderate/strenous exercise 4 days   Average minutes spent exercising at this level 30 min         2/12/2024   Social Factors   Frequency of gathering with friends or relatives Once a week   Worry food won't last until get money to buy more No   Food not last or not have enough money for food? No   Do you have housing?  Yes   Are you worried about losing your housing? No   Lack of transportation? No   Unable to get utilities (heat,electricity)? No         2/12/2024   Dental   Dentist two times every year? (!) NO         2/12/2024   TB Screening   Were you born outside of [place]?  No  "      Today's PHQ-9 Score:       2024    12:27 PM   PHQ-9 SCORE   PHQ-9 Total Score [place] 8 (Mild depression)   PHQ-9 Total Score 8     Answers submitted by the patient for this visit:  Patient Health Questionnaire (Submitted on 2024)  If you checked off any problems, how difficult have these problems made it for you to do your work, take care of things at home, or get along with other people?: Somewhat difficult  PHQ9 TOTAL SCORE: 8        2024   Substance Use   Alcohol more than 3/day or more than 7/wk No   Do you use any other substances recreationally? (!) DECLINE     Social History     Tobacco Use    Smoking status: Former     Packs/day: .5     Types: Cigarettes     Start date: 2003     Quit date: 10/2017     Years since quittin.3    Smokeless tobacco: Never    Tobacco comments:     Used a vapor for the month of October.   Vaping Use    Vaping Use: Never used   Substance Use Topics    Alcohol use: Yes     Comment: 2x/week    Drug use: No     Frequency: 1.0 times per week     Comment: Socially smoked marijuana, long time ago         2024   Breast Cancer Screening   Family history of breast, colon, or ovarian cancer? No / Unknown          2024   STI Screening   New sexual partner(s) since last STI/HIV test? No     History of abnormal Pap smear: NO - age 30- 65 PAP every 3 years recommended        10/16/2018    11:32 AM   PAP / HPV   PAP (Historical) NIL            2024   Contraception/Family Planning   Questions about contraception or family planning No        Reviewed and updated as needed this visit by Provider                      Review of Systems  Constitutional, HEENT, cardiovascular, pulmonary, gi and gu systems are negative, except as otherwise noted.     Objective    Exam  /76 (BP Location: Left arm, Patient Position: Sitting, Cuff Size: Adult Regular)   Pulse 70   Temp 98.3  F (36.8  C) (Temporal)   Resp 11   Ht 1.722 m (5' 7.8\")   Wt 93.4 kg (206 lb)  " " LMP 01/26/2024   SpO2 100%   BMI 31.51 kg/m     Estimated body mass index is 31.51 kg/m  as calculated from the following:    Height as of this encounter: 1.722 m (5' 7.8\").    Weight as of this encounter: 93.4 kg (206 lb).    Physical Exam  GENERAL: alert and no distress  EYES: Eyes grossly normal to inspection, PERRL and conjunctivae and sclerae normal  NECK: no adenopathy, no asymmetry, masses, or scars  RESP: lungs clear to auscultation - no rales, rhonchi or wheezes  BREAST: normal without masses, tenderness or nipple discharge and no palpable axillary masses or adenopathy  CV: regular rate and rhythm, normal S1 S2, no S3 or S4, no murmur, click or rub, no peripheral edema  ABDOMEN: soft, nontender, no hepatosplenomegaly, no masses and bowel sounds normal   (female) w/bimanual: normal female external genitalia, normal urethral meatus, normal vaginal mucosa, normal cervix/adnexa/uterus without masses or discharge  MS: no gross musculoskeletal defects noted, no edema  SKIN: erythema -  scattered patches with mild excoriation  NEURO: Normal strength and tone, mentation intact and speech normal  PSYCH: mentation appears normal, affect normal/bright      Signed Electronically by: CHEY Hernandez CNP    "

## 2024-02-16 LAB
BKR LAB AP GYN ADEQUACY: NORMAL
BKR LAB AP GYN INTERPRETATION: NORMAL
BKR LAB AP HPV REFLEX: NORMAL
BKR LAB AP LMP: NORMAL
BKR LAB AP PREVIOUS ABNORMAL: NORMAL
PATH REPORT.COMMENTS IMP SPEC: NORMAL
PATH REPORT.COMMENTS IMP SPEC: NORMAL
PATH REPORT.RELEVANT HX SPEC: NORMAL

## 2024-02-22 LAB
HUMAN PAPILLOMA VIRUS 16 DNA: NEGATIVE
HUMAN PAPILLOMA VIRUS 18 DNA: NEGATIVE
HUMAN PAPILLOMA VIRUS FINAL DIAGNOSIS: NORMAL
HUMAN PAPILLOMA VIRUS OTHER HR: NEGATIVE

## 2024-02-27 ENCOUNTER — MYC REFILL (OUTPATIENT)
Dept: FAMILY MEDICINE | Facility: CLINIC | Age: 34
End: 2024-02-27
Payer: COMMERCIAL

## 2024-02-27 DIAGNOSIS — F41.1 GAD (GENERALIZED ANXIETY DISORDER): ICD-10-CM

## 2024-02-27 DIAGNOSIS — F90.2 ATTENTION DEFICIT HYPERACTIVITY DISORDER (ADHD), COMBINED TYPE: ICD-10-CM

## 2024-02-27 DIAGNOSIS — Z98.891 STATUS POST CESAREAN DELIVERY: ICD-10-CM

## 2024-02-27 RX ORDER — DEXTROAMPHETAMINE SACCHARATE, AMPHETAMINE ASPARTATE, DEXTROAMPHETAMINE SULFATE AND AMPHETAMINE SULFATE 2.5; 2.5; 2.5; 2.5 MG/1; MG/1; MG/1; MG/1
10 TABLET ORAL 2 TIMES DAILY
Qty: 60 TABLET | Refills: 0 | Status: SHIPPED | OUTPATIENT
Start: 2024-02-27

## 2024-02-27 RX ORDER — ALPRAZOLAM 0.25 MG
0.25 TABLET ORAL 3 TIMES DAILY PRN
Qty: 45 TABLET | Refills: 0 | Status: SHIPPED | OUTPATIENT
Start: 2024-02-27 | End: 2024-05-10

## 2024-02-27 RX ORDER — HYDROXYZINE HYDROCHLORIDE 25 MG/1
TABLET, FILM COATED ORAL
Qty: 90 TABLET | Refills: 0 | Status: SHIPPED | OUTPATIENT
Start: 2024-02-27

## 2024-03-10 ENCOUNTER — MYC REFILL (OUTPATIENT)
Dept: FAMILY MEDICINE | Facility: CLINIC | Age: 34
End: 2024-03-10
Payer: COMMERCIAL

## 2024-03-10 DIAGNOSIS — G47.00 INSOMNIA, UNSPECIFIED TYPE: ICD-10-CM

## 2024-03-10 RX ORDER — QUETIAPINE FUMARATE 25 MG/1
12.5-25 TABLET, FILM COATED ORAL AT BEDTIME
Qty: 30 TABLET | Refills: 1 | Status: SHIPPED | OUTPATIENT
Start: 2024-03-10 | End: 2024-04-16

## 2024-03-11 ENCOUNTER — VIRTUAL VISIT (OUTPATIENT)
Dept: GASTROENTEROLOGY | Facility: CLINIC | Age: 34
End: 2024-03-11
Attending: NURSE PRACTITIONER
Payer: COMMERCIAL

## 2024-03-11 DIAGNOSIS — K21.00 GASTROESOPHAGEAL REFLUX DISEASE WITH ESOPHAGITIS WITHOUT HEMORRHAGE: Primary | ICD-10-CM

## 2024-03-11 DIAGNOSIS — K29.70 GASTRITIS WITHOUT BLEEDING, UNSPECIFIED CHRONICITY, UNSPECIFIED GASTRITIS TYPE: ICD-10-CM

## 2024-03-11 PROCEDURE — 99204 OFFICE O/P NEW MOD 45 MIN: CPT | Mod: 95 | Performed by: PHYSICIAN ASSISTANT

## 2024-03-11 NOTE — NURSING NOTE
Is the patient currently in the state of [place]? YES    Visit mode:VIDEO    If the visit is dropped, the patient can be reconnected by: VIDEO VISIT: Text to cell phone:   Telephone Information:   Mobile [phone]       Will anyone else be joining the visit? NO  (If patient encounters technical issues they should call [phone]    How would you like to obtain your AVS? MyChart    Are changes needed to the allergy or medication list? No    Reason for visit: Consult    Thong BRYSON

## 2024-03-11 NOTE — PROGRESS NOTES
"      GASTROENTEROLOGY NEW PATIENT VIDEO VISIT    CC/REFERRING MD:    Cheri Georges    REASON FOR CONSULTATION:   Cheri Georges for   Chief Complaint   Patient presents with    Consult       HISTORY OF PRESENT ILLNESS:    Dayana Danielle is a 33 year old female with a past medical history significant for depression, anxiety, ADHD, and eczema who is being evaluated via a billable video visit for evaluation of recurrent upper abdominal pain and GERD.  To review, patient has had a total of 3 episodes that she describes as \"gastritis\", since this past fall.  The initial episode occurred after going out at a wedding, but the other 2 episodes did not have any clear trigger in terms of diet or activity.  Each episode was described as a squeezing, retching pain in the upper abdomen, not specifically locating to the left or right.  Each episode resolved after several hours.  There has not been any further episodes since this past February.  Unfortunately, she has been dealing with heartburn and liquid acid reflux more frequently since then, especially with activity and exercise.  There has been no dysphagia, odynophagia, early satiety, or unintentional weight loss.  Stools have been normal, no hematochezia or melena.  She has used Prilosec intermittently for symptoms, which has been helpful.  Has also used Tums in the past.  There is no pertinent surgical history.  Family medical history notable for esophageal cancer in uncle, diagnosed age 52.  No current tobacco use, endorses alcohol use 2-3 nights per week, has used edible marijuana at times as well.      I have reviewed and updated the patient's Past Medical History, Social History, Family History and Medication List.    Exam:    General appearance:  Healthy appearing adult, in no acute distress  Eyes:  Sclera anicteric, Pupils round and reactive to light  Ears, nose, mouth and throat:  No obvious external lesions of ears and " nose.  Hearing intact  Neck:  Symmetric, No obvious external lesions  Respiratory:  Normal respiration, no use of accessory muscles   MSK:  No visual upper extremity, neck or facial muscle atrophy  ABD:  No visual abdominal distention, no audible borborygmi  Skin:  No rashes or jaundice   Psychiatric:  Oriented to person, place and time, Appropriate mood and affect.   Neurologic:  Peripheral muscle function and dexterity appear to be intact      PERTINENT STUDIES have been reviewed.    ASSESSMENT/PLAN:    Dayana Danielle is a 33 year old female who presents for evaluation of recent episodes of upper abdominal pain suspicious for gastritis, as well as more recent persisting GERD symptoms.  We spent some time reviewing potential etiologies for her symptoms.  At least one of the gastritis episodes was preceded by alcohol use, and so we did discuss that there are certain things within the diet that can cause gastritis.  We also spent some time discussing uncomplicated GERD, which can carry dietary triggers as well.  I recommended starting with H. pylori stool antigen.  If positive, will direct treatment with antibiotics.  If negative, would consider either sustained course of prescription strength Prilosec for a couple of months versus EGD off therapy to assess for reflux esophagitis, EOE, gastritis, peptic ulcer disease, gastric outlet obstruction etc. patient stated understanding of plan and I will be in touch after H. pylori stool test is completed.    1. Gastritis without bleeding, unspecified chronicity, unspecified gastritis type  - Adult GI  Referral - Consult Only  - Helicobacter pylori Antigen Stool; Future    2. Gastroesophageal reflux disease with esophagitis without hemorrhage      Video-Visit Details    Video Visit Time: 17 minutes    Type of service:  Video Visit    Originating Location (pt. Location): Home    Distant Location (provider location):  Off-site    Platform used for Video  Visit: Sweta Chicas PA-C    Thank you for this consultation.  It was a pleasure to participate in the care of this patient; please contact us with any further questions.  A total of 25 minutes was spent with reviewing the chart, discussing with the patient, documentation and coordination of care.    This note was created with voice recognition software, and while reviewed for accuracy, typos may remain.     Shakir Chicas PA-C  Division of Gastroenterology, Hepatology and Nutrition  [facility]  [phone]

## 2024-03-14 ENCOUNTER — LAB (OUTPATIENT)
Dept: LAB | Facility: CLINIC | Age: 34
End: 2024-03-14
Payer: COMMERCIAL

## 2024-03-14 DIAGNOSIS — K29.70 GASTRITIS WITHOUT BLEEDING, UNSPECIFIED CHRONICITY, UNSPECIFIED GASTRITIS TYPE: ICD-10-CM

## 2024-03-15 PROCEDURE — 87338 HPYLORI STOOL AG IA: CPT

## 2024-03-18 DIAGNOSIS — K29.70 GASTRITIS WITHOUT BLEEDING, UNSPECIFIED CHRONICITY, UNSPECIFIED GASTRITIS TYPE: Primary | ICD-10-CM

## 2024-03-18 DIAGNOSIS — K21.00 GASTROESOPHAGEAL REFLUX DISEASE WITH ESOPHAGITIS WITHOUT HEMORRHAGE: ICD-10-CM

## 2024-03-18 LAB — H PYLORI AG STL QL IA: NEGATIVE

## 2024-03-18 RX ORDER — OMEPRAZOLE 40 MG/1
40 CAPSULE, DELAYED RELEASE ORAL DAILY
Qty: 60 CAPSULE | Refills: 0 | Status: SHIPPED | OUTPATIENT
Start: 2024-03-18

## 2024-03-18 NOTE — RESULT ENCOUNTER NOTE
Ken Alvarez,    Your recent H. pylori test is negative, which is reassuring.  As I mentioned during your visit, I think a sustained course of proton pump inhibitor therapy would be beneficial here.  I sent a prescription to your pharmacy for prescription strength omeprazole.  Take 1 tablet daily, 30 minutes prior to the same meal each day.  Lets plan for follow-up in a couple of months to see where things are at.  If you are finding that this medication is not helpful, please let me know sooner.  At that point, I would consider upper endoscopy for further evaluation.    Please let me know if you have any questions.    Sincerely,  Shakir WARD [facility] GI, Hepatology, and Nutrition

## 2024-04-01 ENCOUNTER — MYC REFILL (OUTPATIENT)
Dept: FAMILY MEDICINE | Facility: CLINIC | Age: 34
End: 2024-04-01
Payer: COMMERCIAL

## 2024-04-01 DIAGNOSIS — G47.00 INSOMNIA, UNSPECIFIED TYPE: ICD-10-CM

## 2024-04-01 DIAGNOSIS — F90.2 ATTENTION DEFICIT HYPERACTIVITY DISORDER (ADHD), COMBINED TYPE: ICD-10-CM

## 2024-04-01 RX ORDER — DEXTROAMPHETAMINE SACCHARATE, AMPHETAMINE ASPARTATE, DEXTROAMPHETAMINE SULFATE AND AMPHETAMINE SULFATE 2.5; 2.5; 2.5; 2.5 MG/1; MG/1; MG/1; MG/1
10 TABLET ORAL 2 TIMES DAILY
Qty: 60 TABLET | Refills: 0 | OUTPATIENT
Start: 2024-04-01

## 2024-04-01 RX ORDER — QUETIAPINE FUMARATE 25 MG/1
12.5-25 TABLET, FILM COATED ORAL AT BEDTIME
Qty: 30 TABLET | Refills: 1 | OUTPATIENT
Start: 2024-04-01

## 2024-04-13 DIAGNOSIS — F90.2 ATTENTION DEFICIT HYPERACTIVITY DISORDER (ADHD), COMBINED TYPE: ICD-10-CM

## 2024-04-13 RX ORDER — DEXTROAMPHETAMINE SACCHARATE, AMPHETAMINE ASPARTATE, DEXTROAMPHETAMINE SULFATE AND AMPHETAMINE SULFATE 2.5; 2.5; 2.5; 2.5 MG/1; MG/1; MG/1; MG/1
10 TABLET ORAL 2 TIMES DAILY
Qty: 60 TABLET | Refills: 0 | Status: CANCELLED | OUTPATIENT
Start: 2024-04-13

## 2024-04-15 ENCOUNTER — MYC MEDICAL ADVICE (OUTPATIENT)
Dept: FAMILY MEDICINE | Facility: CLINIC | Age: 34
End: 2024-04-15
Payer: COMMERCIAL

## 2024-04-15 DIAGNOSIS — G47.00 INSOMNIA, UNSPECIFIED TYPE: Primary | ICD-10-CM

## 2024-04-15 RX ORDER — DEXTROAMPHETAMINE SACCHARATE, AMPHETAMINE ASPARTATE, DEXTROAMPHETAMINE SULFATE AND AMPHETAMINE SULFATE 2.5; 2.5; 2.5; 2.5 MG/1; MG/1; MG/1; MG/1
10 TABLET ORAL 2 TIMES DAILY
Qty: 60 TABLET | Refills: 0 | Status: SHIPPED | OUTPATIENT
Start: 2024-04-15 | End: 2024-05-15

## 2024-04-15 RX ORDER — DEXTROAMPHETAMINE SACCHARATE, AMPHETAMINE ASPARTATE, DEXTROAMPHETAMINE SULFATE AND AMPHETAMINE SULFATE 2.5; 2.5; 2.5; 2.5 MG/1; MG/1; MG/1; MG/1
10 TABLET ORAL 2 TIMES DAILY
Qty: 60 TABLET | Refills: 0 | Status: SHIPPED | OUTPATIENT
Start: 2024-06-16 | End: 2024-08-21

## 2024-04-15 RX ORDER — DEXTROAMPHETAMINE SACCHARATE, AMPHETAMINE ASPARTATE, DEXTROAMPHETAMINE SULFATE AND AMPHETAMINE SULFATE 2.5; 2.5; 2.5; 2.5 MG/1; MG/1; MG/1; MG/1
10 TABLET ORAL 2 TIMES DAILY
Qty: 60 TABLET | Refills: 0 | Status: SHIPPED | OUTPATIENT
Start: 2024-05-16 | End: 2024-06-15

## 2024-04-16 RX ORDER — TRAZODONE HYDROCHLORIDE 50 MG/1
50 TABLET, FILM COATED ORAL AT BEDTIME
Qty: 30 TABLET | Refills: 1 | Status: SHIPPED | OUTPATIENT
Start: 2024-04-16

## 2024-05-10 DIAGNOSIS — F41.1 GAD (GENERALIZED ANXIETY DISORDER): ICD-10-CM

## 2024-05-10 RX ORDER — ALPRAZOLAM 0.25 MG
0.25 TABLET ORAL 3 TIMES DAILY PRN
Qty: 45 TABLET | Refills: 0 | Status: SHIPPED | OUTPATIENT
Start: 2024-05-10 | End: 2024-08-21

## 2024-05-29 ENCOUNTER — VIRTUAL VISIT (OUTPATIENT)
Dept: SLEEP MEDICINE | Facility: CLINIC | Age: 34
End: 2024-05-29
Attending: NURSE PRACTITIONER
Payer: COMMERCIAL

## 2024-05-29 VITALS — BODY MASS INDEX: 28.73 KG/M2 | HEIGHT: 69 IN | WEIGHT: 194 LBS

## 2024-05-29 DIAGNOSIS — F51.04 CHRONIC INSOMNIA: Primary | ICD-10-CM

## 2024-05-29 DIAGNOSIS — G47.00 INSOMNIA, UNSPECIFIED TYPE: ICD-10-CM

## 2024-05-29 PROCEDURE — 90791 PSYCH DIAGNOSTIC EVALUATION: CPT | Mod: 95 | Performed by: PSYCHOLOGIST

## 2024-05-29 ASSESSMENT — SLEEP AND FATIGUE QUESTIONNAIRES
HOW LIKELY ARE YOU TO NOD OFF OR FALL ASLEEP WHILE SITTING AND TALKING TO SOMEONE: WOULD NEVER DOZE
HOW LIKELY ARE YOU TO NOD OFF OR FALL ASLEEP WHILE WATCHING TV: WOULD NEVER DOZE
HOW LIKELY ARE YOU TO NOD OFF OR FALL ASLEEP WHEN YOU ARE A PASSENGER IN A CAR FOR AN HOUR WITHOUT A BREAK: WOULD NEVER DOZE
HOW LIKELY ARE YOU TO NOD OFF OR FALL ASLEEP WHILE SITTING INACTIVE IN A PUBLIC PLACE: WOULD NEVER DOZE
HOW LIKELY ARE YOU TO NOD OFF OR FALL ASLEEP WHILE SITTING QUIETLY AFTER LUNCH WITHOUT ALCOHOL: WOULD NEVER DOZE
HOW LIKELY ARE YOU TO NOD OFF OR FALL ASLEEP WHILE SITTING AND READING: WOULD NEVER DOZE
HOW LIKELY ARE YOU TO NOD OFF OR FALL ASLEEP IN A CAR, WHILE STOPPED FOR A FEW MINUTES IN TRAFFIC: WOULD NEVER DOZE
HOW LIKELY ARE YOU TO NOD OFF OR FALL ASLEEP WHILE LYING DOWN TO REST IN THE AFTERNOON WHEN CIRCUMSTANCES PERMIT: WOULD NEVER DOZE

## 2024-05-29 ASSESSMENT — PAIN SCALES - GENERAL: PAINLEVEL: NO PAIN (0)

## 2024-05-29 NOTE — Clinical Note
Hello, I'd like to get a ferritin level for this patient referred to me direct from primary care.  Thanks, Anand

## 2024-05-29 NOTE — PROGRESS NOTES
Virtual Visit Details    Type of service:  Video Visit     Originating Location (pt. Location): Home    Distant Location (provider location):  Off-site  Platform used for Video Visit: AmWell  Visit Start Time:  2:20 PM  Visit End Time: 3:11 PM      SLEEP MEDICINE CONSULTATION  Behavioral Sleep Medicine  May 29, 2024    Name: Dayana Danielle MRN# [id number]   Age: 34 year old YOB: 1990     Date of Consultation: May 29, 2024  Consultation is requested by: CHEY Hernandez CNP  [address]  [place] [address]  Primary care provider: Cheri Georges    Reason for Sleep Consultation     Dayana Danielle is a 34 year old female seen today for a behavioral sleep medicine consultation because of insomnia    Assessment and Plan     Sleep Diagnoses:         Chronic insomnia    Co-occurring Conditions:    Gastritis  ADHD  Major Depressive Disorder, recurrent  Generalized Anxiety Disorder    Clinical Impressions:    Dayana Danielle was seen for a sleep psychology consultation and possible behavioral sleep intervention and treatment. History and clinical presentation suggests history of insomnia consistent with chronic Idiopathic insomnia emerging in childhood, associated with ADHD and secondary anxiety/depression, and maintain at least in part due to psychophysiologic factors.  This sleep evaluation suggests low risk and low pretest probability for obstructive sleep apnea.  Overnight polysomnography or home sleep testing is not indicated.  However, patient is endorsing common symptoms associated with restless leg syndrome including motor restlessness in the evening, urge to move legs that can affect ability to fall asleep.  These intermittent symptoms may also be associated with ADHD and/or anxiety.    Recommendations and Counselin.  Patient is a very suitable candidate for brief cognitive behavioral therapy for insomnia.  We discussed  treatment options and she has elected to complete our 6-week intensive online cognitive behavioral therapy program.  Following completion we have scheduled a behavioral sleep follow-up visit to determine progress and next steps if needed.    2.  Recommend obtaining a ferritin level as ferritin levels below 75 have been associated with restless leg syndrome in some patients.  In the situation, initial treatment with iron supplementation  may reduce limb and motor restlessness.  Order requested by sleep medicine, follow-up with primary care.    Dayana was provided information about the pathophysiology of insomnia, psychophysiological factors contributing to the onset and maintenance of insomnia and how co-occurring medical conditions and intrinsic sleep disorders can affect sleep.  Treatment options were discussed  as applicable to patient specific sleep concerns and symptoms. The benefits and potential early side effects of treatment including increased daytime sleepiness were discussed.     Patient was advised to consult with their prescribing provider around use of or changes to prescription sleep medication.  Patient was counseled on the importance of avoiding driving if drowsy.    Services provided are compliant with the requirements of [place] Statute SS 256B.0625 Subd. 3b and paragraph (b)     Follow-up:  with behavioral sleep medicine in 8 weeks, available via [employer] to assist in navigating online CBTI if needed, follow-up with primary care or sleep medicine if ferritin level is low to consider iron supplementation for restlessness of legs.     History of Present Sleep Complaint     Dayana Wilson Evelarisa Gloverjame is a 34 year old year old female who presents with a longstanding history of insomnia emerging in early childhood.    Currently treated for ADHD with Adderall and does not notice any effect on sleep.    Sleeps in her own bed and finds this to be helpful and less disruptive to her .    She  "does report \"anxiety filled\" dreams and nightmares with content stemming from childhood history of trauma.  She reports that the nightmares have gradually reduced over time.  She does not report any functional impairment resulting from the dreams.    Patient reports emergence of sleep specific anxiety and effort.         Childhood episode of sleep walking.  Activities in bed: Read, Use phone, computer, or tablet  Prescribed or OTC stimulants: Adderall  Prescribed or OTC sleep medication: Anxiety prn, heartburn medication, magnesium, unisom,  Previous sleep medications patients has tried: Unisom, zquill, benadryl, zoloft, celexa, cymbalta tried for over a year and not helpful for anxiety, depression or insomnia, propranolol,trazodone not effective, seroquel caused weight gain so discontinued.    SLEEP-WAKE SCHEDULE:    Work/School Days: Patient goes to school/work: Yes     Time to Bed:  8:30/9  Sleep Latency: 30min-3hrs to fall asleep,variation may in part be due to stressor during the day.  Difficulty falling asleep:  5 nights per week  Number of awakenings: 4-8 times per night due to External stimuli (bed partner, pets, noise, etc), Anxiety, Other  Trouble falling back asleep Nightly  times a week   Usually takes 5min to 1.5 hours to get back to sleep  Rise time: 6am-8am, works Tuesdays and Wensdays when she has to get up earlier.  Uses alarm? Yes    Weekends/Non-work Days/All Other Days    Usually gets into bed at 9-10   Sleep latency:  30 min to 3 hours   Rise time: 7am-8am   Uses alarm? Yes    Patient sleep estimates:    Average total sleep time:  4.5-5 hrs   Patient estimate of sleep need: 8-9  Preferred sleep position(s): Side, Stomach     Naps    Intentional naps: Never times a week  Duration:  Na in duration  Feels better after a nap: No  Unintentional Dozing:  Never days per week  Driving accident or near-miss due to sleepiness/drowsiness? No    SLEEP DISRUPTIONS:    Breathing/Snoring    Patient " snores:Yes  Other people complain about Her snoring: No  Patient has been told She stops breathing in Her sleep: No  She has issues with any of the following: Heartburn or reflux at night    Movement:    Patient gets pain, discomfort, with an urge to move: Yes  Symptoms occur when She is resting: No  Symptoms occur more at night:Yes  Patient has been told She kicks Her legs at night:No     Behaviors in Sleep:    Dayana Danielle has experienced the following behaviors while sleeping: Recurring Nightmares, See or hear things that are not really there upon awakening or just falling asleep    She has experienced sudden muscle weakness during the day: No    Caffeine, Alcohol and Other Substances:    Number of caffeinated beverages(per day: 2 cups of tea in am  Last caffeine use is usually: Never after 11am  Uses alcohol to promote sleep: No  Drinks alcohol near bedtime: No      FAMILY HISTORY OF SLEEP DISORDERS    Patient has a family member been diagnosed with a sleep disorder: No              SCALES     EPWORTH SLEEPINESS SCALE      Sitting and reading 0   Watching TV 0   Sitting, inactive in a public place (theatre or [facility] 0    As a passenger in a car 0   Lying down to rest in the afternoon when circumstance permit 0   Sitting and talking to someone 0   Sitting quietly after lunch without alcohol 0   In a car, while stopped for a few minutes in traffic 0   TOTAL SCORE (nl <11) 0     INSOMNIA SEVERITY INDEX       Difficulty falling asleep 3   Difficult staying asleep 3   Problems waking up to early 1   How SATISFIED/DISSATISFIED are you with your CURRENT sleep pattern? 3   How NOTICEABLE to others do you think your sleep pattern is in terms of your quality of life? 3   How WORRIED/DISTRESSED are you about your current sleep pattern? 3   To what extent do you consider your sleep problem to INTERFERE with your daily fuctioning(e.g. daytime fatigue, mood, ability to function at work/daily chores,  concentration, mood,etc.) CURRENTLY? 3   INSOMNIA SEVERITY INDEX TOTAL SCORE 19    Absence of insomnia (0-7); sub-threshold insomnia (8-14); moderate insomnia (15-21); and severe insomnia (22-28)    STOP BANG     1. Snoring: Do you snore loudly (louder than talking or loud enough to be heard through closed doors)?  No    2. Tired: Do you often feel tired, fatigued, or sleepy during daytime?  Yes    3. Observed: Has anyone observed you stop breathing during your sleep?  No    4. Blood pressure: Do you have or are you being treated for high blood pressure?  No    5. BMI: BMI more than 35 kg/m2?  No    6. Age: Age over 50 yr old?  No    7. Neck circumference: Neck circumference greater than 40 cm?  No    8. Gender: Gender male?  No    STOP-BANG Total Score: 1    FEDE Risk  0-2 Low risk for FEDE  3-4  Intermediate risk for FEDE  5-8 High risk      JACINTO-7        9/15/2021     9:05 AM 1/5/2022    10:47 AM 3/27/2023     9:50 AM   JACINTO-7 SCORE   Total Score 11 (moderate anxiety) 13 (moderate anxiety) 17 (severe anxiety)   Total Score 11 13 17       CAGE-AID    CAGE- AID Score -       1/31/2019     8:38 AM 9/15/2021     9:31 AM   CAGE-AID Total Score   Total Score 2 1   Total Score MyChart 2 (A total score of 2 or greater is considered clinically significant) 1 (A total score of 2 or greater is considered clinically significant)       CAGE-AID score  > 1 is a positive screen, suggesting further discussion is needed to determine if evaluation for alcohol or substance abuse is appropriate.  A score > 2 is considered clinically significant, suggesting further evaluation of alcohol or substance-related problems is indicated.         Previous Sleep Consultations/Studies     None reported    Vitals     There were no vitals taken for this visit.     Medical History     Allergies:    Allergies   Allergen Reactions    Seasonal Allergies        Medications:    Current Outpatient Medications   Medication Sig Dispense Refill    albuterol  (PROAIR HFA/PROVENTIL HFA/VENTOLIN HFA) 108 (90 Base) MCG/ACT inhaler Inhale 1-2 puffs into the lungs every 4 hours as needed for shortness of breath, wheezing or cough 18 g 0    ALPRAZolam (XANAX) 0.25 MG tablet TAKE ONE TABLET BY MOUTH THREE TIMES A DAY AS NEEDED FOR ANXIETY 45 tablet 0    amphetamine-dextroamphetamine (ADDERALL) 10 MG tablet Take 1 tablet (10 mg) by mouth 2 times daily for 30 days 60 tablet 0    [START ON 2024] amphetamine-dextroamphetamine (ADDERALL) 10 MG tablet Take 1 tablet (10 mg) by mouth 2 times daily for 30 days 60 tablet 0    amphetamine-dextroamphetamine (ADDERALL) 10 MG tablet Take 1 tablet (10 mg) by mouth 2 times daily 60 tablet 0    amphetamine-dextroamphetamine (ADDERALL) 10 MG tablet Take 1 tablet (10 mg) by mouth 2 times daily 60 tablet 0    doxylamine (UNISOM) 25 MG TABS tablet Take by mouth At Bedtime      hydrOXYzine HCl (ATARAX) 25 MG tablet TAKE ONE TABLET BY MOUTH EVERY FOUR TO SIX HOURS AS NEEDED Strength: 25 mg 90 tablet 0    Omeprazole (PRILOSEC PO)       omeprazole (PRILOSEC) 40 MG DR capsule Take 1 capsule (40 mg) by mouth daily 60 capsule 0    traZODone (DESYREL) 50 MG tablet Take 1 tablet (50 mg) by mouth at bedtime 30 tablet 1    triamcinolone (KENALOG) 0.1 % external cream Apply topically 2 times daily 45 g 1    valACYclovir (VALTREX) 1000 mg tablet Take 2 tablets (2,000 mg) by mouth 2 times daily 4 tablet 3       Problem List:  Patient Active Problem List    Diagnosis Date Noted    Eczema, unspecified type 2024     Priority: Medium    Gastritis without bleeding, unspecified chronicity, unspecified gastritis type 2024     Priority: Medium    Hypertrophy of breast 2022     Priority: Medium     Added automatically from request for surgery [id number]      Status post  delivery 2020     Priority: Medium    History of miscarriage 2019     Priority: Medium    Major depressive disorder, single episode, moderate (H) 2018      Priority: Medium    JACINTO (generalized anxiety disorder) 2017     Priority: Medium    Moderate episode of recurrent major depressive disorder (H) 2017     Priority: Medium    ADHD      Priority: Medium    Ovarian mass 08/15/2015     Priority: Medium        Past Medical/Surgical History:  Past Medical History:   Diagnosis Date    ADHD     States that it's not an ongoing diagnosis, the person who did her psych eval for it did not think she had it. Was on Adderall in the past and stopped that in 2017.    Anemia     10 years ago    Anxiety     Started the Citalopram early 2017 by her PCP.     Depressive disorder     Started the Citalopram early 2017 by her PCP.     History of miscarriage 2019    Ovarian cyst     Right sided    Smoking     Stopped 2017.     Patient Active Problem List    Diagnosis Date Noted    Eczema, unspecified type 2024     Priority: Medium    Gastritis without bleeding, unspecified chronicity, unspecified gastritis type 2024     Priority: Medium    Hypertrophy of breast 2022     Priority: Medium     Added automatically from request for surgery [id number]      Status post  delivery 2020     Priority: Medium    History of miscarriage 2019     Priority: Medium    Major depressive disorder, single episode, moderate (H) 2018     Priority: Medium    JACINTO (generalized anxiety disorder) 2017     Priority: Medium    Moderate episode of recurrent major depressive disorder (H) 2017     Priority: Medium    ADHD      Priority: Medium    Ovarian mass 08/15/2015     Priority: Medium     Patient Active Problem List   Diagnosis    Ovarian mass    ADHD    JACINTO (generalized anxiety disorder)    Moderate episode of recurrent major depressive disorder (H)    Major depressive disorder, single episode, moderate (H)    History of miscarriage    Status post  delivery    Hypertrophy of breast    Eczema, unspecified type     Gastritis without bleeding, unspecified chronicity, unspecified gastritis type        Most Recent Labs:  Lab on 03/14/2024   Component Date Value Ref Range Status    Helicobacter pylori Antigen Stool 03/15/2024 Negative  Negative Final    Negative for Helicobacter pylori antigen by enzyme immunoassay. A negative result indicates the absence of H. pylori antigen or that the level of antigen is below the level of detection.       Mental Health History     Prior Mental Health Diagnosis:   Major Depressive Disorder, Generalized Anxieety Disorder, ADHD    Mental Health Treatment:   Primary care management, no therapist.    Chemical Abuse/Treatment:    None reported    Family History     Family History   Problem Relation Age of Onset    No Known Problems Mother     No Known Problems Father     No Known Problems Sister     No Known Problems Brother     No Known Problems Maternal Grandmother     No Known Problems Maternal Grandfather     No Known Problems Paternal Grandmother     No Known Problems Daughter     No Known Problems Daughter        Social History         Social Determinants of Health     Food Insecurity: Low Risk  (2/12/2024)    Food Insecurity     Within the past 12 months, did you worry that your food would run out before you got money to buy more?: No     Within the past 12 months, did the food you bought just not last and you didn t have money to get more?: No   Depression: Not at risk (3/11/2024)    PHQ-2     PHQ-2 Score: 1   Housing Stability: Low Risk  (2/12/2024)    Housing Stability     Do you have housing? : Yes     Are you worried about losing your housing?: No   Tobacco Use: Medium Risk (3/11/2024)    Patient History     Smoking Tobacco Use: Former     Smokeless Tobacco Use: Never     Passive Exposure: Not on file   Financial Resource Strain: Low Risk  (2/12/2024)    Financial Resource Strain     Within the past 12 months, have you or your family members you live with been unable to get utilities  (heat, electricity) when it was really needed?: No   Alcohol Use: Alcohol Misuse (12/22/2020)    AUDIT-C     Frequency of Alcohol Consumption: 2-3 times a week     Average Number of Drinks: 1 or 2     Frequency of Binge Drinking: Monthly   Transportation Needs: Low Risk  (2/12/2024)    Transportation Needs     Within the past 12 months, has lack of transportation kept you from medical appointments, getting your medicines, non-medical meetings or appointments, work, or from getting things that you need?: No   Physical Activity: Insufficiently Active (2/12/2024)    Exercise Vital Sign     Days of Exercise per Week: 4 days     Minutes of Exercise per Session: 30 min   Interpersonal Safety: Low Risk  (2/12/2024)    Interpersonal Safety     Do you feel physically and emotionally safe where you currently live?: Yes     Within the past 12 months, have you been hit, slapped, kicked or otherwise physically hurt by someone?: No     Within the past 12 months, have you been humiliated or emotionally abused in other ways by your partner or ex-partner?: No   Stress: Stress Concern Present (2/12/2024)    [place] [employer] - Occupational Stress Questionnaire     Feeling of Stress : Rather much   Social Connections: Unknown (2/12/2024)    Social Connection and Isolation Panel [NHANES]     Frequency of Communication with Friends and Family: Not on file     Frequency of Social Gatherings with Friends and Family: Once a week     Attends Evangelical Services: Not on file     Active Member of Clubs or Organizations: Not on file     Attends Club or Organization Meetings: Not on file     Marital Status: Not on file   Health Literacy: Not on file         Mental Status Examination     Dayana presented as oriented X3 with speech and language intact.  The patient was cooperative throughout the evaluation with no signs of hallucinations, delusions, loosening of associations or other thought disturbance.  Mood was normal  Affect was congruent with mood. Insight and judgement were intact.  Memory appeared intact for recent and remote elements.  There was no report of suicidal ideation, intention or plan. Attention and concentration were within normal.        José Miguel Melara PsyD, LP, [facility]  Diplomate, Behavioral Sleep Medicine  [facility]      Copy:   Cheri Georges, CHEY CNP  [address] [facility] [address]  [place] [address]    Note: This dictation was created using voice recognition software. This document may contain an error not identified before finalizing the document. If the error changes the accuracy of the document, I would appreciate it being brought to my attention.

## 2024-05-29 NOTE — NURSING NOTE
Is the patient currently in the state of [place]? YES    Visit mode:VIDEO    If the visit is dropped, the patient can be reconnected by: VIDEO VISIT: Text to cell phone:   Telephone Information:   Mobile [phone]       Will anyone else be joining the visit? NO  (If patient encounters technical issues they should call [phone]    How would you like to obtain your AVS? MyChart    Are changes needed to the allergy or medication list? Pt stated no changes to allergies and Pt stated no med changes    Are refills needed on medications prescribed by this physician? NO  Has patient had flu shot for current/most recent flu season? If so, when? Yes: 02/12/2024    Reason for visit: Consult    Phylicia BRYSON

## 2024-05-29 NOTE — PATIENT INSTRUCTIONS
Online CBT-I    Your health care provider has recommended our online cognitive-behavioral therapy program for insomnia (online CBT-I).  Our online CBT-I program is a collaboration between [facility] and the [facility], developers of the GO! To Sleep program.  The program followsinsomnia  treatment strategies and recommendations  similar to those used in our [facility] Sleep [facility]ics.     How It works:    Research indicates that online CBT-I can be as effective as in-person therapy.  The Go! to Sleep program uses proven methods to help you improves your sleep from the comfort and privacy of your own home.  A recent study in the journal SLEEP found that 91% of patients who completed a five-week online program for insomnia reported improvement in sleep.    What You Get:    The cost for an entire 6 week program is $40.  For this fee you will have an online guide to learning how to improve your sleep using daily sleep logs that help individualize your program.  You will learn the basic science of sleep and why certain behaviors can be detrimental to your sleep.  You will also be given activities to help you get the sleep you needs including specially crafted audio relaxation practices that will help you manage stress and improve your sleep.    Who its for:     GO! To Sleep was designed for those experiencing short-term  insomnia and long-term insomnia lasting more than six months.  If you use sleep medication on an occasional basis, the program will help you learn techniques that will help you sleep better without medication.      Click on the link below to get started today:                           [email or web address]             Once you are registered you can share your sleep log data with [facility] where your health provider will be able to review your sleep log and progress.  Once you begin the program, go to the left side  "navigation bar and click on the  share sleep log  button:                                        This takes you to the next page where you enter the provider code [email or web address] and click Locate:                 This brings you to the verification page where you should see [facility] identified as your provider                                                                           By clicking \"Submit\" your sleep log data will be sent to our secure [facility] portal for review by you provider.     For Help Contact [facility] At:    [email or web address]      "

## 2024-06-06 ENCOUNTER — LAB (OUTPATIENT)
Dept: LAB | Facility: CLINIC | Age: 34
End: 2024-06-06
Payer: COMMERCIAL

## 2024-06-06 DIAGNOSIS — F51.04 CHRONIC INSOMNIA: ICD-10-CM

## 2024-06-06 PROCEDURE — 83540 ASSAY OF IRON: CPT

## 2024-06-06 PROCEDURE — 36415 COLL VENOUS BLD VENIPUNCTURE: CPT

## 2024-06-06 PROCEDURE — 82728 ASSAY OF FERRITIN: CPT

## 2024-06-06 PROCEDURE — 83550 IRON BINDING TEST: CPT

## 2024-06-07 LAB
FERRITIN SERPL-MCNC: 26 NG/ML (ref 6–175)
IRON BINDING CAPACITY (ROCHE): 336 UG/DL (ref 240–430)
IRON SATN MFR SERPL: 24 % (ref 15–46)
IRON SERPL-MCNC: 82 UG/DL (ref 37–145)

## 2024-06-07 NOTE — RESULT ENCOUNTER NOTE
Iron levels are a bit low, this can cause restless leg symptoms .   Recommend  feso4 (iron) 325mg once every other day for 4-6 months to see if leg symptoms improve.   If you are taking an acid reducing medication for heartburn you may need to add vitamin C 500-1000 mg to aide in absorption.   May need to add a stool softener if iron causes constipation.   Goal ferritin is >75. Recheck in 4-6 months is restless leg syndrome symptoms are not improved

## 2024-08-09 ENCOUNTER — VIRTUAL VISIT (OUTPATIENT)
Dept: SLEEP MEDICINE | Facility: CLINIC | Age: 34
End: 2024-08-09
Payer: COMMERCIAL

## 2024-08-09 VITALS — BODY MASS INDEX: 28.14 KG/M2 | HEIGHT: 69 IN | WEIGHT: 190 LBS

## 2024-08-09 DIAGNOSIS — F51.04 CHRONIC INSOMNIA: Primary | ICD-10-CM

## 2024-08-09 PROCEDURE — 90832 PSYTX W PT 30 MINUTES: CPT | Mod: 95 | Performed by: PSYCHOLOGIST

## 2024-08-09 ASSESSMENT — SLEEP AND FATIGUE QUESTIONNAIRES
HOW LIKELY ARE YOU TO NOD OFF OR FALL ASLEEP WHILE SITTING AND TALKING TO SOMEONE: WOULD NEVER DOZE
HOW LIKELY ARE YOU TO NOD OFF OR FALL ASLEEP WHILE SITTING QUIETLY AFTER LUNCH WITHOUT ALCOHOL: WOULD NEVER DOZE
HOW LIKELY ARE YOU TO NOD OFF OR FALL ASLEEP WHEN YOU ARE A PASSENGER IN A CAR FOR AN HOUR WITHOUT A BREAK: WOULD NEVER DOZE
HOW LIKELY ARE YOU TO NOD OFF OR FALL ASLEEP WHILE SITTING AND READING: SLIGHT CHANCE OF DOZING
HOW LIKELY ARE YOU TO NOD OFF OR FALL ASLEEP IN A CAR, WHILE STOPPED FOR A FEW MINUTES IN TRAFFIC: WOULD NEVER DOZE
HOW LIKELY ARE YOU TO NOD OFF OR FALL ASLEEP WHILE WATCHING TV: WOULD NEVER DOZE
HOW LIKELY ARE YOU TO NOD OFF OR FALL ASLEEP WHILE SITTING INACTIVE IN A PUBLIC PLACE: WOULD NEVER DOZE
HOW LIKELY ARE YOU TO NOD OFF OR FALL ASLEEP WHILE LYING DOWN TO REST IN THE AFTERNOON WHEN CIRCUMSTANCES PERMIT: SLIGHT CHANCE OF DOZING

## 2024-08-09 ASSESSMENT — PAIN SCALES - GENERAL: PAINLEVEL: NO PAIN (0)

## 2024-08-09 NOTE — NURSING NOTE
Current patient location: [address] [place] [address]    Is the patient currently in the state of [place]? YES    Visit mode:VIDEO    If the visit is dropped, the patient can be reconnected by: VIDEO VISIT: Send to e-mail at: [email or web address]    Will anyone else be joining the visit? NO  (If patient encounters technical issues they should call [phone]    How would you like to obtain your AVS? MyChart    Are changes needed to the allergy or medication list? No    Are refills needed on medications prescribed by this physician? NO    Rooming Documentation:  Patient will complete questionnaire(s) in MyChart      Reason for visit: RECHYAKOV JOSEPHF

## 2024-08-09 NOTE — PROGRESS NOTES
"Virtual Visit Details    Type of service:  Video Visit     Originating Location (pt. Location): Home    Distant Location (provider location):  On-site  Platform used for Video Visit: AmWell    Visit Start Time:  11:34 AM  Visit End Time:  11:53 AM      SLEEP MEDICINE VIRTUAL FOLLOW-UP VISIT  Behavioral Sleep Medicine    Patient Name: Dayana Danielle  MRN:  [id number]  Date of Service: Aug 9, 2024       Subjective Report     Dayana Danielle  returns for a telehealth video visit to discuss progress in implementing behavioral strategies for the management of insomnia.  Patient consent for initiation of video visit was obtained and documented prior to initiation of visit.     Dayana reports she was not able to follow the online and felt that the parameters and recommendations of the program did not meet her needs.  She discontinued the program after 7-10 days.  We assessed whether she believed behavioral treatment would help her insomnia.  She adamantly said no.  Discussion was attempted to explore the possible contributions from ADHD, generalized anxiety and depression to her clinical presentation of chronic insomnia.  She states \"my mind is racing on the thousand things all the time\".  When attempting to discuss relaxation and other strategies to manage active mind, she stated \"I am on medication\" (referring to her stimulant medication)..     During the interaction today she was irritable, and at times angry.  This in part appeared to reflect frustration about difficulty managing overall stressors, frustration about sleep difficulties, particularly falling asleep.     Antonio reports she does not believe that behavioral therapy will help her insomnia.  She feels that she needs sleep medication but \"nobody will prescribe me meds.\"    Overall it appears that patient is experiencing insufficient sleep in large part due to the impact of anxiety, stress, and possibly depression.  She is maintaining " regular and fairly consistent sleep schedule though changes in summer activities have affected her ability to maintain sleep hygiene.  She has very good insight into the need to maintain a consistent bedtime and wake time.  However she finds himself experiencing very active mind and a degree of stress related anxiety while in bed likely reducing the bed as a cue for sleep.    Per previous evaluation, there does not appear to be any evidence of other intrinsic sleep disorders with no significant snoring, witnessed apneas, motor restlessness, or parasomnia.         Sleep Data:       EPWORTH SLEEPINESS SCALE    Sitting and reading 1   Watching TV 0   Sitting, inactive in a public place (theatre or mtg.) 0    As a passenger in a car 0   Lying down to rest in the afternoon when circumstance permit 1   Sitting and talking to someone 0   Sitting quietly after lunch without alcohol 0   In a car, while stopped for a few minutes in traffic 0   TOTAL SCORE (nl <11) 2     INSOMNIA SEVERITY INDEX     Difficulty falling asleep 3   Difficult staying asleep 3   Problems waking up to early 2   How SATISFIED/DISSATISFIED are you with your CURRENT sleep pattern? 4   How NOTICEABLE to others do you think your sleep pattern is in terms of your quality of life? 3   How WORRIED/DISTRESSED are you about your current sleep pattern? 3   To what extent do you consider your sleep problem to INTERFERE with your daily fuctioning(e.g. daytime fatigue, mood, ability to function at work/daily chores, concentration, mood,etc.) CURRENTLY? 3   INSOMNIA SEVERITY INDEX TOTAL SCORE 21    Absence of insomnia (0-7); sub-threshold insomnia (8-14); moderate insomnia (15-21); and severe insomnia (22-28)       Interventions     Strategies and recommendations including  online CBTI and Stimulus control therapy were reviewed and discussed today.     Services provided are compliant with the requirements of [place] Statute SS 256B.0625 Subd. 3b and paragraph (b)  "      Vital Signs     Ht 1.753 m (5' 9\")   Wt 86.2 kg (190 lb)   BMI 28.06 kg/m       Mental Status     Orientation:  X3  Mood: Irritable, angry  Affect:  Congruent with mood  Speech/Language:  Normal  Thought Process: Intact  Associations:  Normal  Thought Content: Normal  Patient does not report any suicidal ideation, intention or plan.    Diagnostic Impressions and Plan     Chronic insomnia due to mental health conditions  ADHD  Generalized Anxiety Disorder  Major Depressive Disorder, unspecified    At follow-up, patient did not complete the recommended online cognitive behavioral therapy program for insomnia.  She does not believe that behavioral strategies or treatment will help with her insomnia.  She presents today with elevated symptoms of depression, anxiety frustration and tiredness, certainly exacerbated by poor quality sleep.  She declined referral or pursuing psychotherapy to help with mental health conditions.  She declined for consultation liaison psychiatry referral to help facilitate consideration of optimizing treatment for mental health conditions as well as pharmacotherapy for insomnia.  She believes the only effective strategies to help with insomnia would be sleep medication.  Given significant co-occurring mental health conditions, pharmacotherapy for insomnia may be a suitable course within the context of mental health care treatment plan.    Plan:   No follow-up with sleep medicine or behavioral sleep clinic, no need for further diagnostic testing within sleep medicine.  Patient declined recommended plan for a psychiatric medication consultation and declined referral for psychotherapy.    Follow-up:  Patient indicates she will follow-up with her primary care provider.      José Miguel Melara PsyD, LP, [facility]  Diplomate, Behavioral Sleep Medicine  [facility]      Note: This dictation was created using voice recognition software. This document may contain an error " not identified before finalizing the document. If the error changes the accuracy of the document, I would appreciate it being brought to my attention.

## 2024-08-09 NOTE — Clinical Note
Please see my note.  No followup is recommended in sleep medicine and she does not believe behavioral treatment will help treat her insomnia.  She declined our recommended referrals but indicates she will followup with you.  José Miguel Melara PsyD, LP, [facility] Diplomate, Behavioral Sleep Medicine [facility]

## 2024-08-21 DIAGNOSIS — F90.2 ATTENTION DEFICIT HYPERACTIVITY DISORDER (ADHD), COMBINED TYPE: ICD-10-CM

## 2024-08-21 DIAGNOSIS — F41.1 GAD (GENERALIZED ANXIETY DISORDER): ICD-10-CM

## 2024-08-21 RX ORDER — ALPRAZOLAM 0.25 MG
0.25 TABLET ORAL 3 TIMES DAILY PRN
Qty: 45 TABLET | Refills: 0 | Status: SHIPPED | OUTPATIENT
Start: 2024-08-21

## 2024-08-21 RX ORDER — DEXTROAMPHETAMINE SACCHARATE, AMPHETAMINE ASPARTATE, DEXTROAMPHETAMINE SULFATE AND AMPHETAMINE SULFATE 2.5; 2.5; 2.5; 2.5 MG/1; MG/1; MG/1; MG/1
10 TABLET ORAL 2 TIMES DAILY
Qty: 60 TABLET | Refills: 0 | Status: SHIPPED | OUTPATIENT
Start: 2024-08-21 | End: 2024-09-25

## 2024-09-25 DIAGNOSIS — F90.2 ATTENTION DEFICIT HYPERACTIVITY DISORDER (ADHD), COMBINED TYPE: ICD-10-CM

## 2024-09-25 RX ORDER — DEXTROAMPHETAMINE SACCHARATE, AMPHETAMINE ASPARTATE, DEXTROAMPHETAMINE SULFATE AND AMPHETAMINE SULFATE 2.5; 2.5; 2.5; 2.5 MG/1; MG/1; MG/1; MG/1
10 TABLET ORAL 2 TIMES DAILY
Qty: 60 TABLET | Refills: 0 | Status: SHIPPED | OUTPATIENT
Start: 2024-09-25

## 2024-10-24 ENCOUNTER — ALLIED HEALTH/NURSE VISIT (OUTPATIENT)
Dept: FAMILY MEDICINE | Facility: CLINIC | Age: 34
End: 2024-10-24
Payer: COMMERCIAL

## 2024-10-24 DIAGNOSIS — Z23 ENCOUNTER FOR IMMUNIZATION: Primary | ICD-10-CM

## 2024-10-24 PROCEDURE — 91320 SARSCV2 VAC 30MCG TRS-SUC IM: CPT

## 2024-10-24 PROCEDURE — 99207 PR NO CHARGE NURSE ONLY: CPT

## 2024-10-24 PROCEDURE — 90471 IMMUNIZATION ADMIN: CPT

## 2024-10-24 PROCEDURE — 90480 ADMN SARSCOV2 VAC 1/ONLY CMP: CPT

## 2024-10-24 PROCEDURE — 90656 IIV3 VACC NO PRSV 0.5 ML IM: CPT

## 2024-10-24 NOTE — PROGRESS NOTES
Prior to immunization administration, verified patients identity using patient s name and date of birth. Please see Immunization Activity for additional information.     Is the patient's temperature normal (100.5 or less)? Yes     Patient MEETS CRITERIA. PROCEED with vaccine administration.          10/24/2024   COVID   Have you had myocarditis or pericarditis (inflammation of or around the heart muscle) after getting a COVID-19 vaccine? No   Have you had a serious reaction to a COVID vaccine or something in a COVID vaccine, like polyethylene glycol (PEG) or polysorbate? No   Have you had multisystem inflammatory syndrome from COVID-19 in the past 90 days? No   Have you received a bone marrow transplant within the previous 3 months? No            Patient MEETS CRITERIA. PROCEED with vaccine administration.            10/24/2024   INFLUENZA   Would you like to receive the flu shot or the nasal flu vaccine today? Flu Shot   Have you had a serious reaction to a flu vaccine or something in a flu vaccine? No   Have you had Guillain-[place] syndrome within 6 weeks of getting a vaccine? No   Have you received a bone marrow transplant within the previous 6 months? No            Patient MEETS CRITERIA. PROCEED with vaccine administration.        Patient instructed to remain in clinic for 15 minutes afterwards, and to report any adverse reactions.      Link to Ancillary Visit Immunization Standing Orders SmartSet     Screening performed by Marisabel Campoverde MA on 10/24/2024 at 2:07 PM.

## 2024-10-29 DIAGNOSIS — F90.2 ATTENTION DEFICIT HYPERACTIVITY DISORDER (ADHD), COMBINED TYPE: ICD-10-CM

## 2024-10-29 RX ORDER — DEXTROAMPHETAMINE SACCHARATE, AMPHETAMINE ASPARTATE, DEXTROAMPHETAMINE SULFATE AND AMPHETAMINE SULFATE 2.5; 2.5; 2.5; 2.5 MG/1; MG/1; MG/1; MG/1
10 TABLET ORAL 2 TIMES DAILY
Qty: 60 TABLET | Refills: 0 | Status: SHIPPED | OUTPATIENT
Start: 2024-10-29

## 2024-11-04 ENCOUNTER — VIRTUAL VISIT (OUTPATIENT)
Dept: GASTROENTEROLOGY | Facility: CLINIC | Age: 34
End: 2024-11-04
Attending: PHYSICIAN ASSISTANT
Payer: COMMERCIAL

## 2024-11-04 DIAGNOSIS — K21.9 GASTROESOPHAGEAL REFLUX DISEASE WITHOUT ESOPHAGITIS: Primary | ICD-10-CM

## 2024-11-04 PROCEDURE — 99214 OFFICE O/P EST MOD 30 MIN: CPT | Mod: 95 | Performed by: PHYSICIAN ASSISTANT

## 2024-11-04 NOTE — PROGRESS NOTES
GASTROENTEROLOGY Follow-up VIDEO VISIT    CC/REFERRING MD:    Cheri Georges    REASON FOR CONSULTATION:   Shakir Chicas for   Chief Complaint   Patient presents with    RECHECK       HISTORY OF PRESENT ILLNESS:    Dayana Danielle is a 34 year old female who is being evaluated via a billable video visit for follow-up.  To review, I initially met with patient in March of this year for some ongoing GERD symptoms.  She had described heartburn and reflux after meals as well as activity and exercise.  She did not have any other alarm symptoms like dysphagia, unintentional weight loss, or anemia, and so I had recommended a brief course of PPI therapy for her symptoms.  She notes that her GERD symptoms improved dramatically with this, but upon discontinuing, they returned quickly.  She had also described a few episodes of epigastric pain which I felt might have been related to gastritis.  She actually had more of these episodes, occurring on PPI therapy.  No other new symptoms to report.      I have reviewed and updated the patient's Past Medical History, Social History, Family History and Medication List.    Exam:    General appearance:  Healthy appearing adult, in no acute distress  Eyes:  Sclera anicteric, Pupils round and reactive to light  Ears, nose, mouth and throat:  No obvious external lesions of ears and nose.  Hearing intact  Neck:  Symmetric, No obvious external lesions  Respiratory:  Normal respiration, no use of accessory muscles   MSK:  No visual upper extremity, neck or facial muscle atrophy  ABD:  No visual abdominal distention, no audible borborygmi  Skin:  No rashes or jaundice   Psychiatric:  Oriented to person, place and time, Appropriate mood and affect.   Neurologic:  Peripheral muscle function and dexterity appear to be intact      PERTINENT STUDIES have been reviewed.    ASSESSMENT/PLAN:    Dayana Danielle is a 34 year old female who presents for  follow up of GERD symptoms as well as intermittent epigastric pain.  She had brief relief of these GERD symptoms on PPI therapy but they returned quickly after stopping the medication.  She has also continued to have epigastric pain intermittently despite PPI therapy.  For this reason, we will proceed with EGD off therapy to evaluate for reflux esophagitis, EOE, hiatal hernia, gastritis/peptic ulcer disease, gastric outlet obstruction  etc. She can continue on PPI for now, but will discontinue 2 weeks before procedure.  We briefly discussed other causes of epigastric pain, the only other thing I would really be concerned for in her might be radiolucent gallstones that might be causing biliary colic.  Can consider upper abdominal ultrasound if EGD is unrevealing.    1. Gastroesophageal reflux disease without esophagitis (Primary)  - Adult GI  Referral - Procedure Only; Future      Video-Visit Details    Video Visit Time: 22 minutes    Type of service:  Video Visit    Originating Location (pt. Location): Home    Distant Location (provider location):  Off-site    Platform used for Video Visit: Sweta    A total of 30 minutes was spent with reviewing the chart, discussing with the patient, documentation and coordination of care on 11/4/2024.    Shakir Chicas PA-C  Division of Gastroenterology, Hepatology, and Nutrition  [facility]  [phone]    RTC 3 months

## 2024-11-04 NOTE — NURSING NOTE
Current patient location: [address] [place] [address]    Is the patient currently in the state of [place]? YES    Visit mode:VIDEO    If the visit is dropped, the patient can be reconnected by: VIDEO VISIT: Send to e-mail at: [email or web address]    Will anyone else be joining the visit? NO  (If patient encounters technical issues they should call [phone]    Are changes needed to the allergy or medication list? No    Are refills needed on medications prescribed by this physician? Discuss with provider    Rooming Documentation:  Questionnaire(s) completed    Reason for visit: RECHECK    Thong BRYSON

## 2024-11-06 ENCOUNTER — MYC REFILL (OUTPATIENT)
Dept: FAMILY MEDICINE | Facility: CLINIC | Age: 34
End: 2024-11-06
Payer: COMMERCIAL

## 2024-11-06 DIAGNOSIS — F41.1 GAD (GENERALIZED ANXIETY DISORDER): ICD-10-CM

## 2024-11-06 RX ORDER — ALPRAZOLAM 0.25 MG/1
0.25 TABLET ORAL 3 TIMES DAILY PRN
Qty: 45 TABLET | Refills: 0 | Status: SHIPPED | OUTPATIENT
Start: 2024-11-06

## 2024-11-11 ENCOUNTER — TELEPHONE (OUTPATIENT)
Dept: GASTROENTEROLOGY | Facility: CLINIC | Age: 34
End: 2024-11-11
Payer: COMMERCIAL

## 2024-11-11 NOTE — TELEPHONE ENCOUNTER
"Endoscopy Scheduling Screen    Have you had any respiratory illness or flu-like symptoms in the last 10 days?  No    What is your communication preference for Instructions and/or Bowel Prep?   MyChart    What insurance is in the chart?  Other:  R    Ordering/Referring Provider: Shakir Chicas PA-C     (If ordering provider performs procedure, schedule with ordering provider unless otherwise instructed. )    BMI: Estimated body mass index is 28.06 kg/m  as calculated from the following:    Height as of 8/9/24: 1.753 m (5' 9\").    Weight as of 8/9/24: 86.2 kg (190 lb).     Sedation Ordered  moderate sedation.   If patient BMI > 50 do not schedule in [facility].    If patient BMI > 45 do not schedule at [facility].    Are you taking methadone or Suboxone?  NO, No RN review required.    Have you been diagnosed and are being treated for severe PTSD or severe anxiety?  NO, No RN review required.    Are you taking any prescription medications for pain 3 or more times per week?   NO, No RN review required.    Do you have a history of malignant hyperthermia?  No    (Females) Are you currently pregnant?   No     Have you been diagnosed or told you have pulmonary hypertension?   No    Do you have an LVAD?  No    Have you been told you have moderate to severe sleep apnea?  No.    Have you been told you have COPD, asthma, or any other lung disease?  No    Do you have any heart conditions?  No     Have you ever had or are you waiting for an organ transplant?  No. Continue scheduling, no site restrictions.    Have you had a stroke or transient ischemic attack (TIA aka \"mini stroke\" in the last 6 months?   No    Have you been diagnosed with or been told you have cirrhosis of the liver?   No.    Are you currently on dialysis?   No    Do you need assistance transferring?   No    BMI: Estimated body mass index is 28.06 kg/m  as calculated from the following:    Height as of 8/9/24: 1.753 m (5' 9\").    Weight as of 8/9/24: 86.2 kg (190 lb). "     Is patients BMI > 40 and scheduling location [facility]?  No    Do you take an injectable or oral medication for weight loss or diabetes (excluding insulin)?  No    Do you take the medication Naltrexone?  No    Do you take blood thinners?  No       Prep   Are you currently on dialysis or do you have chronic kidney disease?  No    Do you have a diagnosis of diabetes?  No    Do you have a diagnosis of cystic fibrosis (CF)?  No    On a regular basis do you go 3 -5 days between bowel movements?  No    BMI > 40?  No    Preferred Pharmacy:    [place], [place] - [address]  [address]  [address]  [facility] [address]  Phone: [phone] Fax: [phone] Alternate Fax: [phone], [phone], [phone]      Final Scheduling Details     Procedure scheduled  Upper endoscopy (EGD)    Surgeon:  Cynthia     Date of procedure:  12/05/2024     Pre-OP / PAC:   No - Not required for this site.    Location  [facility] - [facility] - Patient preference.    Sedation   Moderate Sedation - Per order.      Patient Reminders:   You will receive a call from a Nurse to review instructions and health history.  This assessment must be completed prior to your procedure.  Failure to complete the Nurse assessment may result in the procedure being cancelled.      On the day of your procedure, please designate an adult(s) who can drive you home stay with you for the next 24 hours. The medicines used in the exam will make you sleepy. You will not be able to drive.      You cannot take public transportation, ride share services, or non-medical taxi service without a responsible caregiver.  Medical transport services are allowed with the requirement that a responsible caregiver will receive you at your destination.  We require that drivers and caregivers are confirmed prior to your procedure.

## 2024-11-20 ENCOUNTER — VIRTUAL VISIT (OUTPATIENT)
Dept: FAMILY MEDICINE | Facility: CLINIC | Age: 34
End: 2024-11-20
Payer: COMMERCIAL

## 2024-11-20 DIAGNOSIS — F51.05 INSOMNIA DUE TO OTHER MENTAL DISORDER: Primary | ICD-10-CM

## 2024-11-20 DIAGNOSIS — F99 INSOMNIA DUE TO OTHER MENTAL DISORDER: Primary | ICD-10-CM

## 2024-11-20 PROCEDURE — 99214 OFFICE O/P EST MOD 30 MIN: CPT | Mod: 95 | Performed by: NURSE PRACTITIONER

## 2024-11-20 RX ORDER — ZOLPIDEM TARTRATE 5 MG/1
5 TABLET ORAL
Qty: 14 TABLET | Refills: 0 | Status: SHIPPED | OUTPATIENT
Start: 2024-11-20

## 2024-11-20 RX ORDER — TRAZODONE HYDROCHLORIDE 100 MG/1
100 TABLET ORAL AT BEDTIME
Qty: 30 TABLET | Refills: 1 | Status: SHIPPED | OUTPATIENT
Start: 2024-11-20

## 2024-11-20 ASSESSMENT — PATIENT HEALTH QUESTIONNAIRE - PHQ9
10. IF YOU CHECKED OFF ANY PROBLEMS, HOW DIFFICULT HAVE THESE PROBLEMS MADE IT FOR YOU TO DO YOUR WORK, TAKE CARE OF THINGS AT HOME, OR GET ALONG WITH OTHER PEOPLE: VERY DIFFICULT
SUM OF ALL RESPONSES TO PHQ QUESTIONS 1-9: 18
SUM OF ALL RESPONSES TO PHQ QUESTIONS 1-9: 18

## 2024-11-20 NOTE — PROGRESS NOTES
"Answers submitted by the patient for this visit:  Patient Health Questionnaire (Submitted on 11/20/2024)  If you checked off any problems, how difficult have these problems made it for you to do your work, take care of things at home, or get along with other people?: Very difficult  PHQ9 TOTAL SCORE: 18  General Questionnaire (Submitted on 11/20/2024)  Chief Complaint: Chronic problems general questions HPI Form  What is the reason for your visit today? : Sleep medication  How many servings of fruits and vegetables do you eat daily?: 2-3  On average, how many sweetened beverages do you drink each day (Examples: soda, juice, sweet tea, etc.  Do NOT count diet or artificially sweetened beverages)?: 0  How many minutes a day do you exercise enough to make your heart beat faster?: 30 to 60  How many days a week do you exercise enough to make your heart beat faster?: 5  How many days per week do you miss taking your medication?: 2  What makes it hard for you to take your medication every day?: remembering to take  Questionnaire about: Chronic problems general questions HPI Form (Submitted on 11/20/2024)  Chief Complaint: Chronic problems general questions HPI Form  Antonio is a 34 year old who is being evaluated via a billable video visit.        Assessment & Plan   Problem List Items Addressed This Visit    None  Visit Diagnoses       Insomnia due to other mental disorder    -  Primary    Relevant Medications    zolpidem (AMBIEN) 5 MG tablet    traZODone (DESYREL) 100 MG tablet         -discussed we will try an increased dose of trazodone, and zolpidem for rare use. We discussed that I think it would be helpful to consider adding another medication for her mood within the next few weeks if she is not feeling better. She had [facility] testing in 2022.  Follow up 3 weeks         BMI  Estimated body mass index is 28.06 kg/m  as calculated from the following:    Height as of 8/9/24: 1.753 m (5' 9\").    Weight as of 8/9/24: " "86.2 kg (190 lb).       See Patient Instructions      Subjective   Antonio is a 34 year old, presenting for the following health issues:    HPI     -She has cut screen time before bed; reduced alcohol; increased exercise.   -wakes up repeatedly during the night  Feels like it has been several months that anxiety and sleep has been worse. Has multiple stressors; financial stress, and has cut off contact with a family member.   Feels like she doesn't \"hit deep sleep.\"  Typically takes hours to fall asleep. Typically has 2-3 cups of tea before 11 am.    Has tried hydroxyzine, alprazolam, unisom, or THC edible. Has taken Trazodone 50 mg, no effect. She tried seroquel 12.5-25 mg. She noticed weight gain, she is not sure how long she tried it for.   Has tried multiple medications for anxiety in the past; cymbalta and celexa; has not felt very effective. She tried Buspar and it was not effective. She feels that \"the sleep issue is causing the anxiety, and once the sleep is better the anxiety will improve\" and does not want to try a new daily medication for anxiety/depression at this point.     Anxiety   How are you doing with your anxiety since your last visit? Worsened   Are you having other symptoms that might be associated with anxiety? Yes:  insomnia  Has felt more irritable and anxious.   Are you feeling depressed? Yes:     Do you have any concerns with your use of alcohol or other drugs? No    Social History     Tobacco Use    Smoking status: Former     Current packs/day: 0.00     Average packs/day: 0.5 packs/day for 14.4 years (7.2 ttl pk-yrs)     Types: Cigarettes     Start date: 2003     Quit date: 10/2017     Years since quittin.1    Smokeless tobacco: Never    Tobacco comments:     Used a vapor for the month of October.   Vaping Use    Vaping status: Never Used   Substance Use Topics    Alcohol use: Yes     Comment: 2x/week    Drug use: No     Frequency: 1.0 times per week     Comment: Socially smoked " marijuana, long time ago         9/15/2021     9:05 AM 1/5/2022    10:47 AM 3/27/2023     9:50 AM   JACINTO-7 SCORE   Total Score 11 (moderate anxiety) 13 (moderate anxiety) 17 (severe anxiety)   Total Score 11 13 17         12/11/2023     9:26 AM 2/12/2024    12:27 PM 11/20/2024     2:27 PM   PHQ   PHQ-9 Total Score 7 8 18    Q9: Thoughts of better off dead/self-harm past 2 weeks Not at all  Not at all  Not at all        Patient-reported         11/20/2024     2:27 PM   Last PHQ-9   1.  Little interest or pleasure in doing things 3    2.  Feeling down, depressed, or hopeless 2    3.  Trouble falling or staying asleep, or sleeping too much 3    4.  Feeling tired or having little energy 3    5.  Poor appetite or overeating 1    6.  Feeling bad about yourself 3    7.  Trouble concentrating 3    8.  Moving slowly or restless 0    Q9: Thoughts of better off dead/self-harm past 2 weeks 0    PHQ-9 Total Score 18        Patient-reported         3/27/2023     9:50 AM   JACINTO-7    1. Feeling nervous, anxious, or on edge 3    2. Not being able to stop or control worrying 2    3. Worrying too much about different things 2    4. Trouble relaxing 3    5. Being so restless that it is hard to sit still 2    6. Becoming easily annoyed or irritable 2    7. Feeling afraid, as if something awful might happen 3    JACINTO-7 Total Score 17   If you checked any problems, how difficult have they made it for you to do your work, take care of things at home, or get along with other people? Somewhat difficult        Patient-reported        Review of Systems  Constitutional, HEENT, cardiovascular, pulmonary, gi and gu systems are negative, except as otherwise noted.      Objective           Vitals:  No vitals were obtained today due to virtual visit.    Physical Exam   GENERAL: alert and no distress  EYES: Eyes grossly normal to inspection.  No discharge or erythema, or obvious scleral/conjunctival abnormalities.  RESP: No audible wheeze, cough, or  visible cyanosis.    SKIN: Visible skin clear. No significant rash, abnormal pigmentation or lesions.  NEURO: Cranial nerves grossly intact.  Mentation and speech appropriate for age.  PSYCH: mentation appears normal, affect normal/bright, tearful, judgement and insight intact, and appearance well groomed    Lab on 06/06/2024   Component Date Value Ref Range Status    Iron 06/06/2024 82  37 - 145 ug/dL Final    Iron Binding Capacity 06/06/2024 336  240 - 430 ug/dL Final    Iron Sat Index 06/06/2024 24  15 - 46 % Final    Ferritin 06/06/2024 26  6 - 175 ng/mL Final         Video-Visit Details    Type of service:  Video Visit   Originating Location (pt. Location): Home    Distant Location (provider location):  On-site  Platform used for Video Visit: Sweta  Signed Electronically by: CHEY Hernandez CNP

## 2024-11-26 ENCOUNTER — TELEPHONE (OUTPATIENT)
Dept: GASTROENTEROLOGY | Facility: CLINIC | Age: 34
End: 2024-11-26
Payer: COMMERCIAL

## 2024-11-26 ENCOUNTER — MYC REFILL (OUTPATIENT)
Dept: FAMILY MEDICINE | Facility: CLINIC | Age: 34
End: 2024-11-26
Payer: COMMERCIAL

## 2024-11-26 DIAGNOSIS — B00.9 HERPES SIMPLEX VIRUS INFECTION: ICD-10-CM

## 2024-11-26 NOTE — TELEPHONE ENCOUNTER
"Msg sent to [facility] provider to review given severe anxiety.    Scheduling called pt to reschedule with MAC, per our new algorithm, and pt stated that she does not want to reschedule given transportation is lined up and patient has already been holding her required medications.  Writer will send message to [facility] provider to review.  ------------------------------------------------------------------------------------------------------------------    Procedure details:    Patient scheduled for Upper endoscopy (EGD) on 12/5/2024.     Arrival time: 0855. Procedure time 0940    Facility location: [facility] [place]; [address] [place] [address], 2nd Floor, [place] [phone]. Check in location: 2nd Floor at Surgery desk.    Sedation type: Conscious sedation - Discuss with pt. JACINTO score 17. Per last OV 11/20/24 pt noted anxiety as \"worsened and has felt more irritable and anxious.     Pre op exam needed? No.    Indication for procedure:   Diagnosis   Gastroesophageal reflux disease with esophagitis without hemorrhage         Chart review:     Electronic implanted devices? No    Recent diagnosis of diverticulitis within the last 6 weeks? No      Medication review:    Diabetic? No    Anticoagulants? No    Weight loss medication/injectable? No GLP-1 medication per patient's medication list. Nursing to verify with pre-assessment call.    Other medication HOLDING recommendations:  EGD: PPIs/Acid reducing medication(s): HOLD 2 weeks before procedure. Due to ordering provider request.     \"we will proceed with EGD off therapy to evaluate for reflux esophagitis, EOE, hiatal hernia, gastritis/peptic ulcer disease, gastric outlet obstruction etc. She can continue on PPI for now, but will discontinue 2 weeks before procedure.\"     Prep for procedure:     Bowel prep recommendation: N/A  Prep instructions sent via [email or web address]         Ashley Mejia RN  Endoscopy Procedure Pre Assessment   [phone] option 2  "

## 2024-11-26 NOTE — TELEPHONE ENCOUNTER
"Response from Danielle Prescott, DO     \"She should be MAC - she may not respond as well to the conscious sedation medications as she is on medications already that can increase tolerance to them - if she really doesn't want to reschedule we can try but if unable to sedate appropriately will need to come back\"    Spoke with pt and she is not wanting to reschedule. Completed PA. Pt wanting to transfer to scheduling to see if something with MAC is available at a reasonable time, otherwise will keep this appt. Pt is aware of the possibility that the procedure may not be able to be completed if sedation is not enough.    Warm transferred to scheduling.    --------------------------------------------------------------------------------------------------------------------    Pre assessment completed for upcoming procedure.   (Please see previous telephone encounter notes for complete details)      Procedure details:    Arrival time and facility location reviewed.    Pre op exam needed? No.    Designated  policy reviewed. Instructed to have someone stay 6  hours post procedure.       Medication review:    HOLD 2 weeks before procedure any acid reducing medications (Aciphex, Axid, cimetidine, esomeprazole, famotidine, Losec, metoclopramide, Nexium, nizatidine, omeprazole, pantoprazole, Pepcid, Prilosec, Propulsid, Protonix, rabeprazole, ranitidine, Reglan, Tagamet and Zantac).       Prep for procedure:     Procedure prep instructions reviewed.        Any additional information needed:  N/A      Patient  verbalized understanding and had no questions or concerns at this time.      Ashley Mejia RN  Endoscopy Procedure Pre Assessment   [phone] option 2    "

## 2024-11-27 RX ORDER — VALACYCLOVIR HYDROCHLORIDE 1 G/1
2000 TABLET, FILM COATED ORAL 2 TIMES DAILY
Qty: 4 TABLET | Refills: 3 | Status: SHIPPED | OUTPATIENT
Start: 2024-11-27

## 2024-12-16 ENCOUNTER — VIRTUAL VISIT (OUTPATIENT)
Dept: FAMILY MEDICINE | Facility: CLINIC | Age: 34
End: 2024-12-16
Payer: COMMERCIAL

## 2024-12-16 DIAGNOSIS — F51.05 INSOMNIA DUE TO OTHER MENTAL DISORDER: ICD-10-CM

## 2024-12-16 DIAGNOSIS — F90.2 ATTENTION DEFICIT HYPERACTIVITY DISORDER (ADHD), COMBINED TYPE: Primary | ICD-10-CM

## 2024-12-16 DIAGNOSIS — F41.1 GAD (GENERALIZED ANXIETY DISORDER): ICD-10-CM

## 2024-12-16 DIAGNOSIS — F99 INSOMNIA DUE TO OTHER MENTAL DISORDER: ICD-10-CM

## 2024-12-16 DIAGNOSIS — F51.04 PSYCHOPHYSIOLOGICAL INSOMNIA: ICD-10-CM

## 2024-12-16 PROCEDURE — 99214 OFFICE O/P EST MOD 30 MIN: CPT | Mod: 95 | Performed by: NURSE PRACTITIONER

## 2024-12-16 NOTE — PROGRESS NOTES
Antonio is a 34 year old who is being evaluated via a billable video visit.    How would you like to obtain your AVS? MyChart  If the video visit is dropped, the invitation should be resent by: Text to cell phone: [phone]  Will anyone else be joining your video visit? No      Assessment & Plan       ICD-10-CM    1. Attention deficit hyperactivity disorder (ADHD), combined type  F90.2       2. JACINTO (generalized anxiety disorder)  F41.1       3. Psychophysiological insomnia  F51.04         Continue current medications; follow up 3 months              Subjective   Antonio is a 34 year old, presenting for the following health issues:    History of Present Illness       Reason for visit:  Medication follow up    She eats 2-3 servings of fruits and vegetables daily.She consumes 0 sweetened beverage(s) daily.She exercises with enough effort to increase her heart rate 30 to 60 minutes per day.  She exercises with enough effort to increase her heart rate 4 days per week. She is missing 3 dose(s) of medications per week.  She is not taking prescribed medications regularly due to remembering to take.     Medication Followup of Zolpidem  Taking Medication as prescribed: yes  Side Effects:  None  Medication Helping Symptoms:  yes  Has taken rarely over the past month. She tried the higher dose of Trazodone, but it caused symptoms of severe congestion. She has noticed her mood and mental health are much improved since she has yulisa able to get to sleep more regularly. She is able to perform more regular self-care activities. She is working on increasing exercise. ADHD symptoms are stable; medications still working well.         Review of Systems  Constitutional, HEENT, cardiovascular, pulmonary, gi and gu systems are negative, except as otherwise noted.      Objective           Vitals:  No vitals were obtained today due to virtual visit.    Physical Exam   GENERAL: alert and no distress  EYES: Eyes grossly normal to inspection.  No  discharge or erythema, or obvious scleral/conjunctival abnormalities.  RESP: No audible wheeze, cough, or visible cyanosis.    SKIN: Visible skin clear. No significant rash, abnormal pigmentation or lesions.  NEURO: Cranial nerves grossly intact.  Mentation and speech appropriate for age.  PSYCH: Appropriate affect, tone, and pace of words    Hospital Outpatient Visit on 12/05/2024   Component Date Value Ref Range Status     Case Report 12/05/2024    Final                    Value:Surgical Pathology Report                         Case: [id number]                                  Authorizing Provider:  Danielle Prescott DO       Collected:           12/05/2024 10:16 AM          Ordering Location:     [facility]    Received:            12/05/2024 11:35 AM                                 [place]                                                                     Pathologist:           Shakir Noonan DO                                                            Specimens:   A) - Stomach, Gastric Biopsies (Antrum & Body) Eval for H. Pylori                                   B) - Esophagus, Distal, Distal Biopsies - Eval for EOE                                              C) - Esophagus, Proximal, Proximal Biopsies - Eval for EOE                                  Final Diagnosis 12/05/2024    Final                    Value:A. GASTRIC, ANTRUM AND BODY, BIOPSY:  - Mild reactive gastropathy  - No H. pylori-like organisms identified on routine staining  - No intestinal metaplasia identified    B. ESOPHAGUS, DISTAL, BIOPSY:  - Gastroesophageal junctional mucosa with mild active inflammation  - No goblet cells (intestinal metaplasia) identified  - No evidence of eosinophilic esophagitis    C. ESOPHAGUS, PROXIMAL, BIOPSY:  - Unremarkable squamous mucosa  - No evidence of eosinophilic esophagitis         Clinical Information 12/05/2024    Final                    Value:Esophageal reflux symptoms that recur despite  "appropriate therapy        Gross Description 12/05/2024    Final                    Value:A(1). Stomach, Gastric Biopsies (Antrum & Body) Eval for H. Pylori:  The specimen is received in formalin with proper patient identification labeled \" gastric biopsies\".  The specimen consists of 5 tan pieces of soft tissue, ranging from 0.2-0.4 cm in greatest dimension.  Entirely submitted in cassette A1.    B(2). Esophagus, Distal, Distal Biopsies - Eval for EOE:  The specimen is received in formalin with proper patient identification labeled \" esophagus distal biopsies\".  The specimen consists of 3 white pieces of soft tissue, ranging from 0.1-0.4 cm in greatest dimension.  Wrapped and entirely submitted in cassette B1.    C(3). Esophagus, Proximal, Proximal Biopsies - Eval for EOE:  The specimen is received in formalin with proper patient identification labeled \" esophagus proximal biopsies\".  The specimen consists of 2 white pieces of soft tissue, each measuring 0.3 cm in greatest dimension.  Wrapped and entirely submitted in cassette C1.         Performing Labs 12/05/2024    Final                    Value:The technical component of this testing was completed at [facility].    Stain controls for all stains resulted within this report have been reviewed and show appropriate reactivity.        Upper GI Endoscopy 12/05/2024    Final                    Value:[facility]  Endoscopy [facility]  _______________________________________________________________________________  Patient Name: Dayana Danielle           Procedure Date: 12/5/2024 9:54 AM  MRN: [id number]                       YOB: 1990  Admit Type: Outpatient                Age: 34  Gender: Female                        Note Status: Finalized  Attending MD: AALIYAH BARRERA DO,   Instrument Name: GIF- " [id number]  _______________________________________________________________________________     Procedure:                Upper GI endoscopy  Indications:              Esophageal reflux symptoms that recur despite                             appropriate therapy  Providers:                DO Christy KIRAN MD:             KEILA SAUCEDO  Medicines:                Fentanyl 150 micrograms IV, Midazolam 6 mg IV  Complications:            No immediate complications. Estimated blood loss:                                                       Minimal.  _______________________________________________________________________________  Procedure:                Pre-Anesthesia Assessment:                            - Prior to the procedure, a History and Physical                             was performed, and patient medications and                             allergies were reviewed. The risks and benefits of                             the procedure and the sedation options and risks                             were discussed with the patient. All questions were                             answered and informed consent was obtained. Patient                             identification and proposed procedure were verified                             by the physician, the nurse and the technician in                             the pre-procedure area in the endoscopy suite.                             Mental Status Examination: alert and oriented.                             Airway Examination: normal oropharyngeal airway and                                                       neck mobility. Respiratory Examination: clear to                             auscultation. CV Examination: normal. Prophylactic                             Antibiotics: The patient does not require                             prophylactic antibiotics. Prior Anticoagulants: The                             patient has taken no anticoagulant or  antiplatelet                             agents. ASA Grade Assessment: II - A patient with                             mild systemic disease. After reviewing the risks                             and benefits, the patient was deemed in                             satisfactory condition to undergo the procedure.                             The anesthesia plan was to use moderate sedation /                             analgesia (conscious sedation). This assessment was                             completed before the administration of sedation.                            After obtaining informed consent, the endoscope was                                                       passed under direct vision. Throughout the                             procedure, the patient's blood pressure, pulse, and                             oxygen saturations were monitored continuously. The                             was introduced through the mouth, and advanced to                             the second part of duodenum. The upper GI endoscopy                             was accomplished with ease. The patient tolerated                             the procedure well.                                                                                   Findings:       The Z-line was regular and was found 37 cm from the incisors.       LA Grade A (one or more mucosal breaks less than 5 mm, not extending        between tops of 2 mucosal folds) esophagitis was found at the        gastroesophageal junction. Biopsies were obtained from the proximal and        distal esophagus with cold forceps for histology of suspected        eosinophilic esophagitis.                                 The gastroesophageal flap valve was visualized endoscopically and        classified as Hill Grade I (prominent fold, tight to endoscope).       The entire examined stomach was normal. Biopsies were taken with a cold        forceps for Helicobacter pylori  testing.       The examined duodenum was normal.                                                                                   Moderate Sedation:       Moderate (conscious) sedation was administered by the nurse and        supervised by the endoscopist. The patient's oxygen saturation, heart        rate, blood pressure and response to care were monitored. Total        physician intraservice time was 11 minutes.  Impression:               - Z-line regular, 37 cm from the incisors.                            - LA Grade A esophagitis.                            - Gastroesophageal flap valve classified as Hill                             Grade I (prominent fold, tight to endoscope).                            - Normal stomach. Biops                          ied.                            - Normal examined duodenum.                            - Biopsies were taken with a cold forceps for                             evaluation of eosinophilic esophagitis.  Recommendation:           - Await pathology results.                            - Patient has a contact number available for                             emergencies. The signs and symptoms of potential                             delayed complications were discussed with the                             patient. Return to normal activities tomorrow.                             Written discharge instructions were provided to the                             patient.                                                                                     Electronically Signed by Dr. Barrera  ______________________  AALIYAH BARRERA DO  12/5/2024 10:23:40 AM  I was physically present for the entire viewing portion of the exam.AALIYAH BARRERA DO  Number of Addenda: 0    Note Initiated On: 12 /5/2024 9:54 AM  Scope In:  Scope Out:           Video-Visit Details    Type of service:  Video Visit   Originating Location (pt. Location):  Home    Distant Location (provider location):  On-site  Platform used for Video Visit: Sweta  Signed Electronically by: CHEY Hernandez CNP

## 2024-12-27 DIAGNOSIS — F51.05 INSOMNIA DUE TO OTHER MENTAL DISORDER: ICD-10-CM

## 2024-12-27 DIAGNOSIS — F41.1 GAD (GENERALIZED ANXIETY DISORDER): ICD-10-CM

## 2024-12-27 DIAGNOSIS — F90.2 ATTENTION DEFICIT HYPERACTIVITY DISORDER (ADHD), COMBINED TYPE: ICD-10-CM

## 2024-12-27 DIAGNOSIS — F99 INSOMNIA DUE TO OTHER MENTAL DISORDER: ICD-10-CM

## 2024-12-28 RX ORDER — DEXTROAMPHETAMINE SACCHARATE, AMPHETAMINE ASPARTATE, DEXTROAMPHETAMINE SULFATE AND AMPHETAMINE SULFATE 2.5; 2.5; 2.5; 2.5 MG/1; MG/1; MG/1; MG/1
10 TABLET ORAL 2 TIMES DAILY
Qty: 60 TABLET | Refills: 0 | Status: SHIPPED | OUTPATIENT
Start: 2024-12-28

## 2024-12-28 RX ORDER — ALPRAZOLAM 0.25 MG/1
0.25 TABLET ORAL 3 TIMES DAILY PRN
Qty: 45 TABLET | Refills: 0 | Status: SHIPPED | OUTPATIENT
Start: 2024-12-28

## 2024-12-30 RX ORDER — ZOLPIDEM TARTRATE 5 MG/1
5 TABLET ORAL
Qty: 14 TABLET | Refills: 0 | Status: SHIPPED | OUTPATIENT
Start: 2024-12-30

## 2024-12-30 RX ORDER — DEXTROAMPHETAMINE SACCHARATE, AMPHETAMINE ASPARTATE, DEXTROAMPHETAMINE SULFATE AND AMPHETAMINE SULFATE 2.5; 2.5; 2.5; 2.5 MG/1; MG/1; MG/1; MG/1
10 TABLET ORAL 2 TIMES DAILY
Qty: 60 TABLET | Refills: 0 | OUTPATIENT
Start: 2024-12-30

## 2025-02-04 DIAGNOSIS — F99 INSOMNIA DUE TO OTHER MENTAL DISORDER: ICD-10-CM

## 2025-02-04 DIAGNOSIS — F51.05 INSOMNIA DUE TO OTHER MENTAL DISORDER: ICD-10-CM

## 2025-02-04 DIAGNOSIS — F90.2 ATTENTION DEFICIT HYPERACTIVITY DISORDER (ADHD), COMBINED TYPE: ICD-10-CM

## 2025-02-04 RX ORDER — DEXTROAMPHETAMINE SACCHARATE, AMPHETAMINE ASPARTATE, DEXTROAMPHETAMINE SULFATE AND AMPHETAMINE SULFATE 2.5; 2.5; 2.5; 2.5 MG/1; MG/1; MG/1; MG/1
10 TABLET ORAL 2 TIMES DAILY
Qty: 60 TABLET | Refills: 0 | Status: SHIPPED | OUTPATIENT
Start: 2025-02-04

## 2025-02-04 RX ORDER — ZOLPIDEM TARTRATE 5 MG/1
5 TABLET ORAL
Qty: 14 TABLET | Refills: 0 | Status: SHIPPED | OUTPATIENT
Start: 2025-02-04

## 2025-02-24 ENCOUNTER — E-VISIT (OUTPATIENT)
Dept: FAMILY MEDICINE | Facility: CLINIC | Age: 35
End: 2025-02-24
Payer: COMMERCIAL

## 2025-02-24 DIAGNOSIS — T38.0X5A PERIORAL DERMATITIS DUE TO CORTICOSTEROID: Primary | ICD-10-CM

## 2025-02-24 DIAGNOSIS — L71.0 PERIORAL DERMATITIS DUE TO CORTICOSTEROID: Primary | ICD-10-CM

## 2025-02-25 ENCOUNTER — MYC REFILL (OUTPATIENT)
Dept: FAMILY MEDICINE | Facility: CLINIC | Age: 35
End: 2025-02-25
Payer: COMMERCIAL

## 2025-02-25 DIAGNOSIS — F99 INSOMNIA DUE TO OTHER MENTAL DISORDER: ICD-10-CM

## 2025-02-25 DIAGNOSIS — F51.05 INSOMNIA DUE TO OTHER MENTAL DISORDER: ICD-10-CM

## 2025-02-25 DIAGNOSIS — F90.2 ATTENTION DEFICIT HYPERACTIVITY DISORDER (ADHD), COMBINED TYPE: ICD-10-CM

## 2025-02-25 RX ORDER — DEXTROAMPHETAMINE SACCHARATE, AMPHETAMINE ASPARTATE, DEXTROAMPHETAMINE SULFATE AND AMPHETAMINE SULFATE 2.5; 2.5; 2.5; 2.5 MG/1; MG/1; MG/1; MG/1
10 TABLET ORAL 2 TIMES DAILY
Qty: 60 TABLET | Refills: 0 | Status: SHIPPED | OUTPATIENT
Start: 2025-02-25 | End: 2025-03-27

## 2025-02-25 RX ORDER — ZOLPIDEM TARTRATE 5 MG/1
5 TABLET ORAL
Qty: 14 TABLET | Refills: 0 | Status: SHIPPED | OUTPATIENT
Start: 2025-02-25

## 2025-02-25 RX ORDER — ZOLPIDEM TARTRATE 5 MG/1
5 TABLET ORAL
Qty: 14 TABLET | Refills: 0 | OUTPATIENT
Start: 2025-02-25

## 2025-02-25 RX ORDER — DEXTROAMPHETAMINE SACCHARATE, AMPHETAMINE ASPARTATE, DEXTROAMPHETAMINE SULFATE AND AMPHETAMINE SULFATE 2.5; 2.5; 2.5; 2.5 MG/1; MG/1; MG/1; MG/1
10 TABLET ORAL 2 TIMES DAILY
Qty: 60 TABLET | Refills: 0 | Status: SHIPPED | OUTPATIENT
Start: 2025-03-27 | End: 2025-04-26

## 2025-02-25 RX ORDER — DEXTROAMPHETAMINE SACCHARATE, AMPHETAMINE ASPARTATE, DEXTROAMPHETAMINE SULFATE AND AMPHETAMINE SULFATE 2.5; 2.5; 2.5; 2.5 MG/1; MG/1; MG/1; MG/1
10 TABLET ORAL 2 TIMES DAILY
Qty: 60 TABLET | Refills: 0 | Status: SHIPPED | OUTPATIENT
Start: 2025-04-26 | End: 2025-05-26

## 2025-02-25 RX ORDER — DEXTROAMPHETAMINE SACCHARATE, AMPHETAMINE ASPARTATE, DEXTROAMPHETAMINE SULFATE AND AMPHETAMINE SULFATE 2.5; 2.5; 2.5; 2.5 MG/1; MG/1; MG/1; MG/1
10 TABLET ORAL 2 TIMES DAILY
Qty: 60 TABLET | Refills: 0 | OUTPATIENT
Start: 2025-02-25

## 2025-03-21 NOTE — PROGRESS NOTES
"                                                                                                                                                                      Adult Intake Structured Interview  Standard Diagnostic Assessment      CLIENT'S NAME: Dayana Moss  MRN:   [id number]  :   1990  ACCT. NUMBER: [id number]  DATE OF SERVICE: 18      Identifying Information:  Client is a 27 year old, ,  female. Client was referred for counseling by Cheri Georges at [facility]. Client is currently employed full time. Client attended the session alone.       Client's Statement of Presenting Concern:  Client reports the reason for seeking therapy at this time as \"anxiety and depression - both new diagnosis. Grief over miscarriage in 2017 (was 8-9 weeks pregnant).\" Identified feeling \"tired and sad\" and difficulty communicating emotions/needs (clouded thinking, needing to word vomit).  Client stated that her symptoms have resulted in the following functional impairments: management of the household and or completion of tasks, organization, relationship(s), self-care, social interactions and work / vocational responsibilities.      History of Presenting Concern:  Client reports that these problem(s) have been ongoing, but manageable. Client has attempted to resolve these concerns in the past through starting medications which helped make things more manageable, but she stopped using medications when she became pregnant 2017. Client reports that other professional(s) are involved in providing support / services.       Social History:  Client reported she grew up in [place], [place]. They were the second born of 2 children (1 older sister and 3 older step sister from step father). Parents  when client was 1 yo. Mother remarried, , and remarried again by the time client was 7 yo. Father remarried when client was 3-3 yo. Client reported " "living with both parents and she described the transition between households to be \"complicated\". At age 13, she stopped living with father due to step mother's declining mental health. Also reported that she did not get along with father, although things with good with step mother. Client reported that her childhood was \"fine/good, supportive, chaotic and changing, moved around a lot\" - step sisters were also transitioning in between homes. Reported personality difference from family as they tend to be loud and often drinking. Identified as a \"good, sweet, helpful, bratty, and sassy\" child. Client described her current relationships with family of origin as overall close knit and supportive.    Client reported a history of 1 committed marriages. Client has been  for 1 years. Client reported having 0 children - currently 6 weeks+ pregnant. Client identified some stable and meaningful social connections. Client reported that she has not been involved with the legal system. Client's highest education level was college graduate. Client did identify the following learning problems: attention and concentration. There are no ethnic, cultural or Yazidism factors that may be relevant for therapy. Client identified her preferred language to be English. Client reported she does not need the assistance of an  or other support involved in therapy. Modifications will not be used to assist communication in therapy. Client did not serve in the .     Client reports family history is not on file.      Mental Health History:  Client reported the following biological family members or relatives with mental health issues: Father experienced Anxiety and Depression, Mother experienced Anxiety, Maternal Grandfather experienced Depression and Sister experienced Anxiety and Depression.  Client previously received the following mental health diagnosis: ADHD and Anxiety.  Client has received the following mental " "health services in the past: counseling and medication(s) from physician / PCP.  Hospitalizations: None.  Client is currently receiving the following services: medication(s) from physician / PCP.      Chemical Health History:  Client reported \"almost all of my [paternal and maternal] family abuse alcohol regularly\". Client has not received chemical dependency treatment in the past. Client is not currently receiving any chemical dependency treatment. Client reports no problems as a result of their drinking / drug use.      Client Reports:  Client denies using alcohol. First started using alcohol at age 8. Before she was pregnant, she would drink 2-3 drinks per day.  Client denies using tobacco.  Client denies using marijuana.  Client reports using caffeine 2 times per week and drinks 1 at a time. Client started using caffeine at age 12.  Client denies using street drugs.  Client denies the non-medical use of prescription or over the counter drugs.    CAGE: C     Patient felt they ought to CUT down on your drinking (or drug use).  G     Patient felt bad or GUILTY about their drinking (or drug use).  E     Patient had a drink (or drug use) as an EYE OPENER first thing in the morning to steady his/her nerves, get the day started, or get rid of a hangover.   Based on the negative Cage-Aid score and clinical interview there are not indications of drug or alcohol abuse as client is currently pregnant.     Discussed the impact of drugs and alcohol when used during pregnancy. Therapist gave client printed information about the effects of chemical use on her health and well being.      Significant Losses / Trauma / Abuse / Neglect Issues:  There are indications or report of significant loss, trauma, abuse or neglect issues related to: parents' divorce and relationship changes (see social history for more details), miscarriage Sept 2017 (\"has been really hard to deal with\"), and difficulty adjusting to new diagnoses of anxiety " and Rhythm: Normal rate.   Pulmonary:      Effort: Pulmonary effort is normal.   Abdominal:      General: Abdomen is flat.   Musculoskeletal:         General: No deformity.      Cervical back: Normal range of motion.   Skin:     General: Skin is dry.   Neurological:      General: No focal deficit present.      Mental Status: She is alert.   Psychiatric:         Mood and Affect: Mood normal.         Behavior: Behavior normal.          DIAGNOSTIC RESULTS   LABS:     Recent Results (from the past 24 hours)   EKG 12 Lead    Collection Time: 03/20/25  3:51 PM   Result Value Ref Range    Ventricular Rate 73 BPM    Atrial Rate 73 BPM    P-R Interval 158 ms    QRS Duration 70 ms    Q-T Interval 380 ms    QTc Calculation (Bazett) 418 ms    P Axis 68 degrees    R Axis 49 degrees    T Axis 36 degrees    Diagnosis       Normal sinus rhythm with sinus arrhythmia  Normal ECG  When compared with ECG of 13-OCT-2021 21:27,  Nonspecific T wave abnormality no longer evident in Anterior leads     CBC with Auto Differential    Collection Time: 03/20/25  6:47 PM   Result Value Ref Range    WBC 10.9 3.6 - 11.0 K/uL    RBC 4.07 3.80 - 5.20 M/uL    Hemoglobin 12.6 11.5 - 16.0 g/dL    Hematocrit 37.6 35.0 - 47.0 %    MCV 92.4 80.0 - 99.0 FL    MCH 31.0 26.0 - 34.0 PG    MCHC 33.5 30.0 - 36.5 g/dL    RDW 13.3 11.5 - 14.5 %    Platelets 226 150 - 400 K/uL    MPV 10.5 8.9 - 12.9 FL    Nucleated RBCs 0.0 0  WBC    nRBC 0.00 0.00 - 0.01 K/uL    Neutrophils % 79.6 (H) 32.0 - 75.0 %    Lymphocytes % 14.7 12.0 - 49.0 %    Monocytes % 5.0 5.0 - 13.0 %    Eosinophils % 0.1 0.0 - 7.0 %    Basophils % 0.2 0.0 - 1.0 %    Immature Granulocytes % 0.4 0.0 - 0.5 %    Neutrophils Absolute 8.70 (H) 1.80 - 8.00 K/UL    Lymphocytes Absolute 1.60 0.80 - 3.50 K/UL    Monocytes Absolute 0.55 0.00 - 1.00 K/UL    Eosinophils Absolute 0.01 0.00 - 0.40 K/UL    Basophils Absolute 0.02 0.00 - 0.10 K/UL    Immature Granulocytes Absolute 0.04 0.00 - 0.04 K/UL     "and depression since she was initially dx with ADHD and was on ADHD medications for 7 years.     Issues of possible neglect are not present.      Medical Issues:  Client has not had a physical exam to rule out medical causes for current symptoms. Date of last physical exam was within the past year. Client was encouraged to follow up with PCP if symptoms were to develop. The client has a [facility] Primary Care Provider, who is named Cheri Georges. The client reports not having a psychiatrist. Client reports the following current medical concerns: ovarian cyst. The client reports the presence of chronic or episodic pain in the form of pregnancy related tenderness to breasts. The pain level is mild. There are significant nutritional concerns: \"I' have gained a lot of weight since miscarriage.\"    Client reports current meds as:   Current Outpatient Prescriptions   Medication Sig     pyridOXINE (VITAMIN B-6) 25 MG tablet Take 1 tablet (25 mg) by mouth 3 times daily as needed     citalopram (CELEXA) 20 MG tablet 1 tablet orally daily     oxyCODONE-acetaminophen (PERCOCET) 5-325 MG per tablet Take 1-2 tablets by mouth every 4 hours as needed for pain (Patient not taking: Reported on 12/19/2017)     ondansetron (ZOFRAN) 4 MG tablet Take 1 tablet (4 mg) by mouth every 8 hours as needed for nausea (Patient not taking: Reported on 12/19/2017)     HYDROcodone-acetaminophen (NORCO) 5-325 MG per tablet Take 1 tablet by mouth every 6 hours as needed for moderate to severe pain (Patient not taking: Reported on 12/19/2017)     senna-docusate (SENOKOT-S;PERICOLACE) 8.6-50 MG per tablet Take 1 tablet by mouth 2 times daily (Patient not taking: Reported on 12/19/2017)     senna-docusate (SENOKOT-S;PERICOLACE) 8.6-50 MG per tablet Take 1 tablet by mouth 2 times daily as needed for constipation (Patient not taking: Reported on 12/19/2017)     No current facility-administered medications for this visit.        Client Allergies:  No Known " Allergies      Medical History:  Past Medical History:   Diagnosis Date     ADHD      Ovarian cyst      Smoking     quit with +UPT       Medication Adherence:  Client reports taking prescribed medications as prescribed.    Client was provided recommendation to follow-up with prescribing physician.      Mental Status Assessment:  Appearance:   Appropriate   Eye Contact:   Fair   Psychomotor Behavior: Normal   Attitude:   Cooperative   Orientation:   All  Speech   Rate / Production: Normal    Volume:  Normal   Mood:    Anxious  Depressed  Sad  Tearful   Affect:    Appropriate   Thought Content:  Clear   Thought Form:  Coherent  Goal Directed  Logical   Insight:    Limited       Review of Symptoms:  Depression: Sleep Interest Guilt Energy Concentration Appetite Hopeless Helpless Worthless Ruminations Irritability  Geri:  No symptoms  Psychosis: No symptoms  Anxiety: Worries Nervousness  Panic:  No symptoms  Post Traumatic Stress Disorder: No symptoms  Obsessive Compulsive Disorder: No symptoms  Eating Disorder: No symptoms  Oppositional Defiant Disorder: No symptoms  ADD / ADHD: Attention Impulsivity   Conduct Disorder: No symptoms      Safety Assessment:    History of Safety Concerns:   Client reported a history of suicidal ideation.  Within last month, passive thoughts of SI: don't want to be here, too much stuff going on. Triggers: when she does not get things done, struggles at work, lacks motivation. Denied plans. No hx of hospitalization.   Client denied a history of suicide attempts.    Client denied a history of homicidal ideation.    Client reported a history of self-injurious ideation.  Hx of cutting in high school.  Client denied a history of personal safety concerns.    Client denied a history of assaultive behaviors.        Current Safety Concerns:  Client denies current suicidal ideation.    Client denies current homicidal ideation and behaviors.  Client denies current self-injurious ideation and  "behaviors.    Client denies current concerns for personal safety.      Client reports there are no firearms in the house.       Plan for Safety and Risk Management:  A safety and risk management plan has not been developed at this time, however client was given the after-hours number / 911 should there be a change in any of these risk factors.      Client's Strengths and Limitations:  Client identified the following strengths or resources that will help her succeed in counseling: open minded, supportive boss/friends/family. Client identified the following supports: , sister, friend, boss, coworkers. Things that may interfere with the client's success in counseling include: \"I am very hard on myself.\"      Diagnostic Criteria:  A. Excessive anxiety and worry about a number of events or activities (such as work or school performance).   B. The person finds it difficult to control the worry.  C. Select 3 or more symptoms (required for diagnosis). Only one item is required in children.   - Restlessness or feeling keyed up or on edge.    - Being easily fatigued.    - Difficulty concentrating or mind going blank.    - Irritability.    - Muscle tension.    - Sleep disturbance (difficulty falling or staying asleep, or restless unsatisfying sleep).   D. The focus of the anxiety and worry is not confined to features of an Axis I disorder.  E. The anxiety, worry, or physical symptoms cause clinically significant distress or impairment in social, occupational, or other important areas of functioning.   F. The disturbance is not due to the direct physiological effects of a substance (e.g., a drug of abuse, a medication) or a general medical condition (e.g., hyperthyroidism) and does not occur exclusively during a Mood Disorder, a Psychotic Disorder, or a Pervasive Developmental Disorder.   - Depressed mood. Note: In children and adolescents, can be irritable mood.     - Diminished interest or pleasure in all, or almost " all, activities.    - Overeating.    - Increased sleep.    - Fatigue or loss of energy.    - Feelings of worthlessness or inappropriate and excessive guilt.    - Diminished ability to think or concentrate, or indecisiveness.   B) The symptoms cause clinically significant distress or impairment in social, occupational, or other important areas of functioning  C) The episode is not attributable to the physiological effects of a substance or to another medical condition  D) The occurence of major depressive episode is not better explained by other thought / psychotic disorders  E) There has never been a manic episode or hypomanic episode      Functional Status:  Client's symptoms have caused reduced functional status in the following areas: Activities of Daily Living, Occupational / Vocational, Social / Relational.      DSM5 Diagnoses: (Sustained by DSM5 Criteria Listed Above)  Diagnoses: 296.22 (F32.1)  Major Depressive Disorder, Single Episode, Moderate & 300.02 (F41.1) Generalized Anxiety Disorder; RULE OUT Major Depressive Disorder, Recurrent, Moderate & Adjustment Disorder  Psychosocial & Contextual Factors: Miscarriage Sept 2017, currently 6 weeks pregnant, some strained in marriage and family relationships  WHODAS 2.0 (12 item)                          This questionnaire asks about difficulties due to health conditions. Health conditions                   include                        disease or illnesses, other health problems that may be short or long lasting,                    injuries, mental health or emotional problems, and problems with alcohol or drugs.                              Think back over the past 30 days and answer these questions, thinking about how much              difficulty you had doing the following activities. For each question, please [place] only                   one response.     S1 Standing for long periods such as 30 minutes? None =         1   S2 Taking care of household  responsibilities? Severe =       4   S3 Learning a new task, for example, learning how to get to a new place? Mild =           2   S4 How much of a problem do you have joining community activities (for example, festivals, Restorationism or other activities) in the same way as anyone else can? Severe =       4   S5 How much have you been emotionally affected by your health problems? Severe =       4           In the past 30 days, how much difficulty did you have in:   S6 Concentrating on doing something for ten minutes? Severe =       4   S7 Walking a long distance such as a kilometer (or equivalent)? None =         1   S8 Washing your whole body? None =         1   S9 Getting dressed? None =         1   S10 Dealing with people you do not know? None =         1   S11 Maintaining a friendship? Moderate =   3   S12 Your day to day work? Severe =       4      H1 Overall, in the past 30 days, how many days were these difficulties present? Record number of days 28   H2 In the past 30 days, for how many days were you totally unable to carry out your usual activities or work because of any health condition? Record number of days 3-4   H3 In the past 30 days, not counting the days that you were totally unable, for how many days did you cut back or reduce your usual activities or work because of any health condition? Record number of days 20         Attendance Agreement:  Client has signed Attendance Agreement:Yes      Collaboration:  Collaboration with other professionals is not indicated at this time.      Preliminary Treatment Plan:  The client reports no currently identified Restorationism, ethnic or cultural issues relevant to therapy.     services are not indicated.    Modifications to assist communication are not indicated.    The concerns identified by the client will be addressed in therapy.    Initial Treatment will focus on: Depressed Mood, Anxiety, and Grief / Loss.    As a preliminary treatment goal, client will  experience a reduction in depressed mood, will develop more effective coping skills to manage depressive symptoms, will develop healthy cognitive patterns and beliefs, will increase ability to function adaptively and will continue to take medications as prescribed / participate in supportive activities and services , will experience a reduction in anxiety, will develop more effective coping skills to manage anxiety symptoms, will develop healthy cognitive patterns and beliefs and will increase ability to function adaptively and will increase understanding of normal grieving process, will engage in effective approach to address and resolve grief/loss issues and will process grief/loss issues in an adaptive manner.    The focus of initial interventions will be to alleviate anxiety, alleviate compulsive behavior(s), alleviate depressed mood, alleviate lability of mood, facilitate appropriate expression of feelings, increase ability to function adaptively, increase coping skills, increase self esteem, process losses, provide homework to reinforce skill development, teach CBT skills, teach communication skills, teach conflict management skills, teach DBT skills, teach distress tolerance skills, teach emotional regulation, teach mindfulness skills, teach problem-solving skills, teach relaxation strategies, teach sleep hygiene, teach social skills and teach stress mangement techniques.    Referral to another professional/service is not indicated at this time.    A Release of Information is not needed at this time.    Report to child / adult protection services was NA.    Client will have access to their [facility]' medical record.    Karel Wren [facility]  January 18, 2018

## 2025-03-27 ENCOUNTER — OFFICE VISIT (OUTPATIENT)
Dept: FAMILY MEDICINE | Facility: CLINIC | Age: 35
End: 2025-03-27
Payer: COMMERCIAL

## 2025-03-27 VITALS
HEART RATE: 64 BPM | WEIGHT: 188.4 LBS | OXYGEN SATURATION: 100 % | TEMPERATURE: 97.5 F | BODY MASS INDEX: 27.91 KG/M2 | HEIGHT: 69 IN | RESPIRATION RATE: 16 BRPM | DIASTOLIC BLOOD PRESSURE: 69 MMHG | SYSTOLIC BLOOD PRESSURE: 102 MMHG

## 2025-03-27 DIAGNOSIS — L24.9 IRRITANT CONTACT DERMATITIS, UNSPECIFIED TRIGGER: Primary | ICD-10-CM

## 2025-03-27 RX ORDER — PREDNISONE 20 MG/1
TABLET ORAL
Qty: 14 TABLET | Refills: 0 | Status: SHIPPED | OUTPATIENT
Start: 2025-03-27

## 2025-03-27 ASSESSMENT — PATIENT HEALTH QUESTIONNAIRE - PHQ9
SUM OF ALL RESPONSES TO PHQ QUESTIONS 1-9: 11
SUM OF ALL RESPONSES TO PHQ QUESTIONS 1-9: 11
10. IF YOU CHECKED OFF ANY PROBLEMS, HOW DIFFICULT HAVE THESE PROBLEMS MADE IT FOR YOU TO DO YOUR WORK, TAKE CARE OF THINGS AT HOME, OR GET ALONG WITH OTHER PEOPLE: SOMEWHAT DIFFICULT

## 2025-03-27 NOTE — PROGRESS NOTES
Assessment & Plan     Irritant contact dermatitis, unspecified trigger  We really were not able to identify a trigger for patient's dermatitis today.  This does appear to be something that her skin was exposed to, not something taken by mouth.  Recommended a taper of steroids along with minimizing any chemical exposures to the skin.  Did recommend holding off on her Adderall while taking oral steroids.  She will see how this affects her prior to taking her Adderall.  If this adversely affects her sleep, she will stop the oral steroid.  She has hydroxyzine to be used as needed for itch.  If no improvement of the rash with this regimen, she will let us know and we could consider referral to dermatology.  - predniSONE (DELTASONE) 20 MG tablet; Take 2 tabs daily for 4 days, then 1 tab daily for 4 days, then 1/2 tab daily for 4 days                Subjective   Antonio is a 34 year old, presenting for the following health issues:  Derm Problem (Rash/Hives on bilateral arms, legs, abdomen, itching, started last night took benadryl which didn't help)        3/27/2025     8:00 AM   Additional Questions   Roomed by Odilia JAMA LPN     History of Present Illness       Reason for visit:  Hives maybe  Symptom onset:  Today  Symptoms include:  Rash on arms legs stomach  Symptom intensity:  Severe  Symptom progression:  Worsening  Had these symptoms before:  No  What makes it worse:  No  What makes it better:  No She is missing 2 dose(s) of medications per week.  She is not taking prescribed medications regularly due to remembering to take.      Patient presents today for evaluation of a rash.  She woke up at midnight with itching on her legs.  She then got out of bed and looked in the mirror and had what she calls hives on her legs, abdomen, and arms.  She took a bath before bed, which she does daily.  She denies any new products in the bath, any new medications, supplements or foods.  She denies any new cleaning products used to clean  "the bath, denies any new soaps, lotions or detergents.  She has not had a rash similar to this in the past.  She does have some eczema and was applying some metronidazole cream over the past couple of days to her eczema.  This cream was not new to her.  She denies any other new systemic symptoms like cough, fever, runny or stuffy nose.      Review of Systems  Constitutional, HEENT, cardiovascular, pulmonary, gi and gu systems are negative, except as otherwise noted.      Objective    /69   Pulse 64   Temp 97.5  F (36.4  C) (Oral)   Resp 16   Ht 1.753 m (5' 9\")   Wt 85.5 kg (188 lb 6.4 oz)   SpO2 100%   BMI 27.82 kg/m    Body mass index is 27.82 kg/m .  Physical Exam   GENERAL: alert and mild distress secondary to itching/rash  RESP: Breathing comfortably, no cough during the visit  SKIN: Tiny papules with surrounding confluent erythema over the entire legs bilaterally, abdomen, and upper and lower arms.  Face, chest, and back spared.  NEURO: Normal strength and tone, mentation intact and speech normal  PSYCH: mentation appears normal, affect normal/bright    Diagnostics: None        Signed Electronically by: Fang Delgado MD    "

## 2025-03-31 DIAGNOSIS — F99 INSOMNIA DUE TO OTHER MENTAL DISORDER: ICD-10-CM

## 2025-03-31 DIAGNOSIS — F51.05 INSOMNIA DUE TO OTHER MENTAL DISORDER: ICD-10-CM

## 2025-03-31 RX ORDER — ZOLPIDEM TARTRATE 5 MG/1
5 TABLET ORAL
Qty: 14 TABLET | Refills: 0 | Status: SHIPPED | OUTPATIENT
Start: 2025-03-31

## 2025-04-04 DIAGNOSIS — F41.1 GAD (GENERALIZED ANXIETY DISORDER): ICD-10-CM

## 2025-04-05 ENCOUNTER — HEALTH MAINTENANCE LETTER (OUTPATIENT)
Age: 35
End: 2025-04-05

## 2025-04-07 RX ORDER — ALPRAZOLAM 0.25 MG
0.25 TABLET ORAL 3 TIMES DAILY PRN
Qty: 45 TABLET | Refills: 0 | Status: SHIPPED | OUTPATIENT
Start: 2025-04-07

## 2025-04-13 ENCOUNTER — E-VISIT (OUTPATIENT)
Dept: FAMILY MEDICINE | Facility: CLINIC | Age: 35
End: 2025-04-13
Payer: COMMERCIAL

## 2025-04-13 DIAGNOSIS — L24.9 IRRITANT CONTACT DERMATITIS, UNSPECIFIED TRIGGER: Primary | ICD-10-CM

## 2025-04-13 PROCEDURE — 99207 PR NON-BILLABLE SERV PER CHARTING: CPT | Performed by: FAMILY MEDICINE

## 2025-04-13 NOTE — PATIENT INSTRUCTIONS
Thank you for choosing us for your care. I have placed the below referral(s) for you:  Orders Placed This Encounter   Procedures     Adult Dermatology  Referral     Please click the link for your After Visit Summary to view scheduling instructions for your referral. In most cases, you will be contacted within 2 business days to schedule. If you do not hear from a representative within that time, please call [phone] to be connected to a .

## 2025-04-14 ENCOUNTER — TELEPHONE (OUTPATIENT)
Dept: DERMATOLOGY | Facility: CLINIC | Age: 35
End: 2025-04-14
Payer: COMMERCIAL

## 2025-04-14 NOTE — TELEPHONE ENCOUNTER
This encounter is being sent to inform the clinic that this patient has a referral from ROSHNI GAN for the diagnoses of L24.9 (ICD-10-CM) - Irritant contact dermatitis, unspecified trigger and has requested that this patient be seen within 1-2 weeks and/or with OX, UC, FK.  Based on the availability of our provider(s), we are unable to accommodate this request.    Were all sites offered this patient?  Yes    Does scheduling algorithm request to schedule next available?  Patient has been scheduled for the first available opening with Aminah  on 10/22.  We have informed the patient that the clinic will review their referral and reach out if a sooner appointment is medically necessary.        Pt would like an appt sooner than 1-2 weeks if possible due to severity of rash

## 2025-04-14 NOTE — TELEPHONE ENCOUNTER
Called and spoke with pt and schdeduled for Thursday 4/17    Cherelle YEUNG RN  Dermatology   [phone]

## 2025-04-15 ENCOUNTER — OFFICE VISIT (OUTPATIENT)
Dept: URGENT CARE | Facility: URGENT CARE | Age: 35
End: 2025-04-15
Payer: COMMERCIAL

## 2025-04-15 VITALS
SYSTOLIC BLOOD PRESSURE: 126 MMHG | DIASTOLIC BLOOD PRESSURE: 80 MMHG | BODY MASS INDEX: 27.85 KG/M2 | HEART RATE: 87 BPM | TEMPERATURE: 98.1 F | WEIGHT: 188 LBS | RESPIRATION RATE: 19 BRPM | HEIGHT: 69 IN | OXYGEN SATURATION: 98 %

## 2025-04-15 DIAGNOSIS — R21 RASH: ICD-10-CM

## 2025-04-15 DIAGNOSIS — J02.0 STREP THROAT: Primary | ICD-10-CM

## 2025-04-15 DIAGNOSIS — R07.0 THROAT PAIN: ICD-10-CM

## 2025-04-15 LAB — DEPRECATED S PYO AG THROAT QL EIA: POSITIVE

## 2025-04-15 PROCEDURE — 3074F SYST BP LT 130 MM HG: CPT | Performed by: PHYSICIAN ASSISTANT

## 2025-04-15 PROCEDURE — 87880 STREP A ASSAY W/OPTIC: CPT | Performed by: PHYSICIAN ASSISTANT

## 2025-04-15 PROCEDURE — 3079F DIAST BP 80-89 MM HG: CPT | Performed by: PHYSICIAN ASSISTANT

## 2025-04-15 PROCEDURE — 99214 OFFICE O/P EST MOD 30 MIN: CPT | Performed by: PHYSICIAN ASSISTANT

## 2025-04-15 RX ORDER — PENICILLIN V POTASSIUM 500 MG/1
500 TABLET, FILM COATED ORAL 2 TIMES DAILY
Qty: 20 TABLET | Refills: 0 | Status: SHIPPED | OUTPATIENT
Start: 2025-04-15 | End: 2025-04-25

## 2025-04-15 NOTE — PATIENT INSTRUCTIONS
Try zyrtec 10 mg twice daily for itching.    Cool compresses for pruritus rather than itching.    OK to use triamcinalone until you see the dermatologist.    Remove all exposures to fragrances and dyes.

## 2025-04-15 NOTE — PROGRESS NOTES
Assessment & Plan     Strep throat  Pcn as ordered.   Push fluids, rest and ibuprofen or tylenol for comfort.    RTC for persistent or worsening sx.   At the end of the encounter, I discussed results, diagnosis, medications. Discussed red flags for immediate return to clinic/ER, as well as indications for follow up if no improvement. Patient understood and agreed to plan.       - penicillin V (VEETID) 500 MG tablet  Dispense: 20 tablet; Refill: 0    Throat pain  - Streptococcus A Rapid Screen w/Reflex to PCR - Clinic Collect    Rash  Difficult to say if this chronic intermittent rash is related to strep vs other vs 2 different etiologies.    Discussed that components of today's rash definitely could be scalariform rash        30 min spent in evaluation, management, documentation and review of chart fromp previous visits on 3-27-25, 2-24-25   No follow-ups on file.    Belle Espinoza PA-C  [facility] [place]    Yvon Alvarez is a 34 year old female who presents to clinic today for the following health issues:  Chief Complaint   Patient presents with    Pharyngitis     Rash going on since 3/26- has been treated but SX worsened, spread to new areas. Sore throat x 1 day. Worsening today. Denies fever.          4/15/2025    12:45 PM   Additional Questions   Roomed by deanna   Accompanied by self     HPI    3-26-25, started with rash.    Prednisone helpful, still some itching.    Last week diarrhea and vomiting for a few days and then back to normal.    Then 2 days ago itching again, rash worsening again.    Has removed new products to exclude contact irritants. Still has some with fragrances and dyes that are long term which she has tolerated for years.    Hydroxyzine somewhat helpful.    No fevers.    No nausea/vomiting.   ST the last 2 days only       Review of Systems  Constitutional, HEENT, cardiovascular, pulmonary, gi and gu systems are negative, except as otherwise noted.      Objective   "  /80 (BP Location: Right arm, Patient Position: Sitting, Cuff Size: Adult Regular)   Pulse 87   Temp 98.1  F (36.7  C) (Oral)   Resp 19   Ht 1.753 m (5' 9.02\")   Wt 85.3 kg (188 lb)   LMP 04/09/2025   SpO2 98%   BMI 27.75 kg/m    Physical Exam   Pt is in no acute distress and appears well  Ears patent B:  TM s intact, non-injected. All land marks easily visibile    Nasal mucosa is non-edematous, no discharge.    Pharynx: erythematous, tonsils 2+ hypertrophied, No exudate   Neck supple: no adenopathy  Lungs: CTA  Heart: RRR, no murmur, no thrills or heaves   Ext: no edema  Skin: fine papular rash about the face, UE, lower extremities.  Sparing trunk currently.  Few dry patches/plaquest in the antecubital region, anterior thighs.    Results for orders placed or performed in visit on 04/15/25   Streptococcus A Rapid Screen w/Reflex to PCR - Clinic Collect     Status: Abnormal    Specimen: Throat; Swab   Result Value Ref Range    Group A Strep antigen Positive (A) Negative               "

## 2025-04-17 ENCOUNTER — OFFICE VISIT (OUTPATIENT)
Dept: DERMATOLOGY | Facility: CLINIC | Age: 35
End: 2025-04-17
Payer: COMMERCIAL

## 2025-04-17 DIAGNOSIS — L71.0 PERIORAL DERMATITIS: ICD-10-CM

## 2025-04-17 DIAGNOSIS — L30.9 DERMATITIS: Primary | ICD-10-CM

## 2025-04-17 DIAGNOSIS — J02.0 STREP THROAT: ICD-10-CM

## 2025-04-17 RX ORDER — TACROLIMUS 1 MG/G
OINTMENT TOPICAL
Qty: 100 G | Refills: 1 | Status: SHIPPED | OUTPATIENT
Start: 2025-04-17

## 2025-04-17 ASSESSMENT — PAIN SCALES - GENERAL: PAINLEVEL_OUTOF10: NO PAIN (0)

## 2025-04-17 NOTE — PROGRESS NOTES
[facility] Dermatology Note  Encounter Date: Apr 17, 2025  Office Visit     Reviewed patients past medical history and pertinent chart review prior to patients visit today.     Dermatology Problem List:  ____________________________________________    Assessment & Plan:     # Eczematous dermatitis  - She prefers to avoid topical steroids if possible at this time.  - Start tacrolimus 0.1% ointment BID to affected areas until resolution.  - Encouraged regular use of an emollient such as Vanicream, Cera Ve Cream or Cetaphil Cream or Vaseline.  - If no improvement in 1 month with above treatment, biopsy to be considered.    # Strep throat  - Rash appears more eczematous today on exam as compared to rash found with scarlet fever. She has a history of a positive group A strep antigen as of 4/15/2025. Continue penicillin V as previously prescribed. She is already noticing much improvement with her symptoms since starting treatment.    # Perioral (periorifacial) dermatitis.   -We discussed the etiology of this condition.   -Start tacrolimus 0.1% ointment BID to affected areas.   -Recommend diligent use of facial moisturizers BID such as Cetaphil cream, Cera Ve Cream or Vanicream.      Return to clinic in 1 month if not fully resolved or if rash worsens, may return sooner PRN for new or worsening conditions.    All risks, benefits and alternatives were discussed with patient.  Patient is in agreement and understands the assessment and plan.  All questions were answered.  Jazmine rBadford PA-C  [facility] Dermat[facility]  _______________________________________    CC: Derm Problem (- started with rash on legs and stomach, was prescribed prednisone which helped but didn't completely clear it, came back, recently diagnosed with scarlet fever and is on day 2 of an antibiotic, currently feeling itchy and still has rash)     HPI:  Ms. Dayana Danielle is a(n) 34 year old female who presents today as a new  patient for evaluation of a rash. The patient reports the rash first started March 26, 2024.  It was initially on the legs, abdomen, and upper arms and was extremely pruritic.  She then went to be seen for this and was diagnosed with a contact dermatitis and given a course of oral prednisone.  She feels this was beneficial and her rash improved however her rash never completely resolved.  It was still mildly pruritic.  She applied lotions over-the-counter to her skin which did not help.  On April 7th and April 8th, she then started to experience food poisoning like symptoms and had diarrhea, nausea, and vomiting.  On April 13, she started to become pruritic again.  The rash will be flared on her legs, upper chest, and arms. She then developed a sore throat the following day and felt the itching of her skin worsened. She was seen in urgent care and diagnosed with strep throat as of April 15th.  Is currently on penicillin for this and is starting to feel better. She states she was told her rash could be scarlet fever.  Prior to the onset of her rash, she denied starting any new medications or new products including soaps, lotions, detergents, etc.    She has had eczema since childhood, but has never had a rash to this degree.  She has no family history of eczema.    Patient is otherwise feeling well, without additional skin concerns.    Physical Exam:  Vitals: [facility] 04/09/2025   SKIN: Focused examination of face, back, chest, abdomen, arms, buttocks, and legs was performed.  - erythematous to slightly violaceous scaly patches and subtle scaly papules on the bilateral shins, popliteal fossa, left medial thigh, arms, and chest.  - few erythematous papules sparing the the vermilion border of the lips.    - No other lesions of concern on areas examined.     Medications:  Current Outpatient Medications   Medication Sig Dispense Refill    ALPRAZolam (XANAX) 0.25 MG tablet TAKE ONE TABLET BY MOUTH THREE TIMES A DAY AS NEEDED  FOR ANXIETY 45 tablet 0    amphetamine-dextroamphetamine (ADDERALL) 10 MG tablet Take 1 tablet (10 mg) by mouth 2 times daily. 60 tablet 0    [START ON 4/26/2025] amphetamine-dextroamphetamine (ADDERALL) 10 MG tablet Take 1 tablet (10 mg) by mouth 2 times daily. 60 tablet 0    amphetamine-dextroamphetamine (ADDERALL) 10 MG tablet TAKE ONE TABLET BY MOUTH TWICE A DAY 60 tablet 0    amphetamine-dextroamphetamine (ADDERALL) 10 MG tablet TAKE ONE TABLET BY MOUTH TWICE A DAY 60 tablet 0    amphetamine-dextroamphetamine (ADDERALL) 10 MG tablet Take 1 tablet (10 mg) by mouth 2 times daily 60 tablet 0    amphetamine-dextroamphetamine (ADDERALL) 10 MG tablet Take 1 tablet (10 mg) by mouth 2 times daily 60 tablet 0    doxylamine (UNISOM) 25 MG TABS tablet Take by mouth At Bedtime      hydrOXYzine HCl (ATARAX) 25 MG tablet TAKE ONE TABLET BY MOUTH EVERY FOUR TO SIX HOURS AS NEEDED Strength: 25 mg 90 tablet 0    Omeprazole (PRILOSEC PO)       omeprazole (PRILOSEC) 40 MG DR capsule Take 1 capsule (40 mg) by mouth daily 60 capsule 0    penicillin V (VEETID) 500 MG tablet Take 1 tablet (500 mg) by mouth 2 times daily for 10 days. 20 tablet 0    valACYclovir (VALTREX) 1000 mg tablet Take 2 tablets (2,000 mg) by mouth 2 times daily. 4 tablet 3    zolpidem (AMBIEN) 5 MG tablet TAKE ONE TABLET BY MOUTH EVERY NIGHT AT BEDTIME AS NEEDED FOR SLEEP. 14 tablet 0    albuterol (PROAIR HFA/PROVENTIL HFA/VENTOLIN HFA) 108 (90 Base) MCG/ACT inhaler Inhale 1-2 puffs into the lungs every 4 hours as needed for shortness of breath, wheezing or cough 18 g 0    predniSONE (DELTASONE) 20 MG tablet Take 2 tabs daily for 4 days, then 1 tab daily for 4 days, then 1/2 tab daily for 4 days 14 tablet 0    triamcinolone (KENALOG) 0.1 % external cream Apply topically 2 times daily 45 g 1     No current facility-administered medications for this visit.      Past Medical History:   Patient Active Problem List   Diagnosis    Ovarian mass    ADHD    JACINTO  (generalized anxiety disorder)    Moderate episode of recurrent major depressive disorder (H)    Major depressive disorder, single episode, moderate (H)    History of miscarriage    Status post  delivery    Hypertrophy of breast    Eczema, unspecified type    Gastritis without bleeding, unspecified chronicity, unspecified gastritis type     Past Medical History:   Diagnosis Date    ADHD     States that it's not an ongoing diagnosis, the person who did her psych eval for it did not think she had it. Was on Adderall in the past and stopped that in 2017.    Anemia     10 years ago    Anxiety     Started the Citalopram early 2017 by her PCP.     Depressive disorder     Started the Citalopram early 2017 by her PCP.     History of miscarriage 2019    Ovarian cyst     Right sided    Smoking     Stopped 2017.       CC Fang Delgado MD  [address]  [place] [address] on close of this encounter.

## 2025-04-17 NOTE — LETTER
4/17/2025      Dayana Danielle  [address] [place] [address]      Dear Colleague,    Thank you for referring your patient, Dayana Danielle, to the [facility]. Please see a copy of my visit note below.    [facility] Dermatology Note  Encounter Date: Apr 17, 2025  Office Visit     Reviewed patients past medical history and pertinent chart review prior to patients visit today.     Dermatology Problem List:  ____________________________________________    Assessment & Plan:     # Eczematous dermatitis  - She prefers to avoid topical steroids if possible at this time.  - Start tacrolimus 0.1% ointment BID to affected areas until resolution.  - Encouraged regular use of an emollient such as Vanicream, Cera Ve Cream or Cetaphil Cream or Vaseline.  - If no improvement in 1 month with above treatment, biopsy to be considered.    # Strep throat  - Rash appears more eczematous today on exam as compared to rash found with scarlet fever. She has a history of a positive group A strep antigen as of 4/15/2025. Continue penicillin V as previously prescribed. She is already noticing much improvement with her symptoms since starting treatment.    # Perioral (periorifacial) dermatitis.   -We discussed the etiology of this condition.   -Start tacrolimus 0.1% ointment BID to affected areas.   -Recommend diligent use of facial moisturizers BID such as Cetaphil cream, Cera Ve Cream or Vanicream.      Return to clinic in 1 month if not fully resolved or if rash worsens, may return sooner PRN for new or worsening conditions.    All risks, benefits and alternatives were discussed with patient.  Patient is in agreement and understands the assessment and plan.  All questions were answered.  Jazmine Bradford PA-C  [facility] Dermatology  _______________________________________    CC: Derm Problem (- started with rash on legs and stomach, was prescribed prednisone which helped  but didn't completely clear it, came back, recently diagnosed with scarlet fever and is on day 2 of an antibiotic, currently feeling itchy and still has rash)     HPI:  Ms. Dayana Danielle is a(n) 34 year old female who presents today as a new patient for evaluation of a rash. The patient reports the rash first started March 26, 2024.  It was initially on the legs, abdomen, and upper arms and was extremely pruritic.  She then went to be seen for this and was diagnosed with a contact dermatitis and given a course of oral prednisone.  She feels this was beneficial and her rash improved however her rash never completely resolved.  It was still mildly pruritic.  She applied lotions over-the-counter to her skin which did not help.  On April 7th and April 8th, she then started to experience food poisoning like symptoms and had diarrhea, nausea, and vomiting.  On April 13, she started to become pruritic again.  The rash will be flared on her legs, upper chest, and arms. She then developed a sore throat the following day and felt the itching of her skin worsened. She was seen in urgent care and diagnosed with strep throat as of April 15th.  Is currently on penicillin for this and is starting to feel better. She states she was told her rash could be scarlet fever.  Prior to the onset of her rash, she denied starting any new medications or new products including soaps, lotions, detergents, etc.    She has had eczema since childhood, but has never had a rash to this degree.  She has no family history of eczema.    Patient is otherwise feeling well, without additional skin concerns.    Physical Exam:  Vitals: [facility] 04/09/2025   SKIN: Focused examination of face, back, chest, abdomen, arms, buttocks, and legs was performed.  - erythematous to slightly violaceous scaly patches and subtle scaly papules on the bilateral shins, popliteal fossa, left medial thigh, arms, and chest.  - few erythematous papules sparing the the vermilion  border of the lips.    - No other lesions of concern on areas examined.     Medications:  Current Outpatient Medications   Medication Sig Dispense Refill     ALPRAZolam (XANAX) 0.25 MG tablet TAKE ONE TABLET BY MOUTH THREE TIMES A DAY AS NEEDED FOR ANXIETY 45 tablet 0     amphetamine-dextroamphetamine (ADDERALL) 10 MG tablet Take 1 tablet (10 mg) by mouth 2 times daily. 60 tablet 0     [START ON 4/26/2025] amphetamine-dextroamphetamine (ADDERALL) 10 MG tablet Take 1 tablet (10 mg) by mouth 2 times daily. 60 tablet 0     amphetamine-dextroamphetamine (ADDERALL) 10 MG tablet TAKE ONE TABLET BY MOUTH TWICE A DAY 60 tablet 0     amphetamine-dextroamphetamine (ADDERALL) 10 MG tablet TAKE ONE TABLET BY MOUTH TWICE A DAY 60 tablet 0     amphetamine-dextroamphetamine (ADDERALL) 10 MG tablet Take 1 tablet (10 mg) by mouth 2 times daily 60 tablet 0     amphetamine-dextroamphetamine (ADDERALL) 10 MG tablet Take 1 tablet (10 mg) by mouth 2 times daily 60 tablet 0     doxylamine (UNISOM) 25 MG TABS tablet Take by mouth At Bedtime       hydrOXYzine HCl (ATARAX) 25 MG tablet TAKE ONE TABLET BY MOUTH EVERY FOUR TO SIX HOURS AS NEEDED Strength: 25 mg 90 tablet 0     Omeprazole (PRILOSEC PO)        omeprazole (PRILOSEC) 40 MG DR capsule Take 1 capsule (40 mg) by mouth daily 60 capsule 0     penicillin V (VEETID) 500 MG tablet Take 1 tablet (500 mg) by mouth 2 times daily for 10 days. 20 tablet 0     valACYclovir (VALTREX) 1000 mg tablet Take 2 tablets (2,000 mg) by mouth 2 times daily. 4 tablet 3     zolpidem (AMBIEN) 5 MG tablet TAKE ONE TABLET BY MOUTH EVERY NIGHT AT BEDTIME AS NEEDED FOR SLEEP. 14 tablet 0     albuterol (PROAIR HFA/PROVENTIL HFA/VENTOLIN HFA) 108 (90 Base) MCG/ACT inhaler Inhale 1-2 puffs into the lungs every 4 hours as needed for shortness of breath, wheezing or cough 18 g 0     predniSONE (DELTASONE) 20 MG tablet Take 2 tabs daily for 4 days, then 1 tab daily for 4 days, then 1/2 tab daily for 4 days 14 tablet 0      triamcinolone (KENALOG) 0.1 % external cream Apply topically 2 times daily 45 g 1     No current facility-administered medications for this visit.      Past Medical History:   Patient Active Problem List   Diagnosis     Ovarian mass     ADHD     JACINTO (generalized anxiety disorder)     Moderate episode of recurrent major depressive disorder (H)     Major depressive disorder, single episode, moderate (H)     History of miscarriage     Status post  delivery     Hypertrophy of breast     Eczema, unspecified type     Gastritis without bleeding, unspecified chronicity, unspecified gastritis type     Past Medical History:   Diagnosis Date     ADHD     States that it's not an ongoing diagnosis, the person who did her psych eval for it did not think she had it. Was on Adderall in the past and stopped that in 2017.     Anemia     10 years ago     Anxiety     Started the Citalopram early 2017 by her PCP.      Depressive disorder     Started the Citalopram early 2017 by her PCP.      History of miscarriage 2019     Ovarian cyst     Right sided     Smoking     Stopped 2017.       CC Fang Delgado MD  [address] [facility] [address] [facility] [address] [facility] [address]  [place] [address] on close of this encounter.       Again, thank you for allowing me to participate in the care of your patient.        Sincerely,        Aminah Bradford PA-C    Electronically signed

## 2025-04-27 ENCOUNTER — E-VISIT (OUTPATIENT)
Dept: URGENT CARE | Facility: CLINIC | Age: 35
End: 2025-04-27
Payer: COMMERCIAL

## 2025-04-27 DIAGNOSIS — B37.31 CANDIDAL VULVOVAGINITIS: Primary | ICD-10-CM

## 2025-04-27 DIAGNOSIS — N76.0 VAGINITIS AND VULVOVAGINITIS: ICD-10-CM

## 2025-04-27 PROCEDURE — 99207 PR NON-BILLABLE SERV PER CHARTING: CPT | Performed by: NURSE PRACTITIONER

## 2025-04-27 RX ORDER — FLUCONAZOLE 150 MG/1
150 TABLET ORAL ONCE
Qty: 1 TABLET | Refills: 0 | Status: SHIPPED | OUTPATIENT
Start: 2025-04-27 | End: 2025-04-27

## 2025-04-27 NOTE — PATIENT INSTRUCTIONS
Thank you for choosing us for your care. I have placed an order for a prescription so that you can start treatment. View your full visit summary for details by clicking on the link below. Your pharmacist will able to address any questions you may have about the medication.     If you re not feeling better within 2-3 days, please schedule an appointment.  You can schedule an appointment right here in [facility], or call [phone]  If the visit is for the same symptoms as your eVisit, we ll refund the cost of your eVisit if seen within seven days.    Vaginal Yeast Infection: Care Instructions  Overview     A vaginal yeast infection is the growth of too many yeast cells in the vagina. This is a common problem. Itching, vaginal discharge and irritation, and other symptoms can bother you. But yeast infections don't often cause other health problems.  Some medicines can increase your risk of getting a yeast infection. These include antibiotics, hormones, and steroids. You may also be more likely to get a yeast infection if you are pregnant, have diabetes, douche, or wear tight clothes.  With treatment, most yeast infections get better in a few days.  Follow-up care is a key part of your treatment and safety. Be sure to make and go to all appointments, and call your doctor if you are having problems. It's also a good idea to know your test results and keep a list of the medicines you take.  How can you care for yourself at home?  Take your medicines exactly as prescribed. Call your doctor if you think you are having a problem with your medicine.  Ask your doctor about over-the-counter (OTC) medicines for yeast infections. If you use an OTC treatment, read and follow all instructions on the label.  Don't use tampons while using a vaginal cream or suppository. The tampons can absorb the medicine. Use pads instead.  Wear loose cotton clothing. Don't wear nylon or other fabric that holds body heat and moisture close to the  "skin.  Try sleeping without underwear.  Don't scratch. Relieve itching with a cold pack or a cool bath.  Don't wash your vulva more than once a day. Use plain water or a mild, unscented soap. Air-dry the vulva.  Change out of wet or damp clothes as soon as possible.  If you are using a vaginal medicine, don't have sex until you have finished your treatment. But if you do have sex, don't depend on a condom or diaphragm for birth control. The oil in some vaginal medicines weakens latex.  Don't douche or use powders, sprays, or perfumes in your vagina or on your vulva. These items can change the normal balance of organisms in your vagina.  When should you call for help?   Call your doctor now or seek immediate medical care if:    You have new or increased pain in your vagina or pelvis.   Watch closely for changes in your health, and be sure to contact your doctor if:    You have unexpected vaginal bleeding.     You have a fever.     You are not getting better after 2 days.     Your symptoms come back after you finish your medicines.   Where can you learn more?  Go to https://www.[employer].net/patiented  Enter F639 in the search box to learn more about \"Vaginal Yeast Infection: Care Instructions.\"  Current as of: April 30, 2024  Content Version: 14.4    9693-3806 [employer].   Care instructions adapted under license by your healthcare professional. If you have questions about a medical condition or this instruction, always ask your healthcare professional. [employer] disclaims any warranty or liability for your use of this information.    "

## 2025-04-27 NOTE — TELEPHONE ENCOUNTER
Wet prep ordered.  SHANE Casas MD       provider E-Visit time total (minutes):  Less than 5 minutes.

## 2025-04-29 ENCOUNTER — LAB (OUTPATIENT)
Dept: LAB | Facility: CLINIC | Age: 35
End: 2025-04-29
Payer: COMMERCIAL

## 2025-04-29 DIAGNOSIS — N76.0 VAGINITIS AND VULVOVAGINITIS: ICD-10-CM

## 2025-04-29 LAB
CLUE CELLS: ABNORMAL
TRICHOMONAS, WET PREP: ABNORMAL
WBC'S/HIGH POWER FIELD, WET PREP: ABNORMAL
YEAST, WET PREP: ABNORMAL

## 2025-04-29 PROCEDURE — 87210 SMEAR WET MOUNT SALINE/INK: CPT

## 2025-05-05 ENCOUNTER — OFFICE VISIT (OUTPATIENT)
Dept: FAMILY MEDICINE | Facility: CLINIC | Age: 35
End: 2025-05-05
Payer: COMMERCIAL

## 2025-05-05 VITALS
DIASTOLIC BLOOD PRESSURE: 80 MMHG | WEIGHT: 194 LBS | HEIGHT: 68 IN | TEMPERATURE: 97.6 F | HEART RATE: 69 BPM | SYSTOLIC BLOOD PRESSURE: 122 MMHG | OXYGEN SATURATION: 98 % | BODY MASS INDEX: 29.4 KG/M2

## 2025-05-05 DIAGNOSIS — Z00.00 ROUTINE HISTORY AND PHYSICAL EXAMINATION OF ADULT: Primary | ICD-10-CM

## 2025-05-05 DIAGNOSIS — F32.1 MAJOR DEPRESSIVE DISORDER, SINGLE EPISODE, MODERATE (H): ICD-10-CM

## 2025-05-05 DIAGNOSIS — F51.05 INSOMNIA DUE TO OTHER MENTAL DISORDER: ICD-10-CM

## 2025-05-05 DIAGNOSIS — L30.9 ECZEMA, UNSPECIFIED TYPE: ICD-10-CM

## 2025-05-05 DIAGNOSIS — F90.2 ATTENTION DEFICIT HYPERACTIVITY DISORDER (ADHD), COMBINED TYPE: ICD-10-CM

## 2025-05-05 DIAGNOSIS — G56.03 BILATERAL CARPAL TUNNEL SYNDROME: ICD-10-CM

## 2025-05-05 DIAGNOSIS — Z11.59 NEED FOR HEPATITIS C SCREENING TEST: ICD-10-CM

## 2025-05-05 DIAGNOSIS — F99 INSOMNIA DUE TO OTHER MENTAL DISORDER: ICD-10-CM

## 2025-05-05 DIAGNOSIS — G44.209 TENSION HEADACHE: ICD-10-CM

## 2025-05-05 DIAGNOSIS — Z13.220 SCREENING CHOLESTEROL LEVEL: ICD-10-CM

## 2025-05-05 DIAGNOSIS — Z13.1 SCREENING FOR DIABETES MELLITUS: ICD-10-CM

## 2025-05-05 DIAGNOSIS — Z98.891 STATUS POST CESAREAN DELIVERY: ICD-10-CM

## 2025-05-05 DIAGNOSIS — F41.1 GAD (GENERALIZED ANXIETY DISORDER): ICD-10-CM

## 2025-05-05 PROBLEM — K29.70 GASTRITIS WITHOUT BLEEDING, UNSPECIFIED CHRONICITY, UNSPECIFIED GASTRITIS TYPE: Status: RESOLVED | Noted: 2024-02-12 | Resolved: 2025-05-05

## 2025-05-05 LAB
ALBUMIN SERPL BCG-MCNC: 4.3 G/DL (ref 3.5–5.2)
ALP SERPL-CCNC: 90 U/L (ref 40–150)
ALT SERPL W P-5'-P-CCNC: 10 U/L (ref 0–50)
ANION GAP SERPL CALCULATED.3IONS-SCNC: 13 MMOL/L (ref 7–15)
AST SERPL W P-5'-P-CCNC: 18 U/L (ref 0–45)
BILIRUB SERPL-MCNC: 0.4 MG/DL
BUN SERPL-MCNC: 12.9 MG/DL (ref 6–20)
CALCIUM SERPL-MCNC: 9.7 MG/DL (ref 8.8–10.4)
CHLORIDE SERPL-SCNC: 105 MMOL/L (ref 98–107)
CHOLEST SERPL-MCNC: 187 MG/DL
CREAT SERPL-MCNC: 0.78 MG/DL (ref 0.51–0.95)
EGFRCR SERPLBLD CKD-EPI 2021: >90 ML/MIN/1.73M2
ERYTHROCYTE [DISTWIDTH] IN BLOOD BY AUTOMATED COUNT: 11.9 % (ref 10–15)
FASTING STATUS PATIENT QL REPORTED: NO
FASTING STATUS PATIENT QL REPORTED: NO
GLUCOSE SERPL-MCNC: 111 MG/DL (ref 70–99)
HCO3 SERPL-SCNC: 21 MMOL/L (ref 22–29)
HCT VFR BLD AUTO: 40.5 % (ref 35–47)
HCV AB SERPL QL IA: NONREACTIVE
HDLC SERPL-MCNC: 63 MG/DL
HGB BLD-MCNC: 13.4 G/DL (ref 11.7–15.7)
LDLC SERPL CALC-MCNC: 97 MG/DL
MCH RBC QN AUTO: 30.7 PG (ref 26.5–33)
MCHC RBC AUTO-ENTMCNC: 33.1 G/DL (ref 31.5–36.5)
MCV RBC AUTO: 93 FL (ref 78–100)
NONHDLC SERPL-MCNC: 124 MG/DL
PLATELET # BLD AUTO: 230 10E3/UL (ref 150–450)
POTASSIUM SERPL-SCNC: 4.5 MMOL/L (ref 3.4–5.3)
PROT SERPL-MCNC: 7 G/DL (ref 6.4–8.3)
RBC # BLD AUTO: 4.36 10E6/UL (ref 3.8–5.2)
SODIUM SERPL-SCNC: 139 MMOL/L (ref 135–145)
TRIGL SERPL-MCNC: 134 MG/DL
WBC # BLD AUTO: 7.3 10E3/UL (ref 4–11)

## 2025-05-05 PROCEDURE — 86803 HEPATITIS C AB TEST: CPT | Performed by: NURSE PRACTITIONER

## 2025-05-05 PROCEDURE — 3074F SYST BP LT 130 MM HG: CPT | Performed by: NURSE PRACTITIONER

## 2025-05-05 PROCEDURE — 99214 OFFICE O/P EST MOD 30 MIN: CPT | Mod: 25 | Performed by: NURSE PRACTITIONER

## 2025-05-05 PROCEDURE — 83036 HEMOGLOBIN GLYCOSYLATED A1C: CPT | Performed by: NURSE PRACTITIONER

## 2025-05-05 PROCEDURE — G2211 COMPLEX E/M VISIT ADD ON: HCPCS | Performed by: NURSE PRACTITIONER

## 2025-05-05 PROCEDURE — 80061 LIPID PANEL: CPT | Performed by: NURSE PRACTITIONER

## 2025-05-05 PROCEDURE — 99395 PREV VISIT EST AGE 18-39: CPT | Performed by: NURSE PRACTITIONER

## 2025-05-05 PROCEDURE — 36415 COLL VENOUS BLD VENIPUNCTURE: CPT | Performed by: NURSE PRACTITIONER

## 2025-05-05 PROCEDURE — 3079F DIAST BP 80-89 MM HG: CPT | Performed by: NURSE PRACTITIONER

## 2025-05-05 PROCEDURE — 85027 COMPLETE CBC AUTOMATED: CPT | Performed by: NURSE PRACTITIONER

## 2025-05-05 PROCEDURE — 1126F AMNT PAIN NOTED NONE PRSNT: CPT | Performed by: NURSE PRACTITIONER

## 2025-05-05 PROCEDURE — 80053 COMPREHEN METABOLIC PANEL: CPT | Performed by: NURSE PRACTITIONER

## 2025-05-05 RX ORDER — DEXTROAMPHETAMINE SACCHARATE, AMPHETAMINE ASPARTATE, DEXTROAMPHETAMINE SULFATE AND AMPHETAMINE SULFATE 2.5; 2.5; 2.5; 2.5 MG/1; MG/1; MG/1; MG/1
10 TABLET ORAL 2 TIMES DAILY
Qty: 60 TABLET | Refills: 0 | Status: SHIPPED | OUTPATIENT
Start: 2025-06-04 | End: 2025-07-04

## 2025-05-05 RX ORDER — ZOLPIDEM TARTRATE 5 MG/1
5 TABLET ORAL
Qty: 14 TABLET | Refills: 0 | Status: SHIPPED | OUTPATIENT
Start: 2025-05-05

## 2025-05-05 RX ORDER — HYDROXYZINE HYDROCHLORIDE 25 MG/1
TABLET, FILM COATED ORAL
Qty: 90 TABLET | Refills: 0 | OUTPATIENT
Start: 2025-05-05

## 2025-05-05 RX ORDER — HYDROXYZINE HYDROCHLORIDE 25 MG/1
TABLET, FILM COATED ORAL
Qty: 90 TABLET | Refills: 3 | Status: SHIPPED | OUTPATIENT
Start: 2025-05-05

## 2025-05-05 RX ORDER — DEXTROAMPHETAMINE SACCHARATE, AMPHETAMINE ASPARTATE, DEXTROAMPHETAMINE SULFATE AND AMPHETAMINE SULFATE 2.5; 2.5; 2.5; 2.5 MG/1; MG/1; MG/1; MG/1
10 TABLET ORAL 2 TIMES DAILY
Qty: 60 TABLET | Refills: 0 | Status: SHIPPED | OUTPATIENT
Start: 2025-05-05 | End: 2025-06-04

## 2025-05-05 RX ORDER — DEXTROAMPHETAMINE SACCHARATE, AMPHETAMINE ASPARTATE, DEXTROAMPHETAMINE SULFATE AND AMPHETAMINE SULFATE 2.5; 2.5; 2.5; 2.5 MG/1; MG/1; MG/1; MG/1
10 TABLET ORAL 2 TIMES DAILY
Qty: 60 TABLET | Refills: 0 | Status: SHIPPED | OUTPATIENT
Start: 2025-07-04 | End: 2025-08-03

## 2025-05-05 SDOH — HEALTH STABILITY: PHYSICAL HEALTH: ON AVERAGE, HOW MANY DAYS PER WEEK DO YOU ENGAGE IN MODERATE TO STRENUOUS EXERCISE (LIKE A BRISK WALK)?: 1 DAY

## 2025-05-05 SDOH — HEALTH STABILITY: PHYSICAL HEALTH: ON AVERAGE, HOW MANY MINUTES DO YOU ENGAGE IN EXERCISE AT THIS LEVEL?: 30 MIN

## 2025-05-05 ASSESSMENT — SOCIAL DETERMINANTS OF HEALTH (SDOH): HOW OFTEN DO YOU GET TOGETHER WITH FRIENDS OR RELATIVES?: NEVER

## 2025-05-05 ASSESSMENT — PAIN SCALES - GENERAL: PAINLEVEL_OUTOF10: NO PAIN (0)

## 2025-05-05 NOTE — PROGRESS NOTES
Preventive Care Visit  [facility]  CHEY Hernandez CNP, Family Medicine  May 5, 2025      ICD-10-CM    1. Routine history and physical examination of adult  Z00.00       2. Screening for diabetes mellitus  Z13.1       3. Need for hepatitis C screening test  Z11.59 Hepatitis C Screen Reflex to HCV RNA Quant and Genotype     Hepatitis C Screen Reflex to HCV RNA Quant and Genotype      4. JACINTO (generalized anxiety disorder)  F41.1 Comprehensive metabolic panel (BMP + Alb, Alk Phos, ALT, AST, Total. Bili, TP)     CBC with platelets     Comprehensive metabolic panel (BMP + Alb, Alk Phos, ALT, AST, Total. Bili, TP)     CBC with platelets      5. Major depressive disorder, single episode, moderate (H)  F32.1       6. Eczema, unspecified type  L30.9       7. Screening cholesterol level  Z13.220 Lipid panel reflex to direct LDL Non-fasting     Lipid panel reflex to direct LDL Non-fasting      8. Bilateral carpal tunnel syndrome  G56.03 Orthopedic  Referral      9. Attention deficit hyperactivity disorder (ADHD), combined type  F90.2 amphetamine-dextroamphetamine (ADDERALL) 10 MG tablet     amphetamine-dextroamphetamine (ADDERALL) 10 MG tablet     amphetamine-dextroamphetamine (ADDERALL) 10 MG tablet      10. Tension headache  G44.209       11. Status post  delivery  Z98.891 hydrOXYzine HCl (ATARAX) 25 MG tablet        -refer to orthopedics for carpal tunnel  -no change to medications for mood  -rash improving; yeast infection resolved  Follow up 3-6 months  The longitudinal plan of care for the diagnosis(es)/condition(s) as documented were addressed during this visit. Due to the added complexity in care, I will continue to support Antonio in the subsequent management and with ongoing continuity of care.        Subjective   Antonio is a 34 year old, presenting for the following:  Physical and Medication Request (Zolpidem and hydroxyzine)        2025    10:02 AM    Additional Questions   Roomed by Yamel HERNANDEZ          HPI  History of Present Illness    Antonio Danielle, 34 years    Rash and Itching  - Developed a full-body rash on March 26, 2025, affecting arms, stomach, and legs, with severe itching.  - Initial treatment with a 12-day course of prednisone provided partial relief.  - Rash persisted and reactivated on April 13, 2025, affecting upper chest and arms.  - Dermatology appointment scheduled due to persistent symptoms.  - Diagnosed with strep throat and scarlet fever on April 15, 2025, suspected to be related to the rash.  - Prescribed penicillin for 10 days, leading to a yeast infection.  - Negative swab results for yeast infection and BV, but slightly elevated white blood cell counts.  - Ongoing use of Tacrolimus ointment and antihistamines (Benadryl and hydroxyzine) for symptom management.    Yeast Infection  - Developed yeast infection after completing a 10-day course of penicillin.  - No relief from initial treatment; swab tests returned negative for yeast infection and BV; now resolved      Joint Pain and Headaches  - Experiencing joint pain in hands and knees- has a history of Carpal tunnel and has been wearing braces, but notcing more tingling/numbness in hands at night  - Reports unusual headaches, varying in location and not linked to specific triggers.      Family and Social Context  - No other family members, including children and partner, have been sick.  - No changes in family stress levels; children are doing well in school.    Mood generally stable; feels like ADHD symptoms are currently well controlled        5/5/2025   General Health   How would you rate your overall physical health? (!) FAIR   Feel stress (tense, anxious, or unable to sleep) Rather much   (!) STRESS CONCERN      5/5/2025   Nutrition   Three or more servings of calcium each day? Yes   Diet: Regular (no restrictions)   How many servings of fruit and vegetables per day? (!) 2-3   How many  sweetened beverages each day? 0-1         2025   Exercise   Days per week of moderate/strenous exercise 1 day   Average minutes spent exercising at this level 30 min   (!) EXERCISE CONCERN      2025   Social Factors   Frequency of gathering with friends or relatives Never   Worry food won't last until get money to buy more No   Food not last or not have enough money for food? No   Do you have housing? (Housing is defined as stable permanent housing and does not include staying outside in a car, in a tent, in an abandoned building, in an overnight shelter, or couch-surfing.) Yes   Are you worried about losing your housing? No   Lack of transportation? No   Unable to get utilities (heat,electricity)? No   (!) SOCIAL CONNECTIONS CONCERN      2025   Dental   Dentist two times every year? (!) NO       Today's PHQ-9 Score:       2025     7:50 AM   PHQ-9 SCORE   PHQ-9 Total Score MyChart 8 (Mild depression)   PHQ-9 Total Score 8        Patient-reported         2025   Substance Use   Alcohol more than 3/day or more than 7/wk No   Do you use any other substances recreationally? (!) DECLINE     Social History     Tobacco Use    Smoking status: Former     Current packs/day: 0.00     Average packs/day: 0.5 packs/day for 14.4 years (7.2 ttl pk-yrs)     Types: Cigarettes     Start date: 2003     Quit date: 10/2017     Years since quittin.5    Smokeless tobacco: Never    Tobacco comments:     Used a vapor for the month of October.   Vaping Use    Vaping status: Former    Quit date: 2021   Substance Use Topics    Alcohol use: Yes     Comment: 2x/week    Drug use: No     Frequency: 1.0 times per week     Comment: Socially smoked marijuana, long time ago             2025   STI Screening   New sexual partner(s) since last STI/HIV test? No     History of abnormal Pap smear: No - age 30-64 HPV with reflex Pap every 5 years recommended        Latest Ref Rng & Units 2024     1:05 PM 10/16/2018     11:32 AM   PAP / HPV   PAP  Negative for Intraepithelial Lesion or Malignancy (NILM)     PAP (Historical)   NIL    HPV 16 DNA Negative Negative     HPV 18 DNA Negative Negative     Other HR HPV Negative Negative             2025   Contraception/Family Planning   Questions about contraception or family planning No        Reviewed and updated as needed this visit by Provider                    Past Medical History:   Diagnosis Date    ADHD     States that it's not an ongoing diagnosis, the person who did her psych eval for it did not think she had it. Was on Adderall in the past and stopped that in 2017.    Anemia     10 years ago    Anxiety     Started the Citalopram early 2017 by her PCP.     Depressive disorder     Started the Citalopram early 2017 by her PCP.     History of miscarriage 2019    Ovarian cyst     Right sided    Smoking     Stopped 2017.     Past Surgical History:   Procedure Laterality Date     SECTION N/A 2018    Procedure:  SECTION;  PRIMARY  SECTION ;  Surgeon: Nia Casanova MD;  Location:  L+D     SECTION N/A 2020    Procedure: REPEAT  SECTION;  Surgeon: Nia Casanova MD;  Location:  L+D    COMBINED ESOPHAGOSCOPY, GASTROSCOPY, DUODENOSCOPY (EGD) WITH CO2 INSUFFLATION N/A 2024    Procedure: Combined Esophagoscopy, Gastroscopy, Duodenoscopy (Egd) With Co2 Insufflation;  Surgeon: Danielle Prescott DO;  Location:  OR    DILATION AND CURETTAGE SUCTION N/A 2019    Procedure: DILATION AND CURETTAGE SUCTION;  Surgeon: Nia Casanova MD;  Location:  OR    ESOPHAGOSCOPY, GASTROSCOPY, DUODENOSCOPY (EGD), COMBINED N/A 2024    Procedure: ESOPHAGOGASTRODUODENOSCOPY, WITH BIOPSY;  Surgeon: Danielle Prescott DO;  Location:  OR    MAMMOPLASTY REDUCTION Bilateral 2022    Procedure: MAMMOPLASTY, REDUCTION;  Surgeon: Romero Rodriguez MD;  Location: [facility] OR         Review of  "Systems  Constitutional, HEENT, cardiovascular, pulmonary, gi and gu systems are negative, except as otherwise noted.     Objective    Exam  /80 (BP Location: Left arm, Patient Position: Sitting, Cuff Size: Adult Regular)   Pulse 69   Temp 97.6  F (36.4  C) (Temporal)   Ht 1.728 m (5' 8.03\")   Wt 88 kg (194 lb)   LMP 05/05/2025 (Exact Date)   SpO2 98%   BMI 29.47 kg/m     Estimated body mass index is 29.47 kg/m  as calculated from the following:    Height as of this encounter: 1.728 m (5' 8.03\").    Weight as of this encounter: 88 kg (194 lb).    Physical Exam  GENERAL: alert and no distress  EYES: Eyes grossly normal to inspection, PERRL and conjunctivae and sclerae normal  HENT: normal cephalic/atraumatic, both ears: clear effusion, nasal mucosa edematous , oropharynx clear, and oral mucous membranes moist  NECK: no adenopathy, no asymmetry, masses, or scars  RESP: lungs clear to auscultation - no rales, rhonchi or wheezes  CV: regular rate and rhythm, normal S1 S2, no S3 or S4, no murmur, click or rub, no peripheral edema  ABDOMEN: soft, nontender, no hepatosplenomegaly, no masses and bowel sounds normal  MS: no gross musculoskeletal defects noted, no edema  SKIN: no suspicious lesions or rashes  PSYCH: mentation appears normal, affect normal/bright        Signed Electronically by: CHEY Hernandez CNP    "

## 2025-05-06 ENCOUNTER — PATIENT OUTREACH (OUTPATIENT)
Dept: CARE COORDINATION | Facility: CLINIC | Age: 35
End: 2025-05-06
Payer: COMMERCIAL

## 2025-05-06 LAB
EST. AVERAGE GLUCOSE BLD GHB EST-MCNC: 94 MG/DL
HBA1C MFR BLD: 4.9 % (ref 0–5.6)

## 2025-05-08 ENCOUNTER — PATIENT OUTREACH (OUTPATIENT)
Dept: CARE COORDINATION | Facility: CLINIC | Age: 35
End: 2025-05-08
Payer: COMMERCIAL

## 2025-06-05 DIAGNOSIS — F51.05 INSOMNIA DUE TO OTHER MENTAL DISORDER: ICD-10-CM

## 2025-06-05 DIAGNOSIS — F99 INSOMNIA DUE TO OTHER MENTAL DISORDER: ICD-10-CM

## 2025-06-05 RX ORDER — ZOLPIDEM TARTRATE 5 MG/1
5 TABLET ORAL
Qty: 14 TABLET | Refills: 0 | Status: SHIPPED | OUTPATIENT
Start: 2025-06-05

## 2025-06-29 ENCOUNTER — HOSPITAL ENCOUNTER (EMERGENCY)
Facility: HOSPITAL | Age: 35
Discharge: HOME | End: 2025-06-29
Attending: EMERGENCY MEDICINE | Admitting: EMERGENCY MEDICINE
Payer: COMMERCIAL

## 2025-06-29 ENCOUNTER — APPOINTMENT (OUTPATIENT)
Dept: CT IMAGING | Facility: HOSPITAL | Age: 35
End: 2025-06-29
Attending: EMERGENCY MEDICINE
Payer: COMMERCIAL

## 2025-06-29 ENCOUNTER — APPOINTMENT (OUTPATIENT)
Dept: ULTRASOUND IMAGING | Facility: HOSPITAL | Age: 35
End: 2025-06-29
Attending: EMERGENCY MEDICINE
Payer: COMMERCIAL

## 2025-06-29 VITALS
DIASTOLIC BLOOD PRESSURE: 61 MMHG | OXYGEN SATURATION: 96 % | RESPIRATION RATE: 20 BRPM | WEIGHT: 196.8 LBS | HEIGHT: 69 IN | TEMPERATURE: 98.3 F | BODY MASS INDEX: 29.15 KG/M2 | HEART RATE: 58 BPM | SYSTOLIC BLOOD PRESSURE: 103 MMHG

## 2025-06-29 DIAGNOSIS — R10.30 LOWER ABDOMINAL PAIN: ICD-10-CM

## 2025-06-29 LAB
ALBUMIN SERPL BCG-MCNC: 4.1 G/DL (ref 3.5–5.2)
ALBUMIN UR-MCNC: NEGATIVE MG/DL
ALP SERPL-CCNC: 99 U/L (ref 40–150)
ALT SERPL W P-5'-P-CCNC: 12 U/L (ref 0–50)
ANION GAP SERPL CALCULATED.3IONS-SCNC: 11 MMOL/L (ref 7–15)
APPEARANCE UR: CLEAR
AST SERPL W P-5'-P-CCNC: 14 U/L (ref 0–45)
BACTERIA #/AREA URNS HPF: ABNORMAL /HPF
BASOPHILS # BLD AUTO: 0 10E3/UL (ref 0–0.2)
BASOPHILS NFR BLD AUTO: 0 %
BILIRUB SERPL-MCNC: 0.6 MG/DL
BILIRUB UR QL STRIP: NEGATIVE
BUN SERPL-MCNC: 13 MG/DL (ref 6–20)
CALCIUM SERPL-MCNC: 9.4 MG/DL (ref 8.8–10.4)
CHLORIDE SERPL-SCNC: 101 MMOL/L (ref 98–107)
COLOR UR AUTO: COLORLESS
CREAT SERPL-MCNC: 0.8 MG/DL (ref 0.51–0.95)
CRP SERPL-MCNC: 44.1 MG/L
EGFRCR SERPLBLD CKD-EPI 2021: >90 ML/MIN/1.73M2
EOSINOPHIL # BLD AUTO: 0 10E3/UL (ref 0–0.7)
EOSINOPHIL NFR BLD AUTO: 0 %
ERYTHROCYTE [DISTWIDTH] IN BLOOD BY AUTOMATED COUNT: 12.7 % (ref 10–15)
GLUCOSE SERPL-MCNC: 108 MG/DL (ref 70–99)
GLUCOSE UR STRIP-MCNC: NEGATIVE MG/DL
HCG SERPL QL: NEGATIVE
HCO3 SERPL-SCNC: 24 MMOL/L (ref 22–29)
HCT VFR BLD AUTO: 40.5 % (ref 35–47)
HGB BLD-MCNC: 13.2 G/DL (ref 11.7–15.7)
HGB UR QL STRIP: NEGATIVE
IMM GRANULOCYTES # BLD: 0.1 10E3/UL
IMM GRANULOCYTES NFR BLD: 0 %
KETONES UR STRIP-MCNC: NEGATIVE MG/DL
LEUKOCYTE ESTERASE UR QL STRIP: ABNORMAL
LIPASE SERPL-CCNC: 24 U/L (ref 13–60)
LYMPHOCYTES # BLD AUTO: 1.7 10E3/UL (ref 0.8–5.3)
LYMPHOCYTES NFR BLD AUTO: 9 %
MCH RBC QN AUTO: 30.8 PG (ref 26.5–33)
MCHC RBC AUTO-ENTMCNC: 32.6 G/DL (ref 31.5–36.5)
MCV RBC AUTO: 94 FL (ref 78–100)
MONOCYTES # BLD AUTO: 0.6 10E3/UL (ref 0–1.3)
MONOCYTES NFR BLD AUTO: 3 %
NEUTROPHILS # BLD AUTO: 16.1 10E3/UL (ref 1.6–8.3)
NEUTROPHILS NFR BLD AUTO: 87 %
NITRATE UR QL: NEGATIVE
NRBC # BLD AUTO: 0 10E3/UL
NRBC BLD AUTO-RTO: 0 /100
PH UR STRIP: 7 [PH] (ref 5–7)
PLATELET # BLD AUTO: 238 10E3/UL (ref 150–450)
POTASSIUM SERPL-SCNC: 3.9 MMOL/L (ref 3.4–5.3)
PROT SERPL-MCNC: 7.2 G/DL (ref 6.4–8.3)
RBC # BLD AUTO: 4.29 10E6/UL (ref 3.8–5.2)
RBC URINE: 1 /HPF
SODIUM SERPL-SCNC: 136 MMOL/L (ref 135–145)
SP GR UR STRIP: 1.01 (ref 1–1.03)
SQUAMOUS EPITHELIAL: 2 /HPF
UROBILINOGEN UR STRIP-MCNC: NORMAL MG/DL
WBC # BLD AUTO: 18.5 10E3/UL (ref 4–11)
WBC URINE: 9 /HPF

## 2025-06-29 PROCEDURE — 96376 TX/PRO/DX INJ SAME DRUG ADON: CPT | Mod: 59

## 2025-06-29 PROCEDURE — 83690 ASSAY OF LIPASE: CPT | Performed by: EMERGENCY MEDICINE

## 2025-06-29 PROCEDURE — 87491 CHLMYD TRACH DNA AMP PROBE: CPT | Performed by: EMERGENCY MEDICINE

## 2025-06-29 PROCEDURE — 250N000009 HC RX 250: Performed by: EMERGENCY MEDICINE

## 2025-06-29 PROCEDURE — 87591 N.GONORRHOEAE DNA AMP PROB: CPT | Performed by: EMERGENCY MEDICINE

## 2025-06-29 PROCEDURE — 250N000011 HC RX IP 250 OP 636: Performed by: EMERGENCY MEDICINE

## 2025-06-29 PROCEDURE — 86140 C-REACTIVE PROTEIN: CPT | Performed by: EMERGENCY MEDICINE

## 2025-06-29 PROCEDURE — 76830 TRANSVAGINAL US NON-OB: CPT

## 2025-06-29 PROCEDURE — 84703 CHORIONIC GONADOTROPIN ASSAY: CPT | Performed by: EMERGENCY MEDICINE

## 2025-06-29 PROCEDURE — 82310 ASSAY OF CALCIUM: CPT | Performed by: EMERGENCY MEDICINE

## 2025-06-29 PROCEDURE — 99285 EMERGENCY DEPT VISIT HI MDM: CPT | Mod: 25

## 2025-06-29 PROCEDURE — 96374 THER/PROPH/DIAG INJ IV PUSH: CPT | Mod: 59

## 2025-06-29 PROCEDURE — 74177 CT ABD & PELVIS W/CONTRAST: CPT

## 2025-06-29 PROCEDURE — 81001 URINALYSIS AUTO W/SCOPE: CPT | Performed by: STUDENT IN AN ORGANIZED HEALTH CARE EDUCATION/TRAINING PROGRAM

## 2025-06-29 PROCEDURE — 85004 AUTOMATED DIFF WBC COUNT: CPT | Performed by: EMERGENCY MEDICINE

## 2025-06-29 PROCEDURE — 87086 URINE CULTURE/COLONY COUNT: CPT | Performed by: STUDENT IN AN ORGANIZED HEALTH CARE EDUCATION/TRAINING PROGRAM

## 2025-06-29 PROCEDURE — 96372 THER/PROPH/DIAG INJ SC/IM: CPT | Performed by: EMERGENCY MEDICINE

## 2025-06-29 PROCEDURE — 36415 COLL VENOUS BLD VENIPUNCTURE: CPT | Performed by: EMERGENCY MEDICINE

## 2025-06-29 PROCEDURE — 96375 TX/PRO/DX INJ NEW DRUG ADDON: CPT

## 2025-06-29 RX ORDER — METOCLOPRAMIDE HYDROCHLORIDE 5 MG/ML
5 INJECTION INTRAMUSCULAR; INTRAVENOUS ONCE
Status: COMPLETED | OUTPATIENT
Start: 2025-06-29 | End: 2025-06-29

## 2025-06-29 RX ORDER — CEFTRIAXONE 500 MG/1
500 INJECTION, POWDER, FOR SOLUTION INTRAMUSCULAR; INTRAVENOUS ONCE
Status: DISCONTINUED | OUTPATIENT
Start: 2025-06-29 | End: 2025-06-29

## 2025-06-29 RX ORDER — ONDANSETRON 4 MG/1
4 TABLET, ORALLY DISINTEGRATING ORAL EVERY 6 HOURS PRN
Qty: 10 TABLET | Refills: 0 | Status: SHIPPED | OUTPATIENT
Start: 2025-06-29 | End: 2025-06-29

## 2025-06-29 RX ORDER — METRONIDAZOLE 500 MG/1
500 TABLET ORAL 2 TIMES DAILY
Qty: 28 TABLET | Refills: 0 | Status: SHIPPED | OUTPATIENT
Start: 2025-06-29 | End: 2025-06-30

## 2025-06-29 RX ORDER — ONDANSETRON 2 MG/ML
4 INJECTION INTRAMUSCULAR; INTRAVENOUS EVERY 30 MIN PRN
Status: DISCONTINUED | OUTPATIENT
Start: 2025-06-29 | End: 2025-06-30 | Stop reason: HOSPADM

## 2025-06-29 RX ORDER — ALBUTEROL SULFATE 90 UG/1
1-2 INHALANT RESPIRATORY (INHALATION) EVERY 4 HOURS PRN
COMMUNITY

## 2025-06-29 RX ORDER — IOPAMIDOL 755 MG/ML
90 INJECTION, SOLUTION INTRAVASCULAR ONCE
Status: COMPLETED | OUTPATIENT
Start: 2025-06-29 | End: 2025-06-29

## 2025-06-29 RX ORDER — KETOROLAC TROMETHAMINE 15 MG/ML
15 INJECTION, SOLUTION INTRAMUSCULAR; INTRAVENOUS ONCE
Status: COMPLETED | OUTPATIENT
Start: 2025-06-29 | End: 2025-06-29

## 2025-06-29 RX ORDER — IBUPROFEN 200 MG
600 TABLET ORAL EVERY 8 HOURS PRN
COMMUNITY

## 2025-06-29 RX ORDER — ACETAMINOPHEN 500 MG
500-1000 TABLET ORAL EVERY 8 HOURS PRN
COMMUNITY

## 2025-06-29 RX ORDER — OXYCODONE HYDROCHLORIDE 5 MG/1
5 TABLET ORAL EVERY 6 HOURS PRN
Qty: 12 TABLET | Refills: 0 | Status: SHIPPED | OUTPATIENT
Start: 2025-06-29 | End: 2025-06-30

## 2025-06-29 RX ORDER — HYDROMORPHONE HYDROCHLORIDE 1 MG/ML
0.5 INJECTION, SOLUTION INTRAMUSCULAR; INTRAVENOUS; SUBCUTANEOUS EVERY 30 MIN PRN
Refills: 0 | Status: DISCONTINUED | OUTPATIENT
Start: 2025-06-29 | End: 2025-06-30 | Stop reason: HOSPADM

## 2025-06-29 RX ORDER — DOXYCYCLINE 100 MG/1
100 CAPSULE ORAL 2 TIMES DAILY
Qty: 28 CAPSULE | Refills: 0 | Status: SHIPPED | OUTPATIENT
Start: 2025-06-29 | End: 2025-06-30

## 2025-06-29 RX ORDER — ONDANSETRON 2 MG/ML
4 INJECTION INTRAMUSCULAR; INTRAVENOUS ONCE
Status: COMPLETED | OUTPATIENT
Start: 2025-06-29 | End: 2025-06-29

## 2025-06-29 RX ORDER — ONDANSETRON 4 MG/1
4 TABLET, ORALLY DISINTEGRATING ORAL EVERY 6 HOURS PRN
Qty: 10 TABLET | Refills: 0 | Status: SHIPPED | OUTPATIENT
Start: 2025-06-29 | End: 2025-06-30

## 2025-06-29 RX ORDER — OMEPRAZOLE 20 MG/1
20 TABLET, DELAYED RELEASE ORAL EVERY MORNING
COMMUNITY

## 2025-06-29 RX ADMIN — ONDANSETRON 4 MG: 2 INJECTION INTRAMUSCULAR; INTRAVENOUS at 22:20

## 2025-06-29 RX ADMIN — METOCLOPRAMIDE 5 MG: 5 INJECTION, SOLUTION INTRAMUSCULAR; INTRAVENOUS at 23:00

## 2025-06-29 RX ADMIN — IOPAMIDOL 90 ML: 755 INJECTION, SOLUTION INTRAVENOUS at 18:20

## 2025-06-29 RX ADMIN — KETOROLAC TROMETHAMINE 15 MG: 15 INJECTION, SOLUTION INTRAMUSCULAR; INTRAVENOUS at 17:19

## 2025-06-29 RX ADMIN — KETOROLAC TROMETHAMINE 15 MG: 15 INJECTION, SOLUTION INTRAMUSCULAR; INTRAVENOUS at 23:20

## 2025-06-29 RX ADMIN — HYDROMORPHONE HYDROCHLORIDE 0.5 MG: 1 INJECTION, SOLUTION INTRAMUSCULAR; INTRAVENOUS; SUBCUTANEOUS at 19:26

## 2025-06-29 RX ADMIN — LIDOCAINE HYDROCHLORIDE 500 MG: 10 INJECTION, SOLUTION INFILTRATION; PERINEURAL at 23:43

## 2025-06-29 RX ADMIN — HYDROMORPHONE HYDROCHLORIDE 0.5 MG: 1 INJECTION, SOLUTION INTRAMUSCULAR; INTRAVENOUS; SUBCUTANEOUS at 21:48

## 2025-06-29 ASSESSMENT — ACTIVITIES OF DAILY LIVING (ADL)
ADLS_ACUITY_SCORE: 42

## 2025-06-29 ASSESSMENT — ENCOUNTER SYMPTOMS
DYSURIA: 0
HEMATURIA: 0
NAUSEA: 1
ABDOMINAL PAIN: 1
VOMITING: 0

## 2025-06-29 ASSESSMENT — COLUMBIA-SUICIDE SEVERITY RATING SCALE - C-SSRS
1. IN THE PAST MONTH, HAVE YOU WISHED YOU WERE DEAD OR WISHED YOU COULD GO TO SLEEP AND NOT WAKE UP?: NO
6. HAVE YOU EVER DONE ANYTHING, STARTED TO DO ANYTHING, OR PREPARED TO DO ANYTHING TO END YOUR LIFE?: NO
2. HAVE YOU ACTUALLY HAD ANY THOUGHTS OF KILLING YOURSELF IN THE PAST MONTH?: NO

## 2025-06-29 NOTE — ED PROVIDER NOTES
Emergency Department Encounter      NAME: Dayana Danielle  AGE: 35 year old female  YOB: 1990  MRN: [id number]  EVALUATION DATE & TIME: No admission date for patient encounter.    PCP: Cheri Georges    ED PROVIDER: Kirby White M.D.      Chief Complaint   Patient presents with    Abdominal Pain         FINAL IMPRESSION:  1. Lower abdominal pain        MEDICAL DECISION MAKIN:00 PM I met with the patient, obtained history, performed an initial exam, and discussed options and plan for diagnostics and treatment here in the ED.      This patient is a 35-year-old female with a history of ovarian cyst who presents with lower abdominal pain.  She says that she began having pain in the right lower abdomen on 8 PM yesterday.  Today the pain has moved to the left lower abdomen which she describes as a sharp stabbing severe pain that radiates to her back.  She has some associated nausea.  She said the pain was worse with any sort of movement, passing urine and the car went over bumps on the way here.  She says that she has a temperature of 99 Fahrenheit to 101 Fahrenheit.  She had 1 loose bowel movement today but no urinary or vaginal symptoms.  On exam she is quite tender in the left lower quadrant.  This pain actually resolved and moved to the right side while she is in the ER.  Her pain is treated with Toradol and narcotics in the ER  I ordered a CT scan of the abdomen as I was concerned about perforated diverticulitis.  The patient's lab work was significant for an elevated white blood cell count and elevated CRP.  The CT scan of the abdomen did not show anything that would explain her pain and specifically no evidence of diverticulitis.   The patient's pelvic ultrasound did not show any evidence of torsion or ovarian cyst.  There was nothing to explain the pain.  The patient did say that the exam was painful.  We reviewed the possibility is things that could cause her pain.  She did not want  to do a pelvic exam in the ER and instead we will have her doctor or gynecologist do it.  But she was interested in doing a self collected swab to check for causes of PID and to get started on the antibiotics for PID, which I think is a reasonable possibility of a cause of her pain.  She will check on the test results tomorrow and contact her physician for the pelvic exam.    Pertinent Labs & Imaging studies reviewed. (See chart for details)    Medical Decision Making  I obtained history from Family Member/Significant Other  I reviewed the EMR: Outpatient Record: [facility] practice clinic visit on 5 May 2025 when she saw Dr. Georges for a routine physical.  Care impacted by hematemesis  I independently interpreted the abdominal CT scan and note no evidence of diverticulitis or appendicitis. See radiology report for final interpretation.  Admission considered. Patient was signed out to the oncoming physician, disposition pending.    MIPS (CTPE, Dental pain, Frias, Sinusitis, Asthma/COPD, Head Trauma): Not Applicable    SEPSIS: None        MEDICATIONS GIVEN IN THE EMERGENCY:  Medications   ondansetron (ZOFRAN) injection 4 mg (has no administration in time range)   HYDROmorphone (PF) (DILAUDID) injection 0.5 mg (0.5 mg Intravenous $Given 6/29/25 1926)   ketorolac (TORADOL) injection 15 mg (15 mg Intravenous $Given 6/29/25 1719)   iopamidol (ISOVUE-370) solution 90 mL (90 mLs Intravenous $Given 6/29/25 1820)       NEW PRESCRIPTIONS STARTED AT TODAY'S ER VISIT:  New Prescriptions    No medications on file          =================================================================    HPI    Patient information was obtained from: patient     Use of : N/A         Dayana Danielle is a 35 year old female with a past medical history of ovarian cyst, ovarian mass, who presents for abdominal pain.     The patient presents with constant abdominal pain since last night at 8 pm. Her pain originated in the right lower quadrant  "but is now residing in her left lower quadrant. She fells fullness in her lower abdomen and pressure in her vagina that has been coming and going for the past 2-3 weeks. She described her pain as a \"stabbing\" in th left side and sore on the right side. Movement exacerbates her pain. Of note, she is nauseated and had a low grade fever last night.     Patient denies dysuria, hematuria, vaginal bleeding or discharge.       REVIEW OF SYSTEMS   Review of Systems   Gastrointestinal:  Positive for abdominal pain and nausea. Negative for vomiting.   Genitourinary:  Negative for dysuria, hematuria, vaginal bleeding and vaginal discharge.        PAST MEDICAL HISTORY:  Past Medical History:   Diagnosis Date    ADHD     States that it's not an ongoing diagnosis, the person who did her psych eval for it did not think she had it. Was on Adderall in the past and stopped that in 2017.    Anemia     10 years ago    Anxiety     Started the Citalopram early 2017 by her PCP.     Depressive disorder     Started the Citalopram early 2017 by her PCP.     History of miscarriage 2019    Ovarian cyst     Right sided    Smoking     Stopped 2017.       PAST SURGICAL HISTORY:  Past Surgical History:   Procedure Laterality Date     SECTION N/A 2018    Procedure:  SECTION;  PRIMARY  SECTION ;  Surgeon: Nia Casanova MD;  Location:  L+D     SECTION N/A 2020    Procedure: REPEAT  SECTION;  Surgeon: Nia Casanova MD;  Location:  L+D    COMBINED ESOPHAGOSCOPY, GASTROSCOPY, DUODENOSCOPY (EGD) WITH CO2 INSUFFLATION N/A 2024    Procedure: Combined Esophagoscopy, Gastroscopy, Duodenoscopy (Egd) With Co2 Insufflation;  Surgeon: Danielle Prescott DO;  Location:  OR    DILATION AND CURETTAGE SUCTION N/A 2019    Procedure: DILATION AND CURETTAGE SUCTION;  Surgeon: Nia Casanova MD;  Location:  OR    ESOPHAGOSCOPY, GASTROSCOPY, DUODENOSCOPY " (EGD), COMBINED N/A 12/5/2024    Procedure: ESOPHAGOGASTRODUODENOSCOPY, WITH BIOPSY;  Surgeon: Danielle Prescott DO;  Location: [facility] OR    MAMMOPLASTY REDUCTION Bilateral 6/6/2022    Procedure: MAMMOPLASTY, REDUCTION;  Surgeon: Romero Rodriguez MD;  Location: [facility] OR       CURRENT MEDICATIONS:      Current Facility-Administered Medications:     HYDROmorphone (PF) (DILAUDID) injection 0.5 mg, 0.5 mg, Intravenous, Q30 Min PRN, Kirby White MD, 0.5 mg at 06/29/25 1926    ondansetron (ZOFRAN) injection 4 mg, 4 mg, Intravenous, Q30 Min PRN, Kirby White MD    Current Outpatient Medications:     acetaminophen (TYLENOL) 500 MG tablet, Take 500-1,000 mg by mouth every 8 hours as needed for mild pain., Disp: , Rfl:     albuterol (PROAIR HFA/PROVENTIL HFA/VENTOLIN HFA) 108 (90 Base) MCG/ACT inhaler, Inhale 1-2 puffs into the lungs every 4 hours as needed for shortness of breath, wheezing or cough., Disp: , Rfl:     ALPRAZolam (XANAX) 0.25 MG tablet, TAKE ONE TABLET BY MOUTH THREE TIMES A DAY AS NEEDED FOR ANXIETY, Disp: 45 tablet, Rfl: 0    [START ON 7/4/2025] amphetamine-dextroamphetamine (ADDERALL) 10 MG tablet, Take 1 tablet (10 mg) by mouth 2 times daily., Disp: 60 tablet, Rfl: 0    doxylamine (UNISOM) 25 MG TABS tablet, Take 25 mg by mouth nightly as needed., Disp: , Rfl:     hydrOXYzine HCl (ATARAX) 25 MG tablet, TAKE ONE TABLET BY MOUTH EVERY FOUR TO SIX HOURS AS NEEDED Strength: 25 mg, Disp: 90 tablet, Rfl: 3    ibuprofen (ADVIL/MOTRIN) 200 MG tablet, Take 600 mg by mouth every 8 hours as needed for pain., Disp: , Rfl:     omeprazole (PRILOSEC OTC) 20 MG EC tablet, Take 20 mg by mouth every morning., Disp: , Rfl:     tacrolimus (PROTOPIC) 0.1 % external ointment, Apply to affected areas twice daily as needed. You may experience a burning or stinging sensation when you apply this, this should improve with continued use., Disp: 100 g, Rfl: 1    valACYclovir (VALTREX) 1000 mg tablet, Take 2 tablets  (2,000 mg) by mouth 2 times daily., Disp: 4 tablet, Rfl: 3    zolpidem (AMBIEN) 5 MG tablet, TAKE ONE TABLET BY MOUTH EVERY NIGHT AT BEDTIME AS NEEDED FOR SLEEP, Disp: 14 tablet, Rfl: 0    ALLERGIES:  Allergies   Allergen Reactions    Seasonal Allergies        FAMILY HISTORY:  Family History   Problem Relation Age of Onset    No Known Problems Mother     No Known Problems Father     No Known Problems Sister     No Known Problems Brother     No Known Problems Maternal Grandmother     No Known Problems Maternal Grandfather     No Known Problems Paternal Grandmother     No Known Problems Daughter     No Known Problems Daughter        SOCIAL HISTORY:   Social History     Socioeconomic History    Marital status:      Spouse name: Flavio    Number of children: 2   Occupational History    Occupation: [employer] service [employer] part time   Tobacco Use    Smoking status: Former     Current packs/day: 0.00     Average packs/day: 0.5 packs/day for 14.4 years (7.2 ttl pk-yrs)     Types: Cigarettes     Start date: 2003     Quit date: 10/2017     Years since quittin.7    Smokeless tobacco: Never    Tobacco comments:     Used a vapor for the month of October.   Vaping Use    Vaping status: Former    Quit date: 2021   Substance and Sexual Activity    Alcohol use: Yes     Comment: 2x/week    Drug use: No     Frequency: 1.0 times per week     Comment: Socially smoked marijuana, long time ago    Sexual activity: Not Currently     Partners: Male     Birth control/protection: None     Social Drivers of Health     Financial Resource Strain: Low Risk  (2025)    Financial Resource Strain     Within the past 12 months, have you or your family members you live with been unable to get utilities (heat, electricity) when it was really needed?: No   Food Insecurity: Low Risk  (2025)    Food Insecurity     Within the past 12 months, did you worry that your food would run out before you got money to buy more?: No     Within  "the past 12 months, did the food you bought just not last and you didn t have money to get more?: No   Transportation Needs: Low Risk  (5/5/2025)    Transportation Needs     Within the past 12 months, has lack of transportation kept you from medical appointments, getting your medicines, non-medical meetings or appointments, work, or from getting things that you need?: No   Physical Activity: Insufficiently Active (5/5/2025)    Exercise Vital Sign     Days of Exercise per Week: 1 day     Minutes of Exercise per Session: 30 min   Stress: Stress Concern Present (5/5/2025)    [employer] [place] [employer] - Occupational Stress Questionnaire     Feeling of Stress : Rather much   Social Connections: Unknown (5/5/2025)    Social Connection and Isolation Panel [NHANES]     Frequency of Social Gatherings with Friends and Family: Never   Interpersonal Safety: Low Risk  (5/5/2025)    Interpersonal Safety     Do you feel physically and emotionally safe where you currently live?: Yes     Within the past 12 months, have you been hit, slapped, kicked or otherwise physically hurt by someone?: No     Within the past 12 months, have you been humiliated or emotionally abused in other ways by your partner or ex-partner?: No   Housing Stability: Low Risk  (5/5/2025)    Housing Stability     Do you have housing? : Yes     Are you worried about losing your housing?: No       PHYSICAL EXAM:    Vitals: /72   Pulse 61   Temp 98.3  F (36.8  C) (Temporal)   Resp 20   Ht 1.753 m (5' 9\")   Wt 89.3 kg (196 lb 12.8 oz)   LMP 06/16/2025 (Exact Date)   SpO2 97%   BMI 29.06 kg/m     Constitutional: Well developed, well nourished. Comfortable appearing. Moderate distress.   HEAD:Normocephalic, atraumatic,   ENT: mucous membranes moist, nose normal.   Neck- Supple, gross ROM intact.  No JVD.  No palpable nodes.  Pulmonary: Clear to auscultation bilaterally, no respiratory distress, no wheezing, speaks full sentences " easily.  Chest: No chest wall tenderness  Cardiovascular: Normal heart rate, regular rhythm, no murmurs. No lower extremity edema, 2+ DP pulses.   GI: LLQ tenderness with guarding, RLQ tenderness. Soft, not distended, no masses.  No hepatosplenomegaly. Musculoskeletal: Moving all 4 extremities intentionally and without pain. No obvious deformity.  Back: No CVA tenderness  Skin: Warm, dry, no rash.  Neurologic: Alert & oriented x 3, speech clear, moving all extremities spontaneously   Psychiatric: Affect normal, cooperative.     LAB:  All pertinent labs reviewed and interpreted.  Labs Ordered and Resulted from Time of ED Arrival to Time of ED Departure   ROUTINE UA WITH MICROSCOPIC REFLEX TO CULTURE - Abnormal       Result Value    Color Urine Colorless      Appearance Urine Clear      Glucose Urine Negative      Bilirubin Urine Negative      Ketones Urine Negative      Specific Gravity Urine 1.011      Blood Urine Negative      pH Urine 7.0      Protein Albumin Urine Negative      Urobilinogen Urine Normal      Nitrite Urine Negative      Leukocyte Esterase Urine 250 Glenna/uL (*)     Bacteria Urine Moderate (*)     RBC Urine 1      WBC Urine 9 (*)     Squamous Epithelials Urine 2 (*)    COMPREHENSIVE METABOLIC PANEL - Abnormal    Sodium 136      Potassium 3.9      Carbon Dioxide (CO2) 24      Anion Gap 11      Urea Nitrogen 13.0      Creatinine 0.80      GFR Estimate >90      Calcium 9.4      Chloride 101      Glucose 108 (*)     Alkaline Phosphatase 99      AST 14      ALT 12      Protein Total 7.2      Albumin 4.1      Bilirubin Total 0.6     CRP INFLAMMATION - Abnormal    CRP Inflammation 44.10 (*)    CBC WITH PLATELETS AND DIFFERENTIAL - Abnormal    WBC Count 18.5 (*)     RBC Count 4.29      Hemoglobin 13.2      Hematocrit 40.5      MCV 94      MCH 30.8      MCHC 32.6      RDW 12.7      Platelet Count 238      % Neutrophils 87      % Lymphocytes 9      % Monocytes 3      % Eosinophils 0      % Basophils 0      %  Immature Granulocytes 0      NRBCs per 100 WBC 0      Absolute Neutrophils 16.1 (*)     Absolute Lymphocytes 1.7      Absolute Monocytes 0.6      Absolute Eosinophils 0.0      Absolute Basophils 0.0      Absolute Immature Granulocytes 0.1      Absolute NRBCs 0.0     HCG QUALITATIVE PREGNANCY - Normal    hCG Serum Qualitative Negative     LIPASE - Normal    Lipase 24     URINE CULTURE       RADIOLOGY:  CT Abdomen Pelvis w Contrast   Final Result   IMPRESSION:   No acute abnormalities in the abdomen or pelvis.         US Pelvis Cmplt w Transvag & Doppler LmtPel Duplex Limited    (Results Pending)           I, Ariella Matt, am serving as a scribe to document services personally performed by Dr. Kirby White based on my observation and the provider's statements to me. I, Kirby White M.D. attest that Ariella Matt is acting in a scribe capacity, has observed my performance of the services and has documented them in accordance with my direction.      Kirby White M.D.  Emergency Medicine  [facility] EMERGENCY DEPARTMENT  [facility] [facility] [id number] [phone]  Dept: [phone]     Kirby White MD  06/29/25 [id number]       Kirby White MD  06/29/25 4144

## 2025-06-29 NOTE — ED TRIAGE NOTES
Patient here with left side abdominal pain, states last night had severe right sided abdominal pain and states she felt like an ovarian cyst has burst. The pain feels similar to having a cyst burst in the past. No vaginal bleeding noted. States she took Tylenol this morning at 0900 this morning, and dramamine last night for nausea. Has been nauseous since pain started.     Triage Assessment (Adult)       Row Name 06/29/25 1636          Triage Assessment    Airway WDL WDL        Respiratory WDL    Respiratory WDL WDL        Skin Circulation/Temperature WDL    Skin Circulation/Temperature WDL WDL        Cardiac WDL    Cardiac WDL WDL

## 2025-06-30 LAB
BACTERIA UR CULT: NORMAL
C TRACH DNA SPEC QL NAA+PROBE: NEGATIVE
N GONORRHOEA DNA SPEC QL NAA+PROBE: NEGATIVE
SPECIMEN TYPE: NORMAL
SPECIMEN TYPE: NORMAL

## 2025-06-30 RX ORDER — ONDANSETRON 4 MG/1
4 TABLET, ORALLY DISINTEGRATING ORAL EVERY 6 HOURS PRN
Qty: 10 TABLET | Refills: 0 | Status: SHIPPED | OUTPATIENT
Start: 2025-06-30

## 2025-06-30 RX ORDER — DOXYCYCLINE 100 MG/1
100 CAPSULE ORAL 2 TIMES DAILY
Qty: 28 CAPSULE | Refills: 0 | Status: SHIPPED | OUTPATIENT
Start: 2025-06-30

## 2025-06-30 RX ORDER — OXYCODONE HYDROCHLORIDE 5 MG/1
5 TABLET ORAL EVERY 6 HOURS PRN
Qty: 12 TABLET | Refills: 0 | Status: SHIPPED | OUTPATIENT
Start: 2025-06-30

## 2025-06-30 RX ORDER — METRONIDAZOLE 500 MG/1
500 TABLET ORAL 2 TIMES DAILY
Qty: 28 TABLET | Refills: 0 | Status: SHIPPED | OUTPATIENT
Start: 2025-06-30

## 2025-06-30 NOTE — MEDICATION SCRIBE - ADMISSION MEDICATION HISTORY
Medication Scribe Admission Medication History    Admission medication history is complete. The information provided in this note is only as accurate as the sources available at the time of the update.    Information Source(s): Patient via in-person    Pertinent Information: Patient reports self management of medications.     Changes made to PTA medication list:  Added: Motrin, Tylenol, Albuterol  Deleted: None  Changed:   Unisom to PRN (per patient)   Omeprazole to 20 mg every day (per patient)    Allergies reviewed with patient and updates made in EHR: yes    Medication History Completed By: George Conteh 6/29/2025 8:15 PM    PTA Med List   Medication Sig Note Last Dose/Taking    acetaminophen (TYLENOL) 500 MG tablet Take 500-1,000 mg by mouth every 8 hours as needed for mild pain.  Taking As Needed    albuterol (PROAIR HFA/PROVENTIL HFA/VENTOLIN HFA) 108 (90 Base) MCG/ACT inhaler Inhale 1-2 puffs into the lungs every 4 hours as needed for shortness of breath, wheezing or cough.  Taking As Needed    ALPRAZolam (XANAX) 0.25 MG tablet TAKE ONE TABLET BY MOUTH THREE TIMES A DAY AS NEEDED FOR ANXIETY  Taking As Needed    [START ON 7/4/2025] amphetamine-dextroamphetamine (ADDERALL) 10 MG tablet Take 1 tablet (10 mg) by mouth 2 times daily.  6/28/2025 Evening    doxylamine (UNISOM) 25 MG TABS tablet Take 25 mg by mouth nightly as needed.  Taking As Needed    hydrOXYzine HCl (ATARAX) 25 MG tablet TAKE ONE TABLET BY MOUTH EVERY FOUR TO SIX HOURS AS NEEDED Strength: 25 mg  Taking    ibuprofen (ADVIL/MOTRIN) 200 MG tablet Take 600 mg by mouth every 8 hours as needed for pain.  Taking As Needed    omeprazole (PRILOSEC OTC) 20 MG EC tablet Take 20 mg by mouth every morning.  6/28/2025 Morning    tacrolimus (PROTOPIC) 0.1 % external ointment Apply to affected areas twice daily as needed. You may experience a burning or stinging sensation when you apply this, this should improve with continued use.  Taking    valACYclovir  (VALTREX) 1000 mg tablet Take 2 tablets (2,000 mg) by mouth 2 times daily. 6/29/2025: As needed for outbreaks Taking    zolpidem (AMBIEN) 5 MG tablet TAKE ONE TABLET BY MOUTH EVERY NIGHT AT BEDTIME AS NEEDED FOR SLEEP  Taking As Needed

## 2025-06-30 NOTE — ED NOTES
"Pt presents NAD. SKIN: NWD.   HEENT: normocephalic, PERRL, clear x 3.   CHEST: symmetrical rise, CEBBS, no CP or SOB.  ABD: Pt c/o lower abdominal pain all the way across, currently rated a \"3,\" denies nausea, vomiting, and diarrhea.  EXT: LIN x 4  Rest of exam unremarkable.     "

## 2025-06-30 NOTE — ED NOTES
Pt states her abdominal pain is getting worse and would like something for it. Dr. White informed.

## 2025-06-30 NOTE — DISCHARGE INSTRUCTIONS
The lab results should be back in a day or 2.  You can check your MyChart for the results.    Contact your physician for a pelvic exam to complete the abdominal pain workup.

## 2025-07-06 ENCOUNTER — E-VISIT (OUTPATIENT)
Dept: URGENT CARE | Facility: CLINIC | Age: 35
End: 2025-07-06
Payer: COMMERCIAL

## 2025-07-06 DIAGNOSIS — B37.31 CANDIDAL VULVOVAGINITIS: Primary | ICD-10-CM

## 2025-07-06 RX ORDER — FLUCONAZOLE 150 MG/1
TABLET ORAL
Qty: 1 TABLET | Refills: 0 | Status: SHIPPED | OUTPATIENT
Start: 2025-07-06

## 2025-07-06 RX ORDER — FLUCONAZOLE 150 MG/1
150 TABLET ORAL
Qty: 1 TABLET | Refills: 0 | Status: SHIPPED | OUTPATIENT
Start: 2025-07-06

## 2025-07-06 NOTE — PATIENT INSTRUCTIONS
Thank you for choosing us for your care. I have placed an order for a prescription so that you can start treatment. View your full visit summary for details by clicking on the link below. Your pharmacist will able to address any questions you may have about the medication.     If you re not feeling better within 2-3 days, please schedule an appointment.  You can schedule an appointment right here in [facility], or call [phone]  If the visit is for the same symptoms as your eVisit, we ll refund the cost of your eVisit if seen within seven days.

## 2025-07-07 DIAGNOSIS — F41.1 GAD (GENERALIZED ANXIETY DISORDER): ICD-10-CM

## 2025-07-07 DIAGNOSIS — F99 INSOMNIA DUE TO OTHER MENTAL DISORDER: ICD-10-CM

## 2025-07-07 DIAGNOSIS — F51.05 INSOMNIA DUE TO OTHER MENTAL DISORDER: ICD-10-CM

## 2025-07-07 RX ORDER — ZOLPIDEM TARTRATE 5 MG/1
5 TABLET ORAL
Qty: 14 TABLET | Refills: 0 | Status: SHIPPED | OUTPATIENT
Start: 2025-07-07

## 2025-07-07 RX ORDER — ALPRAZOLAM 0.25 MG
0.25 TABLET ORAL 3 TIMES DAILY PRN
Qty: 45 TABLET | Refills: 0 | Status: SHIPPED | OUTPATIENT
Start: 2025-07-07

## 2025-07-21 ENCOUNTER — OFFICE VISIT (OUTPATIENT)
Dept: FAMILY MEDICINE | Facility: CLINIC | Age: 35
End: 2025-07-21
Payer: COMMERCIAL

## 2025-07-21 VITALS
SYSTOLIC BLOOD PRESSURE: 116 MMHG | RESPIRATION RATE: 15 BRPM | HEIGHT: 69 IN | BODY MASS INDEX: 28.29 KG/M2 | WEIGHT: 191 LBS | DIASTOLIC BLOOD PRESSURE: 74 MMHG | OXYGEN SATURATION: 100 % | TEMPERATURE: 97.6 F | HEART RATE: 64 BPM

## 2025-07-21 DIAGNOSIS — N92.6 IRREGULAR PERIODS: Primary | ICD-10-CM

## 2025-07-21 DIAGNOSIS — F90.2 ATTENTION DEFICIT HYPERACTIVITY DISORDER (ADHD), COMBINED TYPE: ICD-10-CM

## 2025-07-21 DIAGNOSIS — R11.0 NAUSEA: ICD-10-CM

## 2025-07-21 DIAGNOSIS — K59.00 CONSTIPATION, UNSPECIFIED CONSTIPATION TYPE: ICD-10-CM

## 2025-07-21 DIAGNOSIS — N94.6 DYSMENORRHEA: ICD-10-CM

## 2025-07-21 PROCEDURE — G2211 COMPLEX E/M VISIT ADD ON: HCPCS | Performed by: NURSE PRACTITIONER

## 2025-07-21 PROCEDURE — 3078F DIAST BP <80 MM HG: CPT | Performed by: NURSE PRACTITIONER

## 2025-07-21 PROCEDURE — 3074F SYST BP LT 130 MM HG: CPT | Performed by: NURSE PRACTITIONER

## 2025-07-21 PROCEDURE — 1126F AMNT PAIN NOTED NONE PRSNT: CPT | Performed by: NURSE PRACTITIONER

## 2025-07-21 PROCEDURE — 99214 OFFICE O/P EST MOD 30 MIN: CPT | Performed by: NURSE PRACTITIONER

## 2025-07-21 RX ORDER — DEXTROAMPHETAMINE SACCHARATE, AMPHETAMINE ASPARTATE, DEXTROAMPHETAMINE SULFATE AND AMPHETAMINE SULFATE 3.75; 3.75; 3.75; 3.75 MG/1; MG/1; MG/1; MG/1
15 TABLET ORAL 2 TIMES DAILY
Qty: 60 TABLET | Refills: 0 | Status: SHIPPED | OUTPATIENT
Start: 2025-07-21 | End: 2025-08-20

## 2025-07-21 RX ORDER — DEXTROAMPHETAMINE SACCHARATE, AMPHETAMINE ASPARTATE, DEXTROAMPHETAMINE SULFATE AND AMPHETAMINE SULFATE 3.75; 3.75; 3.75; 3.75 MG/1; MG/1; MG/1; MG/1
15 TABLET ORAL 2 TIMES DAILY
Qty: 60 TABLET | Refills: 0 | Status: SHIPPED | OUTPATIENT
Start: 2025-09-19 | End: 2025-10-19

## 2025-07-21 RX ORDER — DEXTROAMPHETAMINE SACCHARATE, AMPHETAMINE ASPARTATE, DEXTROAMPHETAMINE SULFATE AND AMPHETAMINE SULFATE 3.75; 3.75; 3.75; 3.75 MG/1; MG/1; MG/1; MG/1
15 TABLET ORAL 2 TIMES DAILY
Qty: 60 TABLET | Refills: 0 | Status: SHIPPED | OUTPATIENT
Start: 2025-08-20 | End: 2025-09-19

## 2025-07-21 ASSESSMENT — PATIENT HEALTH QUESTIONNAIRE - PHQ9
10. IF YOU CHECKED OFF ANY PROBLEMS, HOW DIFFICULT HAVE THESE PROBLEMS MADE IT FOR YOU TO DO YOUR WORK, TAKE CARE OF THINGS AT HOME, OR GET ALONG WITH OTHER PEOPLE: VERY DIFFICULT
SUM OF ALL RESPONSES TO PHQ QUESTIONS 1-9: 12
SUM OF ALL RESPONSES TO PHQ QUESTIONS 1-9: 12

## 2025-07-21 ASSESSMENT — PAIN SCALES - GENERAL: PAINLEVEL_OUTOF10: NO PAIN (0)

## 2025-07-21 NOTE — PROGRESS NOTES
"Answers submitted by the patient for this visit:  Patient Health Questionnaire (Submitted on 7/21/2025)  If you checked off any problems, how difficult have these problems made it for you to do your work, take care of things at home, or get along with other people?: Very difficult  PHQ9 TOTAL SCORE: 12    Assessment & Plan   Problem List Items Addressed This Visit          Behavioral Health    ADHD    Relevant Medications    amphetamine-dextroamphetamine (ADDERALL) 15 MG tablet    amphetamine-dextroamphetamine (ADDERALL) 15 MG tablet (Start on 8/20/2025)    amphetamine-dextroamphetamine (ADDERALL) 15 MG tablet (Start on 9/19/2025)     Other Visit Diagnoses         Irregular periods    -  Primary    Relevant Orders    Physical Therapy  Referral      Constipation, unspecified constipation type        Relevant Orders    Physical Therapy  Referral      Nausea        Relevant Orders    Physical Therapy  Referral      Dysmenorrhea        Relevant Orders    Ob/Gyn  Referral    Physical Therapy  Referral           MED REC REQUIRED  Post Medication Reconciliation Status:     BMI  Estimated body mass index is 28.19 kg/m  as calculated from the following:    Height as of this encounter: 1.753 m (5' 9.02\").    Weight as of this encounter: 86.6 kg (191 lb).   Weight management plan return visit in 1 Month    I spent greater than 50% of this 30 minute appointment were spent in face to face counseling with the patient of the issues described above in the history of present illness and in the plan, including ordering referrals  The longitudinal plan of care for the diagnosis(es)/condition(s) as documented were addressed during this visit. Due to the added complexity in care, I will continue to support Antonio in the subsequent management and with ongoing continuity of care.      Yvon Alvarez is a 35 year old, presenting for the following health issues:  Hospital F/U      7/21/2025     " 9:10 AM   Additional Questions   Roomed by Mary MILLER     [facility]    ED/UC Followup:    Facility:  [facility] ED  Date of visit: 6/29/25  Reason for visit: Lower abdominal pain  Current Status: Pain improving - 0/10 today  History of Present Illness    Antonio Danielle, 35 years    Acute pelvic pain, abnormal uterine bleeding, and associated symptoms  - In mid-June 2025, developed cough and sore throat, initially thought to be allergies; took multiple home COVID tests, all negative  - Developed severe laryngitis, unable to speak for approximately 2 weeks  - [place], experienced sharp lower right-sided pelvic pain with significant vaginal pressure, consistent with prior episodes of ovarian cyst rupture  - Pain shifted to lower left side the following day, persistent and not resolving  - Presented to ER due to concern for ovarian torsion  - Underwent CT with contrast, internal and external pelvic ultrasound; imaging showed significant pelvic free fluid consistent with ruptured cyst, and a cyst on right ovary (noted as common for her), but no other explanation for symptoms  - Reported severe pain and significant nausea at that time  - Blood work in ER showed elevated white blood cell count and high inflammatory markers  - Received intramuscular antibiotics in ER and was prescribed two oral antibiotics for 14 days  - Over subsequent days, pelvic pain improved somewhat but developed upper abdominal pain, marked bloating, and persistent nausea    - Developed yeast infection during antibiotic course, required antifungal treatment; symptoms improved and did not require second dose  - Ongoing intermittent right lower quadrant pelvic pain, especially with physical activity (e.g., walking)  - Noted recurrence of pelvic pain prior to and during menses  - Menstrual cycles have become irregular: most recent period occurred 12 days after previous one; prior cycle interval was 20 days  - Reports heavy, functionally  "limiting menstrual bleeding, historically always severe but now worsening with increased frequency  - Ongoing intermittent nausea, especially associated with pain and menses; no vomiting  - Persistent bloating and upper abdominal discomfort following ER visit  - Constipation present, worsened by antibiotics; reports lifelong history of constipation, requiring ongoing attention to bowel habits      - No urinary incontinence  - Occasional pain and pressure with sexual activity, more pronounced around time of pelvic pain episodes, not consistent  - No reported diarrhea  - No reported urine leakage  - No reported vomiting    - No history of birth control use in adulthood; used in youth but discontinued due to intolerable mood side effects    Chronic heartburn  - Reports ongoing, unexplained heartburn for at least six months  - Underwent endoscopy less than a year ago, which showed no ulcers or other findings to explain symptoms    Weight management concerns  - Reports difficulty losing weight despite significant effort, leading to frustration and loss of motivation  - Expresses concern that ADHD symptoms contribute to difficulty with weight management and impulse control    ADHD and mood symptoms  - Currently taking Adderall, never had dose increased  - Reports ongoing struggles with ADHD symptoms  - History of multiple antidepressant trials without benefit; never tried Wellbutrin  - Reports that much of her depression is related to cognitive symptoms            Review of Systems  Constitutional, HEENT, cardiovascular, pulmonary, gi and gu systems are negative, except as otherwise noted.      Objective    /74 (BP Location: Right arm, Patient Position: Sitting, Cuff Size: Adult Regular)   Pulse 64   Temp 97.6  F (36.4  C) (Temporal)   Resp 15   Ht 1.753 m (5' 9.02\")   Wt 86.6 kg (191 lb)   LMP 07/08/2025 (Exact Date)   SpO2 100%   BMI 28.19 kg/m    Body mass index is 28.19 kg/m .  Physical Exam   GENERAL: " alert and no distress  EYES: Eyes grossly normal to inspection, PERRL and conjunctivae and sclerae normal  HENT: ear canals and TM's normal, nose and mouth without ulcers or lesions  NECK: no adenopathy, no asymmetry, masses, or scars  RESP: lungs clear to auscultation - no rales, rhonchi or wheezes  CV: regular rate and rhythm, normal S1 S2, no S3 or S4, no murmur, click or rub, no peripheral edema  ABDOMEN: soft, nontender, no hepatosplenomegaly, no masses and bowel sounds normal  MS: no gross musculoskeletal defects noted, no edema  PSYCH: mentation appears normal, affect normal/bright    Admission on 06/29/2025, Discharged on 06/29/2025   Component Date Value Ref Range Status    Color Urine 06/29/2025 Colorless  Colorless, Straw, Light Yellow, Yellow Final    Appearance Urine 06/29/2025 Clear  Clear Final    Glucose Urine 06/29/2025 Negative  Negative mg/dL Final    Bilirubin Urine 06/29/2025 Negative  Negative Final    Ketones Urine 06/29/2025 Negative  Negative mg/dL Final    Specific Gravity Urine 06/29/2025 1.011  1.001 - 1.030 Final    Blood Urine 06/29/2025 Negative  Negative Final    pH Urine 06/29/2025 7.0  5.0 - 7.0 Final    Protein Albumin Urine 06/29/2025 Negative  Negative mg/dL Final    Urobilinogen Urine 06/29/2025 Normal  Normal mg/dL Final    Nitrite Urine 06/29/2025 Negative  Negative Final    Leukocyte Esterase Urine 06/29/2025 250 Glenna/uL (A)  Negative Final    Bacteria Urine 06/29/2025 Moderate (A)  None Seen /HPF Final    RBC Urine 06/29/2025 1  <=2 /HPF Final    WBC Urine 06/29/2025 9 (H)  <=5 /HPF Final    Squamous Epithelials Urine 06/29/2025 2 (H)  <=1 /HPF Final    Sodium 06/29/2025 136  135 - 145 mmol/L Final    Potassium 06/29/2025 3.9  3.4 - 5.3 mmol/L Final    Carbon Dioxide (CO2) 06/29/2025 24  22 - 29 mmol/L Final    Anion Gap 06/29/2025 11  7 - 15 mmol/L Final    Urea Nitrogen 06/29/2025 13.0  6.0 - 20.0 mg/dL Final    Creatinine 06/29/2025 0.80  0.51 - 0.95 mg/dL Final    GFR  Estimate 06/29/2025 >90  >60 mL/min/1.73m2 Final    eGFR calculated using 2021 CKD-EPI equation.    Calcium 06/29/2025 9.4  8.8 - 10.4 mg/dL Final    Chloride 06/29/2025 101  98 - 107 mmol/L Final    Glucose 06/29/2025 108 (H)  70 - 99 mg/dL Final    Alkaline Phosphatase 06/29/2025 99  40 - 150 U/L Final    AST 06/29/2025 14  0 - 45 U/L Final    ALT 06/29/2025 12  0 - 50 U/L Final    Protein Total 06/29/2025 7.2  6.4 - 8.3 g/dL Final    Albumin 06/29/2025 4.1  3.5 - 5.2 g/dL Final    Bilirubin Total 06/29/2025 0.6  <=1.2 mg/dL Final    hCG Serum Qualitative 06/29/2025 Negative  Negative Final    This test is for screening purposes.  Results should be interpreted along with the clinical picture.  Confirmation testing is available if warranted by ordering [id number], HCG Quantitative Pregnancy.    Lipase 06/29/2025 24  13 - 60 U/L Final    CRP Inflammation 06/29/2025 44.10 (H)  <5.00 mg/L Final    Culture 06/29/2025 <10,000 CFU/mL Mixture of Urogenital Sissy   Final    WBC Count 06/29/2025 18.5 (H)  4.0 - 11.0 10e3/uL Final    RBC Count 06/29/2025 4.29  3.80 - 5.20 10e6/uL Final    Hemoglobin 06/29/2025 13.2  11.7 - 15.7 g/dL Final    Hematocrit 06/29/2025 40.5  35.0 - 47.0 % Final    MCV 06/29/2025 94  78 - 100 fL Final    MCH 06/29/2025 30.8  26.5 - 33.0 pg Final    MCHC 06/29/2025 32.6  31.5 - 36.5 g/dL Final    RDW 06/29/2025 12.7  10.0 - 15.0 % Final    Platelet Count 06/29/2025 238  150 - 450 10e3/uL Final    % Neutrophils 06/29/2025 87  % Final    % Lymphocytes 06/29/2025 9  % Final    % Monocytes 06/29/2025 3  % Final    % Eosinophils 06/29/2025 0  % Final    % Basophils 06/29/2025 0  % Final    % Immature Granulocytes 06/29/2025 0  % Final    NRBCs per 100 WBC 06/29/2025 0  <1 /100 Final    Absolute Neutrophils 06/29/2025 16.1 (H)  1.6 - 8.3 10e3/uL Final    Absolute Lymphocytes 06/29/2025 1.7  0.8 - 5.3 10e3/uL Final    Absolute Monocytes 06/29/2025 0.6  0.0 - 1.3 10e3/uL Final    Absolute Eosinophils 06/29/2025  0.0  0.0 - 0.7 10e3/uL Final    Absolute Basophils 06/29/2025 0.0  0.0 - 0.2 10e3/uL Final    Absolute Immature Granulocytes 06/29/2025 0.1  <=0.4 10e3/uL Final    Absolute NRBCs 06/29/2025 0.0  10e3/uL Final    Chlamydia trachomatis 06/29/2025 Negative  Negative Final    A negative result by transcription mediated amplification does not preclude the presence of C. trachomatis infection because results are dependent on proper and adequate collection, absence of inhibitors and sufficient rRNA to be detected.    Chlamydia trachomatis Specimen [place]* 06/29/2025 Vagina   Final    Neisseria gonorrhoeae 06/29/2025 Negative  Negative Final    Negative for N. gonorrhoeae rRNA by transcription mediated amplification. A negative result by transcription mediated amplification does not preclude the presence of C. trachomatis infection because results are dependent on proper and adequate collection, absence of inhibitors and sufficient rRNA to be detected.    Neisseria gonorrhoeae Specimen [place]* 06/29/2025 Vagina   Final           Signed Electronically by: CHEY Hernandez CNP

## 2025-07-22 ENCOUNTER — PATIENT OUTREACH (OUTPATIENT)
Dept: CARE COORDINATION | Facility: CLINIC | Age: 35
End: 2025-07-22
Payer: COMMERCIAL

## 2025-07-24 ENCOUNTER — PATIENT OUTREACH (OUTPATIENT)
Dept: CARE COORDINATION | Facility: CLINIC | Age: 35
End: 2025-07-24
Payer: COMMERCIAL

## 2025-07-27 ENCOUNTER — E-VISIT (OUTPATIENT)
Dept: URGENT CARE | Facility: CLINIC | Age: 35
End: 2025-07-27
Payer: COMMERCIAL

## 2025-07-27 DIAGNOSIS — B37.31 CANDIDAL VULVOVAGINITIS: ICD-10-CM

## 2025-07-27 RX ORDER — FLUCONAZOLE 150 MG/1
TABLET ORAL
Qty: 2 TABLET | Refills: 0 | Status: SHIPPED | OUTPATIENT
Start: 2025-07-27

## 2025-07-27 NOTE — PATIENT INSTRUCTIONS
Thank you for choosing us for your care. I have placed an order for a prescription so that you can start treatment. View your full visit summary for details by clicking on the link below. Your pharmacist will able to address any questions you may have about the medication.     If you re not feeling better within 2-3 days, please schedule an appointment.  You can schedule an appointment right here in [facility], or call [phone]  If the visit is for the same symptoms as your eVisit, we ll refund the cost of your eVisit if seen within seven days.

## 2025-08-24 DIAGNOSIS — F51.05 INSOMNIA DUE TO OTHER MENTAL DISORDER: ICD-10-CM

## 2025-08-24 DIAGNOSIS — F99 INSOMNIA DUE TO OTHER MENTAL DISORDER: ICD-10-CM

## 2025-08-24 DIAGNOSIS — F90.2 ATTENTION DEFICIT HYPERACTIVITY DISORDER (ADHD), COMBINED TYPE: ICD-10-CM

## 2025-08-25 RX ORDER — DEXTROAMPHETAMINE SACCHARATE, AMPHETAMINE ASPARTATE, DEXTROAMPHETAMINE SULFATE AND AMPHETAMINE SULFATE 3.75; 3.75; 3.75; 3.75 MG/1; MG/1; MG/1; MG/1
15 TABLET ORAL 2 TIMES DAILY
Qty: 60 TABLET | Refills: 0 | Status: SHIPPED | OUTPATIENT
Start: 2025-10-24 | End: 2025-11-23

## 2025-08-25 RX ORDER — ZOLPIDEM TARTRATE 5 MG/1
5 TABLET ORAL
Qty: 14 TABLET | Refills: 0 | Status: SHIPPED | OUTPATIENT
Start: 2025-10-20

## 2025-08-25 RX ORDER — DEXTROAMPHETAMINE SACCHARATE, AMPHETAMINE ASPARTATE, DEXTROAMPHETAMINE SULFATE AND AMPHETAMINE SULFATE 3.75; 3.75; 3.75; 3.75 MG/1; MG/1; MG/1; MG/1
15 TABLET ORAL 2 TIMES DAILY
Qty: 60 TABLET | Refills: 0 | Status: SHIPPED | OUTPATIENT
Start: 2025-09-24 | End: 2025-10-24

## 2025-08-25 RX ORDER — ZOLPIDEM TARTRATE 5 MG/1
5 TABLET ORAL
Qty: 14 TABLET | Refills: 0 | Status: SHIPPED | OUTPATIENT
Start: 2025-09-22

## 2025-08-25 RX ORDER — ZOLPIDEM TARTRATE 5 MG/1
TABLET ORAL
Qty: 14 TABLET | Refills: 0 | Status: SHIPPED | OUTPATIENT
Start: 2025-08-25

## 2025-08-25 RX ORDER — DEXTROAMPHETAMINE SACCHARATE, AMPHETAMINE ASPARTATE, DEXTROAMPHETAMINE SULFATE AND AMPHETAMINE SULFATE 3.75; 3.75; 3.75; 3.75 MG/1; MG/1; MG/1; MG/1
15 TABLET ORAL 2 TIMES DAILY
Qty: 60 TABLET | Refills: 0 | OUTPATIENT
Start: 2025-08-25

## 2025-08-25 RX ORDER — DEXTROAMPHETAMINE SACCHARATE, AMPHETAMINE ASPARTATE, DEXTROAMPHETAMINE SULFATE AND AMPHETAMINE SULFATE 3.75; 3.75; 3.75; 3.75 MG/1; MG/1; MG/1; MG/1
15 TABLET ORAL 2 TIMES DAILY
Qty: 60 TABLET | Refills: 0 | Status: SHIPPED | OUTPATIENT
Start: 2025-08-25 | End: 2025-09-24

## 2025-09-03 ENCOUNTER — OFFICE VISIT (OUTPATIENT)
Dept: OBGYN | Facility: CLINIC | Age: 35
End: 2025-09-03
Payer: COMMERCIAL

## 2025-09-03 VITALS
HEART RATE: 64 BPM | SYSTOLIC BLOOD PRESSURE: 113 MMHG | BODY MASS INDEX: 27.31 KG/M2 | WEIGHT: 185 LBS | DIASTOLIC BLOOD PRESSURE: 74 MMHG

## 2025-09-03 DIAGNOSIS — R10.2 ACUTE PELVIC PAIN, FEMALE: ICD-10-CM

## 2025-09-03 DIAGNOSIS — N80.9 ENDOMETRIOSIS: Primary | ICD-10-CM

## 2025-09-03 PROCEDURE — 3078F DIAST BP <80 MM HG: CPT | Performed by: OBSTETRICS & GYNECOLOGY

## 2025-09-03 PROCEDURE — 99204 OFFICE O/P NEW MOD 45 MIN: CPT | Performed by: OBSTETRICS & GYNECOLOGY

## 2025-09-03 PROCEDURE — 3074F SYST BP LT 130 MM HG: CPT | Performed by: OBSTETRICS & GYNECOLOGY

## 2025-09-03 RX ORDER — OXYCODONE HYDROCHLORIDE 5 MG/1
5-10 TABLET ORAL EVERY 6 HOURS PRN
Qty: 12 TABLET | Refills: 0 | Status: SHIPPED | OUTPATIENT
Start: 2025-09-03 | End: 2025-09-06

## (undated) DEVICE — ESU GROUND PAD UNIVERSAL W/O CORD

## (undated) DEVICE — SUCTION CANISTER MEDIVAC LINER 3000ML W/LID 65651-530

## (undated) DEVICE — DRSG STERI STRIP 1/2X4" R1547

## (undated) DEVICE — SU VICRYL 3-0 SH 27" J316H

## (undated) DEVICE — CATH INTERMITTENT CLEAN-CATH FEMALE 14FR 6" VINYL LF 420614

## (undated) DEVICE — SUCTION CANNULA UTERINE 08MM CVD  20317

## (undated) DEVICE — SOL NACL 0.9% IRRIG 1000ML BOTTLE 07138-09

## (undated) DEVICE — GLOVE PROTEXIS W/NEU-THERA 6.0  2D73TE60

## (undated) DEVICE — PREP CHLORAPREP 26ML TINTED ORANGE  260815

## (undated) DEVICE — SU VICRYL 0 CT 36" J358H

## (undated) DEVICE — SU MONOCRYL 4-0 PS-2 18" UND Y496G

## (undated) DEVICE — SU PDS II 0 CTX 60" Z990G

## (undated) DEVICE — GLOVE PROTEXIS POWDER FREE 7.0 ORTHOPEDIC 2D73ET70

## (undated) DEVICE — LINEN C-SECTION 5415

## (undated) DEVICE — LINEN TOWEL PACK X5 5464

## (undated) DEVICE — SOL WATER IRRIG 1000ML BOTTLE 2F7114

## (undated) DEVICE — PACK TVT HYSTEROSCOPY SMA15HYFSE

## (undated) DEVICE — PACK C-SECTION LF PL15OTA83B

## (undated) DEVICE — BLADE CLIPPER 4406

## (undated) DEVICE — CATH TRAY FOLEY 16FR BARDEX W/DRAIN BAG STATLOCK 300316A

## (undated) DEVICE — DRAPE POUCH IRR 1016

## (undated) DEVICE — TUBING VACUUM COLLECTION 6FT 23116

## (undated) RX ORDER — PROPOFOL 10 MG/ML
INJECTION, EMULSION INTRAVENOUS
Status: DISPENSED
Start: 2019-04-04

## (undated) RX ORDER — ACETAMINOPHEN 325 MG/1
TABLET ORAL
Status: DISPENSED
Start: 2019-04-04

## (undated) RX ORDER — HYDROCODONE BITARTRATE AND ACETAMINOPHEN 5; 325 MG/1; MG/1
TABLET ORAL
Status: DISPENSED
Start: 2019-04-04

## (undated) RX ORDER — ONDANSETRON 2 MG/ML
INJECTION INTRAMUSCULAR; INTRAVENOUS
Status: DISPENSED
Start: 2019-04-04

## (undated) RX ORDER — OXYTOCIN/0.9 % SODIUM CHLORIDE 30/500 ML
PLASTIC BAG, INJECTION (ML) INTRAVENOUS
Status: DISPENSED
Start: 2020-11-12

## (undated) RX ORDER — DEXAMETHASONE SODIUM PHOSPHATE 4 MG/ML
INJECTION, SOLUTION INTRA-ARTICULAR; INTRALESIONAL; INTRAMUSCULAR; INTRAVENOUS; SOFT TISSUE
Status: DISPENSED
Start: 2019-04-04

## (undated) RX ORDER — DOXYCYCLINE 100 MG/10ML
INJECTION, POWDER, LYOPHILIZED, FOR SOLUTION INTRAVENOUS
Status: DISPENSED
Start: 2019-04-04

## (undated) RX ORDER — FENTANYL CITRATE 50 UG/ML
INJECTION, SOLUTION INTRAMUSCULAR; INTRAVENOUS
Status: DISPENSED
Start: 2019-04-04

## (undated) RX ORDER — MORPHINE SULFATE 1 MG/ML
INJECTION, SOLUTION EPIDURAL; INTRATHECAL; INTRAVENOUS
Status: DISPENSED
Start: 2020-11-12

## (undated) RX ORDER — LIDOCAINE HYDROCHLORIDE 20 MG/ML
INJECTION, SOLUTION EPIDURAL; INFILTRATION; INTRACAUDAL; PERINEURAL
Status: DISPENSED
Start: 2019-04-04

## (undated) RX ORDER — HYDROXYZINE HYDROCHLORIDE 50 MG/1
TABLET, FILM COATED ORAL
Status: DISPENSED
Start: 2019-04-04